# Patient Record
Sex: FEMALE | Race: WHITE | NOT HISPANIC OR LATINO | Employment: OTHER | URBAN - METROPOLITAN AREA
[De-identification: names, ages, dates, MRNs, and addresses within clinical notes are randomized per-mention and may not be internally consistent; named-entity substitution may affect disease eponyms.]

---

## 2017-01-30 ENCOUNTER — APPOINTMENT (OUTPATIENT)
Dept: LAB | Facility: CLINIC | Age: 82
End: 2017-01-30
Payer: COMMERCIAL

## 2017-01-30 ENCOUNTER — TRANSCRIBE ORDERS (OUTPATIENT)
Dept: LAB | Facility: CLINIC | Age: 82
End: 2017-01-30

## 2017-01-30 DIAGNOSIS — I10 ESSENTIAL HYPERTENSION, MALIGNANT: Primary | ICD-10-CM

## 2017-01-30 DIAGNOSIS — E78.00 PURE HYPERCHOLESTEROLEMIA: ICD-10-CM

## 2017-01-30 LAB
ALBUMIN SERPL BCP-MCNC: 3.4 G/DL (ref 3.5–5)
ALP SERPL-CCNC: 77 U/L (ref 46–116)
ALT SERPL W P-5'-P-CCNC: 33 U/L (ref 12–78)
ANION GAP SERPL CALCULATED.3IONS-SCNC: 7 MMOL/L (ref 4–13)
AST SERPL W P-5'-P-CCNC: 18 U/L (ref 5–45)
BILIRUB DIRECT SERPL-MCNC: 0.14 MG/DL (ref 0–0.2)
BILIRUB SERPL-MCNC: 0.53 MG/DL (ref 0.2–1)
BUN SERPL-MCNC: 7 MG/DL (ref 5–25)
CALCIUM SERPL-MCNC: 8.5 MG/DL (ref 8.3–10.1)
CHLORIDE SERPL-SCNC: 108 MMOL/L (ref 100–108)
CHOLEST SERPL-MCNC: 119 MG/DL (ref 50–200)
CK SERPL-CCNC: 51 U/L (ref 26–192)
CO2 SERPL-SCNC: 27 MMOL/L (ref 21–32)
CREAT SERPL-MCNC: 0.79 MG/DL (ref 0.6–1.3)
GFR SERPL CREATININE-BSD FRML MDRD: >60 ML/MIN/1.73SQ M
GLUCOSE SERPL-MCNC: 100 MG/DL (ref 65–140)
HDLC SERPL-MCNC: 50 MG/DL (ref 40–60)
LDLC SERPL CALC-MCNC: 48 MG/DL (ref 0–100)
POTASSIUM SERPL-SCNC: 4.2 MMOL/L (ref 3.5–5.3)
PROT SERPL-MCNC: 6.8 G/DL (ref 6.4–8.2)
SODIUM SERPL-SCNC: 142 MMOL/L (ref 136–145)
TRIGL SERPL-MCNC: 107 MG/DL

## 2017-01-30 PROCEDURE — 36415 COLL VENOUS BLD VENIPUNCTURE: CPT

## 2017-01-30 PROCEDURE — 80076 HEPATIC FUNCTION PANEL: CPT

## 2017-01-30 PROCEDURE — 82550 ASSAY OF CK (CPK): CPT

## 2017-01-30 PROCEDURE — 80061 LIPID PANEL: CPT

## 2017-01-30 PROCEDURE — 80048 BASIC METABOLIC PNL TOTAL CA: CPT

## 2017-03-20 ENCOUNTER — APPOINTMENT (EMERGENCY)
Dept: RADIOLOGY | Facility: HOSPITAL | Age: 82
End: 2017-03-20
Payer: COMMERCIAL

## 2017-03-20 ENCOUNTER — HOSPITAL ENCOUNTER (EMERGENCY)
Facility: HOSPITAL | Age: 82
Discharge: HOME | End: 2017-03-20
Attending: EMERGENCY MEDICINE | Admitting: EMERGENCY MEDICINE
Payer: COMMERCIAL

## 2017-03-20 VITALS
WEIGHT: 150 LBS | OXYGEN SATURATION: 95 % | SYSTOLIC BLOOD PRESSURE: 128 MMHG | RESPIRATION RATE: 16 BRPM | DIASTOLIC BLOOD PRESSURE: 61 MMHG | HEART RATE: 68 BPM | TEMPERATURE: 98.4 F | BODY MASS INDEX: 28.32 KG/M2 | HEIGHT: 61 IN

## 2017-03-20 DIAGNOSIS — R49.0 HOARSENESS OF VOICE: ICD-10-CM

## 2017-03-20 DIAGNOSIS — J20.9 ACUTE BRONCHITIS: Primary | ICD-10-CM

## 2017-03-20 LAB
ANION GAP SERPL CALCULATED.3IONS-SCNC: 9 MMOL/L (ref 4–13)
BASOPHILS # BLD AUTO: 0 THOUSANDS/ΜL (ref 0–0.1)
BASOPHILS NFR BLD AUTO: 1 % (ref 0–1)
BUN SERPL-MCNC: 10 MG/DL (ref 5–25)
CALCIUM SERPL-MCNC: 8.4 MG/DL (ref 8.3–10.1)
CHLORIDE SERPL-SCNC: 102 MMOL/L (ref 100–108)
CO2 SERPL-SCNC: 27 MMOL/L (ref 21–32)
CREAT SERPL-MCNC: 0.88 MG/DL (ref 0.6–1.3)
EOSINOPHIL # BLD AUTO: 0.2 THOUSAND/ΜL (ref 0–0.61)
EOSINOPHIL NFR BLD AUTO: 3 % (ref 0–6)
ERYTHROCYTE [DISTWIDTH] IN BLOOD BY AUTOMATED COUNT: 12.9 % (ref 11.6–15.1)
GFR SERPL CREATININE-BSD FRML MDRD: >60 ML/MIN/1.73SQ M
GLUCOSE SERPL-MCNC: 110 MG/DL (ref 65–140)
HCT VFR BLD AUTO: 37 % (ref 37–47)
HGB BLD-MCNC: 12.2 G/DL (ref 12–16)
LYMPHOCYTES # BLD AUTO: 1.3 THOUSANDS/ΜL (ref 0.6–4.47)
LYMPHOCYTES NFR BLD AUTO: 18 % (ref 14–44)
MCH RBC QN AUTO: 30.9 PG (ref 27–31)
MCHC RBC AUTO-ENTMCNC: 32.9 G/DL (ref 31.4–37.4)
MCV RBC AUTO: 94 FL (ref 82–98)
MONOCYTES # BLD AUTO: 0.7 THOUSAND/ΜL (ref 0.17–1.22)
MONOCYTES NFR BLD AUTO: 9 % (ref 4–12)
NEUTROPHILS # BLD AUTO: 5.3 THOUSANDS/ΜL (ref 1.85–7.62)
NEUTS SEG NFR BLD AUTO: 70 % (ref 43–75)
NRBC BLD AUTO-RTO: 0 /100 WBCS
PLATELET # BLD AUTO: 262 THOUSANDS/UL (ref 130–400)
PMV BLD AUTO: 8.1 FL (ref 8.9–12.7)
POTASSIUM SERPL-SCNC: 3.6 MMOL/L (ref 3.5–5.3)
RBC # BLD AUTO: 3.94 MILLION/UL (ref 4.2–5.4)
SODIUM SERPL-SCNC: 138 MMOL/L (ref 136–145)
WBC # BLD AUTO: 7.5 THOUSAND/UL (ref 4.8–10.8)

## 2017-03-20 PROCEDURE — 80048 BASIC METABOLIC PNL TOTAL CA: CPT | Performed by: EMERGENCY MEDICINE

## 2017-03-20 PROCEDURE — 85025 COMPLETE CBC W/AUTO DIFF WBC: CPT | Performed by: EMERGENCY MEDICINE

## 2017-03-20 PROCEDURE — 36415 COLL VENOUS BLD VENIPUNCTURE: CPT | Performed by: EMERGENCY MEDICINE

## 2017-03-20 PROCEDURE — 99285 EMERGENCY DEPT VISIT HI MDM: CPT

## 2017-03-20 PROCEDURE — 93005 ELECTROCARDIOGRAM TRACING: CPT

## 2017-03-20 PROCEDURE — 71010 HB CHEST X-RAY 1 VIEW FRONTAL (PORTABLE): CPT

## 2017-03-20 PROCEDURE — 94640 AIRWAY INHALATION TREATMENT: CPT

## 2017-03-20 RX ORDER — AZITHROMYCIN 250 MG/1
250 TABLET, FILM COATED ORAL DAILY
Qty: 4 TABLET | Refills: 0 | Status: SHIPPED | OUTPATIENT
Start: 2017-03-20 | End: 2017-03-24

## 2017-03-20 RX ORDER — ALBUTEROL SULFATE 90 UG/1
1 AEROSOL, METERED RESPIRATORY (INHALATION) 4 TIMES DAILY
Qty: 1 INHALER | Refills: 0 | Status: SHIPPED | OUTPATIENT
Start: 2017-03-20 | End: 2017-03-25

## 2017-03-20 RX ORDER — AZITHROMYCIN 250 MG/1
500 TABLET, FILM COATED ORAL ONCE
Status: COMPLETED | OUTPATIENT
Start: 2017-03-20 | End: 2017-03-20

## 2017-03-20 RX ORDER — ALBUTEROL SULFATE 2.5 MG/3ML
2.5 SOLUTION RESPIRATORY (INHALATION) ONCE
Status: COMPLETED | OUTPATIENT
Start: 2017-03-20 | End: 2017-03-20

## 2017-03-20 RX ADMIN — AZITHROMYCIN 500 MG: 250 TABLET, FILM COATED ORAL at 16:18

## 2017-03-20 RX ADMIN — ALBUTEROL SULFATE 2.5 MG: 2.5 SOLUTION RESPIRATORY (INHALATION) at 14:32

## 2017-03-21 LAB
ATRIAL RATE: 86 BPM
P AXIS: 55 DEGREES
PR INTERVAL: 156 MS
QRS AXIS: 52 DEGREES
QRSD INTERVAL: 84 MS
QT INTERVAL: 366 MS
QTC INTERVAL: 437 MS
T WAVE AXIS: 39 DEGREES
VENTRICULAR RATE: 86 BPM

## 2018-01-05 ENCOUNTER — APPOINTMENT (OUTPATIENT)
Dept: LAB | Facility: CLINIC | Age: 83
End: 2018-01-05
Payer: COMMERCIAL

## 2018-01-05 ENCOUNTER — TRANSCRIBE ORDERS (OUTPATIENT)
Dept: LAB | Facility: CLINIC | Age: 83
End: 2018-01-05

## 2018-01-05 DIAGNOSIS — R06.02 SHORTNESS OF BREATH: ICD-10-CM

## 2018-01-05 DIAGNOSIS — I10 ESSENTIAL HYPERTENSION, BENIGN: Primary | ICD-10-CM

## 2018-01-05 DIAGNOSIS — R07.9 CHEST PAIN, UNSPECIFIED TYPE: ICD-10-CM

## 2018-01-05 LAB
ALBUMIN SERPL BCP-MCNC: 3.5 G/DL (ref 3.5–5)
ALP SERPL-CCNC: 71 U/L (ref 46–116)
ALT SERPL W P-5'-P-CCNC: 26 U/L (ref 12–78)
ANION GAP SERPL CALCULATED.3IONS-SCNC: 6 MMOL/L (ref 4–13)
AST SERPL W P-5'-P-CCNC: 18 U/L (ref 5–45)
BILIRUB DIRECT SERPL-MCNC: 0.14 MG/DL (ref 0–0.2)
BILIRUB SERPL-MCNC: 0.58 MG/DL (ref 0.2–1)
BUN SERPL-MCNC: 9 MG/DL (ref 5–25)
CALCIUM SERPL-MCNC: 8.3 MG/DL (ref 8.3–10.1)
CHLORIDE SERPL-SCNC: 108 MMOL/L (ref 100–108)
CHOLEST SERPL-MCNC: 140 MG/DL (ref 50–200)
CK SERPL-CCNC: 83 U/L (ref 26–192)
CO2 SERPL-SCNC: 28 MMOL/L (ref 21–32)
CREAT SERPL-MCNC: 0.8 MG/DL (ref 0.6–1.3)
GFR SERPL CREATININE-BSD FRML MDRD: 70 ML/MIN/1.73SQ M
GLUCOSE P FAST SERPL-MCNC: 93 MG/DL (ref 65–99)
HDLC SERPL-MCNC: 54 MG/DL (ref 40–60)
LDLC SERPL CALC-MCNC: 62 MG/DL (ref 0–100)
POTASSIUM SERPL-SCNC: 4 MMOL/L (ref 3.5–5.3)
PROT SERPL-MCNC: 6.8 G/DL (ref 6.4–8.2)
SODIUM SERPL-SCNC: 142 MMOL/L (ref 136–145)
TRIGL SERPL-MCNC: 121 MG/DL

## 2018-01-05 PROCEDURE — 80076 HEPATIC FUNCTION PANEL: CPT

## 2018-01-05 PROCEDURE — 82550 ASSAY OF CK (CPK): CPT

## 2018-01-05 PROCEDURE — 80048 BASIC METABOLIC PNL TOTAL CA: CPT

## 2018-01-05 PROCEDURE — 80061 LIPID PANEL: CPT

## 2018-01-05 PROCEDURE — 36415 COLL VENOUS BLD VENIPUNCTURE: CPT

## 2018-01-11 NOTE — RESULT NOTES
Verified Results  (1) HEMOGLOBIN A1C [id number] 12:50PM Michelle Kade     Test Name Result Flag Reference   HEMOGLOBIN A1C 5 7 %  4 2-6 3   EST  AVG   GLUCOSE 117 mg/dl     PLEASE DO FROM EXISTING BLOOD SAMPLE    PLEASE DO FROM EXISTING BLOOD SAMPLE

## 2018-01-16 NOTE — PROCEDURES
Procedures by Eliot Jacobsen MD  at 2016  9:27 AM      Author:  Eliot Jacobsen MD Service:  Neurology Author Type:  Physician     Filed:  2016  9:35 AM Date of Service:  2016  9:27 AM Status:  Signed     :  Eliot Jacobsen MD (Physician)            ELECTROENCEPHALOGRAM (EEG)      Patient Name:  Thom Lozano  MRN: [id number]   :  1935 File #: Luis Santo   Age: 68 y o  Encounter #: [id number]   Date performed:16            Report date: 2016          Study type: Routine EEG    ICD 10 diagnosis: Spells/Fit NOS R56 9    Start time: 08:22 End time: 08:55     -------------------------------------------------------------------------------------------------------------------   Patient History:  68year old female presented to ER with acute onset speech difficulty and left-sided weakness  Had a  stroke in 2016 with  almost complete recovery except  for some persistent speech problems  On this admission, patient received TPA, after which MRI showed small amount of subarachnoid blood as well as subdural hematoma in left parietal area  -------------------------------------------------------------------------------------------------------------------   Description of Procedure:  ·  32 channel digital recording with electrodes placed according to the International 10-20 system with additional  T1/T2 electrodes, EOG, EKG, and simultaneous  video  The recording was technically satisfactory  -------------------------------------------------------------------------------------------------------------------   Results:  Background Activity:  The recording was performed with the patient awake  During wakefulness, there were long runs of well regulated/regular,  low amplitude, posteriorly dominant, symmetric 9-10 Hz activity that  did attenuate with eye opening  -------------------------------------------------------------------------------------------------------------------   Activation Procedures:  Hyperventilation was not performed  Stepped photic stimulation between 1-20 cps was performed and induced symmetric photic  driving     -------------------------------------------------------------------------------------------------------------------   Abnormal Findings:  Continuous low to mid amplitude irregular theta with some intermixed delta slowing was noted at F7 T3  No interictal epileptiform discharges were noted  No electrographic seizures were observed during this recording     -------------------------------------------------------------------------------------------------------------------  Other Findings: The single lead ECG demonstrated a regular rhythm     -------------------------------------------------------------------------------------------------------------------  Events:  No patient push button events  -------------------------------------------------------------------------------------------------------------------   EEG Interpretation:  Moderately abnormal 28 minute EEG, due to nearly continuous irregular theta with some intermixed delta slowing in the left anterior temporal     No interictal epileptiform discharges were noted  No electrographic seizures occurred during the recording  Scribe Attestation:  Documented by Haley Gaspar, acting as a scribe for Dr Funmilayo Bales on 9/13/2016  at 9:34 PM     Physician Attestation:  All documentation recorded by the scribe accurately reflects the service I personally performed and the decisions made by me  I was present during the time the encounter was recorded and have reviewed all the documentation and confirm that it is all accurate  and representative of the encounter        Dale Davis MD   Medical Director  [facility] [place]  Pager # [phone] [phone]    Sep 13 2016  9:35AM [facility] Standard Time

## 2018-06-14 ENCOUNTER — TRANSCRIBE ORDERS (OUTPATIENT)
Dept: ADMINISTRATIVE | Facility: HOSPITAL | Age: 83
End: 2018-06-14

## 2018-06-14 DIAGNOSIS — R13.10 PROBLEMS WITH SWALLOWING AND MASTICATION: ICD-10-CM

## 2018-06-14 DIAGNOSIS — R27.0 ATAXIA: Primary | ICD-10-CM

## 2018-06-20 ENCOUNTER — HOSPITAL ENCOUNTER (OUTPATIENT)
Dept: RADIOLOGY | Facility: HOSPITAL | Age: 83
Discharge: HOME | End: 2018-06-20
Payer: COMMERCIAL

## 2018-06-20 DIAGNOSIS — R13.10 PROBLEMS WITH SWALLOWING AND MASTICATION: ICD-10-CM

## 2018-06-20 PROCEDURE — 74220 X-RAY XM ESOPHAGUS 1CNTRST: CPT

## 2018-07-12 ENCOUNTER — HOSPITAL ENCOUNTER (OUTPATIENT)
Dept: RADIOLOGY | Facility: HOSPITAL | Age: 83
Discharge: HOME | End: 2018-07-12
Payer: COMMERCIAL

## 2018-07-12 DIAGNOSIS — R27.0 ATAXIA: ICD-10-CM

## 2018-07-12 PROCEDURE — 70551 MRI BRAIN STEM W/O DYE: CPT

## 2018-07-23 ENCOUNTER — APPOINTMENT (OUTPATIENT)
Dept: LAB | Facility: CLINIC | Age: 83
End: 2018-07-23
Payer: COMMERCIAL

## 2018-07-23 ENCOUNTER — TRANSCRIBE ORDERS (OUTPATIENT)
Dept: LAB | Facility: CLINIC | Age: 83
End: 2018-07-23

## 2018-07-23 DIAGNOSIS — L28.1 PRURIGO NODULARIS: Primary | ICD-10-CM

## 2018-07-23 DIAGNOSIS — L29.8 PRURITUS SENILIS: ICD-10-CM

## 2018-07-23 LAB
ALBUMIN SERPL BCP-MCNC: 3.4 G/DL (ref 3.5–5)
ALP SERPL-CCNC: 78 U/L (ref 46–116)
ALT SERPL W P-5'-P-CCNC: 25 U/L (ref 12–78)
ANION GAP SERPL CALCULATED.3IONS-SCNC: 5 MMOL/L (ref 4–13)
AST SERPL W P-5'-P-CCNC: 21 U/L (ref 5–45)
BASOPHILS # BLD AUTO: 0.06 THOUSANDS/ΜL (ref 0–0.1)
BASOPHILS NFR BLD AUTO: 1 % (ref 0–1)
BILIRUB SERPL-MCNC: 0.31 MG/DL (ref 0.2–1)
BILIRUB UR QL STRIP: NEGATIVE
BUN SERPL-MCNC: 9 MG/DL (ref 5–25)
CALCIUM SERPL-MCNC: 8.3 MG/DL (ref 8.3–10.1)
CHLORIDE SERPL-SCNC: 108 MMOL/L (ref 100–108)
CLARITY UR: CLEAR
CO2 SERPL-SCNC: 27 MMOL/L (ref 21–32)
COLOR UR: YELLOW
CREAT SERPL-MCNC: 0.94 MG/DL (ref 0.6–1.3)
EOSINOPHIL # BLD AUTO: 0.16 THOUSAND/ΜL (ref 0–0.61)
EOSINOPHIL NFR BLD AUTO: 3 % (ref 0–6)
ERYTHROCYTE [DISTWIDTH] IN BLOOD BY AUTOMATED COUNT: 12.8 % (ref 11.6–15.1)
ERYTHROCYTE [SEDIMENTATION RATE] IN BLOOD: 13 MM/HOUR (ref 0–20)
GFR SERPL CREATININE-BSD FRML MDRD: 57 ML/MIN/1.73SQ M
GLUCOSE P FAST SERPL-MCNC: 98 MG/DL (ref 65–99)
GLUCOSE UR STRIP-MCNC: NEGATIVE MG/DL
HCT VFR BLD AUTO: 42.3 % (ref 34.8–46.1)
HGB BLD-MCNC: 13.5 G/DL (ref 11.5–15.4)
HGB UR QL STRIP.AUTO: NEGATIVE
IMM GRANULOCYTES # BLD AUTO: 0.01 THOUSAND/UL (ref 0–0.2)
IMM GRANULOCYTES NFR BLD AUTO: 0 % (ref 0–2)
KETONES UR STRIP-MCNC: NEGATIVE MG/DL
LEUKOCYTE ESTERASE UR QL STRIP: NEGATIVE
LYMPHOCYTES # BLD AUTO: 1.63 THOUSANDS/ΜL (ref 0.6–4.47)
LYMPHOCYTES NFR BLD AUTO: 25 % (ref 14–44)
MCH RBC QN AUTO: 31.6 PG (ref 26.8–34.3)
MCHC RBC AUTO-ENTMCNC: 31.9 G/DL (ref 31.4–37.4)
MCV RBC AUTO: 99 FL (ref 82–98)
MONOCYTES # BLD AUTO: 0.45 THOUSAND/ΜL (ref 0.17–1.22)
MONOCYTES NFR BLD AUTO: 7 % (ref 4–12)
NEUTROPHILS # BLD AUTO: 4.12 THOUSANDS/ΜL (ref 1.85–7.62)
NEUTS SEG NFR BLD AUTO: 64 % (ref 43–75)
NITRITE UR QL STRIP: NEGATIVE
NRBC BLD AUTO-RTO: 0 /100 WBCS
PH UR STRIP.AUTO: 6 [PH] (ref 4.5–8)
PLATELET # BLD AUTO: 257 THOUSANDS/UL (ref 149–390)
PMV BLD AUTO: 11.2 FL (ref 8.9–12.7)
POTASSIUM SERPL-SCNC: 3.9 MMOL/L (ref 3.5–5.3)
PROT SERPL-MCNC: 7 G/DL (ref 6.4–8.2)
PROT UR STRIP-MCNC: NEGATIVE MG/DL
RBC # BLD AUTO: 4.27 MILLION/UL (ref 3.81–5.12)
SODIUM SERPL-SCNC: 140 MMOL/L (ref 136–145)
SP GR UR STRIP.AUTO: 1.01 (ref 1–1.03)
TSH SERPL DL<=0.05 MIU/L-ACNC: 1.07 UIU/ML (ref 0.36–3.74)
UROBILINOGEN UR QL STRIP.AUTO: 0.2 E.U./DL
WBC # BLD AUTO: 6.43 THOUSAND/UL (ref 4.31–10.16)

## 2018-07-23 PROCEDURE — 85025 COMPLETE CBC W/AUTO DIFF WBC: CPT

## 2018-07-23 PROCEDURE — 84166 PROTEIN E-PHORESIS/URINE/CSF: CPT

## 2018-07-23 PROCEDURE — 80053 COMPREHEN METABOLIC PANEL: CPT

## 2018-07-23 PROCEDURE — 84166 PROTEIN E-PHORESIS/URINE/CSF: CPT | Performed by: PATHOLOGY

## 2018-07-23 PROCEDURE — 85652 RBC SED RATE AUTOMATED: CPT

## 2018-07-23 PROCEDURE — 84443 ASSAY THYROID STIM HORMONE: CPT

## 2018-07-23 PROCEDURE — 36415 COLL VENOUS BLD VENIPUNCTURE: CPT

## 2018-07-23 PROCEDURE — 81003 URINALYSIS AUTO W/O SCOPE: CPT

## 2018-07-26 LAB
ALBUMIN UR ELPH-MCNC: 100 %
ALPHA1 GLOB MFR UR ELPH: 0 %
ALPHA2 GLOB MFR UR ELPH: 0 %
B-GLOBULIN MFR UR ELPH: 0 %
GAMMA GLOB MFR UR ELPH: 0 %
PROT PATTERN UR ELPH-IMP: NORMAL
PROT UR-MCNC: 6 MG/DL

## 2018-07-27 ENCOUNTER — APPOINTMENT (OUTPATIENT)
Dept: LAB | Facility: CLINIC | Age: 83
End: 2018-07-27
Payer: COMMERCIAL

## 2018-07-27 ENCOUNTER — TRANSCRIBE ORDERS (OUTPATIENT)
Dept: LAB | Facility: CLINIC | Age: 83
End: 2018-07-27

## 2018-07-27 DIAGNOSIS — L29.8 PRURITUS SENILIS: ICD-10-CM

## 2018-07-27 DIAGNOSIS — L28.1 PRURIGO NODULARIS: Primary | ICD-10-CM

## 2018-07-27 PROCEDURE — 36415 COLL VENOUS BLD VENIPUNCTURE: CPT

## 2018-07-27 PROCEDURE — 84165 PROTEIN E-PHORESIS SERUM: CPT | Performed by: PATHOLOGY

## 2018-07-27 PROCEDURE — 84165 PROTEIN E-PHORESIS SERUM: CPT

## 2018-07-31 LAB
ALBUMIN SERPL ELPH-MCNC: 4.04 G/DL (ref 3.5–5)
ALBUMIN SERPL ELPH-MCNC: 60.3 % (ref 52–65)
ALPHA1 GLOB SERPL ELPH-MCNC: 0.29 G/DL (ref 0.1–0.4)
ALPHA1 GLOB SERPL ELPH-MCNC: 4.4 % (ref 2.5–5)
ALPHA2 GLOB SERPL ELPH-MCNC: 0.82 G/DL (ref 0.4–1.2)
ALPHA2 GLOB SERPL ELPH-MCNC: 12.3 % (ref 7–13)
BETA GLOB ABNORMAL SERPL ELPH-MCNC: 0.44 G/DL (ref 0.4–0.8)
BETA1 GLOB SERPL ELPH-MCNC: 6.5 % (ref 5–13)
BETA2 GLOB SERPL ELPH-MCNC: 5.3 % (ref 2–8)
BETA2+GAMMA GLOB SERPL ELPH-MCNC: 0.36 G/DL (ref 0.2–0.5)
GAMMA GLOB ABNORMAL SERPL ELPH-MCNC: 0.75 G/DL (ref 0.5–1.6)
GAMMA GLOB SERPL ELPH-MCNC: 11.2 % (ref 12–22)
IGG/ALB SER: 1.52 {RATIO} (ref 1.1–1.8)
PROT PATTERN SERPL ELPH-IMP: ABNORMAL
PROT SERPL-MCNC: 6.7 G/DL (ref 6.4–8.2)

## 2018-08-17 ENCOUNTER — APPOINTMENT (OUTPATIENT)
Dept: LAB | Facility: CLINIC | Age: 83
End: 2018-08-17
Payer: COMMERCIAL

## 2018-08-17 ENCOUNTER — TRANSCRIBE ORDERS (OUTPATIENT)
Dept: LAB | Facility: CLINIC | Age: 83
End: 2018-08-17

## 2018-08-17 DIAGNOSIS — I10 ESSENTIAL HYPERTENSION, MALIGNANT: ICD-10-CM

## 2018-08-17 DIAGNOSIS — E03.4 IDIOPATHIC ATROPHIC HYPOTHYROIDISM: ICD-10-CM

## 2018-08-17 DIAGNOSIS — E55.9 VITAMIN D DEFICIENCY: ICD-10-CM

## 2018-08-17 DIAGNOSIS — E78.5 HYPERLIPIDEMIA, UNSPECIFIED HYPERLIPIDEMIA TYPE: Primary | ICD-10-CM

## 2018-08-17 DIAGNOSIS — Z79.899 ENCOUNTER FOR LONG-TERM (CURRENT) USE OF MEDICATIONS: ICD-10-CM

## 2018-08-17 LAB
25(OH)D3 SERPL-MCNC: 25.1 NG/ML (ref 30–100)
ALBUMIN SERPL BCP-MCNC: 3.6 G/DL (ref 3.5–5)
ALP SERPL-CCNC: 76 U/L (ref 46–116)
ALT SERPL W P-5'-P-CCNC: 27 U/L (ref 12–78)
ANION GAP SERPL CALCULATED.3IONS-SCNC: 8 MMOL/L (ref 4–13)
AST SERPL W P-5'-P-CCNC: 20 U/L (ref 5–45)
BACTERIA UR QL AUTO: ABNORMAL /HPF
BASOPHILS # BLD AUTO: 0.04 THOUSANDS/ΜL (ref 0–0.1)
BASOPHILS NFR BLD AUTO: 1 % (ref 0–1)
BILIRUB SERPL-MCNC: 0.56 MG/DL (ref 0.2–1)
BILIRUB UR QL STRIP: NEGATIVE
BUN SERPL-MCNC: 8 MG/DL (ref 5–25)
CALCIUM SERPL-MCNC: 8.6 MG/DL (ref 8.3–10.1)
CHLORIDE SERPL-SCNC: 107 MMOL/L (ref 100–108)
CHOLEST SERPL-MCNC: 143 MG/DL (ref 50–200)
CLARITY UR: CLEAR
CO2 SERPL-SCNC: 26 MMOL/L (ref 21–32)
COLOR UR: YELLOW
CREAT SERPL-MCNC: 0.9 MG/DL (ref 0.6–1.3)
EOSINOPHIL # BLD AUTO: 0.17 THOUSAND/ΜL (ref 0–0.61)
EOSINOPHIL NFR BLD AUTO: 3 % (ref 0–6)
ERYTHROCYTE [DISTWIDTH] IN BLOOD BY AUTOMATED COUNT: 12.8 % (ref 11.6–15.1)
GFR SERPL CREATININE-BSD FRML MDRD: 61 ML/MIN/1.73SQ M
GLUCOSE P FAST SERPL-MCNC: 97 MG/DL (ref 65–99)
GLUCOSE UR STRIP-MCNC: NEGATIVE MG/DL
HCT VFR BLD AUTO: 41.1 % (ref 34.8–46.1)
HDLC SERPL-MCNC: 52 MG/DL (ref 40–60)
HGB BLD-MCNC: 13.4 G/DL (ref 11.5–15.4)
HGB UR QL STRIP.AUTO: NEGATIVE
HYALINE CASTS #/AREA URNS LPF: ABNORMAL /LPF
IMM GRANULOCYTES # BLD AUTO: 0.01 THOUSAND/UL (ref 0–0.2)
IMM GRANULOCYTES NFR BLD AUTO: 0 % (ref 0–2)
KETONES UR STRIP-MCNC: NEGATIVE MG/DL
LDLC SERPL CALC-MCNC: 72 MG/DL (ref 0–100)
LDLC SERPL DIRECT ASSAY-MCNC: 86 MG/DL (ref 0–100)
LEUKOCYTE ESTERASE UR QL STRIP: ABNORMAL
LYMPHOCYTES # BLD AUTO: 1.12 THOUSANDS/ΜL (ref 0.6–4.47)
LYMPHOCYTES NFR BLD AUTO: 22 % (ref 14–44)
MCH RBC QN AUTO: 31.7 PG (ref 26.8–34.3)
MCHC RBC AUTO-ENTMCNC: 32.6 G/DL (ref 31.4–37.4)
MCV RBC AUTO: 97 FL (ref 82–98)
MONOCYTES # BLD AUTO: 0.3 THOUSAND/ΜL (ref 0.17–1.22)
MONOCYTES NFR BLD AUTO: 6 % (ref 4–12)
NEUTROPHILS # BLD AUTO: 3.42 THOUSANDS/ΜL (ref 1.85–7.62)
NEUTS SEG NFR BLD AUTO: 68 % (ref 43–75)
NITRITE UR QL STRIP: NEGATIVE
NON-SQ EPI CELLS URNS QL MICRO: ABNORMAL /HPF
NONHDLC SERPL-MCNC: 91 MG/DL
NRBC BLD AUTO-RTO: 0 /100 WBCS
PH UR STRIP.AUTO: 6.5 [PH] (ref 4.5–8)
PLATELET # BLD AUTO: 275 THOUSANDS/UL (ref 149–390)
PMV BLD AUTO: 10.8 FL (ref 8.9–12.7)
POTASSIUM SERPL-SCNC: 4 MMOL/L (ref 3.5–5.3)
PROT SERPL-MCNC: 7.2 G/DL (ref 6.4–8.2)
PROT UR STRIP-MCNC: NEGATIVE MG/DL
RBC # BLD AUTO: 4.23 MILLION/UL (ref 3.81–5.12)
RBC #/AREA URNS AUTO: ABNORMAL /HPF
SODIUM SERPL-SCNC: 141 MMOL/L (ref 136–145)
SP GR UR STRIP.AUTO: 1.01 (ref 1–1.03)
T4 FREE SERPL-MCNC: 1 NG/DL (ref 0.76–1.46)
TRIGL SERPL-MCNC: 95 MG/DL
TSH SERPL DL<=0.05 MIU/L-ACNC: 0.68 UIU/ML (ref 0.36–3.74)
UROBILINOGEN UR QL STRIP.AUTO: 0.2 E.U./DL
WBC # BLD AUTO: 5.06 THOUSAND/UL (ref 4.31–10.16)
WBC #/AREA URNS AUTO: ABNORMAL /HPF

## 2018-08-17 PROCEDURE — 36415 COLL VENOUS BLD VENIPUNCTURE: CPT

## 2018-08-17 PROCEDURE — 81001 URINALYSIS AUTO W/SCOPE: CPT

## 2018-08-17 PROCEDURE — 80053 COMPREHEN METABOLIC PANEL: CPT

## 2018-08-17 PROCEDURE — 80061 LIPID PANEL: CPT

## 2018-08-17 PROCEDURE — 83721 ASSAY OF BLOOD LIPOPROTEIN: CPT

## 2018-08-17 PROCEDURE — 84439 ASSAY OF FREE THYROXINE: CPT

## 2018-08-17 PROCEDURE — 85025 COMPLETE CBC W/AUTO DIFF WBC: CPT

## 2018-08-17 PROCEDURE — 84443 ASSAY THYROID STIM HORMONE: CPT

## 2018-08-17 PROCEDURE — 82306 VITAMIN D 25 HYDROXY: CPT

## 2019-06-10 ENCOUNTER — HOSPITAL ENCOUNTER (INPATIENT)
Facility: HOSPITAL | Age: 84
LOS: 2 days | Discharge: HOME | DRG: 103 | End: 2019-06-12
Attending: EMERGENCY MEDICINE | Admitting: FAMILY MEDICINE
Payer: COMMERCIAL

## 2019-06-10 ENCOUNTER — APPOINTMENT (EMERGENCY)
Dept: RADIOLOGY | Facility: HOSPITAL | Age: 84
DRG: 103 | End: 2019-06-10
Payer: COMMERCIAL

## 2019-06-10 DIAGNOSIS — R47.01 EXPRESSIVE APHASIA: ICD-10-CM

## 2019-06-10 DIAGNOSIS — G45.9 TIA (TRANSIENT ISCHEMIC ATTACK): Primary | ICD-10-CM

## 2019-06-10 DIAGNOSIS — G40.909 SEIZURE DISORDER (HCC): ICD-10-CM

## 2019-06-10 DIAGNOSIS — Z79.899 MEDICATION MANAGEMENT: ICD-10-CM

## 2019-06-10 DIAGNOSIS — I10 HIGH BLOOD PRESSURE: ICD-10-CM

## 2019-06-10 DIAGNOSIS — I10 ESSENTIAL HYPERTENSION: Chronic | ICD-10-CM

## 2019-06-10 DIAGNOSIS — R41.3 POOR MEMORY: ICD-10-CM

## 2019-06-10 DIAGNOSIS — I63.9 ACUTE CVA (CEREBROVASCULAR ACCIDENT) (HCC): ICD-10-CM

## 2019-06-10 PROBLEM — K21.9 GERD (GASTROESOPHAGEAL REFLUX DISEASE): Status: ACTIVE | Noted: 2019-06-10

## 2019-06-10 PROBLEM — E78.5 HYPERLIPEMIA: Status: ACTIVE | Noted: 2019-06-10

## 2019-06-10 PROBLEM — F41.9 ANXIETY: Status: ACTIVE | Noted: 2019-06-10

## 2019-06-10 LAB
ALBUMIN SERPL BCP-MCNC: 3.7 G/DL (ref 3.5–5)
ALP SERPL-CCNC: 79 U/L (ref 46–116)
ALT SERPL W P-5'-P-CCNC: 28 U/L (ref 12–78)
ANION GAP SERPL CALCULATED.3IONS-SCNC: 10 MMOL/L (ref 4–13)
APTT PPP: 25 SECONDS (ref 26–38)
AST SERPL W P-5'-P-CCNC: 19 U/L (ref 5–45)
BASOPHILS # BLD AUTO: 0.04 THOUSANDS/ΜL (ref 0–0.1)
BASOPHILS NFR BLD AUTO: 1 % (ref 0–1)
BILIRUB SERPL-MCNC: 0.4 MG/DL (ref 0.2–1)
BUN SERPL-MCNC: 10 MG/DL (ref 5–25)
CALCIUM SERPL-MCNC: 8.7 MG/DL (ref 8.3–10.1)
CHLORIDE SERPL-SCNC: 105 MMOL/L (ref 100–108)
CHOLEST SERPL-MCNC: 172 MG/DL (ref 50–200)
CO2 SERPL-SCNC: 26 MMOL/L (ref 21–32)
CREAT SERPL-MCNC: 0.94 MG/DL (ref 0.6–1.3)
EOSINOPHIL # BLD AUTO: 0.17 THOUSAND/ΜL (ref 0–0.61)
EOSINOPHIL NFR BLD AUTO: 3 % (ref 0–6)
ERYTHROCYTE [DISTWIDTH] IN BLOOD BY AUTOMATED COUNT: 12.6 % (ref 11.6–15.1)
GFR SERPL CREATININE-BSD FRML MDRD: 57 ML/MIN/1.73SQ M
GLUCOSE SERPL-MCNC: 91 MG/DL (ref 65–140)
GLUCOSE SERPL-MCNC: 95 MG/DL (ref 65–140)
HCT VFR BLD AUTO: 40.8 % (ref 34.8–46.1)
HDLC SERPL-MCNC: 46 MG/DL (ref 40–60)
HGB BLD-MCNC: 13.5 G/DL (ref 11.5–15.4)
IMM GRANULOCYTES # BLD AUTO: 0.01 THOUSAND/UL (ref 0–0.2)
IMM GRANULOCYTES NFR BLD AUTO: 0 % (ref 0–2)
INR PPP: 1.02 (ref 0.86–1.16)
LDLC SERPL CALC-MCNC: 92 MG/DL (ref 0–100)
LYMPHOCYTES # BLD AUTO: 1.68 THOUSANDS/ΜL (ref 0.6–4.47)
LYMPHOCYTES NFR BLD AUTO: 29 % (ref 14–44)
MCH RBC QN AUTO: 31.5 PG (ref 26.8–34.3)
MCHC RBC AUTO-ENTMCNC: 33.1 G/DL (ref 31.4–37.4)
MCV RBC AUTO: 95 FL (ref 82–98)
MONOCYTES # BLD AUTO: 0.49 THOUSAND/ΜL (ref 0.17–1.22)
MONOCYTES NFR BLD AUTO: 8 % (ref 4–12)
NEUTROPHILS # BLD AUTO: 3.43 THOUSANDS/ΜL (ref 1.85–7.62)
NEUTS SEG NFR BLD AUTO: 59 % (ref 43–75)
NRBC BLD AUTO-RTO: 0 /100 WBCS
PLATELET # BLD AUTO: 248 THOUSANDS/UL (ref 149–390)
PMV BLD AUTO: 10.5 FL (ref 8.9–12.7)
POTASSIUM SERPL-SCNC: 3.8 MMOL/L (ref 3.5–5.3)
PROT SERPL-MCNC: 7.3 G/DL (ref 6.4–8.2)
PROTHROMBIN TIME: 10.7 SECONDS (ref 9.4–11.7)
RBC # BLD AUTO: 4.29 MILLION/UL (ref 3.81–5.12)
SODIUM SERPL-SCNC: 141 MMOL/L (ref 136–145)
TRIGL SERPL-MCNC: 168 MG/DL
TSH SERPL DL<=0.05 MIU/L-ACNC: 1.27 UIU/ML (ref 0.36–3.74)
WBC # BLD AUTO: 5.82 THOUSAND/UL (ref 4.31–10.16)

## 2019-06-10 PROCEDURE — 85610 PROTHROMBIN TIME: CPT | Performed by: EMERGENCY MEDICINE

## 2019-06-10 PROCEDURE — 87147 CULTURE TYPE IMMUNOLOGIC: CPT | Performed by: GENERAL PRACTICE

## 2019-06-10 PROCEDURE — 99285 EMERGENCY DEPT VISIT HI MDM: CPT

## 2019-06-10 PROCEDURE — 36415 COLL VENOUS BLD VENIPUNCTURE: CPT | Performed by: EMERGENCY MEDICINE

## 2019-06-10 PROCEDURE — 70450 CT HEAD/BRAIN W/O DYE: CPT

## 2019-06-10 PROCEDURE — 85025 COMPLETE CBC W/AUTO DIFF WBC: CPT | Performed by: EMERGENCY MEDICINE

## 2019-06-10 PROCEDURE — 70496 CT ANGIOGRAPHY HEAD: CPT

## 2019-06-10 PROCEDURE — 80165 DIPROPYLACETIC ACID FREE: CPT | Performed by: GENERAL PRACTICE

## 2019-06-10 PROCEDURE — 87081 CULTURE SCREEN ONLY: CPT | Performed by: GENERAL PRACTICE

## 2019-06-10 PROCEDURE — 82948 REAGENT STRIP/BLOOD GLUCOSE: CPT

## 2019-06-10 PROCEDURE — 84443 ASSAY THYROID STIM HORMONE: CPT | Performed by: GENERAL PRACTICE

## 2019-06-10 PROCEDURE — 96360 HYDRATION IV INFUSION INIT: CPT

## 2019-06-10 PROCEDURE — 85730 THROMBOPLASTIN TIME PARTIAL: CPT | Performed by: EMERGENCY MEDICINE

## 2019-06-10 PROCEDURE — 93005 ELECTROCARDIOGRAM TRACING: CPT

## 2019-06-10 PROCEDURE — 80061 LIPID PANEL: CPT | Performed by: GENERAL PRACTICE

## 2019-06-10 PROCEDURE — 80177 DRUG SCRN QUAN LEVETIRACETAM: CPT | Performed by: GENERAL PRACTICE

## 2019-06-10 PROCEDURE — 70498 CT ANGIOGRAPHY NECK: CPT

## 2019-06-10 PROCEDURE — 80053 COMPREHEN METABOLIC PANEL: CPT | Performed by: EMERGENCY MEDICINE

## 2019-06-10 RX ORDER — CALCIUM CARBONATE 200(500)MG
1000 TABLET,CHEWABLE ORAL DAILY PRN
Status: DISCONTINUED | OUTPATIENT
Start: 2019-06-10 | End: 2019-06-12 | Stop reason: HOSPADM

## 2019-06-10 RX ORDER — FLUTICASONE PROPIONATE 50 MCG
1 SPRAY, SUSPENSION (ML) NASAL DAILY
COMMUNITY

## 2019-06-10 RX ORDER — LANOLIN ALCOHOL/MO/W.PET/CERES
3 CREAM (GRAM) TOPICAL
COMMUNITY

## 2019-06-10 RX ORDER — LOSARTAN POTASSIUM 50 MG/1
50 TABLET ORAL DAILY
COMMUNITY

## 2019-06-10 RX ORDER — GUAIFENESIN 100 MG/5ML
200 SOLUTION ORAL EVERY 4 HOURS PRN
Status: DISCONTINUED | OUTPATIENT
Start: 2019-06-10 | End: 2019-06-12 | Stop reason: HOSPADM

## 2019-06-10 RX ORDER — CLOPIDOGREL BISULFATE 75 MG/1
75 TABLET ORAL DAILY
Status: DISCONTINUED | OUTPATIENT
Start: 2019-06-11 | End: 2019-06-12 | Stop reason: HOSPADM

## 2019-06-10 RX ORDER — LEVOTHYROXINE SODIUM 0.07 MG/1
75 TABLET ORAL DAILY
COMMUNITY
End: 2019-06-12 | Stop reason: HOSPADM

## 2019-06-10 RX ORDER — LORATADINE 10 MG/1
10 TABLET ORAL AS NEEDED
COMMUNITY

## 2019-06-10 RX ORDER — DIVALPROEX SODIUM 500 MG/1
500 TABLET, DELAYED RELEASE ORAL 3 TIMES DAILY
Status: DISCONTINUED | OUTPATIENT
Start: 2019-06-10 | End: 2019-06-12 | Stop reason: HOSPADM

## 2019-06-10 RX ORDER — LORAZEPAM 0.5 MG/1
0.5 TABLET ORAL EVERY 6 HOURS PRN
Status: DISCONTINUED | OUTPATIENT
Start: 2019-06-10 | End: 2019-06-12 | Stop reason: HOSPADM

## 2019-06-10 RX ORDER — FAMOTIDINE 20 MG/1
20 TABLET, FILM COATED ORAL 2 TIMES DAILY
Status: DISCONTINUED | OUTPATIENT
Start: 2019-06-10 | End: 2019-06-12 | Stop reason: HOSPADM

## 2019-06-10 RX ORDER — OMEPRAZOLE 20 MG/1
20 CAPSULE, DELAYED RELEASE ORAL DAILY
COMMUNITY

## 2019-06-10 RX ORDER — ATORVASTATIN CALCIUM 80 MG/1
80 TABLET, FILM COATED ORAL
Status: DISCONTINUED | OUTPATIENT
Start: 2019-06-11 | End: 2019-06-12 | Stop reason: HOSPADM

## 2019-06-10 RX ORDER — LEVOTHYROXINE SODIUM 88 UG/1
88 TABLET ORAL
Status: DISCONTINUED | OUTPATIENT
Start: 2019-06-11 | End: 2019-06-12 | Stop reason: HOSPADM

## 2019-06-10 RX ORDER — CITALOPRAM 20 MG/1
20 TABLET ORAL DAILY
COMMUNITY
End: 2021-04-14 | Stop reason: SDUPTHER

## 2019-06-10 RX ORDER — BISACODYL 10 MG
10 SUPPOSITORY, RECTAL RECTAL DAILY PRN
Status: DISCONTINUED | OUTPATIENT
Start: 2019-06-10 | End: 2019-06-12 | Stop reason: HOSPADM

## 2019-06-10 RX ORDER — ONDANSETRON 2 MG/ML
4 INJECTION INTRAMUSCULAR; INTRAVENOUS EVERY 6 HOURS PRN
Status: DISCONTINUED | OUTPATIENT
Start: 2019-06-10 | End: 2019-06-12 | Stop reason: HOSPADM

## 2019-06-10 RX ORDER — ACETAMINOPHEN 325 MG/1
650 TABLET ORAL EVERY 6 HOURS PRN
Status: DISCONTINUED | OUTPATIENT
Start: 2019-06-10 | End: 2019-06-12 | Stop reason: HOSPADM

## 2019-06-10 RX ORDER — LEVETIRACETAM 500 MG/1
500 TABLET ORAL EVERY 12 HOURS SCHEDULED
Status: DISCONTINUED | OUTPATIENT
Start: 2019-06-10 | End: 2019-06-12 | Stop reason: HOSPADM

## 2019-06-10 RX ORDER — KETOROLAC TROMETHAMINE 30 MG/ML
15 INJECTION, SOLUTION INTRAMUSCULAR; INTRAVENOUS ONCE AS NEEDED
Status: COMPLETED | OUTPATIENT
Start: 2019-06-10 | End: 2019-06-10

## 2019-06-10 RX ADMIN — KETOROLAC TROMETHAMINE 15 MG: 30 INJECTION, SOLUTION INTRAMUSCULAR at 21:34

## 2019-06-10 RX ADMIN — FAMOTIDINE 20 MG: 20 TABLET ORAL at 21:23

## 2019-06-10 RX ADMIN — SODIUM CHLORIDE 1000 ML: 0.9 INJECTION, SOLUTION INTRAVENOUS at 17:58

## 2019-06-10 RX ADMIN — IOHEXOL 85 ML: 350 INJECTION, SOLUTION INTRAVENOUS at 17:44

## 2019-06-10 RX ADMIN — DIVALPROEX SODIUM 500 MG: 500 TABLET, DELAYED RELEASE ORAL at 21:23

## 2019-06-10 RX ADMIN — METOPROLOL TARTRATE 25 MG: 25 TABLET ORAL at 21:24

## 2019-06-10 RX ADMIN — LEVETIRACETAM 500 MG: 500 TABLET, FILM COATED ORAL at 21:23

## 2019-06-10 NOTE — H&P
H&P Exam - Elisabeth Shoulder 82 y o  female MRN: [id number]    Unit/Bed#: [address] [place] [address] Encounter: [id number]    Assessment:  81 y/o female w/ PMH of CVA x3 in 2016, expressive aphagia, seizure disorder, paroxysmal Afib, hypothyroidism, htn, subarachnoid hemorrhage and subdural hematoma admitted for expressive aphasia likely 2/2 TIA vs CVA  Patient to be admitted on the F under the care of Dr Shola Lala, expected length of stay >2 midnights  Plan:  * Expressive aphasia  Assessment & Plan  · Likely 2/2 CVA versus TIA  · Home Plavix 75 mg continued  · Neuro consulted  · PT/OT consulted  · Fall & aspiration precautions ordered  · NPO until pt passes bedside swallow eval  · Brain MRI without contrast ordered  · Home ASA held on admission, consider restarting  qd in the morning    Hypertension  Assessment & Plan  · Permissive htn per neuro in light of TIA vs CVA  · Tx if systolic BP > 040  · Home Zestril 10 mg qd held  · Home Lopressor 25 mg q12h order w/ holding parameters (systolic TM<094 & HR <73) ordered  Paroxysmal atrial fibrillation [facility])  Assessment & Plan  · Placed on telemetry  · POA EKG showed normal sinus rhythm  · Echo on 3/13/16 showed EF of 60% G1DD, consider repeat echo outpatient  · Home Lopressor 25 mg q12h order w/ holding parameters (systolic AN<713 & HR <49) ordered  · Patient reports taking Lopressor 50 mg q12h (unable to reach  to confirm dosage)  · Home Plavix 75 mg continued, and  mg qd held on admission    Hypothyroidism  Assessment & Plan  · Home Levothyroxine 88 mcg qd continued  · Cold intolerance reported  · TSH w/ reflex T4 ordered, adjust Levothyroxine as needed  Seizure disorder (HCC)  Assessment & Plan  · Home Depakote  mg TID & Keppra 500 mg q12h continued  · Depakote & Keppra level ordered  · Neuro consulted      Hyperlipemia  Assessment & Plan     8/17/2018 11:12   Cholesterol 143   Triglycerides 95   HDL 52   Non-HDL Cholesterol 91   LDL Direct 72     · Repeat lipid panel ordered  · Home Lipitor increased to Lipitor 80 mg qd  GERD (gastroesophageal reflux disease)  Assessment & Plan  · Home Famotidine 20mg PO BID continued  · TUMS qd PRN ordered  Anxiety  Assessment & Plan  · Home Ativan 0 5mg q6h PRN continued  History of Present Illness      Se Pagan is 81 y/o female w/ PMH of CVA (x3 in 2016), expressive aphagia, seizure disorder, paroxysmal Afib, hypothyroidism, htn, subarachnoid hemorrhage and subdural hematoma presents for evaluation of expressive aphagia & confusion  Onset of symptoms was sudden, related to feeling frustrated while attempting to fix a  her bedroom, reports increased fatigue over the past few days  Symptoms are currently persistent and have have lasted a few hours since 16:30 on the day of presentation  Stroke risk factors include atrial fibrillation, hyperlipidemia and hypertension  Prior stroke history: yes, exact type of CVA unknown  Patient also complains of left temporal HA  Patient denies chest pain, palpitations, seizures, shortness of breath and fall  Patient has not seen a PCP for years  Although warranted, TPA not given in light of previous intracranial hemorrhage following last administration per chart review  Patient is unsure when she last took her medications  Limited HPI in light of expressive aphasia,  not present at bedside  Attempts has been made to reach him, unsucesful  *ED course: NIHSS: 4 (2 for aphasia and 2 for LOC questions), BP max 203/110, stroke alert called, EKG, Head CT, CTA head & neck ordered  Review of Systems   Constitutional: Positive for appetite change (decrease apetite) and fatigue  HENT: Positive for postnasal drip (attributed to allergies)  Respiratory: Negative for shortness of breath  Cardiovascular: Negative for chest pain and palpitations  Gastrointestinal: Negative for abdominal pain, constipation and diarrhea          Endorses loose stool x3-4  Reflux   Endocrine: Positive for cold intolerance  Neurological: Positive for dizziness, speech difficulty and headaches  Negative for facial asymmetry  Psychiatric/Behavioral: Positive for confusion  Historical Information   Past Medical History:   Diagnosis Date    Arthritis     Disease of thyroid gland     Hypertension     Stroke ([address] ) 06/2016     Past Surgical History:   Procedure Laterality Date    BACK SURGERY  01/2016     Social History   Social History     Substance and Sexual Activity   Alcohol Use No     Social History     Substance and Sexual Activity   Drug Use No     Social History     Tobacco Use   Smoking Status Never Smoker   Smokeless Tobacco Never Used     Family History: History reviewed  No pertinent family history  Meds/Allergies   all medications and allergies reviewed  Allergies   Allergen Reactions    Other       unsure of other allergies, pt unable to answer    Penicillins        Objective   First Vitals:   Blood Pressure: (!) 191/73 (06/10/19 1715)  Pulse: 65 (06/10/19 1715)  Temperature: 98 8 °F (37 1 °C) (06/10/19 1715)  Temp Source: Tympanic (06/10/19 1715)  Respirations: 20 (06/10/19 1715)  SpO2: 99 % (06/10/19 1715)    Current Vitals:   Blood Pressure: (!) 177/77 (06/10/19 1756)  Pulse: 77 (06/10/19 1756)  Temperature: 98 8 °F (37 1 °C) (06/10/19 1715)  Temp Source: Tympanic (06/10/19 1715)  Respirations: 20 (06/10/19 1756)  SpO2: 98 % (06/10/19 1756)      Intake/Output Summary (Last 24 hours) at 6/10/2019 1838  Last data filed at 6/10/2019 1758  Gross per 24 hour   Intake 1000 ml   Output    Net 1000 ml       Invasive Devices     Peripheral Intravenous Line            Peripheral IV 06/10/19 Left Antecubital less than 1 day                Physical Exam   Constitutional: She appears well-developed and well-nourished  No distress  HENT:   Head: Normocephalic and atraumatic  Mouth/Throat: No oropharyngeal exudate     Eyes: Conjunctivae are normal    Neck: No tracheal deviation present  Cardiovascular: Normal rate, regular rhythm and normal heart sounds  No murmur heard  Pulmonary/Chest: Effort normal and breath sounds normal  No respiratory distress  She has no wheezes  Abdominal: Soft  Bowel sounds are normal  She exhibits no distension  There is no tenderness  There is no rebound and no guarding  Musculoskeletal: Normal range of motion  She exhibits no edema, tenderness or deformity  Neurological: She is alert  She has normal strength  She is disoriented (AAOx1, to self only)  No cranial nerve deficit  She exhibits normal muscle tone  She displays no seizure activity  Expressive aphasia    Skin: Skin is warm and dry         Lab Results:   Results for orders placed or performed during the hospital encounter of 06/10/19   CBC and differential   Result Value Ref Range    WBC 5 82 4 31 - 10 16 Thousand/uL    RBC 4 29 3 81 - 5 12 Million/uL    Hemoglobin 13 5 11 5 - 15 4 g/dL    Hematocrit 40 8 34 8 - 46 1 %    MCV 95 82 - 98 fL    MCH 31 5 26 8 - 34 3 pg    MCHC 33 1 31 4 - 37 4 g/dL    RDW 12 6 11 6 - 15 1 %    MPV 10 5 8 9 - 12 7 fL    Platelets 184 185 - 312 Thousands/uL    nRBC 0 /100 WBCs    Neutrophils Relative 59 43 - 75 %    Immat GRANS % 0 0 - 2 %    Lymphocytes Relative 29 14 - 44 %    Monocytes Relative 8 4 - 12 %    Eosinophils Relative 3 0 - 6 %    Basophils Relative 1 0 - 1 %    Neutrophils Absolute 3 43 1 85 - 7 62 Thousands/µL    Immature Grans Absolute 0 01 0 00 - 0 20 Thousand/uL    Lymphocytes Absolute 1 68 0 60 - 4 47 Thousands/µL    Monocytes Absolute 0 49 0 17 - 1 22 Thousand/µL    Eosinophils Absolute 0 17 0 00 - 0 61 Thousand/µL    Basophils Absolute 0 04 0 00 - 0 10 Thousands/µL   Protime-INR   Result Value Ref Range    Protime 10 7 9 4 - 11 7 seconds    INR 1 02 0 86 - 1 16   APTT   Result Value Ref Range    PTT 25 (L) 26 - 38 seconds   Comprehensive metabolic panel   Result Value Ref Range    Sodium 141 136 - 145 mmol/L    Potassium 3 8 3 5 - 5 3 mmol/L    Chloride 105 100 - 108 mmol/L    CO2 26 21 - 32 mmol/L    ANION GAP 10 4 - 13 mmol/L    BUN 10 5 - 25 mg/dL    Creatinine 0 94 0 60 - 1 30 mg/dL    Glucose 91 65 - 140 mg/dL    Calcium 8 7 8 3 - 10 1 mg/dL    AST 19 5 - 45 U/L    ALT 28 12 - 78 U/L    Alkaline Phosphatase 79 46 - 116 U/L    Total Protein 7 3 6 4 - 8 2 g/dL    Albumin 3 7 3 5 - 5 0 g/dL    Total Bilirubin 0 40 0 20 - 1 00 mg/dL    eGFR 57 ml/min/1 73sq m   Fingerstick Glucose (POCT)   Result Value Ref Range    POC Glucose 95 65 - 140 mg/dl       Imaging:   CTA head and neck with and without contrast   Final Result         1  No hemodynamically significant stenosis, dissection or occlusion of the carotid or vertebral arteries  2   Moderate (50-69%) narrowing of the distal left M1 segment secondary to atherosclerotic plaque  No evidence of acute thrombus or occlusion of the major vessels of the [place]  I personally discussed this study with DR Bj Betancourt on 6/10/2019 at 6:00 PM                            Workstation performed: [id number]         CT head without contrast   Final Result      No evidence of acute vascular territorial infarction, intracranial hemorrhage or mass effect               I personally discussed this study with DR FREY on 6/10/2019 at 5:36 PM                            Workstation performed: [id number]             EKG, Pathology, and Other Studies:   ECG 12 Lead Documentation  Date/Time: 6/10/2019 6:15 PM  Performed by: Arturo Reece MD  Authorized by: Arturo Reece MD      Indications / Diagnosis:  CVA  ECG reviewed by me, the ED Provider: yes    Patient location:  ED  Previous ECG:     Previous ECG:  Unavailable    Comparison to cardiac monitor: Yes    Interpretation:     Interpretation: normal    Rate:     ECG rate:  76    ECG rate assessment: normal    Rhythm:     Rhythm: sinus rhythm    Ectopy:     Ectopy: none    QRS:     QRS axis:  Normal  Conduction: Conduction: normal    ST segments:     ST segments:  Normal  T waves:     T waves: normal    CriticalCare Time  Performed by: Veena Allan MD  Authorized by: Veena Allan MD     Code Status: Full code

## 2019-06-10 NOTE — ED NOTES
Patient came in with expressive aphasia and weakness  Alert/oriented but could not get her words out  Lungs clear  Abdomen soft wit bowel sounds noted  Patient unable to  hands,signs of weakness noted  Patient also was not able to lift limbs upon arrival ER MD assessed patient prior to being transferred to CT SCAN  Prior to being transferred back to ER patient was noted to have clearer speech and now speech is better  Patient is still having expressive aphasia but not as severe as when she arrived  Vital signs are within normal at this time  ER MD was in to speak with patient and her   Patient to be admitted  Call bell in reach  Side rails are up       Porfirio Collins RN  06/10/19 [id number]

## 2019-06-10 NOTE — ED PROVIDER NOTES
History  Chief Complaint   Patient presents with    CVA/TIA-like Symptoms     sudden onset overlast 30 min with confusion and aphasia, not ableto get outany appropriatewords  two strokes in past     Patient is an 65-year-old female who presents with her  of starting with expressive aphasia like symptoms starting 0 5 hour ago  Patient has had similar symptoms in the past which was from a stroke and 3 months later had another stroke like symptoms was given tPA and had a small bleed from the tPA  After that happened in 2016 the patient has only had minimal deficits with some speech issues  Today the patient is unable to express herself via words  She is obvious that she can follow commands  Patient is able to communicate that she is not having any pain such as no headache or chest pain but she was able to get out that she felt like her head was empty  She stroke alert was called and the patient was sent to CT scan          Prior to Admission Medications   Prescriptions Last Dose Informant Patient Reported? Taking? LORazepam (ATIVAN) 0 5 mg tablet More than a month at Unknown time  Yes No   Sig: Take 0 5 mg by mouth every 6 (six) hours as needed for anxiety   acetaminophen 650 MG TABS Not Taking at Unknown time  No No   Si tab po every 6 hours prn headache, fevers, mild pain   Patient not taking: Reported on 6/10/2019   aspirin (ECOTRIN) 325 mg EC tablet Not Taking at Unknown time  Yes No   Sig: Take 325 mg by mouth daily   atorvastatin (LIPITOR) 40 mg tablet 2019 at Unknown time  Yes Yes   Sig: Take 40 mg by mouth daily  bisacodyl (DULCOLAX) 10 mg suppository Past Month at Unknown time  Yes Yes   Sig: Insert 10 mg into the rectum daily   citalopram (CeleXA) 20 mg tablet Unknown at Unknown time Self Yes No   Sig: Take 20 mg by mouth daily   clopidogrel (PLAVIX) 75 mg tablet 6/10/2019 at Unknown time  Yes Yes   Sig: Take 75 mg by mouth daily Indications: Stroke caused by a Blood Clot  divalproex sodium (DEPAKOTE) 500 mg EC tablet 6/10/2019 at Unknown time  Yes Yes   Sig: Take 500 mg by mouth 3 (three) times a day   famotidine (PEPCID) 20 mg tablet 6/10/2019 at Unknown time  Yes Yes   Sig: Take 20 mg by mouth 2 (two) times a day   fluticasone (FLONASE) 50 mcg/act nasal spray 6/10/2019 at Unknown time Self Yes Yes   Si spray into each nostril daily   levETIRAcetam (KEPPRA) 500 mg tablet 6/10/2019 at 1100  No Yes   Sig: Take 1 tablet by mouth every 12 (twelve) hours for 30 days  levothyroxine (SYNTHROID) 88 mcg tablet   No No   Sig: Take 1 tablet by mouth daily for 30 days  levothyroxine 75 mcg tablet 6/10/2019 at Unknown time Self Yes Yes   Sig: Take 75 mcg by mouth daily   lisinopril (ZESTRIL) 10 mg tablet   No No   Sig: Take 1 tablet by mouth daily for 30 days  loratadine (CLARITIN) 10 mg tablet 6/10/2019 at Unknown time Self Yes Yes   Sig: Take 10 mg by mouth daily   losartan (COZAAR) 50 mg tablet 2019 at Unknown time  Yes Yes   Sig: Take 25 mg by mouth daily   melatonin 3 mg 2019 at Unknown time  Yes Yes   Sig: Take 3 mg by mouth daily at bedtime   metoprolol tartrate (LOPRESSOR) 25 mg tablet 2019 at Unknown time  No Yes   Sig: Take 1 tablet by mouth every 12 (twelve) hours for 30 days  Patient taking differently: Take 50 mg by mouth every 12 (twelve) hours     omeprazole (PriLOSEC) 20 mg delayed release capsule 6/10/2019 at Unknown time Self Yes Yes   Sig: Take 20 mg by mouth daily      Facility-Administered Medications: None       Past Medical History:   Diagnosis Date    Arthritis     Disease of thyroid gland     Hypertension     Stroke ([facility] [address] ) 2016       Past Surgical History:   Procedure Laterality Date    BACK SURGERY  2016       History reviewed  No pertinent family history  I have reviewed and agree with the history as documented      Social History     Tobacco Use    Smoking status: Never Smoker    Smokeless tobacco: Never Used   Substance Use Topics    Alcohol use: No    Drug use: No        Review of Systems   Unable to perform ROS: Acuity of condition   Neurological: Positive for speech difficulty  Physical Exam  Physical Exam   Constitutional: She appears well-developed and well-nourished  She appears distressed  HENT:   Head: Normocephalic and atraumatic  Eyes: Pupils are equal, round, and reactive to light  EOM are normal    Neck: Normal range of motion  Cardiovascular: Normal rate and regular rhythm  Pulmonary/Chest: Effort normal and breath sounds normal  No stridor  No respiratory distress  Abdominal: Soft  She exhibits no distension  There is no tenderness  Musculoskeletal: Normal range of motion  She exhibits no edema  Neurological: She is alert  She has normal reflexes  No cranial nerve deficit or sensory deficit  She exhibits normal muscle tone  GCS eye subscore is 4  GCS verbal subscore is 3  GCS motor subscore is 6  Patient has an NIH score of 5  This is based on a higher score because of her expressive aphasia unable to answer verbally questions  Nursing note and vitals reviewed        Vital Signs  ED Triage Vitals [06/10/19 1715]   Temperature Pulse Respirations Blood Pressure SpO2   98 8 °F (37 1 °C) 65 20 (!) 191/73 99 %      Temp Source Heart Rate Source Patient Position - Orthostatic VS BP Location FiO2 (%)   Tympanic Monitor Sitting Left arm --      Pain Score       No Pain           Vitals:    06/10/19 1900 06/10/19 1950 06/10/19 1952 06/10/19 2124   BP: (!) 180/80 (!) 178/68  (!) 176/64   Pulse: 72  74    Patient Position - Orthostatic VS: Lying Lying Lying          Visual Acuity      ED Medications  Medications   acetaminophen (TYLENOL) tablet 650 mg (has no administration in time range)   atorvastatin (LIPITOR) tablet 80 mg (has no administration in time range)   clopidogrel (PLAVIX) tablet 75 mg (has no administration in time range)   levothyroxine tablet 88 mcg (has no administration in time range) LORazepam (ATIVAN) tablet 0 5 mg (has no administration in time range)   famotidine (PEPCID) tablet 20 mg (20 mg Oral Given 6/10/19 2123)   bisacodyl (DULCOLAX) rectal suppository 10 mg (has no administration in time range)   ondansetron (ZOFRAN) injection 4 mg (has no administration in time range)   calcium carbonate (TUMS) chewable tablet 1,000 mg (has no administration in time range)   metoprolol tartrate (LOPRESSOR) tablet 25 mg (25 mg Oral Given 6/10/19 2124)   divalproex sodium (DEPAKOTE) EC tablet 500 mg (500 mg Oral Given 6/10/19 2123)   levETIRAcetam (KEPPRA) tablet 500 mg (500 mg Oral Given 6/10/19 2123)   guaiFENesin (ROBITUSSIN) oral solution 200 mg (has no administration in time range)   sodium chloride 0 9 % bolus 1,000 mL (1,000 mL Intravenous New Bag 6/10/19 1758)   iohexol (OMNIPAQUE) 350 MG/ML injection (MULTI-DOSE) 85 mL (85 mL Intravenous Given 6/10/19 1744)   ketorolac (TORADOL) injection 15 mg (15 mg Intravenous Given 6/10/19 2134)       Diagnostic Studies  Results Reviewed     Procedure Component Value Units Date/Time    TSH, 3rd generation with Free T4 reflex [[id number]]  (Normal) Collected:  06/10/19 1738    Lab Status:  Final result Specimen:  Blood from Arm, Left Updated:  06/10/19 2053     TSH 3RD GENERATON 1 273 uIU/mL     Narrative:       Patients undergoing fluorescein dye angiography may retain small amounts of fluorescein in the body for 48-72 hours post procedure  Samples containing fluorescein can produce falsely depressed TSH values  If the patient had this procedure,a specimen should be resubmitted post fluorescein clearance        Lipid Panel with Direct LDL reflex [[id number]]  (Abnormal) Collected:  06/10/19 1738    Lab Status:  Final result Specimen:  Blood from Arm, Left Updated:  06/10/19 2053     Cholesterol 172 mg/dL      Triglycerides 168 mg/dL      HDL, Direct 46 mg/dL      LDL Calculated 92 mg/dL     Protime-INR [[id number]]  (Normal) Collected:  06/10/19 1738    Lab Status:  Final result Specimen:  Blood from Arm, Left Updated:  06/10/19 1804     Protime 10 7 seconds      INR 1 02    APTT [[id number]]  (Abnormal) Collected:  06/10/19 1738    Lab Status:  Final result Specimen:  Blood from Arm, Left Updated:  06/10/19 1804     PTT 25 seconds     Comprehensive metabolic panel [[id number]] Collected:  06/10/19 1738    Lab Status:  Final result Specimen:  Blood from Arm, Left Updated:  06/10/19 1803     Sodium 141 mmol/L      Potassium 3 8 mmol/L      Chloride 105 mmol/L      CO2 26 mmol/L      ANION GAP 10 mmol/L      BUN 10 mg/dL      Creatinine 0 94 mg/dL      Glucose 91 mg/dL      Calcium 8 7 mg/dL      AST 19 U/L      ALT 28 U/L      Alkaline Phosphatase 79 U/L      Total Protein 7 3 g/dL      Albumin 3 7 g/dL      Total Bilirubin 0 40 mg/dL      eGFR 57 ml/min/1 73sq m     Narrative:       [place] guidelines for Chronic Kidney Disease (CKD):     Stage 1 with normal or high GFR (GFR > 90 mL/min/1 73 square meters)    Stage 2 Mild CKD (GFR = 60-89 mL/min/1 73 square meters)    Stage 3A Moderate CKD (GFR = 45-59 mL/min/1 73 square meters)    Stage 3B Moderate CKD (GFR = 30-44 mL/min/1 73 square meters)    Stage 4 Severe CKD (GFR = 15-29 mL/min/1 73 square meters)    Stage 5 End Stage CKD (GFR <15 mL/min/1 73 square meters)  Note: GFR calculation is accurate only with a steady state creatinine    CBC and differential [[id number]] Collected:  06/10/19 1738    Lab Status:  Final result Specimen:  Blood from Arm, Left Updated:  06/10/19 1743     WBC 5 82 Thousand/uL      RBC 4 29 Million/uL      Hemoglobin 13 5 g/dL      Hematocrit 40 8 %      MCV 95 fL      MCH 31 5 pg      MCHC 33 1 g/dL      RDW 12 6 %      MPV 10 5 fL      Platelets 778 Thousands/uL      nRBC 0 /100 WBCs      Neutrophils Relative 59 %      Immat GRANS % 0 %      Lymphocytes Relative 29 %      Monocytes Relative 8 %      Eosinophils Relative 3 %      Basophils Relative 1 % Neutrophils Absolute 3 43 Thousands/µL      Immature Grans Absolute 0 01 Thousand/uL      Lymphocytes Absolute 1 68 Thousands/µL      Monocytes Absolute 0 49 Thousand/µL      Eosinophils Absolute 0 17 Thousand/µL      Basophils Absolute 0 04 Thousands/µL     Fingerstick Glucose (POCT) [[id number]]  (Normal) Collected:  06/10/19 1715    Lab Status:  Final result Updated:  06/10/19 1716     POC Glucose 95 mg/dl                  CTA head and neck with and without contrast   Final Result by Yadira Mckeon MD (06/10 1810)         1  No hemodynamically significant stenosis, dissection or occlusion of the carotid or vertebral arteries  2   Moderate (50-69%) narrowing of the distal left M1 segment secondary to atherosclerotic plaque  No evidence of acute thrombus or occlusion of the major vessels of the [place]  I personally discussed this study with DR Rebecca Ulloa on 6/10/2019 at 6:00 PM                            Workstation performed: [id number]         CT head without contrast   Final Result by Yadira Mckeon MD (06/10 1801)      No evidence of acute vascular territorial infarction, intracranial hemorrhage or mass effect               I personally discussed this study with DR FREY on 6/10/2019 at 5:36 PM                            Workstation performed: [id number]         MRI inpatient order    (Results Pending)              Procedures  ECG 12 Lead Documentation  Date/Time: 6/10/2019 6:15 PM  Performed by: Kayy Hartley MD  Authorized by: Kayy Hartley MD     Indications / Diagnosis:  CVA  ECG reviewed by me, the ED Provider: yes    Patient location:  ED  Previous ECG:     Previous ECG:  Unavailable    Comparison to cardiac monitor: Yes    Interpretation:     Interpretation: normal    Rate:     ECG rate:  76    ECG rate assessment: normal    Rhythm:     Rhythm: sinus rhythm    Ectopy:     Ectopy: none    QRS:     QRS axis:  Normal  Conduction:     Conduction: normal    ST segments:     ST segments:  Normal  T waves:     T waves: normal    CriticalCare Time  Performed by: Kayy Hartley MD  Authorized by: Kayy Hartley MD     Critical care provider statement:     Critical care time (minutes):  30    Critical care was necessary to treat or prevent imminent or life-threatening deterioration of the following conditions:  CNS failure or compromise    Critical care was time spent personally by me on the following activities:  Obtaining history from patient or surrogate, discussions with consultants, evaluation of patient's response to treatment, examination of patient, ordering and performing treatments and interventions, ordering and review of laboratory studies, ordering and review of radiographic studies, re-evaluation of patient's condition and review of old charts           Phone Contacts  ED Phone Contact    ED Course             Stroke Assessment     Row Name 06/10/19 1727 06/10/19 1843          NIH Stroke Scale    Interval         Level of Consciousness (1a )  0  0     LOC Questions (1b )  2  0     LOC Commands (1c )  0  0     Best Gaze (2 )  0  0     Visual (3 )  0  0     Facial Palsy (4 )  0  0     Motor Arm, Left (5a )  0  0     Motor Arm, Right (5b )  0  0     Motor Leg, Left (6a )  0  0     Motor Leg, Right (6b )  0  0     Limb Ataxia (7 )  0  0     Sensory (8 )  0  0     Best Language (9 )  1  1     Dysarthria (10 )  2  1     Extinction and Inattention (11 ) (Formerly Neglect)  0  0     Total  5  2                         MDM  Number of Diagnoses or Management Options  High blood pressure:   TIA (transient ischemic attack):   Diagnosis management comments: Patient's CT scan and CTA showed no acute abnormalities  Patient is improving symptomatic we even without any treatment  The patient is most likely now having a TIA versus CVA  Patient is going to be admitted to the hospital for further evaluation and observation    At this time no tPA was recommended because of the increased risk of bleeding with 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 and also that her symptoms are improving  I answered all questions the best my ability, patient and  state understanding and are in agreement with the assessment plan  Amount and/or Complexity of Data Reviewed  Clinical lab tests: reviewed  Tests in the radiology section of CPT®: ordered and reviewed        Disposition  Final diagnoses:   TIA (transient ischemic attack)   High blood pressure     Time reflects when diagnosis was documented in both MDM as applicable and the Disposition within this note     Time User Action Codes Description Comment    6/10/2019  6:47 PM Gris Melissa Add [G45 9] TIA (transient ischemic attack)     6/10/2019  6:47 PM Gris Melissa Add [I10] High blood pressure     6/10/2019  7:44 PM ASH Woo Add [R47 01] Expressive aphasia     6/10/2019  7:44 PM MILY ToddFYIPTBIJAN Add [I63 9] Acute CVA (cerebrovascular accident) [facility])       ED Disposition     ED Disposition Condition Date/Time Comment    Admit Stable Mon Johann 10, 2019  6:47 PM Case was discussed with Dr Collin Mccracken and the patient's admission status was agreed to be Admission Status: inpatient status to the service of Dr Collin Mccracken   Follow-up Information    None         Current Discharge Medication List      CONTINUE these medications which have NOT CHANGED    Details   atorvastatin (LIPITOR) 40 mg tablet Take 40 mg by mouth daily  bisacodyl (DULCOLAX) 10 mg suppository Insert 10 mg into the rectum daily      clopidogrel (PLAVIX) 75 mg tablet Take 75 mg by mouth daily Indications: Stroke caused by a Blood Clot        divalproex sodium (DEPAKOTE) 500 mg EC tablet Take 500 mg by mouth 3 (three) times a day      famotidine (PEPCID) 20 mg tablet Take 20 mg by mouth 2 (two) times a day      fluticasone (FLONASE) 50 mcg/act nasal spray 1 spray into each nostril daily      levETIRAcetam (KEPPRA) 500 mg tablet Take 1 tablet by mouth every 12 (twelve) hours for 30 days   Rodrigo Chi: 60 tablet, Refills: 0      levothyroxine 75 mcg tablet Take 75 mcg by mouth daily      loratadine (CLARITIN) 10 mg tablet Take 10 mg by mouth daily      losartan (COZAAR) 50 mg tablet Take 25 mg by mouth daily      melatonin 3 mg Take 3 mg by mouth daily at bedtime      metoprolol tartrate (LOPRESSOR) 25 mg tablet Take 1 tablet by mouth every 12 (twelve) hours for 30 days  Qty: 60 tablet, Refills: 0      omeprazole (PriLOSEC) 20 mg delayed release capsule Take 20 mg by mouth daily      acetaminophen 650 MG TABS 1 tab po every 6 hours prn headache, fevers, mild pain  Qty: 30 tablet, Refills: 1      aspirin (ECOTRIN) 325 mg EC tablet Take 325 mg by mouth daily      citalopram (CeleXA) 20 mg tablet Take 20 mg by mouth daily      lisinopril (ZESTRIL) 10 mg tablet Take 1 tablet by mouth daily for 30 days  Qty: 30 tablet, Refills: 0      LORazepam (ATIVAN) 0 5 mg tablet Take 0 5 mg by mouth every 6 (six) hours as needed for anxiety           No discharge procedures on file      ED Provider  Electronically Signed by           Yenny Mcwilliams MD  06/10/19 4506

## 2019-06-11 ENCOUNTER — APPOINTMENT (INPATIENT)
Dept: RADIOLOGY | Facility: HOSPITAL | Age: 84
DRG: 103 | End: 2019-06-11
Payer: COMMERCIAL

## 2019-06-11 ENCOUNTER — TRANSCRIBE ORDERS (OUTPATIENT)
Dept: ADMINISTRATIVE | Facility: HOSPITAL | Age: 84
End: 2019-06-11

## 2019-06-11 PROBLEM — K22.4 ESOPHAGEAL DYSMOTILITY: Status: ACTIVE | Noted: 2019-06-10

## 2019-06-11 LAB
ALBUMIN SERPL BCP-MCNC: 3 G/DL (ref 3.5–5)
ALP SERPL-CCNC: 64 U/L (ref 46–116)
ALT SERPL W P-5'-P-CCNC: 21 U/L (ref 12–78)
ANION GAP SERPL CALCULATED.3IONS-SCNC: 8 MMOL/L (ref 4–13)
AST SERPL W P-5'-P-CCNC: 16 U/L (ref 5–45)
BASOPHILS # BLD AUTO: 0.03 THOUSANDS/ΜL (ref 0–0.1)
BASOPHILS NFR BLD AUTO: 1 % (ref 0–1)
BILIRUB SERPL-MCNC: 0.6 MG/DL (ref 0.2–1)
BUN SERPL-MCNC: 10 MG/DL (ref 5–25)
CALCIUM SERPL-MCNC: 7.7 MG/DL (ref 8.3–10.1)
CHLORIDE SERPL-SCNC: 107 MMOL/L (ref 100–108)
CO2 SERPL-SCNC: 25 MMOL/L (ref 21–32)
CREAT SERPL-MCNC: 1.01 MG/DL (ref 0.6–1.3)
EOSINOPHIL # BLD AUTO: 0.09 THOUSAND/ΜL (ref 0–0.61)
EOSINOPHIL NFR BLD AUTO: 2 % (ref 0–6)
ERYTHROCYTE [DISTWIDTH] IN BLOOD BY AUTOMATED COUNT: 12.6 % (ref 11.6–15.1)
GFR SERPL CREATININE-BSD FRML MDRD: 52 ML/MIN/1.73SQ M
GLUCOSE SERPL-MCNC: 87 MG/DL (ref 65–140)
HCT VFR BLD AUTO: 35.5 % (ref 34.8–46.1)
HGB BLD-MCNC: 11.6 G/DL (ref 11.5–15.4)
IMM GRANULOCYTES # BLD AUTO: 0.02 THOUSAND/UL (ref 0–0.2)
IMM GRANULOCYTES NFR BLD AUTO: 0 % (ref 0–2)
LYMPHOCYTES # BLD AUTO: 1.62 THOUSANDS/ΜL (ref 0.6–4.47)
LYMPHOCYTES NFR BLD AUTO: 29 % (ref 14–44)
MAGNESIUM SERPL-MCNC: 1.9 MG/DL (ref 1.6–2.6)
MCH RBC QN AUTO: 31.3 PG (ref 26.8–34.3)
MCHC RBC AUTO-ENTMCNC: 32.7 G/DL (ref 31.4–37.4)
MCV RBC AUTO: 96 FL (ref 82–98)
MONOCYTES # BLD AUTO: 0.39 THOUSAND/ΜL (ref 0.17–1.22)
MONOCYTES NFR BLD AUTO: 7 % (ref 4–12)
NEUTROPHILS # BLD AUTO: 3.39 THOUSANDS/ΜL (ref 1.85–7.62)
NEUTS SEG NFR BLD AUTO: 61 % (ref 43–75)
NRBC BLD AUTO-RTO: 0 /100 WBCS
PHOSPHATE SERPL-MCNC: 3.4 MG/DL (ref 2.3–4.1)
PLATELET # BLD AUTO: 200 THOUSANDS/UL (ref 149–390)
PMV BLD AUTO: 10 FL (ref 8.9–12.7)
POTASSIUM SERPL-SCNC: 3.9 MMOL/L (ref 3.5–5.3)
PROT SERPL-MCNC: 6 G/DL (ref 6.4–8.2)
RBC # BLD AUTO: 3.71 MILLION/UL (ref 3.81–5.12)
SODIUM SERPL-SCNC: 140 MMOL/L (ref 136–145)
WBC # BLD AUTO: 5.54 THOUSAND/UL (ref 4.31–10.16)

## 2019-06-11 PROCEDURE — 99222 1ST HOSP IP/OBS MODERATE 55: CPT | Performed by: FAMILY MEDICINE

## 2019-06-11 PROCEDURE — G8978 MOBILITY CURRENT STATUS: HCPCS

## 2019-06-11 PROCEDURE — G8987 SELF CARE CURRENT STATUS: HCPCS

## 2019-06-11 PROCEDURE — 97167 OT EVAL HIGH COMPLEX 60 MIN: CPT

## 2019-06-11 PROCEDURE — 70551 MRI BRAIN STEM W/O DYE: CPT

## 2019-06-11 PROCEDURE — NC001 PR NO CHARGE: Performed by: FAMILY MEDICINE

## 2019-06-11 PROCEDURE — 97110 THERAPEUTIC EXERCISES: CPT

## 2019-06-11 PROCEDURE — 83735 ASSAY OF MAGNESIUM: CPT | Performed by: GENERAL PRACTICE

## 2019-06-11 PROCEDURE — 99222 1ST HOSP IP/OBS MODERATE 55: CPT | Performed by: PSYCHIATRY & NEUROLOGY

## 2019-06-11 PROCEDURE — 97163 PT EVAL HIGH COMPLEX 45 MIN: CPT

## 2019-06-11 PROCEDURE — 97535 SELF CARE MNGMENT TRAINING: CPT

## 2019-06-11 PROCEDURE — 84100 ASSAY OF PHOSPHORUS: CPT | Performed by: GENERAL PRACTICE

## 2019-06-11 PROCEDURE — G8979 MOBILITY GOAL STATUS: HCPCS

## 2019-06-11 PROCEDURE — 80053 COMPREHEN METABOLIC PANEL: CPT | Performed by: GENERAL PRACTICE

## 2019-06-11 PROCEDURE — G8988 SELF CARE GOAL STATUS: HCPCS

## 2019-06-11 PROCEDURE — 85025 COMPLETE CBC W/AUTO DIFF WBC: CPT | Performed by: GENERAL PRACTICE

## 2019-06-11 PROCEDURE — 92610 EVALUATE SWALLOWING FUNCTION: CPT

## 2019-06-11 RX ORDER — LANOLIN ALCOHOL/MO/W.PET/CERES
3 CREAM (GRAM) TOPICAL
Status: DISCONTINUED | OUTPATIENT
Start: 2019-06-11 | End: 2019-06-12 | Stop reason: HOSPADM

## 2019-06-11 RX ORDER — LORATADINE 10 MG/1
10 TABLET ORAL DAILY
Status: DISCONTINUED | OUTPATIENT
Start: 2019-06-11 | End: 2019-06-12 | Stop reason: HOSPADM

## 2019-06-11 RX ORDER — KETOTIFEN FUMARATE 0.35 MG/ML
1 SOLUTION/ DROPS OPHTHALMIC 2 TIMES DAILY
Status: DISCONTINUED | OUTPATIENT
Start: 2019-06-11 | End: 2019-06-12 | Stop reason: HOSPADM

## 2019-06-11 RX ORDER — PANTOPRAZOLE SODIUM 40 MG/1
40 TABLET, DELAYED RELEASE ORAL
Status: DISCONTINUED | OUTPATIENT
Start: 2019-06-11 | End: 2019-06-12 | Stop reason: HOSPADM

## 2019-06-11 RX ORDER — CITALOPRAM 20 MG/1
20 TABLET ORAL DAILY
Status: DISCONTINUED | OUTPATIENT
Start: 2019-06-11 | End: 2019-06-12 | Stop reason: HOSPADM

## 2019-06-11 RX ORDER — FLUTICASONE PROPIONATE 50 MCG
1 SPRAY, SUSPENSION (ML) NASAL DAILY
Status: DISCONTINUED | OUTPATIENT
Start: 2019-06-11 | End: 2019-06-12 | Stop reason: HOSPADM

## 2019-06-11 RX ADMIN — FAMOTIDINE 20 MG: 20 TABLET ORAL at 09:48

## 2019-06-11 RX ADMIN — DIVALPROEX SODIUM 500 MG: 500 TABLET, DELAYED RELEASE ORAL at 09:48

## 2019-06-11 RX ADMIN — FLUTICASONE PROPIONATE 1 SPRAY: 50 SPRAY, METERED NASAL at 09:49

## 2019-06-11 RX ADMIN — MELATONIN 3 MG: at 22:23

## 2019-06-11 RX ADMIN — LEVETIRACETAM 500 MG: 500 TABLET, FILM COATED ORAL at 09:48

## 2019-06-11 RX ADMIN — LEVETIRACETAM 500 MG: 500 TABLET, FILM COATED ORAL at 22:22

## 2019-06-11 RX ADMIN — LORATADINE 10 MG: 10 TABLET ORAL at 09:48

## 2019-06-11 RX ADMIN — ATORVASTATIN CALCIUM 80 MG: 80 TABLET ORAL at 16:12

## 2019-06-11 RX ADMIN — FAMOTIDINE 20 MG: 20 TABLET ORAL at 18:02

## 2019-06-11 RX ADMIN — CITALOPRAM HYDROBROMIDE 20 MG: 20 TABLET ORAL at 09:48

## 2019-06-11 RX ADMIN — CLOPIDOGREL BISULFATE 75 MG: 75 TABLET ORAL at 09:48

## 2019-06-11 RX ADMIN — PANTOPRAZOLE SODIUM 40 MG: 40 TABLET, DELAYED RELEASE ORAL at 05:24

## 2019-06-11 RX ADMIN — LEVOTHYROXINE SODIUM 88 MCG: 88 TABLET ORAL at 05:24

## 2019-06-11 RX ADMIN — DIVALPROEX SODIUM 500 MG: 500 TABLET, DELAYED RELEASE ORAL at 22:22

## 2019-06-11 RX ADMIN — SODIUM CHLORIDE 500 ML: 0.9 INJECTION, SOLUTION INTRAVENOUS at 05:07

## 2019-06-11 RX ADMIN — DIVALPROEX SODIUM 500 MG: 500 TABLET, DELAYED RELEASE ORAL at 16:10

## 2019-06-11 NOTE — OCCUPATIONAL THERAPY NOTE
Occupational Therapy Evaluation/Treatment       06/11/19 1500   Note Type   Note type Eval/Treat   Restrictions/Precautions   Other Precautions Fall Risk   Pain Assessment   Pain Assessment No/denies pain   Home Living   Type of [address] One level  (3 GAVIN )   Bathroom Shower/Tub Tub/shower unit   Bathroom Equipment Shower chair;Grab bars in [facility] Walker;Cane   Prior Function   Level of [place] Independent with ADLs and functional mobility   Lives With [place] Help From Family   ADL Assistance Independent   IADLs Independent   Comments pt drives    Lifestyle   Intrinsic Gratification sewing   ADL   Eating Assistance 7  Independent   Grooming Assistance 5  Supervision/Setup   Grooming Deficit Standing with assistive device   UB Bathing Assistance 5  Supervision/Setup   LB Bathing Assistance 5  Supervision/Setup   UB Dressing Assistance 5  Supervision/Setup   LB Dressing Assistance 5  Supervision/Setup   Toileting Assistance  5  Supervision/Setup   Transfers   Sit to Stand 5  Supervision   Stand to Sit 5  Supervision   Stand pivot 5  Supervision   Toilet transfer 5  Supervision   Functional Mobility   Functional Mobility 5  Supervision   Additional Comments 15 feet x 2 to and from bathroom    Additional items Rolling walker   Balance   Static Sitting Fair +   Dynamic Sitting [address] -   Activity Tolerance   Activity Tolerance Patient tolerated treatment well   RUE Assessment   RUE Assessment WFL   LUE Assessment   LUE Assessment WFL   Hand Function   Gross Motor Coordination Functional   Fine Motor Coordination Functional   Cognition   Overall Cognitive Status Impaired   Arousal/Participation Cooperative   Attention Attends with cues to redirect   Orientation Level Oriented X4   Following Commands Follows multistep commands with increased time or repetition   Comments scored 4 8 on ACLS, per staff pt unable to manage pills Assessment   Limitation Decreased ADL status; Decreased Safe judgement during ADL;Decreased endurance;Decreased self-care trans;Decreased high-level ADLs  (decreased balance and mobility )   Prognosis Good   Assessment Patient evaluated by Occupational Therapy  Patient admitted with Expressive aphasia  The patients occupational profile, medical and therapy history includes a extensive additional review of physical, cognitive, or psychosocial history related to current functional performance  Comorbidities affecting functional mobility and ADLS include: arthritis, CVA and hypertension  Prior to admission, patient was independent with functional mobility without assistive device, independent with ADLS and independent with IADLS  The evaluation identifies the following performance deficits: weakness, impaired balance, decreased endurance, increased fall risk, new onset of impairment of functional mobility, decreased ADLS, decreased IADLS, decreased activity tolerance, decreased safety awareness, impaired judgement, decreased cognition and decreased strength, that result in activity limitations and/or participation restrictions  This evaluation requires clinical decision making of high complexity, because the patient presents with comorbidites that affect occupational performance and required significant modification of tasks or assistance with consideration of multiple treatment options  The Barthel Index was used as a functional outcome tool presenting with a score of 50, indicating marked limitations of functional mobility and ADLS  Patient will benefit from skilled Occupational Therapy services to address above deficits and facilitate a safe return to prior level of function  Goals   Patient Goals go home!    STG Time Frame   (1-7 days)   Short Term Goal  Goals established to promote patient goal of going home:  Patient will increase standing tolerance to 5 minutes during ADL task to decrease assistance level and decrease fall risk;  Patient will increase functional mobility to and from bathroom with rolling walker independently to increase performance with ADLS and to use a toilet; Patient will tolerate 10 minutes of UE ROM/strengthening to increase general activity tolerance and performance in ADLS/IADLS; Patient will improve functional activity tolerance to 10 minutes of sustained functional tasks to increase participation in basic self-care and decrease assistance level;  Patient will increase dynamic standing balance to fair to improve postural stability and decrease fall risk during standing ADLS and transfers  LTG Time Frame   (8-14 days)   Long Term Goal Goals established to promote patient goal of going home:  Patient will increase standing tolerance to 10 minutes during ADL task to decrease assistance level and decrease fall risk;  Patient will tolerate 20 minutes of UE ROM/strengthening to increase general activity tolerance and performance in ADLS/IADLS; Patient will improve functional activity tolerance to 20 minutes of sustained functional tasks to increase participation in basic self-care and decrease assistance level;  Patient will increase dynamic standing balance to fair+ to improve postural stability and decrease fall risk during standing ADLS and transfers  Patient will complete medication management activity with minimal verbal cues  Functional Transfer Goals   Pt Will Perform All Functional Transfers   (STG independent )   ADL Goals   Pt Will Perform Grooming Standing at sink  (STG independent )   Pt Will Perform Bathing   (LTG independent )   Pt Will Perform LE Dressing   (STG independent )   Pt Will Perform Toileting   (STG independent )   Plan   Treatment Interventions ADL retraining;Functional transfer training; Endurance training;Patient/family training;Equipment evaluation/education; Activityengagement; Compensatory technique education   Goal Expiration Date 06/25/19   OT Frequency 5x/wk   Additional Treatment Session   Start Time 1435   End Time 1500   Treatment Assessment Completed the [facility] Cognitive Level Screen  Patient scored a 4 8/6 0  Patient is requires reptition and deomstration to complete the single cordovan stitch  Patient was determined to complete stitch but unable to complete independently after 2 demonstrations  Patient was able to concentration on task but unable to correct errors  See full results at end of evaluation  Patient was distracted at times but cooperative and pleasant  Recommendation   OT Discharge Recommendation Home with family support  ([facility] center )   Barthel Index   Feeding 10   Bathing 0   Grooming Score 0   Dressing Score 5   Bladder Score 10   Bowels Score 10   Toilet Use Score 5   Transfers (Bed/Chair) Score 10   Mobility (Level Surface) Score 0   Stairs Score 0   Barthel Index Score 50   Modified [place] Scale   Modified [place] Scale 1   Licensure   [place] License Number  Polo Oz MS OTR/L [id number]       4 8    Administered [facility] Cognitive Level Screen (ACLS)  The patient scored 4 8/6 0 indicating that they may live alone with daily assistance to monitor personal safety and check problem solving effectiveness  Behavior:  Asks for verification  Knows how 2 concurrent schedules fit together  Fits daily routine into schedule of others  Can avoid obvious hazards  More flexible and willing to adapt to new ideas  Insight into disability is fair  Processing is delayed  May be able to get to a regularly scheduled appointment without need for assistance  May frequently stop a task to examine objects  Seeks verification from others  Grooming:  Checks results of grooming, hair styling and make up application  Learns new grooming procedures slowly  May rigidly follow prescribed routines  Dressing: Considers all striking features of garments including color, pattern, condition and fit    May not attend to cleanliness or consider social standards  May don clothing slowly and check results in mirror  Bathing:  May coordinate bathing schedule with others  Attends to all features of bathing environment  May check results and vary rate  Reports low supplies to caregivers  Walking/Exercising:  Learns new routes over several days  Scans environment, reads street signs and attempts to use this information but may still get lost   Understands explanations of safety hazards  Learns an exercise program and does it without variation  Eating:  Eats and attends to social conversation though may not be able to talk and eat at the same time  Manages all utensils  Follows a meal time schedule  May be reminded to check food temperatures  Toileting:  Independently performs usual toileting routine  May not anticipate use of bathroom before trips  May learn bathroom etiquette like conserving paper  Medications: Follows new prescriptions slowly  Considers variables like time of day and amount but with difficulty  Follows verbal explanations  May follow a set schedule set up by others for renewing prescriptions  Utilize a daily pill box (set up by someone else)  Use of adaptive equipment:  Recognizes loss of strength, sensation and balance  May learn a series of new actions slowly  Housekeeping:  Initiates and completes a routine of housekeeping activities  Learns new procedures slowly  Notes all visible effects of cleaning and checking results  Corrects visible errors one at a time  May not anticipate supplies but reports low supplies to caregiver  May not identify potential hazards related to electric, gas, toxins, poisons and improper storage/disposal of items  Food preparation:  Does not plan for long term food needs  May learn to follow a plan for purchasing food items  May follow a weekly schedule for shopping  Memorizes a new sequence of actions to prepare dishes  Follows a written recipe in a rigid manner  Check stove after use  Spending Money:  Slowly follows budget or learns money management procedures  May need assistance to adjust to change in income or usual banking procedures  Shopping: Learns directions to new shopping areas  May learn to use a shopping list or use money saving procedures like coupons  May not read labels or consider whether it is practical or affordable  Laundry:  Completes laundry routine in a fixed manner  Memorizes procedures like using a new [employer]  May try to read new labels but needs assistance in interpreting them  May overload machine  Traveling:  Learns new routes or travel procedures slowly  May learn to use a wheelchair but has difficulty maneuvering or estimating space requirements  Does not anticipate hazards  May follow safety precautions pointed out by others  Telephone:  Learns to use a new telephone answering machine or pay phone slowly  Does not vary actions  May memorize correct times to call others     Driving:  Should NOT operate a motor vehicle

## 2019-06-11 NOTE — ASSESSMENT & PLAN NOTE
· Placed on telemetry  · POA EKG showed normal sinus rhythm  · Echo on 3/13/16 showed EF of 60% G1DD, consider repeat echo outpatient  · Home Lopressor 25 mg q12h order w/ holding parameters (systolic OA<041 & HR <20) ordered    · Patient reports taking Lopressor 50 mg q12h (unable to reach  to confirm dosage)  · Home Plavix 75 mg continued, and  mg qd held on admission

## 2019-06-11 NOTE — PROGRESS NOTES
[facility] Practice Progress Note - Thom Lozano 82 y o  female MRN: [id number]    Unit/Bed#: [address] [place] [address] Encounter: [id number]      Assessment/Plan:  * Expressive aphasia  Assessment & Plan  · Likely 2/2 CVA versus TIA  · Home Plavix 75 mg continued  · Neuro consulted  · PT/OT consulted  · Fall & aspiration precautions ordered  · Brain MRI without contrast ordered  · Home ASA held on admission, consider restarting  qd in the morning    Seizure disorder (HCC)  Assessment & Plan  · Home Depakote  mg TID & Keppra 500 mg q12h continued  · Depakote & Keppra level ordered  · Neuro consulted  Esophageal dysmotility  Assessment & Plan  · Home Famotidine 20mg PO BID continued  · TUMS qd PRN ordered  · Home Omeprazole 20 mg delayed release ordered as Protonix 40 mg qd  · Formal modified videofluoroscopic swallowing study, performed in conjunction with speech therapy recommended  Anxiety  Assessment & Plan  · Home Celexa 20 mg qd & Ativan 0 5mg q6h PRN continued  Hyperlipemia  Assessment & Plan     8/17/2018 11:12   Cholesterol 143   Triglycerides 95   HDL 52   Non-HDL Cholesterol 91   LDL Direct 72     · Repeat lipid panel ordered  · Home Lipitor increased to Lipitor 80 mg qd  Paroxysmal atrial fibrillation [facility])  Assessment & Plan  · Placed on telemetry  · POA EKG showed normal sinus rhythm  · Echo on 3/13/16 showed EF of 60% G1DD, consider repeat echo outpatient  · Home Lopressor 25 mg q12h order w/ holding parameters (systolic LS<804 & HR <84) ordered  · Patient reports taking Lopressor 50 mg q12h (unable to reach  to confirm dosage)  · Home Plavix 75 mg continued, and  mg qd held on admission    Hypothyroidism  Assessment & Plan  · Home Levothyroxine 88 mcg qd continued  · Cold intolerance reported  · TSH w/ reflex T4 ordered  · TSH 6/11: 1 237    Hypertension  Assessment & Plan  · Permissive htn per neuro in light of TIA vs CVA    · Tx if systolic BP > 210   · Home Zestril 10 mg qd held  · Home Lopressor 25 mg q12h order w/ holding parameters (systolic PZ<481 & HR <77) ordered  Subjective:   Patient seen and examined at bedside  Reports feeling much better compared to yesterday  No complaints  Objective:     Vitals: Blood pressure 129/59, pulse 69, temperature 99 3 °F (37 4 °C), temperature source Oral, resp  rate 19, weight 65 1 kg (143 lb 8 3 oz), SpO2 94 %, not currently breastfeeding  ,Body mass index is 27 12 kg/m²  Wt Readings from Last 3 Encounters:   06/10/19 65 1 kg (143 lb 8 3 oz)   03/20/17 68 kg (150 lb)   09/20/16 63 2 kg (139 lb 5 3 oz)       Intake/Output Summary (Last 24 hours) at 6/11/2019 0811  Last data filed at 6/11/2019 0527  Gross per 24 hour   Intake 1000 ml   Output 800 ml   Net 200 ml       Physical Exam: /59 (BP Location: Right arm)   Pulse 69   Temp 99 3 °F (37 4 °C) (Oral)   Resp 19   Wt 65 1 kg (143 lb 8 3 oz)   SpO2 94%   BMI 27 12 kg/m²   General appearance: alert and oriented, in no acute distress  Lungs: clear to auscultation bilaterally  Heart: regular rate and rhythm, S1, S2 normal, no murmur, click, rub or gallop  Abdomen: soft, non-tender; bowel sounds normal; no masses,  no organomegaly  Extremities: extremities normal, warm and well-perfused; no cyanosis, clubbing, or edema  Neurologic: Alert and oriented X 3, normal strength and tone  Normal symmetric reflexes   Normal coordination and gait     Recent Results (from the past 24 hour(s))   Fingerstick Glucose (POCT)    Collection Time: 06/10/19  5:15 PM   Result Value Ref Range    POC Glucose 95 65 - 140 mg/dl   CBC and differential    Collection Time: 06/10/19  5:38 PM   Result Value Ref Range    WBC 5 82 4 31 - 10 16 Thousand/uL    RBC 4 29 3 81 - 5 12 Million/uL    Hemoglobin 13 5 11 5 - 15 4 g/dL    Hematocrit 40 8 34 8 - 46 1 %    MCV 95 82 - 98 fL    MCH 31 5 26 8 - 34 3 pg    MCHC 33 1 31 4 - 37 4 g/dL    RDW 12 6 11 6 - 15 1 %    MPV 10 5 8 9 - 12 7 fL    Platelets 200 165 - 696 Thousands/uL    nRBC 0 /100 WBCs    Neutrophils Relative 59 43 - 75 %    Immat GRANS % 0 0 - 2 %    Lymphocytes Relative 29 14 - 44 %    Monocytes Relative 8 4 - 12 %    Eosinophils Relative 3 0 - 6 %    Basophils Relative 1 0 - 1 %    Neutrophils Absolute 3 43 1 85 - 7 62 Thousands/µL    Immature Grans Absolute 0 01 0 00 - 0 20 Thousand/uL    Lymphocytes Absolute 1 68 0 60 - 4 47 Thousands/µL    Monocytes Absolute 0 49 0 17 - 1 22 Thousand/µL    Eosinophils Absolute 0 17 0 00 - 0 61 Thousand/µL    Basophils Absolute 0 04 0 00 - 0 10 Thousands/µL   Protime-INR    Collection Time: 06/10/19  5:38 PM   Result Value Ref Range    Protime 10 7 9 4 - 11 7 seconds    INR 1 02 0 86 - 1 16   APTT    Collection Time: 06/10/19  5:38 PM   Result Value Ref Range    PTT 25 (L) 26 - 38 seconds   Comprehensive metabolic panel    Collection Time: 06/10/19  5:38 PM   Result Value Ref Range    Sodium 141 136 - 145 mmol/L    Potassium 3 8 3 5 - 5 3 mmol/L    Chloride 105 100 - 108 mmol/L    CO2 26 21 - 32 mmol/L    ANION GAP 10 4 - 13 mmol/L    BUN 10 5 - 25 mg/dL    Creatinine 0 94 0 60 - 1 30 mg/dL    Glucose 91 65 - 140 mg/dL    Calcium 8 7 8 3 - 10 1 mg/dL    AST 19 5 - 45 U/L    ALT 28 12 - 78 U/L    Alkaline Phosphatase 79 46 - 116 U/L    Total Protein 7 3 6 4 - 8 2 g/dL    Albumin 3 7 3 5 - 5 0 g/dL    Total Bilirubin 0 40 0 20 - 1 00 mg/dL    eGFR 57 ml/min/1 73sq m   TSH, 3rd generation with Free T4 reflex    Collection Time: 06/10/19  5:38 PM   Result Value Ref Range    TSH 3RD GENERATON 1 273 0 358 - 3 740 uIU/mL   Lipid Panel with Direct LDL reflex    Collection Time: 06/10/19  5:38 PM   Result Value Ref Range    Cholesterol 172 50 - 200 mg/dL    Triglycerides 168 (H) <=150 mg/dL    HDL, Direct 46 40 - 60 mg/dL    LDL Calculated 92 0 - 100 mg/dL   Comprehensive metabolic panel    Collection Time: 06/11/19  5:20 AM   Result Value Ref Range    Sodium 140 136 - 145 mmol/L    Potassium 3 9 3 5 - 5 3 mmol/L    Chloride 107 100 - 108 mmol/L    CO2 25 21 - 32 mmol/L    ANION GAP 8 4 - 13 mmol/L    BUN 10 5 - 25 mg/dL    Creatinine 1 01 0 60 - 1 30 mg/dL    Glucose 87 65 - 140 mg/dL    Calcium 7 7 (L) 8 3 - 10 1 mg/dL    AST 16 5 - 45 U/L    ALT 21 12 - 78 U/L    Alkaline Phosphatase 64 46 - 116 U/L    Total Protein 6 0 (L) 6 4 - 8 2 g/dL    Albumin 3 0 (L) 3 5 - 5 0 g/dL    Total Bilirubin 0 60 0 20 - 1 00 mg/dL    eGFR 52 ml/min/1 73sq m   Magnesium    Collection Time: 06/11/19  5:20 AM   Result Value Ref Range    Magnesium 1 9 1 6 - 2 6 mg/dL   Phosphorus    Collection Time: 06/11/19  5:20 AM   Result Value Ref Range    Phosphorus 3 4 2 3 - 4 1 mg/dL   CBC and differential    Collection Time: 06/11/19  5:20 AM   Result Value Ref Range    WBC 5 54 4 31 - 10 16 Thousand/uL    RBC 3 71 (L) 3 81 - 5 12 Million/uL    Hemoglobin 11 6 11 5 - 15 4 g/dL    Hematocrit 35 5 34 8 - 46 1 %    MCV 96 82 - 98 fL    MCH 31 3 26 8 - 34 3 pg    MCHC 32 7 31 4 - 37 4 g/dL    RDW 12 6 11 6 - 15 1 %    MPV 10 0 8 9 - 12 7 fL    Platelets 398 566 - 701 Thousands/uL    nRBC 0 /100 WBCs    Neutrophils Relative 61 43 - 75 %    Immat GRANS % 0 0 - 2 %    Lymphocytes Relative 29 14 - 44 %    Monocytes Relative 7 4 - 12 %    Eosinophils Relative 2 0 - 6 %    Basophils Relative 1 0 - 1 %    Neutrophils Absolute 3 39 1 85 - 7 62 Thousands/µL    Immature Grans Absolute 0 02 0 00 - 0 20 Thousand/uL    Lymphocytes Absolute 1 62 0 60 - 4 47 Thousands/µL    Monocytes Absolute 0 39 0 17 - 1 22 Thousand/µL    Eosinophils Absolute 0 09 0 00 - 0 61 Thousand/µL    Basophils Absolute 0 03 0 00 - 0 10 Thousands/µL       Current Facility-Administered Medications   Medication Dose Route Frequency Provider Last Rate Last Dose    acetaminophen (TYLENOL) tablet 650 mg  650 mg Oral Q6H PRN Lanny Woo DO        atorvastatin (LIPITOR) tablet 80 mg  80 mg Oral Daily With KEMI Farooq DO        bisacodyl (DULCOLAX) rectal suppository 10 mg  10 mg Rectal Daily PRN FKQRAOLS Chilo, DO        calcium carbonate (TUMS) chewable tablet 1,000 mg  1,000 mg Oral Daily PRN Dieynaba Chilo, DO        citalopram (CeleXA) tablet 20 mg  20 mg Oral Daily Dieynaba Chilo, DO        clopidogrel (PLAVIX) tablet 75 mg  75 mg Oral Daily Dieynaba Chilo, DO        divalproex sodium (DEPAKOTE) EC tablet 500 mg  500 mg Oral TID Dieynaba Chilo, DO   500 mg at 06/10/19 2123    famotidine (PEPCID) tablet 20 mg  20 mg Oral BID Dieynaba Chilo, DO   20 mg at 06/10/19 2123    fluticasone (FLONASE) 50 mcg/act nasal spray 1 spray  1 spray Nasal Daily Dieynaba Chilo, DO        guaiFENesin (ROBITUSSIN) oral solution 200 mg  200 mg Oral Q4H PRN Diaz Chavira, DO        levETIRAcetam (KEPPRA) tablet 500 mg  500 mg Oral Q12H [address] Dieynaba Chilo, DO   500 mg at 06/10/19 2123    levothyroxine tablet 88 mcg  88 mcg Oral Early Morning Dieynaba Chilo, DO   88 mcg at 06/11/19 0524    loratadine (CLARITIN) tablet 10 mg  10 mg Oral Daily Dieynaba Chilo, DO        LORazepam (ATIVAN) tablet 0 5 mg  0 5 mg Oral Q6H PRN Dieynaba Chilo, DO        melatonin tablet 3 mg  3 mg Oral HS Dieynaba Chilo, DO        ondansetron (ZOFRAN) injection 4 mg  4 mg Intravenous Q6H PRN Dieynaba Chilo, DO        pantoprazole (PROTONIX) EC tablet 40 mg  40 mg Oral Early Morning Dieynaba Chilo, DO   40 mg at 06/11/19 0524       Invasive Devices     Peripheral Intravenous Line            Peripheral IV 06/10/19 Left Antecubital less than 1 day                Lab, Imaging and other studies: I have personally reviewed pertinent reports      VTE Pharmacologic Prophylaxis: Reason for no pharmacologic prophylaxis Hx of intracranial bleeding  VTE Mechanical Prophylaxis: sequential compression device    Rosa Duke MD

## 2019-06-11 NOTE — UTILIZATION REVIEW
Initial Clinical Review    Admission: Date/Time/Statement: 6/10/19 @ 1848       Orders Placed This Encounter   Procedures    Inpatient Admission     Standing Status:   Standing     Number of Occurrences:   1     Order Specific Question:   Admitting Physician     Answer:   Yanira Matthew     Order Specific Question:   Level of Care     Answer:   Med Surg [16]     Order Specific Question:   Estimated length of stay     Answer:   More than 2 Midnights     Order Specific Question:   Certification     Answer:   I certify that inpatient services are medically necessary for this patient for a duration of greater than two midnights  See H&P and MD Progress Notes for additional information about the patient's course of treatment  ED Arrival Information     Expected Arrival Acuity Means of Arrival Escorted By Service Admission Type    - 6/10/2019 17:09 Immediate Walk-In Spouse General Medicine Emergency    Arrival Complaint    -        Chief Complaint   Patient presents with    CVA/TIA-like Symptoms     sudden onset overlast 30 min with confusion and aphasia, not ableto get outany appropriatewords  two strokes in past     Assessment/Plan: 83 YO FEMALE PMH CVA X3 IN 2016 ,EXPRESSIVE APHAGIA,SEIZURE DISORDER, AFIB, HTN SUBARACHNOID HEMORRHAGE AND SUBDURAL HEMATOMA PRESENTS TO ED WITH SUDDEN ONSET OF EXPRESSIVE APHAGIA AND CONFUSION  SYMPTOMS SUDDEN ONSET WHEN SHE BECAME FRUSTRATED WHILE ATTEMPTPING TO FIX A DRAWER  REPORTS INCREASED FATIGUE OVER THE PAST FEW DAYS  SYMPTOMS ARE CURRENTLY PERSISTENT AND HAVE LASTED A FEW HOURS SINCE 16:30   PT ALSO C/O LEFT  TEMPORAL HA   TPA NOT GIVEN IN LIGHT OF PREVIOUS INTRACRANIAL HEMORRHAGE AFTER LAST TPA  ATTEMPTS HAS BEEN MADE TO REACH  WHICH WERE UNSUCCESSFUL  She is disoriented (AAOx1, to self only)  Patient has an NIH score of 5  This is based on a higher score because of her expressive aphasia unable to answer verbally questions       NEUROLOGY ASSESSMENT ED  EXPRESSIVE APHASIA, UNKNOWN ETIOLOGY,FIXED STENOSIS  SUGGESTED REDUCED PERFUSION   NO TPA CONTRAINDICATED PER NEURO  ADMIT TO STROKE PATHWAY MRI BRAIN ORDERED  PERMISSIVE HTN ONLY TREAT IF SYSTOLIC >973 MMHG  ED Triage Vitals [06/10/19 1715]   Temperature Pulse Respirations Blood Pressure SpO2   98 8 °F (37 1 °C) 65 20 (!) 191/73 99 %      Temp Source Heart Rate Source Patient Position - Orthostatic VS BP Location FiO2 (%)   Tympanic Monitor Sitting Left arm --      Pain Score       No Pain        Wt Readings from Last 1 Encounters:   06/10/19 65 1 kg (143 lb 8 3 oz)     Additional Vital Signs:   Pertinent Labs/Diagnostic Test Results:   CT HEAD No evidence of acute vascular territorial infarction, intracranial hemorrhage or mass effect  CTA HEAD NECK    1  No hemodynamically significant stenosis, dissection or occlusion of the carotid or vertebral arteries  2   Moderate (50-69%) narrowing of the distal left M1 segment secondary to atherosclerotic plaque  No evidence of acute thrombus or occlusion of the major vessels of the [place]    6/11/19 MRI BRAIN  Mild chronic microvascular ischemic disease  No acute ischemic disease  Age-related atrophy  Similar to previous study  Results from last 7 days   Lab Units 06/11/19  0520 06/10/19  1738   WBC Thousand/uL 5 54 5 82   HEMOGLOBIN g/dL 11 6 13 5   HEMATOCRIT % 35 5 40 8   PLATELETS Thousands/uL 200 248   NEUTROS ABS Thousands/µL 3 39 3 43     Results from last 7 days   Lab Units 06/11/19  0520 06/10/19  1738   SODIUM mmol/L 140 141   POTASSIUM mmol/L 3 9 3 8   CHLORIDE mmol/L 107 105   CO2 mmol/L 25 26   ANION GAP mmol/L 8 10   BUN mg/dL 10 10   CREATININE mg/dL 1 01 0 94   EGFR ml/min/1 73sq m 52 57   CALCIUM mg/dL 7 7* 8 7   MAGNESIUM mg/dL 1 9  --    PHOSPHORUS mg/dL 3 4  --      Results from last 7 days   Lab Units 06/11/19  0520 06/10/19  1738   AST U/L 16 19   ALT U/L 21 28   ALK PHOS U/L 64 79   TOTAL PROTEIN g/dL 6 0* 7 3   ALBUMIN g/dL 3 0* 3 7 TOTAL BILIRUBIN mg/dL 0 60 0 40     Results from last 7 days   Lab Units 06/10/19  1715   POC GLUCOSE mg/dl 95     Results from last 7 days   Lab Units 06/11/19  0520 06/10/19  1738   GLUCOSE RANDOM mg/dL 87 91     Results from last 7 days   Lab Units 06/10/19  1738   PROTIME seconds 10 7   INR  1 02   PTT seconds 25*     ED Treatment:   Medication Administration from 06/10/2019 1708 to 06/10/2019 1938       Date/Time Order Dose Route Action Action by Comments     06/10/2019 1758 sodium chloride 0 9 % bolus 1,000 mL 1,000 mL Intravenous New 350 KAILYN Roberts      06/10/2019 1744 iohexol (OMNIPAQUE) 350 MG/ML injection (MULTI-DOSE) 85 mL 85 mL Intravenous Given Debbrah Labrum         Past Medical History:   Diagnosis Date    Arthritis     Disease of thyroid gland     Hypertension     Stroke [facility] [address] ) 06/2016     Present on Admission:   Expressive aphasia   Hypertension   Hypothyroidism   Paroxysmal atrial fibrillation (HCC)   Seizure disorder (HCC)      Admitting Diagnosis: TIA (transient ischemic attack) [G45 9]  High blood pressure [I10]  Symptoms of cerebrovascular accident (CVA) [R29 90]  Age/Sex: 82 y o  female  Admission Orders:  TELE MON  NEUROLOGY CONSULT  NEURO CHECKS Q4H  ASPIRATION PRECAUTIONS  FALL PRECAUTIONS  PT OT SPEECH SWALLOW EVAL/COGNITIVE EVAL  MRI    Current Facility-Administered Medications:  acetaminophen 650 mg Oral Q6H PRN Dieynaba Chilo, DO   atorvastatin 80 mg Oral Daily With KEMI Farooq, DO   bisacodyl 10 mg Rectal Daily PRN Dieynaba Chilo, DO   calcium carbonate 1,000 mg Oral Daily PRN Dieynaba Chilo, DO   citalopram 20 mg Oral Daily Dieynaba Chilo, DO   clopidogrel 75 mg Oral Daily Dieynaba Chilo, DO   divalproex sodium 500 mg Oral TID Dieynaba Chilo, DO   famotidine 20 mg Oral BID Dieynaba Chilo, DO   fluticasone 1 spray Nasal Daily Dieynaba Chilo, DO   guaiFENesin 200 mg Oral Q4H PRN Yi Cueva, DO   levETIRAcetam 500 mg Oral Q12H [address] Dieynaba Chilo, DO levothyroxine 88 mcg Oral Early Morning Dieynaba Chilo, DO   loratadine 10 mg Oral Daily Dieynaba Chilo, DO   LORazepam 0 5 mg Oral Q6H PRN Dieynaba Chilo, DO   melatonin 3 mg Oral HS Dieynaba Chilo, DO   ondansetron 4 mg Intravenous Q6H PRN Dieynaba Chilo, DO   pantoprazole 40 mg Oral Early Morning Dieynaba Chilo, DO       IP CONSULT TO NEUROLOGY    Network Utilization Review Department  Phone: [phone]; Fax [phone]  [email or web address]  ATTENTION: Please call with any questions or concerns to [phone]  and carefully listen to the prompts so that you are directed to the right person  Send all requests for admission clinical reviews, approved or denied determinations and any other requests to fax [phone]   All voicemails are confidential

## 2019-06-11 NOTE — PLAN OF CARE
Problem: Potential for Falls  Goal: Patient will remain free of falls  Description  INTERVENTIONS:  - Assess patient frequently for physical needs  -  Identify cognitive and physical deficits and behaviors that affect risk of falls  -  [place] fall precautions as indicated by assessment   - Educate patient/family on patient safety including physical limitations  - Instruct patient to call for assistance with activity based on assessment  - Modify environment to reduce risk of injury  - Consider OT/PT consult to assist with strengthening/mobility  Outcome: Progressing     Problem: Nutrition/Hydration-ADULT  Goal: Nutrient/Hydration intake appropriate for improving, restoring or maintaining nutritional needs  Description  Monitor and assess patient's nutrition/hydration status for malnutrition (ex- brittle hair, bruises, dry skin, pale skin and conjunctiva, muscle wasting, smooth red tongue, and disorientation)  Collaborate with interdisciplinary team and initiate plan and interventions as ordered  Monitor patient's weight and dietary intake as ordered or per policy  Utilize nutrition screening tool and intervene per policy  Determine patient's food preferences and provide high-protein, high-caloric foods as appropriate       INTERVENTIONS:  - Monitor oral intake, urinary output, labs, and treatment plans  - Assess nutrition and hydration status and recommend course of action  - Evaluate amount of meals eaten  - Assist patient with eating if necessary   - Allow adequate time for meals  - Recommend/ encourage appropriate diets, oral nutritional supplements, and vitamin/mineral supplements  - Order, calculate, and assess calorie counts as needed  - Recommend, monitor, and adjust tube feedings and TPN/PPN based on assessed needs  - Assess need for intravenous fluids  - Provide specific nutrition/hydration education as appropriate  - Include patient/family/caregiver in decisions related to nutrition  Outcome: Progressing     Problem: Prexisting or High Potential for Compromised Skin Integrity  Goal: Skin integrity is maintained or improved  Description  INTERVENTIONS:  - Identify patients at risk for skin breakdown  - Assess and monitor skin integrity  - Assess and monitor nutrition and hydration status  - Monitor labs (i e  albumin)  - Assess for incontinence   - Turn and reposition patient  - Assist with mobility/ambulation  - Relieve pressure over bony prominences  - Avoid friction and shearing  - Provide appropriate hygiene as needed including keeping skin clean and dry  - Evaluate need for skin moisturizer/barrier cream  - Collaborate with interdisciplinary team (i e  Nutrition, Rehabilitation, etc )   - Patient/family teaching  Outcome: Progressing     Problem: PAIN - ADULT  Goal: Verbalizes/displays adequate comfort level or baseline comfort level  Description  Interventions:  - Encourage patient to monitor pain and request assistance  - Assess pain using appropriate pain scale  - Administer analgesics based on type and severity of pain and evaluate response  - Implement non-pharmacological measures as appropriate and evaluate response  - Consider cultural and social influences on pain and pain management  - Notify physician/advanced practitioner if interventions unsuccessful or patient reports new pain  Outcome: Progressing     Problem: INFECTION - ADULT  Goal: Absence or prevention of progression during hospitalization  Description  INTERVENTIONS:  - Assess and monitor for signs and symptoms of infection  - Monitor lab/diagnostic results  - Monitor all insertion sites, i e  indwelling lines, tubes, and drains  - [place] appropriate cooling/warming therapies per order  - Administer medications as ordered  - Instruct and encourage patient and family to use good hand hygiene technique  - Identify and instruct in appropriate isolation precautions for identified infection/condition   Outcome: Progressing Problem: SAFETY ADULT  Goal: Maintain or return to baseline ADL function  Description  INTERVENTIONS:  -  Assess patient's ability to carry out ADLs; assess patient's baseline for ADL function and identify physical deficits which impact ability to perform ADLs (bathing, care of mouth/teeth, toileting, grooming, dressing, etc )  - Assess/evaluate cause of self-care deficits   - Assess range of motion  - Assess patient's mobility; develop plan if impaired  - Assess patient's need for assistive devices and provide as appropriate  - Encourage maximum independence but intervene and supervise when necessary  ¯ Involve family in performance of ADLs  ¯ Assess for home care needs following discharge   ¯ Request OT consult to assist with ADL evaluation and planning for discharge  ¯ Provide patient education as appropriate  Outcome: Progressing  Goal: Maintain or return mobility status to optimal level  Description  INTERVENTIONS:  - Assess patient's baseline mobility status (ambulation, transfers, stairs, etc )    - Identify cognitive and physical deficits and behaviors that affect mobility  - Identify mobility aids required to assist with transfers and/or ambulation (gait belt, sit-to-stand, lift, walker, cane, etc )  - [place] fall precautions as indicated by assessment  - Record patient progress and toleration of activity level on Mobility SBAR; progress patient to next Phase/Stage  - Instruct patient to call for assistance with activity based on assessment  - Request Rehabilitation consult to assist with strengthening/weightbearing, etc   Outcome: Progressing     Problem: DISCHARGE PLANNING  Goal: Discharge to home or other facility with appropriate resources  Description  INTERVENTIONS:  - Identify barriers to discharge w/patient and caregiver  - Arrange for needed discharge resources and transportation as appropriate  - Identify discharge learning needs (meds, wound care, etc )  - Arrange for interpretive services to assist at discharge as needed  - Refer to Case Management Department for coordinating discharge planning if the patient needs post-hospital services based on physician/advanced practitioner order or complex needs related to functional status, cognitive ability, or social support system  Outcome: Progressing     Problem: Knowledge Deficit  Goal: Patient/family/caregiver demonstrates understanding of disease process, treatment plan, medications, and discharge instructions  Description  Complete learning assessment and assess knowledge base  Interventions:  - Provide teaching at level of understanding  - Provide teaching via preferred learning methods  Outcome: Progressing     Problem: Neurological Deficit  Goal: Neurological status is stable or improving  Description  Interventions:  - Monitor and assess patient's level of consciousness, motor function, sensory function, and level of assistance needed for ADLs  - Monitor and report changes from baseline  Collaborate with interdisciplinary team to initiate plan and implement interventions as ordered  - Provide and maintain a safe environment  - Utilize seizure precautions  - Utilize fall precautions  - Utilize aspiration precautions  - Utilize bleeding precautions  Outcome: Progressing     Problem: Activity Intolerance/Impaired Mobility  Goal: Mobility/activity is maintained at optimum level for patient  Description  Interventions:  - Assess and monitor patient  barriers to mobility and need for assistive/adaptive devices  - Assess patient's emotional response to limitations  - Collaborate with interdisciplinary team and initiate plans and interventions as ordered  - Encourage independent activity per ability   - Maintain proper body alignment  - Perform active/passive rom as tolerated/ordered    - Plan activities to conserve energy   - Turn patient  Outcome: Progressing     Problem: Communication Impairment  Goal: Ability to express needs and understand communication  Description  Assess patient's communication skills and ability to understand information  Patient will demonstrate use of effective communication techniques, alternative methods of communication and understanding even if not able to speak  - Encourage communication and provide alternate methods of communication as needed  - Collaborate with case management/ for discharge needs  - Include patient/family/caregiver in decisions related to communication  Outcome: Progressing     Problem: Potential for Aspiration  Goal: Non-ventilated patient's risk of aspiration is minimized  Description  Assess and monitor vital signs, respiratory status, and labs (WBC)  Monitor for signs of aspiration (tachypnea, cough, rales, wheezing, cyanosis, fever)  - Assess and monitor patient's ability to swallow  - Place patient up in chair to eat if possible  - HOB up at 90 degrees to eat if unable to get patient up into chair   - Supervise patient during oral intake  - Instruct patient to take small bites  - Instruct patient to take small single sips when taking liquids  - Follow patient-specific strategies generated by speech pathologist   Outcome: Progressing     Problem: Nutrition  Goal: Nutrition/Hydration status is improving  Description  Monitor and assess patient's nutrition/hydration status for malnutrition (ex- brittle hair, bruises, dry skin, pale skin and conjunctiva, muscle wasting, smooth red tongue, and disorientation)  Collaborate with interdisciplinary team and initiate plan and interventions as ordered  Monitor patient's weight and dietary intake as ordered or per policy  Utilize nutrition screening tool and intervene per policy  Determine patient's food preferences and provide high-protein, high-caloric foods as appropriate  - Assist patient with eating   - Allow adequate time for meals   - Encourage patient to take dietary supplement as ordered    - Collaborate with clinical nutritionist   - Include patient/family/caregiver in decisions related to nutrition    Outcome: Progressing

## 2019-06-11 NOTE — PHYSICAL THERAPY NOTE
PT EVALUATION     06/11/19 1135   Pain Assessment   Pain Assessment No/denies pain   Home Living   Type of [address] [facility] [address] One level;Stairs to enter with rails  (3 stairs to enter)   [address] [place] [address] chair   Home Equipment Walker;Cane   Additional Comments patient not using assistive device prior to admission   Prior Function   Level of [place] Independent with ADLs and functional mobility   Lives With [place] Help From Family   ADL Assistance Independent   IADLs Independent   Comments patient driving and caring for  as needed   Restrictions/Precautions   Other Precautions Fall Risk   General   Additional Pertinent History chart reviewed,patient admitted with expressive aphasia, now resolved  Patient now presents with unsteady gait with distance walking and benefitted from use of roller walker   Family/Caregiver Present Yes   Cognition   Arousal/Participation Cooperative   Attention Attends with cues to redirect   Orientation Level Oriented X4   Following Commands Follows multistep commands with increased time or repetition   Comments patient with decreased safety awareness and education completed for use of roller walker for safety   RLE Assessment   RLE Assessment   (ROM WFL, strength 3+/4-)   LLE Assessment   LLE Assessment   (ROM WFL, strength 3+/4-)   Coordination   Movements are Fluid and Coordinated 0   Transfers   Sit to Stand 7  Independent   Stand to Sit 7  Independent   Ambulation/Elevation   Gait Assistance 4  Minimal assist   Additional items Assist x 1;Verbal cues; Tactile cues   Assistive Device   (none)   Distance 20 feet with change in direction and loss of balance laterally at times with narrow base of support   Balance   Static Sitting Fair +   Dynamic Sitting Fair   Static Standing Fair   Dynamic Standing Fair -   Ambulatory Poor +   Activity Tolerance   Activity Tolerance Patient tolerated treatment well   Nurse Made Aware yes   Assessment Prognosis Good   Problem List Decreased strength;Decreased endurance;Decreased range of motion; Impaired balance;Decreased mobility; Decreased coordination;Decreased safety awareness   Assessment Patient seen for Physical Therapy evaluation  Patient admitted with Expressive aphasia  Comorbidities affecting patient's physical performance include:CVA x3 in 2016, expressive aphagia, seizure disorder, paroxysmal Afib, hypothyroidism, htn, subarachnoid hemorrhage and subdural hematoma admitted for expressive aphasia   Personal factors affecting patient at time of initial evaluation include: inability to ambulate household distances, inability to navigate community distances, inability to navigate level surfaces without external assistance, inability to perform dynamic tasks in community, limited home support, inability to perform caregiver support/tasks, inability to perform physical activity, limited insight into impairments, inability to perform ADLS and inability to perform IADLS   Prior to admission, patient was independent with functional mobility without assistive device, independent with ADLS, independent with IADLS, ambulating household distance and ambulating community distances  Please find objective findings from Physical Therapy assessment regarding body systems outlined above with impairments and limitations including weakness, decreased ROM, impaired balance, decreased endurance, impaired coordination, gait deviations, decreased activity tolerance, decreased functional mobility tolerance, decreased safety awareness and fall risk  The Barthel Index was used as a functional outcome tool presenting with a score of 50 today indicating marked limitations of functional mobility and ADLS    Patient's clinical presentation is currently unstable/unpredictable as seen in patient's presentation of vital sign response, changing level of pain, varying levels of cognitive performance, increased fall risk, new onset of impairment of functional mobility, decreased endurance and new onset of weakness  Pt would benefit from continued Physical Therapy treatment to address deficits as defined above and maximize level of functional mobility  As demonstrated by objective findings, the assigned level of complexity for this evaluation is high  Goals   Patient Goals go home and be independent   STG Expiration Date 06/18/19   Short Term Goal #1 transfers and gait with roller walker independently   Short Term Goal #2 gait endurance to functional household distances, strength BLEs 4-/5 all to meet patient goal or returning home   LTG Expiration Date 06/25/19   Long Term Goal #1 transfers and gait without assistive device independently   Long Term Goal #2  gait endruance to functional community distances   Plan   Treatment/Interventions ADL retraining;Functional transfer training;LE strengthening/ROM; Elevations; Therapeutic exercise; Endurance training;Patient/family training;Equipment eval/education;Gait training; Compensatory technique education   PT Frequency 5x/wk   Recommendation   Recommendation Outpatient PT  ([facility])   Barthel Index   Feeding 10   Bathing 0   Grooming Score 0   Dressing Score 5   Bladder Score 10   Bowels Score 10   Toilet Use Score 5   Transfers (Bed/Chair) Score 10   Mobility (Level Surface) Score 0   Stairs Score 0   Barthel Index Score 50   Licensure   [place] License Number  Liza Rodríguez [id number]   PT TREATMENT  Time In:1125 1135  Time Out:  Total Time: 10min      S:  Patient cooperative without pain but with word finding difficulty at times  O:  -gait training with roller walker with supervision of 1  -endurance 60 feet with improved gait balance and endurance and education completed for improved safety awareness to prevent falls by using roller walker at all times  A:  Patient will benefit from continued PT with progression as tolerated  P:  Out patient [facility]    Jaylene Eid, PT

## 2019-06-11 NOTE — SPEECH THERAPY NOTE
Speech-Language Pathology Bedside Swallow Evaluation        Patient Name: Yadi Richardson    PSEFU'S Date: 6/11/2019     Problem List  Patient Active Problem List   Diagnosis    Expressive aphasia    Acute CVA (cerebrovascular accident) ([facility] [address] )    Hypertension    Hypothyroidism    Paroxysmal atrial fibrillation (HCC)    Hypokalemia    Subarachnoid hemorrhage (HCC)    Subdural hematoma (HCC)    Hyperlipemia    Anxiety    Esophageal dysmotility    Seizure disorder [facility])       Past Medical History  Past Medical History:   Diagnosis Date    Arthritis     Disease of thyroid gland     Hypertension     Stroke ([facility] ) 06/2016       Past Surgical History  Past Surgical History:   Procedure Laterality Date    BACK SURGERY  01/2016    CARPAL TUNNEL RELEASE Right        Summary    Oral and pharyngeal stages of swallowing appeared WNL  No overt s/s aspiration noted  Recommendations:   Diet: regular diet and thin liquids   Meds: whole with liquid   Frequent Oral care: 2x/day  Other Recommendations/ considerations: no further tx needed  Current Medical Status  Pt is a 82 y o  female who presented to [facility] with expressive aphasia  Pt presents for evaluation of expressive aphasia & confusion  Onset of symptoms was sudden, related to feeling frustrated while attempting to fix a  her bedroom, reports increased fatigue over the past few days  Symptoms are currently persistent and have have lasted a few hours since 16:30 on the day of presentation  Stroke risk factors include atrial fibrillation, hyperlipidemia and hypertension  Prior stroke history: yes, exact type of CVA unknown  Patient also complains of left temporal HA  Patient denies chest pain, palpitations, seizures, shortness of breath and fall  Patient has not seen a PCP for years  Although warranted, TPA not given in light of previous intracranial hemorrhage following last administration per chart review   Patient is unsure when she last took her medications  Limited HPI in light of expressive aphasia,  not present at bedside  Attempts has been made to reach him, unsucesful  Past medical history:   Please see H&P for details    Special Studies:  MRI: 6/11/19 Mild chronic microvascular ischemic disease  No acute ischemic disease  Barium Swallow esophagram: 6/20/18   Esophageal dysmotility with tertiary contractions in the mid and distal esophagus, as can be seen with chronic reflux esophagitis and presbyesophagus  Social/Education/Vocational Hx:  Pt lives with family    Swallow Information   Current Risks for Dysphagia & Aspiration: CVA  Current Symptoms/Concerns: CVA symptoms  Current Diet: puree/level 1 diet and honey thick liquids   Baseline Diet: regular diet and thin liquids    Baseline Assessment   Behavior/Cognition: alert  Speech/Language Status: able to participate in conversation and able to follow commands  Patient Positioning: upright in chair     Swallow Mechanism Exam   Facial: symmetrical  Labial: WFL  Lingual: WFL  Velum: unable to visualize  Mandible: adequate ROM  Dentition: adequate  Vocal quality:clear/adequate   Volitional Cough: strong/productive   Respiratory: RA    Consistencies Assessed and Performance   Consistencies Administered: thin liquids, nectar thick, puree and hard solids    Oral Stage: pt was able to self feed;  mastication, manipulation, oral control of liquids and transfer of all consistencies  No oral residue or pocketing noted  Pharyngeal Stage: Swallow initiation was timely and complete w/ no overt s/s aspiration  Voice remained cleared         Esophageal Concerns: c/o feeling food and NTL go down slowly      Results Reviewed with: patient, MD and dietician   Dysphagia Goals: none at this time    April Jannie Colvin MA CCC-SLP  Speech Patholgist  [place] license # [employer] [facility] ([facility]) [id number]  [place] license # [id number]  Available via [employer] [facility]

## 2019-06-11 NOTE — ASSESSMENT & PLAN NOTE
· Neuro: Most likely complex migraine  No longer needs permissive HTN  · Patient to continue home BP meds

## 2019-06-11 NOTE — ASSESSMENT & PLAN NOTE
· Home Depakote  mg TID & Keppra 500 mg q12h continued  · Depakote & Keppra level ordered  · Neuro consulted    · Patient to continue keppra 500 mg TID and depakote 500 mg BID

## 2019-06-11 NOTE — CONSULTS
[facility]   Neurology Initial Consult    Danisha Conner is a 82 y o  female  [address] [place]          Information obtained from:   Chief Complaint   Patient presents with    CVA/TIA-like Symptoms     sudden onset overlast 30 min with confusion and aphasia, not ableto get outany appropriatewords  two strokes in past         Assessment/Plan:    1  HTN urgency  2  Migraine aura  3  Stress reaction   4  Left carotid moderate stenosis   5  H/o seizure disorder   6  Anxiety disorder     Patient has history of similar episodes at least 3 x by now  We have not found any etiology  True ischemic process seems less likely  It could be HTN related as each time it has been elevated  It has now spontaneously returned to wnl  There is small possibility of migraine aura as she has history of them  Stress reaction and non neurological is also in differential    Medication compliance is in question  Recommend arranging for home health aide that can check her meds, compliance and also periodic BP at home  MRI brain is negative for acute process  CTA shows left carotid 50-69% stenosis  Resume lipitor and plavix  F/u doppler in 6 months  Cont prior AEDs depakote and keppra  PT/OT          HPI:    Danisha Conner is an 81 yo F with PMH of HTN presents with cc of aphasia  Patient says she was in a lot of stress at home  She developed inability to speak 30 min prior to arrival  She reported associated left sided headache and some vision disturbance  She could follow commands but wasn't able to communicate  Patient had similar episode in June 2016  She had similar sxs with addition of left lower extremity weakness in Sept of 2016 at which time she had received IV tPA  After she tPA she developed small area of SAH and SDH which was either after effect or because of uncontrolled blood pressure post tPA  With this history, high risk of bleed, she was not offered tPA yesterday   Her blood pressure is also an issue and tends to be high each time she presents with stroke like sxs  Patient is also on depakote and keppra for h/o seizure but our sources suggest she hasn't been taking them  Her  who is 82 is dispensing her meds     She's asymptomatic upon encounter           Past Medical History:   Diagnosis Date    Arthritis     Disease of thyroid gland     Hypertension     Stroke ([facility] [address] ) 06/2016       Past Surgical History:   Procedure Laterality Date    BACK SURGERY  01/2016    CARPAL TUNNEL RELEASE Right        Allergies   Allergen Reactions    Other       unsure of other allergies, pt unable to answer    Penicillins          Current Facility-Administered Medications:     acetaminophen (TYLENOL) tablet 650 mg, 650 mg, Oral, Q6H PRN, UFIEXATR Chilo, DO    atorvastatin (LIPITOR) tablet 80 mg, 80 mg, Oral, Daily With Dinner, Dieynaba Chilo, DO    bisacodyl (DULCOLAX) rectal suppository 10 mg, 10 mg, Rectal, Daily PRN, RIZAEIGC Chilo, DO    calcium carbonate (TUMS) chewable tablet 1,000 mg, 1,000 mg, Oral, Daily PRN, JNPPAHWN Chilo, DO    citalopram (CeleXA) tablet 20 mg, 20 mg, Oral, Daily, Dieynaba Chilo, DO, 20 mg at 06/11/19 0948    clopidogrel (PLAVIX) tablet 75 mg, 75 mg, Oral, Daily, Dieynaba Chilo, DO, 75 mg at 06/11/19 0948    divalproex sodium (DEPAKOTE) EC tablet 500 mg, 500 mg, Oral, TID, Dieynaba Chilo, DO, 500 mg at 06/11/19 0948    famotidine (PEPCID) tablet 20 mg, 20 mg, Oral, BID, Dieynaba Chilo, DO, 20 mg at 06/11/19 0948    fluticasone (FLONASE) 50 mcg/act nasal spray 1 spray, 1 spray, Nasal, Daily, Dieynaba Chilo, DO, 1 spray at 06/11/19 0949    guaiFENesin (ROBITUSSIN) oral solution 200 mg, 200 mg, Oral, Q4H PRN, Stacey Simmer, DO    levETIRAcetam (KEPPRA) tablet 500 mg, 500 mg, Oral, Q12H [address], Dieynaba Chilo, DO, 500 mg at 06/11/19 0948    levothyroxine tablet 88 mcg, 88 mcg, Oral, Early Morning, Lanny Woo DO, 88 mcg at 06/11/19 0524    loratadine (CLARITIN) tablet 10 mg, 10 mg, Oral, Daily, Dieynaba Chilo, DO, 10 mg at 06/11/19 0948    LORazepam (ATIVAN) tablet 0 5 mg, 0 5 mg, Oral, Q6H PRN, GXINSSMB Chilo, DO    melatonin tablet 3 mg, 3 mg, Oral, HS, Dieynaba Chilo, DO    ondansetron (ZOFRAN) injection 4 mg, 4 mg, Intravenous, Q6H PRN, HWVOVXIK Chilo, DO    pantoprazole (PROTONIX) EC tablet 40 mg, 40 mg, Oral, Early Morning, Dieynaba Chilo, DO, 40 mg at 06/11/19 0524    Social History     Socioeconomic History    Marital status: /Civil Union     Spouse name: Not on file    Number of children: Not on file    Years of education: Not on file    Highest education level: Not on file   Occupational History    Not on file   Social Needs    Financial resource strain: Not on file    Food insecurity:     Worry: Not on file     Inability: Not on file    Transportation needs:     Medical: Not on file     Non-medical: Not on file   Tobacco Use    Smoking status: Never Smoker    Smokeless tobacco: Never Used   Substance and Sexual Activity    Alcohol use: Never     Alcohol/week: 0 0 standard drinks     Frequency: Never     Drinks per session: Patient refused     Binge frequency: Never     Comment: 0    Drug use: No    Sexual activity: Yes     Partners: Male     Birth control/protection: Post-menopausal   Lifestyle    Physical activity:     Days per week: Not on file     Minutes per session: Not on file    Stress: Not on file   Relationships    Social connections:     Talks on phone: Not on file     Gets together: Not on file     Attends Taoist service: Not on file     Active member of club or organization: Not on file     Attends meetings of clubs or organizations: Not on file     Relationship status: Not on file    Intimate partner violence:     Fear of current or ex partner: Not on file     Emotionally abused: Not on file     Physically abused: Not on file     Forced sexual activity: Not on file   Other Topics Concern    Not on file   Social History Narrative    Not on file       History reviewed  No pertinent family history  Review of systems:  Please see HPI for positive symptoms  No fever, no chills, no weight change  Ocular: No drainage, no blurred vision  HEENT:  No sore throat, earache, or congestion  + mild headache No neck pain  COR:  No chest pain  No palpitations  Lungs:  no sob, wheezing,  GI:  no  nausea, no vomiting, no diarrhea, no constipation, no anorexia  :  No dysuria, frequency, or urgency  No hematuria  Musculoskeletal:  No joint pain or swelling or edema  Skin:  No rash or itching  Psychiatric:  no anxiety, no depression  Endocrine:  No polyuria or polydipsia  Physical examination:  Vitals:    06/11/19 0800   BP: 130/74   Pulse: 70   Resp: 18   Temp: 98 °F (36 7 °C)   SpO2: 96%       GENERAL APPEARANCE:  The patient is alert, oriented  He is in no acute distress  HEENT:  Head is normocephalic  The sinuses are otherwise nontender  Pupils are equal and reactive  NECK:  Supple without lymphadenopathy  HEART:  Regular rate and rhythm  LUNGS:  clear to auscultation  No crackles or wheezes are heard  ABDOMEN:  Soft, nontender, nondistended with good bowel sounds heard  EXTREMITIES:  Without cyanosis, clubbing or edema  Mental status: The patient is alert, attentive, and oriented  Speech is clear and fluent, good repetition, comprehension, and naming  She recalls 2/3 objects at 5 minutes  Cranial nerves:  CN II: Visual fields are full to confrontation  Fundoscopic exam is normal with sharp discs and no vascular changes    Pupils are 3 mm and reactive to light  CN III, IV, VI: At primary gaze, there is no eye deviation  CN V: Facial sensation is intact to pinprick in all 3 divisions bilaterally  Corneal responses are intact  CN VII: Face is symmetric with normal eye closure and smile  CN VIII: Hearing is normal to rubbing fingers  CN IX, X: Palate elevates symmetrically   Phonation is normal   CN XI: Head turning and shoulder shrug are intact  CN XII: Tongue is midline with normal movements and no atrophy  Motor: There is no pronator drift of out-stretched arms  Muscle bulk and tone are normal      Muscle exam  Arm Right Left Leg Right Left   Deltoid 5/5 5/5 Iliopsoas 5/5 5/5   Biceps 5/5 5/5 Quads 5/5 5/5   Triceps 5/5 5/5 Hamstrings 5/5 5/5   Wrist Extension 5/5 5/5 Ankle Dorsi Flexion 5/5 5/5   Wrist Flexion 5/5 5/5 Ankle Plantar Flexion 5/5 5/5   Interossei 5/5 5/5 Ankle Eversion 5/5 5/5   APB 5/5 5/5 Ankle Inversion 5/5 5/5       Reflexes   RJ BJ TJ KJ AJ Plantars Copeland's   Right 2+ 2+ 2+ 2+ 2+ Downgoing Not present   Left 2+ 2+ 2+ 2+ 2+ Downgoing Not present     Sensory:  Light touch, pinprick, position sense, and vibration sense are intact in fingers and toes  Coordination:  Rapid alternating movements and fine finger movements are intact  There is no dysmetria on finger-to-nose and heel-knee-shin  There are no abnormal or extraneous movements  Romberg negative  Gait/Stance:  Unsteady  Balance slightly off at baseline     Lab Results   Component Value Date    WBC 5 54 06/11/2019    HGB 11 6 06/11/2019    HCT 35 5 06/11/2019    MCV 96 06/11/2019     06/11/2019     Lab Results   Component Value Date    HGBA1C 5 7 09/12/2016     Lab Results   Component Value Date    ALT 21 06/11/2019    AST 16 06/11/2019    ALKPHOS 64 06/11/2019     Lab Results   Component Value Date    CALCIUM 7 7 (L) 06/11/2019    K 3 9 06/11/2019    CO2 25 06/11/2019     06/11/2019    BUN 10 06/11/2019    CREATININE 1 01 06/11/2019         Radiology          Review of reports and Independent Interpretation of images or specimens:  Cta Head And Neck With And Without Contrast    Result Date: 6/10/2019  1  No hemodynamically significant stenosis, dissection or occlusion of the carotid or vertebral arteries  2   Moderate (50-69%) narrowing of the distal left M1 segment secondary to atherosclerotic plaque    No evidence of acute thrombus or occlusion of the major vessels of the [place]  I personally discussed this study with DR Peri Morrison on 6/10/2019 at 6:00 PM  Workstation performed: [id number]     Ct Head Without Contrast    Result Date: 6/10/2019  No evidence of acute vascular territorial infarction, intracranial hemorrhage or mass effect  I personally discussed this study with DR FREY on 6/10/2019 at 5:36 PM  Workstation performed: [id number]     Mri Brain Wo Contrast    Result Date: 6/11/2019  Mild chronic microvascular ischemic disease No acute ischemic disease Age-related atrophy Similar to previous study Workstation performed: [id number]             Thank you for this consult  Total time of encounter: 70 min  More than 50% of time was spent in counseling and coordination of care of patient  PASCUAL Cassidy [facility]  [phone]  [address]

## 2019-06-11 NOTE — ASSESSMENT & PLAN NOTE
· Home Levothyroxine 88 mcg qd continued  · Cold intolerance reported    · TSH w/ reflex T4 ordered  · TSH 6/11: 1 237

## 2019-06-11 NOTE — DISCHARGE SUMMARY
[facility] Discharge Summary - Medical Yeseniavickie Petersen 82 y o  female MRN: [id number]    [place] [address] [facility] / Bed: [address] Encounter: [id number]    BRIEF OVERVIEW  Admitting Provider: Marycruz Currie, DO  Discharge Provider: Marycruz Currie DO    Discharge To: Home, self care    Outpatient Follow-Up:   PCP, Neurology  Things to address at first follow up visit:   Expressive aphasia   HTN vs migraine aura  Has patient had any more episodes like this? Is her BP well controlled? · one of suspected causes for episodes is HTN  · patient also mentioned recent stressors at home and has been unable to keep track of BP at home  Is patient taking all her meds? · Suspected to be noncompliant  · Patient d/c with keppra 500 mg BID and depakote 500 mg BID  · Ordered script for lab work for valproic acid level  Has patient f/u with neurology? F/u doppler in 6 months per neuro  Labs and results pending at discharge:  Valproic acid and levetiracetam levels    Admission Date: 6/10/2019     Discharge Date: 6/12/2019    Primary Discharge Diagnosis  Principal Problem:    Expressive aphasia  Active Problems:    Hypertension    Hypothyroidism    Paroxysmal atrial fibrillation (HCC)    Hyperlipemia    Anxiety    Esophageal dysmotility    Seizure disorder ([address] )  Resolved Problems:    Expressive aphasia  Consulting Providers   Taqueria Chen MD (Neurology)        [address] [place] [address]    Procedures Performed/Pertinent Test results  MRI brain wo contrast   Final Result   Mild chronic microvascular ischemic disease      No acute ischemic disease      Age-related atrophy      Similar to previous study         Workstation performed: [id number]         CTA head and neck with and without contrast   Final Result         1  No hemodynamically significant stenosis, dissection or occlusion of the carotid or vertebral arteries     2   Moderate (50-69%) narrowing of the distal left M1 segment secondary to atherosclerotic plaque  No evidence of acute thrombus or occlusion of the major vessels of the [place]  I personally discussed this study with DR Paras Gaitan on 6/10/2019 at 6:00 PM                            Workstation performed: [id number]         CT head without contrast   Final Result      No evidence of acute vascular territorial infarction, intracranial hemorrhage or mass effect  I personally discussed this study with DR FREY on 6/10/2019 at 5:36 PM                            Workstation performed: [id number]           6/12: BMP WNLs, CBC WNLs, Ca 8 1, A1C 6 1, sed rate 13  6/11: CBC WNL, CMP WNLS,  Corrected Ca 8 5, protein 6 0, albumin 3 0 othyerwise WNL  6/10: CMP & CBC unremarkable; CT head: No evidence of acute vascular territorial infarction, intracranial hemorrhage or mass effect  CTA head & neck: No significant stenosis or occlusion of the carotid or vertebral a  Mod narrowing of the distal M1 segment (arthrosclerotic plaque)  EKG: NSR    HPI (copied from H&P)  Karlos Berg is 81 y/o female w/ PMH of CVA (x3 in 2016), expressive aphagia, seizure disorder, paroxysmal Afib, hypothyroidism, htn, subarachnoid hemorrhage and subdural hematoma presents for evaluation of expressive aphagia & confusion  Onset of symptoms was sudden, related to feeling frustrated while attempting to fix a  her bedroom, reports increased fatigue over the past few days  Symptoms are currently persistent and have have lasted a few hours since 16:30 on the day of presentation  Stroke risk factors include atrial fibrillation, hyperlipidemia and hypertension  Prior stroke history: yes, exact type of CVA unknown  Patient also complains of left temporal HA  Patient denies chest pain, palpitations, seizures, shortness of breath and fall  Patient has not seen a PCP for years   Although warranted, TPA not given in light of previous intracranial hemorrhage following last administration per chart review  Patient is unsure when she last took her medications  Limited HPI in light of expressive aphasia,  not present at bedside  Attempts has been made to reach him, unsucesful  *ED course: NIHSS: 4 (2 for aphasia and 2 for LOC questions), BP max 203/110, stroke alert called, EKG, Head CT, CTA head & neck ordered  Hospital Course  Patient admitted due to expressive aphasia suspected to be secondary to stroke vs TIA  CT head w/o evidence of infarction, intracranial hemorrhage or mass  CTA head and neck showed no hemodynamically significant stenosis, dissection or occlusion of carotid or vertebral arteries  Moderate (50-69%) narrowing of the distal left M1 segment secondary to atherosclerotic plaque   No evidence of acute thrombus or occlusion of the major vessels of the [place]  Stroke was called, case was discussed with neurologist on call who recommend to avoid tPA due to history of intracranial bleeding, MRI head w/o contrast and permissive HTN to systolic <960 mmHg  Patient's condition improved through the night  On examination today, patient able to have a complete conversation with team w/o any residual symptoms from expressive aphasia  Also, no neurological deficits noted on examination  Patient evaluated by speech-language pathology, who reported that patient is stable, no residual deficits and patient currently does not need dysphagia diet  MRI head w/o acute process Patient later seen by Dr Alexandre Preston, who suspects episodes likely HTN related vs migraine aura (due to association of headaches with episodes)  Recommend to arrange health aide to check meds and compliance, due to questionable compliance, also to resume lipitor and plavix and continue prior AEDs depakote and keppra  Patient discharged with script to f/u valproic acid levels and was instructed to f/u with PCP and neurology       Physical Exam at Discharge  /59 (BP Location: Right arm)   Pulse 69 Temp 99 3 °F (37 4 °C) (Oral)   Resp 19   Wt 65 1 kg (143 lb 8 3 oz)   SpO2 94%   BMI 27 12 kg/m²   General appearance: alert and oriented, in no acute distress  Lungs: clear to auscultation bilaterally  Heart: regular rate and rhythm, S1, S2 normal, no murmur, click, rub or gallop  Abdomen: soft, non-tender; bowel sounds normal; no masses,  no organomegaly  Extremities: extremities normal, warm and well-perfused; no cyanosis, clubbing, or edema  Neurologic: Alert and oriented X 3, normal strength and tone  Normal symmetric reflexes  Normal coordination and gait     Medications   TAKE these medications     TAKE these medications     Morning Afternoon Evening Bedtime As Needed    atorvastatin 40 mg tablet   Commonly known as: LIPITOR   Take 40 mg by mouth daily  Refills: 0          bisacodyl 10 mg suppository   Commonly known as: DULCOLAX   Insert 10 mg into the rectum daily   Refills: 0          citalopram 20 mg tablet   Commonly known as: CeleXA   Take 20 mg by mouth daily   Refills: 0  Given at 0933         clopidogrel 75 mg tablet   Commonly known as: PLAVIX   For: Stroke caused by a Blood Clot   Take 75 mg by mouth daily Indications: Stroke caused by a Blood Clot  Refills: 0          divalproex sodium 500 mg EC tablet   Commonly known as: DEPAKOTE   Take 1 tablet (500 mg total) by mouth every 12 (twelve) hours   Refills: 0   What changed: when to take this  Given at 0933         famotidine 20 mg tablet   Commonly known as: PEPCID   Take 20 mg by mouth 2 (two) times a day   Refills: 0  Given at 0933         fluticasone 50 mcg/act nasal spray   Commonly known as: FLONASE   1 spray into each nostril daily   Refills: 0  Given at 0933         levETIRAcetam 500 mg tablet   Commonly known as: KEPPRA   Take 1 tablet (500 mg total) by mouth every 12 (twelve) hours   Refills: 0          lisinopril 10 mg tablet   Commonly known as: ZESTRIL   Take 1 tablet by mouth daily for 30 days     Refills: 0          loratadine 10 mg tablet   Commonly known as: CLARITIN   Take 10 mg by mouth daily   Refills: 0  Given at 0933          LORazepam 0 5 mg tablet   Commonly known as: ATIVAN   Take 0 5 mg by mouth every 6 (six) hours as needed for anxiety   Refills: 0          losartan 50 mg tablet   Commonly known as: COZAAR   Take 25 mg by mouth daily   Refills: 0          melatonin 3 mg   Take 3 mg by mouth daily at bedtime   Refills: 0          metoprolol tartrate 25 mg tablet   Commonly known as: LOPRESSOR   Take 2 tablets (50 mg total) by mouth every 12 (twelve) hours   Refills: 0          omeprazole 20 mg delayed release capsule   Commonly known as: PriLOSEC   Take 20 mg by mouth daily   Refills: 0         Ask your Primary Care Physician about these medications:     Ask your Primary Care Physician about these medications:     Morning Afternoon Evening Bedtime As Needed    levothyroxine 88 mcg tablet   Take 1 tablet by mouth daily for 30 days  Refills: 0   Ask about: Should I take this medication? Allergies  Allergies   Allergen Reactions    Other       unsure of other allergies, pt unable to answer    Penicillins        Diet restrictions: low sodium diet  Activity restrictions: as tolerated per patient  Code Status: Level 1 - Full Code    Discharge Condition: stable      Discharge  Statement   I spent 30 minutes discharging the patient  This time was spent on the day of discharge  I had direct contact with the patient on the day of discharge  Additional documentation is required if more than 30 minutes were spent on discharge

## 2019-06-11 NOTE — ASSESSMENT & PLAN NOTE
8/17/2018 11:12   Cholesterol 143   Triglycerides 95   HDL 52   Non-HDL Cholesterol 91   LDL Direct 72     · Repeat lipid panel ordered  · Home Lipitor increased to Lipitor 80 mg qd

## 2019-06-11 NOTE — ASSESSMENT & PLAN NOTE
· Likely 2/2 CVA versus TIA  · Home Plavix 75 mg continued  · Neuro consulted  · PT/OT consulted  · Fall & aspiration precautions ordered  · Brain MRI without contrast ordered  · Home ASA held on admission, consider restarting  qd in the morning

## 2019-06-11 NOTE — ASSESSMENT & PLAN NOTE
· Home Famotidine 20mg PO BID continued  · TUMS qd PRN ordered  · Home Omeprazole 20 mg delayed release ordered as Protonix 40 mg qd  · Formal modified videofluoroscopic swallowing study, performed in conjunction with speech therapy recommended

## 2019-06-12 VITALS
WEIGHT: 144 LBS | RESPIRATION RATE: 20 BRPM | TEMPERATURE: 98.2 F | HEART RATE: 72 BPM | SYSTOLIC BLOOD PRESSURE: 180 MMHG | BODY MASS INDEX: 24.59 KG/M2 | DIASTOLIC BLOOD PRESSURE: 68 MMHG | HEIGHT: 64 IN | OXYGEN SATURATION: 96 %

## 2019-06-12 LAB
ANION GAP SERPL CALCULATED.3IONS-SCNC: 9 MMOL/L (ref 4–13)
BUN SERPL-MCNC: 12 MG/DL (ref 5–25)
CALCIUM SERPL-MCNC: 8.1 MG/DL (ref 8.3–10.1)
CHLORIDE SERPL-SCNC: 107 MMOL/L (ref 100–108)
CO2 SERPL-SCNC: 26 MMOL/L (ref 21–32)
CREAT SERPL-MCNC: 0.92 MG/DL (ref 0.6–1.3)
ERYTHROCYTE [DISTWIDTH] IN BLOOD BY AUTOMATED COUNT: 12.6 % (ref 11.6–15.1)
ERYTHROCYTE [SEDIMENTATION RATE] IN BLOOD: 13 MM/HOUR (ref 2–25)
EST. AVERAGE GLUCOSE BLD GHB EST-MCNC: 128 MG/DL
GFR SERPL CREATININE-BSD FRML MDRD: 59 ML/MIN/1.73SQ M
GLUCOSE SERPL-MCNC: 81 MG/DL (ref 65–140)
HBA1C MFR BLD: 6.1 % (ref 4.2–6.3)
HCT VFR BLD AUTO: 36.6 % (ref 34.8–46.1)
HGB BLD-MCNC: 11.9 G/DL (ref 11.5–15.4)
MAGNESIUM SERPL-MCNC: 2.1 MG/DL (ref 1.6–2.6)
MCH RBC QN AUTO: 31.3 PG (ref 26.8–34.3)
MCHC RBC AUTO-ENTMCNC: 32.5 G/DL (ref 31.4–37.4)
MCV RBC AUTO: 96 FL (ref 82–98)
MRSA NOSE QL CULT: ABNORMAL
PHOSPHATE SERPL-MCNC: 2.9 MG/DL (ref 2.3–4.1)
PLATELET # BLD AUTO: 203 THOUSANDS/UL (ref 149–390)
PMV BLD AUTO: 10.7 FL (ref 8.9–12.7)
POTASSIUM SERPL-SCNC: 4 MMOL/L (ref 3.5–5.3)
RBC # BLD AUTO: 3.8 MILLION/UL (ref 3.81–5.12)
SODIUM SERPL-SCNC: 142 MMOL/L (ref 136–145)
WBC # BLD AUTO: 4.67 THOUSAND/UL (ref 4.31–10.16)

## 2019-06-12 PROCEDURE — 84100 ASSAY OF PHOSPHORUS: CPT | Performed by: STUDENT IN AN ORGANIZED HEALTH CARE EDUCATION/TRAINING PROGRAM

## 2019-06-12 PROCEDURE — 85027 COMPLETE CBC AUTOMATED: CPT | Performed by: STUDENT IN AN ORGANIZED HEALTH CARE EDUCATION/TRAINING PROGRAM

## 2019-06-12 PROCEDURE — 80048 BASIC METABOLIC PNL TOTAL CA: CPT | Performed by: STUDENT IN AN ORGANIZED HEALTH CARE EDUCATION/TRAINING PROGRAM

## 2019-06-12 PROCEDURE — 83735 ASSAY OF MAGNESIUM: CPT | Performed by: STUDENT IN AN ORGANIZED HEALTH CARE EDUCATION/TRAINING PROGRAM

## 2019-06-12 PROCEDURE — 83036 HEMOGLOBIN GLYCOSYLATED A1C: CPT | Performed by: PSYCHIATRY & NEUROLOGY

## 2019-06-12 PROCEDURE — NC001 PR NO CHARGE: Performed by: FAMILY MEDICINE

## 2019-06-12 PROCEDURE — 85652 RBC SED RATE AUTOMATED: CPT | Performed by: STUDENT IN AN ORGANIZED HEALTH CARE EDUCATION/TRAINING PROGRAM

## 2019-06-12 RX ORDER — LEVETIRACETAM 500 MG/1
500 TABLET ORAL EVERY 12 HOURS SCHEDULED
Qty: 60 TABLET | Refills: 0 | Status: SHIPPED | OUTPATIENT
Start: 2019-06-12 | End: 2020-09-08

## 2019-06-12 RX ORDER — LEVETIRACETAM 500 MG/1
500 TABLET ORAL EVERY 12 HOURS SCHEDULED
Qty: 60 TABLET | Refills: 0 | Status: SHIPPED | OUTPATIENT
Start: 2019-06-12 | End: 2019-06-12

## 2019-06-12 RX ORDER — DIVALPROEX SODIUM 500 MG/1
500 TABLET, DELAYED RELEASE ORAL EVERY 12 HOURS SCHEDULED
Qty: 60 TABLET | Refills: 0 | Status: ON HOLD | OUTPATIENT
Start: 2019-06-12 | End: 2020-02-09 | Stop reason: CLARIF

## 2019-06-12 RX ADMIN — LEVETIRACETAM 500 MG: 500 TABLET, FILM COATED ORAL at 09:33

## 2019-06-12 RX ADMIN — FLUTICASONE PROPIONATE 1 SPRAY: 50 SPRAY, METERED NASAL at 09:33

## 2019-06-12 RX ADMIN — LORATADINE 10 MG: 10 TABLET ORAL at 09:33

## 2019-06-12 RX ADMIN — CLOPIDOGREL BISULFATE 75 MG: 75 TABLET ORAL at 09:33

## 2019-06-12 RX ADMIN — FAMOTIDINE 20 MG: 20 TABLET ORAL at 09:33

## 2019-06-12 RX ADMIN — DIVALPROEX SODIUM 500 MG: 500 TABLET, DELAYED RELEASE ORAL at 09:33

## 2019-06-12 RX ADMIN — PANTOPRAZOLE SODIUM 40 MG: 40 TABLET, DELAYED RELEASE ORAL at 06:39

## 2019-06-12 RX ADMIN — LEVOTHYROXINE SODIUM 88 MCG: 88 TABLET ORAL at 06:39

## 2019-06-12 RX ADMIN — CITALOPRAM HYDROBROMIDE 20 MG: 20 TABLET ORAL at 09:33

## 2019-06-12 NOTE — PLAN OF CARE
Problem: Potential for Falls  Goal: Patient will remain free of falls  Description  INTERVENTIONS:  - Assess patient frequently for physical needs  -  Identify cognitive and physical deficits and behaviors that affect risk of falls  -  [place] fall precautions as indicated by assessment   - Educate patient/family on patient safety including physical limitations  - Instruct patient to call for assistance with activity based on assessment  - Modify environment to reduce risk of injury  - Consider OT/PT consult to assist with strengthening/mobility  Outcome: Progressing     Problem: Nutrition/Hydration-ADULT  Goal: Nutrient/Hydration intake appropriate for improving, restoring or maintaining nutritional needs  Description  Monitor and assess patient's nutrition/hydration status for malnutrition (ex- brittle hair, bruises, dry skin, pale skin and conjunctiva, muscle wasting, smooth red tongue, and disorientation)  Collaborate with interdisciplinary team and initiate plan and interventions as ordered  Monitor patient's weight and dietary intake as ordered or per policy  Utilize nutrition screening tool and intervene per policy  Determine patient's food preferences and provide high-protein, high-caloric foods as appropriate       INTERVENTIONS:  - Monitor oral intake, urinary output, labs, and treatment plans  - Assess nutrition and hydration status and recommend course of action  - Evaluate amount of meals eaten  - Assist patient with eating if necessary   - Allow adequate time for meals  - Recommend/ encourage appropriate diets, oral nutritional supplements, and vitamin/mineral supplements  - Order, calculate, and assess calorie counts as needed  - Recommend, monitor, and adjust tube feedings and TPN/PPN based on assessed needs  - Assess need for intravenous fluids  - Provide specific nutrition/hydration education as appropriate  - Include patient/family/caregiver in decisions related to nutrition  Outcome: Progressing     Problem: Prexisting or High Potential for Compromised Skin Integrity  Goal: Skin integrity is maintained or improved  Description  INTERVENTIONS:  - Identify patients at risk for skin breakdown  - Assess and monitor skin integrity  - Assess and monitor nutrition and hydration status  - Monitor labs (i e  albumin)  - Assess for incontinence   - Turn and reposition patient  - Assist with mobility/ambulation  - Relieve pressure over bony prominences  - Avoid friction and shearing  - Provide appropriate hygiene as needed including keeping skin clean and dry  - Evaluate need for skin moisturizer/barrier cream  - Collaborate with interdisciplinary team (i e  Nutrition, Rehabilitation, etc )   - Patient/family teaching  Outcome: Progressing     Problem: PAIN - ADULT  Goal: Verbalizes/displays adequate comfort level or baseline comfort level  Description  Interventions:  - Encourage patient to monitor pain and request assistance  - Assess pain using appropriate pain scale  - Administer analgesics based on type and severity of pain and evaluate response  - Implement non-pharmacological measures as appropriate and evaluate response  - Consider cultural and social influences on pain and pain management  - Notify physician/advanced practitioner if interventions unsuccessful or patient reports new pain  Outcome: Progressing     Problem: INFECTION - ADULT  Goal: Absence or prevention of progression during hospitalization  Description  INTERVENTIONS:  - Assess and monitor for signs and symptoms of infection  - Monitor lab/diagnostic results  - Monitor all insertion sites, i e  indwelling lines, tubes, and drains  - [place] appropriate cooling/warming therapies per order  - Administer medications as ordered  - Instruct and encourage patient and family to use good hand hygiene technique  - Identify and instruct in appropriate isolation precautions for identified infection/condition   Outcome: Progressing Problem: SAFETY ADULT  Goal: Maintain or return to baseline ADL function  Description  INTERVENTIONS:  -  Assess patient's ability to carry out ADLs; assess patient's baseline for ADL function and identify physical deficits which impact ability to perform ADLs (bathing, care of mouth/teeth, toileting, grooming, dressing, etc )  - Assess/evaluate cause of self-care deficits   - Assess range of motion  - Assess patient's mobility; develop plan if impaired  - Assess patient's need for assistive devices and provide as appropriate  - Encourage maximum independence but intervene and supervise when necessary  ¯ Involve family in performance of ADLs  ¯ Assess for home care needs following discharge   ¯ Request OT consult to assist with ADL evaluation and planning for discharge  ¯ Provide patient education as appropriate  Outcome: Progressing  Goal: Maintain or return mobility status to optimal level  Description  INTERVENTIONS:  - Assess patient's baseline mobility status (ambulation, transfers, stairs, etc )    - Identify cognitive and physical deficits and behaviors that affect mobility  - Identify mobility aids required to assist with transfers and/or ambulation (gait belt, sit-to-stand, lift, walker, cane, etc )  - [place] fall precautions as indicated by assessment  - Record patient progress and toleration of activity level on Mobility SBAR; progress patient to next Phase/Stage  - Instruct patient to call for assistance with activity based on assessment  - Request Rehabilitation consult to assist with strengthening/weightbearing, etc   Outcome: Progressing     Problem: DISCHARGE PLANNING  Goal: Discharge to home or other facility with appropriate resources  Description  INTERVENTIONS:  - Identify barriers to discharge w/patient and caregiver  - Arrange for needed discharge resources and transportation as appropriate  - Identify discharge learning needs (meds, wound care, etc )  - Arrange for interpretive services to assist at discharge as needed  - Refer to Case Management Department for coordinating discharge planning if the patient needs post-hospital services based on physician/advanced practitioner order or complex needs related to functional status, cognitive ability, or social support system  Outcome: Progressing     Problem: Knowledge Deficit  Goal: Patient/family/caregiver demonstrates understanding of disease process, treatment plan, medications, and discharge instructions  Description  Complete learning assessment and assess knowledge base  Interventions:  - Provide teaching at level of understanding  - Provide teaching via preferred learning methods  Outcome: Progressing     Problem: Neurological Deficit  Goal: Neurological status is stable or improving  Description  Interventions:  - Monitor and assess patient's level of consciousness, motor function, sensory function, and level of assistance needed for ADLs  - Monitor and report changes from baseline  Collaborate with interdisciplinary team to initiate plan and implement interventions as ordered  - Provide and maintain a safe environment  - Utilize seizure precautions  - Utilize fall precautions  - Utilize aspiration precautions  - Utilize bleeding precautions  Outcome: Progressing     Problem: Activity Intolerance/Impaired Mobility  Goal: Mobility/activity is maintained at optimum level for patient  Description  Interventions:  - Assess and monitor patient  barriers to mobility and need for assistive/adaptive devices  - Assess patient's emotional response to limitations  - Collaborate with interdisciplinary team and initiate plans and interventions as ordered  - Encourage independent activity per ability   - Maintain proper body alignment  - Perform active/passive rom as tolerated/ordered    - Plan activities to conserve energy   - Turn patient  Outcome: Progressing     Problem: Communication Impairment  Goal: Ability to express needs and understand communication  Description  Assess patient's communication skills and ability to understand information  Patient will demonstrate use of effective communication techniques, alternative methods of communication and understanding even if not able to speak  - Encourage communication and provide alternate methods of communication as needed  - Collaborate with case management/ for discharge needs  - Include patient/family/caregiver in decisions related to communication  Outcome: Progressing     Problem: Potential for Aspiration  Goal: Non-ventilated patient's risk of aspiration is minimized  Description  Assess and monitor vital signs, respiratory status, and labs (WBC)  Monitor for signs of aspiration (tachypnea, cough, rales, wheezing, cyanosis, fever)  - Assess and monitor patient's ability to swallow  - Place patient up in chair to eat if possible  - HOB up at 90 degrees to eat if unable to get patient up into chair   - Supervise patient during oral intake  - Instruct patient to take small bites  - Instruct patient to take small single sips when taking liquids  - Follow patient-specific strategies generated by speech pathologist   Outcome: Progressing     Problem: Nutrition  Goal: Nutrition/Hydration status is improving  Description  Monitor and assess patient's nutrition/hydration status for malnutrition (ex- brittle hair, bruises, dry skin, pale skin and conjunctiva, muscle wasting, smooth red tongue, and disorientation)  Collaborate with interdisciplinary team and initiate plan and interventions as ordered  Monitor patient's weight and dietary intake as ordered or per policy  Utilize nutrition screening tool and intervene per policy  Determine patient's food preferences and provide high-protein, high-caloric foods as appropriate  - Assist patient with eating   - Allow adequate time for meals   - Encourage patient to take dietary supplement as ordered    - Collaborate with clinical nutritionist   - Include patient/family/caregiver in decisions related to nutrition    Outcome: Progressing

## 2019-06-12 NOTE — PROGRESS NOTES
[facility] Practice Progress Note - Thom Lozano 82 y o  female MRN: [id number]    Unit/Bed#: [address] [place] [address] Encounter: [id number]      Assessment/Plan:  * Expressive aphasiaresolved as of 6/12/2019  Assessment & Plan  · Likely 2/2 CVA versus TIA  · Home Plavix 75 mg continued  · Neuro consulted  · PT/OT consulted  · Fall & aspiration precautions ordered  · Brain MRI without contrast ordered  · Home ASA held on admission, consider restarting  qd in the morning    Seizure disorder (HCC)  Assessment & Plan  · Home Depakote  mg TID & Keppra 500 mg q12h continued  · Depakote & Keppra level ordered  · Neuro consulted  · Patient to continue keppra 500 mg TID and depakote 500 mg BID    Esophageal dysmotility  Assessment & Plan  · Home Famotidine 20mg PO BID continued  · TUMS qd PRN ordered  · Home Omeprazole 20 mg delayed release ordered as Protonix 40 mg qd  · Formal modified videofluoroscopic swallowing study, performed in conjunction with speech therapy recommended  Anxiety  Assessment & Plan  · Home Celexa 20 mg qd & Ativan 0 5mg q6h PRN continued  Hyperlipemia  Assessment & Plan     8/17/2018 11:12   Cholesterol 143   Triglycerides 95   HDL 52   Non-HDL Cholesterol 91   LDL Direct 72     · Repeat lipid panel ordered  · Home Lipitor increased to Lipitor 80 mg qd  Paroxysmal atrial fibrillation [facility])  Assessment & Plan  · Placed on telemetry  · POA EKG showed normal sinus rhythm  · Echo on 3/13/16 showed EF of 60% G1DD, consider repeat echo outpatient  · Home Lopressor 25 mg q12h order w/ holding parameters (systolic LL<804 & HR <46) ordered  · Patient reports taking Lopressor 50 mg q12h (unable to reach  to confirm dosage)  · Home Plavix 75 mg continued, and  mg qd held on admission    Hypothyroidism  Assessment & Plan  · Home Levothyroxine 88 mcg qd continued  · Cold intolerance reported    · TSH w/ reflex T4 ordered  · TSH 6/11: 1 237    Hypertension  Assessment & Plan  · Neuro: Most likely complex migraine  No longer needs permissive HTN  · Patient to continue home BP meds  Subjective:   Patient seen and examined at bedside  Patient reports she feels well, ready to go home  ROS negative  Objective:     Vitals: Blood pressure (!) 180/68, pulse 72, temperature 98 2 °F (36 8 °C), temperature source Oral, resp  rate 20, height 5' 4" (1 626 m), weight 65 3 kg (144 lb), SpO2 96 %, not currently breastfeeding  ,Body mass index is 24 72 kg/m²  Wt Readings from Last 3 Encounters:   06/11/19 65 3 kg (144 lb)   03/20/17 68 kg (150 lb)   09/20/16 63 2 kg (139 lb 5 3 oz)       Intake/Output Summary (Last 24 hours) at 6/12/2019 1145  Last data filed at 6/12/2019 0800  Gross per 24 hour   Intake 960 ml   Output 200 ml   Net 760 ml       Physical Exam: BP (!) 180/68 (BP Location: Right arm)   Pulse 72   Temp 98 2 °F (36 8 °C) (Oral)   Resp 20   Ht 5' 4" (1 626 m)   Wt 65 3 kg (144 lb)   SpO2 96%   BMI 24 72 kg/m²   General appearance: alert and oriented, in no acute distress  Lungs: clear to auscultation bilaterally  Heart: regular rate and rhythm, S1, S2 normal, no murmur, click, rub or gallop  Abdomen: soft, non-tender; bowel sounds normal; no masses,  no organomegaly  Extremities: extremities normal, warm and well-perfused; no cyanosis, clubbing, or edema  Neurologic: Alert and oriented X 3, normal strength and tone  Normal symmetric reflexes   Normal coordination and gait     Recent Results (from the past 24 hour(s))   Basic metabolic panel    Collection Time: 06/12/19  5:16 AM   Result Value Ref Range    Sodium 142 136 - 145 mmol/L    Potassium 4 0 3 5 - 5 3 mmol/L    Chloride 107 100 - 108 mmol/L    CO2 26 21 - 32 mmol/L    ANION GAP 9 4 - 13 mmol/L    BUN 12 5 - 25 mg/dL    Creatinine 0 92 0 60 - 1 30 mg/dL    Glucose 81 65 - 140 mg/dL    Calcium 8 1 (L) 8 3 - 10 1 mg/dL    eGFR 59 ml/min/1 73sq m   CBC    Collection Time: 06/12/19 5:16 AM   Result Value Ref Range    WBC 4 67 4 31 - 10 16 Thousand/uL    RBC 3 80 (L) 3 81 - 5 12 Million/uL    Hemoglobin 11 9 11 5 - 15 4 g/dL    Hematocrit 36 6 34 8 - 46 1 %    MCV 96 82 - 98 fL    MCH 31 3 26 8 - 34 3 pg    MCHC 32 5 31 4 - 37 4 g/dL    RDW 12 6 11 6 - 15 1 %    Platelets 742 867 - 469 Thousands/uL    MPV 10 7 8 9 - 12 7 fL   Magnesium    Collection Time: 06/12/19  5:16 AM   Result Value Ref Range    Magnesium 2 1 1 6 - 2 6 mg/dL   Phosphorus    Collection Time: 06/12/19  5:16 AM   Result Value Ref Range    Phosphorus 2 9 2 3 - 4 1 mg/dL   Hemoglobin A1C    Collection Time: 06/12/19  5:16 AM   Result Value Ref Range    Hemoglobin A1C 6 1 4 2 - 6 3 %     mg/dl   Sedimentation rate, automated    Collection Time: 06/12/19  9:05 AM   Result Value Ref Range    Sed Rate 13 2 - 25 mm/hour       Current Facility-Administered Medications   Medication Dose Route Frequency Provider Last Rate Last Dose    acetaminophen (TYLENOL) tablet 650 mg  650 mg Oral Q6H PRN Dieynaba Chilo, DO        atorvastatin (LIPITOR) tablet 80 mg  80 mg Oral Daily With KEMI Farooq, DO   80 mg at 06/11/19 1612    bisacodyl (DULCOLAX) rectal suppository 10 mg  10 mg Rectal Daily PRN Dieynaba Chilo, DO        calcium carbonate (TUMS) chewable tablet 1,000 mg  1,000 mg Oral Daily PRN Dieynaba Chilo, DO        citalopram (CeleXA) tablet 20 mg  20 mg Oral Daily Dieynaba Chilo, DO   20 mg at 06/12/19 0933    clopidogrel (PLAVIX) tablet 75 mg  75 mg Oral Daily Dieynaba Chilo, DO   75 mg at 06/12/19 0933    divalproex sodium (DEPAKOTE) EC tablet 500 mg  500 mg Oral TID Dieynaba Chilo, DO   500 mg at 06/12/19 0933    famotidine (PEPCID) tablet 20 mg  20 mg Oral BID Dieynaba Chilo, DO   20 mg at 06/12/19 0933    fluticasone (FLONASE) 50 mcg/act nasal spray 1 spray  1 spray Nasal Daily Dieynaba Chilo, DO   1 spray at 06/12/19 0933    guaiFENesin (ROBITUSSIN) oral solution 200 mg  200 mg Oral Q4H PRN Vera Shelley Keke Dickerson, DO        ketotifen (ZADITOR) 0 025 % ophthalmic solution 1 drop  1 drop Both Eyes BID Jamie Cerda MD        levETIRAcetam (KEPPRA) tablet 500 mg  500 mg Oral Q12H [address] Dieynaba Chilo, DO   500 mg at 06/12/19 0933    levothyroxine tablet 88 mcg  88 mcg Oral Early Morning Dieynaba Chilo, DO   88 mcg at 06/12/19 0639    loratadine (CLARITIN) tablet 10 mg  10 mg Oral Daily Dieynaba Chilo, DO   10 mg at 06/12/19 0933    LORazepam (ATIVAN) tablet 0 5 mg  0 5 mg Oral Q6H PRN Dieynaba Chilo, DO        melatonin tablet 3 mg  3 mg Oral HS Dieynaba Chilo, DO   3 mg at 06/11/19 2223    ondansetron (ZOFRAN) injection 4 mg  4 mg Intravenous Q6H PRN Dieynaba Chilo, DO        pantoprazole (PROTONIX) EC tablet 40 mg  40 mg Oral Early Morning Dieynaba Cihlo, DO   40 mg at 06/12/19 2919       Invasive Devices     Peripheral Intravenous Line            Peripheral IV 06/10/19 Left Antecubital 1 day                Lab, Imaging and other studies: I have personally reviewed pertinent reports      VTE Pharmacologic Prophylaxis: Plavix   VTE Mechanical Prophylaxis: sequential compression device    Jamie Cerda MD

## 2019-06-12 NOTE — DISCHARGE INSTRUCTIONS
Migraine Headache   WHAT YOU NEED TO KNOW:   A migraine is a severe headache  The pain can be so severe that it interferes with your daily activities  A migraine can last a few hours up to several days  The exact cause of migraines is not known  DISCHARGE INSTRUCTIONS:   Return to the emergency department if:   · You have a headache that seems different or much worse than your usual migraine headache  · You have a severe headache with a fever or a stiff neck  · You have new problems with speech, vision, balance, or movement  · You feel like you are going to faint, you become confused, or you have a seizure  Contact your healthcare provider or neurologist if:   · Your migraines interfere with your daily activities  · Your medicines or treatments stop working  · You have questions or concerns about your condition or care  Medicines: You may need any of the following  Take medicine as soon as you feel a migraine begin  · Prescription pain medicine  may be given  Do not wait until the pain is severe before you take your medicine  · Migraine medicines  are used to help prevent a migraine or stop it once it starts  · Antinausea medicine  may be given to calm your stomach and to help prevent vomiting  This medicine can also help relieve pain  · Take your medicine as directed  Contact your healthcare provider if you think your medicine is not helping or if you have side effects  Tell him or her if you are allergic to any medicine  Keep a list of the medicines, vitamins, and herbs you take  Include the amounts, and when and why you take them  Bring the list or the pill bottles to follow-up visits  Carry your medicine list with you in case of an emergency  Manage your symptoms:   · Rest in a dark, quiet room  This will help decrease your pain  Sleep may also help relieve the pain  · Apply ice to decrease pain  Use an ice pack, or put crushed ice in a plastic bag   Cover the ice pack with a towel and place it on your head  Apply ice for 15 to 20 minutes every hour  · Apply heat to decrease pain and muscle spasms  Use a small towel dampened with warm water or a heating pad, or sit in a warm bath  Apply heat on the area for 20 to 30 minutes every 2 hours  You may alternate heat and ice  · Keep a migraine record  Write down when your migraines start and stop  Include your symptoms and what you were doing when a migraine began  Record what you ate or drank for 24 hours before the migraine started  Keep track of what you did to treat your migraine and if it worked  Bring the migraine record with you to visits with your healthcare provider  Follow up with your healthcare provider or neurologist as directed:  Bring your migraine record with you  Write down your questions so you remember to ask them during your visits  Prevent another migraine:   · Do not smoke  Nicotine and other chemicals in cigarettes and cigars can trigger a migraine or make it worse  Ask your healthcare provider for information if you currently smoke and need help to quit  E-cigarettes or smokeless tobacco still contain nicotine  Talk to your healthcare provider before you use these products  · Do not drink alcohol  Alcohol can trigger a migraine  It can also keep medicines used to treat your migraines from working  · Get regular exercise  Exercise may help prevent migraines  Talk to your healthcare provider about the best exercise plan for you  Try to get at least 30 minutes of exercise on most days  · Manage stress  Stress may trigger a migraine  Learn new ways to relax, such as deep breathing  · Create a sleep schedule  Go to bed and get up at the same times each day  Do not watch television before bed  · Eat regular meals  Include healthy foods such as include fruit, vegetables, whole-grain breads, low-fat dairy products, beans, lean meat, and fish   Do not have food or drinks that trigger your migraines  © 2017 [employer]  Information is for End User's use only and may not be sold, redistributed or otherwise used for commercial purposes  All illustrations and images included in CareNotes® are the copyrighted property of [employer]  or [employer]  The above information is an  only  It is not intended as medical advice for individual conditions or treatments  Talk to your doctor, nurse or pharmacist before following any medical regimen to see if it is safe and effective for you  Stress   WHAT YOU NEED TO KNOW:   Stress is a feeling of tension or strain related to the events and pressures of everyday life  Learn to cope and control your stress to help you function in a healthy way  DISCHARGE INSTRUCTIONS:   Call 911 for any of the following:   · You feel like hurting yourself or someone else  · You feel you are overwhelmed and can no longer handle things by yourself  Contact your healthcare provider if:   · You have trouble coping with your stress  · Your symptoms cause problems in your relationships  · You feel depressed  · You have trouble controlling your anger  · You have started to use alcohol, illegal drugs, or prescription medicines, or you increase your current use  · You have questions or concerns about your condition or care  Ways to manage your stress:  Learn what causes you stress  Not all stress can be avoided  Instead, change how you cope with stress by doing any of the following:  · Learn relaxation techniques, such as yoga, meditation, or listening to music  Take at least 30 minutes a day to do something you enjoy  This may include taking a bath or reading a book  · Do deep breathing exercises during times of increased stress  Sit up straight and take a slow, deep breath in through your nose  Then breathe out slowly through your mouth  Take twice as long to breathe out as you do when you breathe in   Repeat this a few times until you feel calmer or more focused  · Set realistic goals for yourself  Make a list of tasks and prioritize them  Focus on one task at a time  · Talk to someone about things that upset you  Talk to a trusted friend, family member, or support group  Try to stop yourself when you think negative, angry, or discouraging thoughts  · Take time to exercise  Start slowly, such as walking 1 to 2 blocks each day  Stretch and relax your muscles often  Ask about the best exercise plan for you  · Eat a variety of healthy foods  Healthy foods include fruits, vegetables, whole-grain breads, low-fat dairy products, beans, lean meats, and fish  Follow up with your healthcare provider as directed:  Write down your questions so you remember to ask them during your visits  © 2017 [employer] Information is for End User's use only and may not be sold, redistributed or otherwise used for commercial purposes  All illustrations and images included in CareNotes® are the copyrighted property of [employer]  or [employer]  The above information is an  only  It is not intended as medical advice for individual conditions or treatments  Talk to your doctor, nurse or pharmacist before following any medical regimen to see if it is safe and effective for you

## 2019-06-12 NOTE — NURSING NOTE
AVS reviewed with pt  Scripts for blood work given  IV and tele removed  Tele returned   Pt left to go home with PCA

## 2019-06-13 LAB
LEVETIRACETAM SERPL-MCNC: 11.1 UG/ML (ref 10–40)
VALPROATE FREE SERPL-MCNC: NORMAL UG/ML (ref 6–22)

## 2019-06-13 NOTE — UTILIZATION REVIEW
Auth:   [id number]    Notification of Discharge  This is a Notification of Discharge from our facility [address]  Please be advised that this patient has been discharge from our facility  Below you will find the admission and discharge date and time including the patients disposition  PRESENTATION DATE: 6/10/2019  5:10 PM  IP ADMISSION DATE: 6/10/19 1848  DISCHARGE DATE: 6/12/2019  1:26 PM  DISPOSITION: Home/Self Care    Please contact the UR Department if additional information is required to close this patient's authorization/case  [address]  Utilization Review Department  Phone: [phone]; Fax [phone]  [email or web address]  ATTENTION: Please call with any questions or concerns to [phone]  and carefully listen to the prompts so that you are directed to the right person  Send all requests for admission clinical reviews, approved or denied determinations and any other requests to fax [phone]   All voicemails are confidential

## 2019-06-14 ENCOUNTER — TELEPHONE (OUTPATIENT)
Dept: NEUROLOGY | Facility: CLINIC | Age: 84
End: 2019-06-14

## 2019-06-14 LAB
ATRIAL RATE: 76 BPM
P AXIS: 71 DEGREES
PR INTERVAL: 182 MS
QRS AXIS: 62 DEGREES
QRSD INTERVAL: 86 MS
QT INTERVAL: 400 MS
QTC INTERVAL: 450 MS
T WAVE AXIS: 53 DEGREES
VENTRICULAR RATE: 76 BPM

## 2019-06-14 PROCEDURE — 93010 ELECTROCARDIOGRAM REPORT: CPT | Performed by: INTERNAL MEDICINE

## 2019-08-01 ENCOUNTER — OFFICE VISIT (OUTPATIENT)
Dept: NEUROLOGY | Facility: CLINIC | Age: 84
End: 2019-08-01
Payer: COMMERCIAL

## 2019-08-01 VITALS
BODY MASS INDEX: 24.89 KG/M2 | SYSTOLIC BLOOD PRESSURE: 143 MMHG | DIASTOLIC BLOOD PRESSURE: 73 MMHG | WEIGHT: 145 LBS | HEART RATE: 69 BPM

## 2019-08-01 DIAGNOSIS — I10 ESSENTIAL HYPERTENSION: Chronic | ICD-10-CM

## 2019-08-01 DIAGNOSIS — I66.02 STENOSIS OF LEFT MIDDLE CEREBRAL ARTERY: ICD-10-CM

## 2019-08-01 DIAGNOSIS — I65.22 STENOSIS OF LEFT CAROTID ARTERY: ICD-10-CM

## 2019-08-01 DIAGNOSIS — I48.0 PAROXYSMAL ATRIAL FIBRILLATION (HCC): Chronic | ICD-10-CM

## 2019-08-01 DIAGNOSIS — G40.909 SEIZURE DISORDER (HCC): Primary | ICD-10-CM

## 2019-08-01 PROCEDURE — 99215 OFFICE O/P EST HI 40 MIN: CPT | Performed by: PSYCHIATRY & NEUROLOGY

## 2019-08-01 RX ORDER — LEVOTHYROXINE SODIUM 88 UG/1
TABLET ORAL
COMMUNITY

## 2019-08-01 RX ORDER — VALSARTAN 80 MG/1
80 TABLET ORAL
COMMUNITY
End: 2020-05-07 | Stop reason: ALTCHOICE

## 2019-08-01 NOTE — PROGRESS NOTES
Patient ID: Leeanna Muro is a 82 y o  female  Assessment/Plan:       Problem List Items Addressed This Visit        Cardiovascular and Mediastinum    Hypertension (Chronic)    Relevant Medications    valsartan (DIOVAN) 80 mg tablet    Paroxysmal atrial fibrillation (HCC) (Chronic)    Stenosis of left middle cerebral artery    RESOLVED: Stenosis of left carotid artery       Nervous and Auditory    Seizure disorder (HCC) - Primary    Relevant Orders    EEG awake or drowsy routine           82-year-old female with transient expressive aphasia associated with left temporal headache, with history of TIA like events in the recent past as noted below, on Plavix, in the setting of PAF, hypertension, stress reaction  NIHSS4, tpa not given due to bleed in the past with tpa/ HTN combination  Cannot r/o TIA, however sounds migrainous as well  She feels her sxs have improved and they have not recurred  We could have her see vascular however left M1 (MCA) stenosis not correlating with past episodes of left-sided weakness, so will have Dr Alexandre Preston decide whether seeing vascular surgery for this is appropriate, or we can follow this ourselves with CTA head  Recommended continuing Plavix plus 40 mg atorvastatin daily for secondary stroke prevention  Counseled to maintain euglycemia and blood pressure of less than 130/80  We could order P2Y12 to see if plavix is effective  She is non-compliant with depakote, however no apparent seizures since weaning it down on her own  She also only takes keppra 500 mg once daily  Will repeat an EEG to see what her baseline activity is, and if EEG abnormal we may need to increase the dose of keppra to BID, or restart depakote  Seizure precautions discussed today  She did not have a known seizure recently so driving short distances is not restricted  Recommended counseling for grief reaction      The patient should not hesitate to call me prior to her follow up with any questions or concerns  The patient was instructed to urgently call 911 or present to the nearest emergency room with any new or worsening neurological deficits  Subjective:    HPI    Ms Vania Martinez  Is a pleasant 82-year-old female here for neurological follow-up after hospital admission  She has a past history of hypertension, hypothyroidism, PAF, seizures (no isolated seizures on EEGs in past), anxiety, left M1 (MCA) stenosis, migraines/ migraine aura  She presented to the ED on 6/10/2019, NIHSS 4 (see tele-neurology note); she had expressive aphasia but she could follow commands thankfully  Per tele neurology note: "This is the 4th stereotypical presentation:    1st) Early 2016, immediately following an elective surgery (not in our system)  Aphasia remained for some unknown periods, and recovered fully  2nd) June 2016  She developed aphasia again that stayed for near a weak  Dr Aiken Bottom note mentioned that only 5% of her speech was understandable at that time  MRI (not available to me) showed only small infarct in left parietal area  CTA showed fixed stenosis of distal M1 on left  She was discharged on Plavix  She underwent a loop recorder implantation  3rd) September 2016: She presented with aphasia and ?left sided weakness  She received tPA IV  Imaging showed small occipital hemorrhage with adjacent subarachnoid hemorrhage, although there did not seem any acute infarcts  She recovered fully  She underwent EEG which was normal  MRI was not suggestive of amyloid angiopathy "    She was evaluated by Neurology the following day in the hospital and it was noted that she has a lot of stress at home  "She developed inability to speak 30 min prior to arrival  She reported associated left sided headache and some vision disturbance  She could follow commands but wasn't able to communicate  Patient had similar episode in June 2016   She had similar sxs with addition of left lower extremity weakness in Sept of 2016 at which time she had received IV tPA  After she tPA she developed small area of SAH and SDH which was either after effect or because of uncontrolled blood pressure post tPA  With this history, high risk of bleed, she was not offered tPA" at the time of presentation on 6/10/19  She came to the hospital already on depakote and keppra for h/o seizure, however she admits to me today that she has not been taking 500 mg Keppra twice a day, rather she only takes it q h s , and she does not take Depakote at all (but it is supposed to be 500 mg BID)  She states she stopped it a long time ago by weaning down slowly, and then never had any problems or seizure-like activities, so she thought she was okay to continue without it  She tells me that her son committed suicide about 1 year ago  She was having difficulty getting along with his wife, her daughter in law, but recently they have been getting along better  She is very depressed and teary about this today  She states she does not see a counselor/ therapist but she was thinking about it  During this past admission in June 2019:  CTA shows the fixed stenosis of left distal M1, with no acute occlusion  CT head shows no hemorrhage , and brain MRI 6/11/2019 shows age-related atrophy, mild chronic microvascular ischemia but no acute disease and is similar to the previous study  Hemoglobin A1c 6 1%   Keppra level 11 1, valproic acid level undetected  LDL 92 on lipitor 40 mg qd  Echo 2016 w/o ASD, PFO, atrial dilation  BP in the ED was noted 191/73, HR 65  AF  The following portions of the patient's history were reviewed and updated as appropriate: She  has a past medical history of Arthritis, Disease of thyroid gland, Hypertension, and Stroke ([facility] [address] ) (06/2016)    She   Patient Active Problem List    Diagnosis Date Noted    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    Hyperlipemia 06/10/2019    Anxiety 06/10/2019    Esophageal dysmotility 06/10/2019    Seizure disorder ([place] [id number] ) 08/24/2018    Hypokalemia 09/13/2016    Subarachnoid hemorrhage ([place] [id number] ) 09/13/2016    Subdural hematoma ([place] [id number] ) 09/13/2016    Hypertension 09/12/2016    Hypothyroidism 09/12/2016    Paroxysmal atrial fibrillation ([place] [id number] ) 09/12/2016    Acute CVA (cerebrovascular accident) ([place] [id number] ) 06/18/2016     She  has a past surgical history that includes Back surgery (01/2016) and Carpal tunnel release (Right)  Her family history is not on file  She  reports that she has never smoked  She has never used smokeless tobacco  She reports that she does not drink alcohol or use drugs  Current Outpatient Medications   Medication Sig Dispense Refill    atorvastatin (LIPITOR) 40 mg tablet Take 40 mg by mouth daily   citalopram (CeleXA) 20 mg tablet Take 20 mg by mouth daily      clopidogrel (PLAVIX) 75 mg tablet Take 75 mg by mouth daily Indications: Stroke caused by a Blood Clot        famotidine (PEPCID) 20 mg tablet Take 20 mg by mouth 2 (two) times a day      levETIRAcetam (KEPPRA) 500 mg tablet Take 1 tablet (500 mg total) by mouth every 12 (twelve) hours 60 tablet 0    levothyroxine 88 mcg tablet levothyroxine 88 mcg tablet      LORazepam (ATIVAN) 0 5 mg tablet Take 0 5 mg by mouth every 6 (six) hours as needed for anxiety      losartan (COZAAR) 50 mg tablet Take 25 mg by mouth daily      melatonin 3 mg Take 3 mg by mouth daily at bedtime      metoprolol tartrate (LOPRESSOR) 25 mg tablet Take 2 tablets (50 mg total) by mouth every 12 (twelve) hours 60 tablet 0    omeprazole (PriLOSEC) 20 mg delayed release capsule Take 20 mg by mouth daily      valsartan (DIOVAN) 80 mg tablet Take 80 mg by mouth daily at bedtime      bisacodyl (DULCOLAX) 10 mg suppository Insert 10 mg into the rectum daily      divalproex sodium (DEPAKOTE) 500 mg EC tablet Take 1 tablet (500 mg total) by mouth every 12 (twelve) hours (Patient not taking: Reported on 8/1/2019) 60 tablet 0    fluticasone (FLONASE) 50 mcg/act nasal spray 1 spray into each nostril daily      lisinopril (ZESTRIL) 10 mg tablet Take 1 tablet by mouth daily for 30 days  30 tablet 0    loratadine (CLARITIN) 10 mg tablet Take 10 mg by mouth daily       No current facility-administered medications for this visit  She is allergic to other and penicillins            Objective:    Blood pressure 143/73, pulse 69, weight 65 8 kg (145 lb), not currently breastfeeding  Physical Exam    Neurological Exam  Vital signs reviewed  Well developed, well nourished  Speech is fluent and articulate  Head: Normocephalic, atraumatic  Neck: Neck flexors 5/5, No TTP  CN 2-12: intact and symmetric, including EOMs which are normal b/l and PERRL  Fundi b/l are normal to crude ophthalmological examination  MSK: 5/5 t/o  ROM normal x all 4 extr  No pronator drift  Sensation: Inact to LT and temp x4 extr  Romberg borderline  Reflexes: 2+ and symmetric in all 4 extr  Coordination: Nml x4 extr  Gait: Steady normal gait  MMSE 30/30 today  ROS:    Review of Systems   Constitutional: Positive for appetite change  Negative for fever  HENT: Positive for tinnitus  Negative for hearing loss, trouble swallowing and voice change  Eyes: Positive for pain  Negative for photophobia  Respiratory: Positive for cough and shortness of breath  Cardiovascular: Negative  Negative for palpitations  Gastrointestinal: Negative  Negative for nausea and vomiting  Endocrine: Negative  Negative for cold intolerance and heat intolerance  Genitourinary: Positive for urgency  Negative for dysuria and frequency  Musculoskeletal: Positive for gait problem (balance)  Negative for myalgias and neck pain  Skin: Negative  Negative for rash  Neurological: Positive for dizziness, speech difficulty (word finding difficulty, slurred speech) and light-headedness  Negative for tremors, seizures, syncope, facial asymmetry, weakness, numbness and headaches  Hematological: Negative  Does not bruise/bleed easily  Psychiatric/Behavioral: Positive for decreased concentration (memory) and sleep disturbance  Negative for confusion and hallucinations  The patient is nervous/anxious  The following portions of the patient's history were reviewed and updated as appropriate: allergies, current medications/ medication history, past family history, past medical history, past social history, past surgical history and problem list     Review of systems was reviewed and otherwise unremarkable from a neurological perspective

## 2019-08-13 PROBLEM — I65.22 STENOSIS OF LEFT CAROTID ARTERY: Status: RESOLVED | Noted: 2019-08-13 | Resolved: 2019-08-13

## 2019-08-13 PROBLEM — I66.02 STENOSIS OF LEFT MIDDLE CEREBRAL ARTERY: Status: ACTIVE | Noted: 2019-08-13

## 2019-08-13 PROBLEM — I65.22 STENOSIS OF LEFT CAROTID ARTERY: Status: ACTIVE | Noted: 2019-08-13

## 2019-09-11 ENCOUNTER — HOSPITAL ENCOUNTER (OUTPATIENT)
Dept: NEUROLOGY | Facility: HOSPITAL | Age: 84
Discharge: HOME | End: 2019-09-11
Payer: COMMERCIAL

## 2019-09-11 DIAGNOSIS — G40.909 SEIZURE DISORDER (HCC): ICD-10-CM

## 2019-09-11 PROCEDURE — 95816 EEG AWAKE AND DROWSY: CPT

## 2019-09-11 PROCEDURE — 95816 EEG AWAKE AND DROWSY: CPT | Performed by: PSYCHIATRY & NEUROLOGY

## 2019-09-20 ENCOUNTER — APPOINTMENT (OUTPATIENT)
Dept: LAB | Facility: CLINIC | Age: 84
End: 2019-09-20
Payer: COMMERCIAL

## 2019-09-20 ENCOUNTER — TRANSCRIBE ORDERS (OUTPATIENT)
Dept: LAB | Facility: CLINIC | Age: 84
End: 2019-09-20

## 2019-09-20 DIAGNOSIS — E78.5 HYPERLIPIDEMIA, UNSPECIFIED HYPERLIPIDEMIA TYPE: Primary | ICD-10-CM

## 2019-09-20 DIAGNOSIS — E03.4 IDIOPATHIC ATROPHIC HYPOTHYROIDISM: ICD-10-CM

## 2019-09-20 DIAGNOSIS — E55.9 VITAMIN D DEFICIENCY, UNSPECIFIED: ICD-10-CM

## 2019-09-20 DIAGNOSIS — Z79.899 ENCOUNTER FOR LONG-TERM (CURRENT) USE OF OTHER MEDICATIONS: ICD-10-CM

## 2019-09-20 DIAGNOSIS — E78.5 HYPERLIPIDEMIA, UNSPECIFIED HYPERLIPIDEMIA TYPE: ICD-10-CM

## 2019-09-20 DIAGNOSIS — I10 ESSENTIAL HYPERTENSION, MALIGNANT: ICD-10-CM

## 2019-09-20 LAB
25(OH)D3 SERPL-MCNC: 20.4 NG/ML (ref 30–100)
ALBUMIN SERPL BCP-MCNC: 4 G/DL (ref 3.5–5)
ALP SERPL-CCNC: 73 U/L (ref 46–116)
ALT SERPL W P-5'-P-CCNC: 27 U/L (ref 12–78)
ANION GAP SERPL CALCULATED.3IONS-SCNC: 8 MMOL/L (ref 4–13)
AST SERPL W P-5'-P-CCNC: 16 U/L (ref 5–45)
BACTERIA UR QL AUTO: ABNORMAL /HPF
BASOPHILS # BLD AUTO: 0.05 THOUSANDS/ΜL (ref 0–0.1)
BASOPHILS NFR BLD AUTO: 1 % (ref 0–1)
BILIRUB SERPL-MCNC: 0.45 MG/DL (ref 0.2–1)
BILIRUB UR QL STRIP: NEGATIVE
BUN SERPL-MCNC: 14 MG/DL (ref 5–25)
CALCIUM SERPL-MCNC: 8.6 MG/DL (ref 8.3–10.1)
CHLORIDE SERPL-SCNC: 109 MMOL/L (ref 100–108)
CHOLEST SERPL-MCNC: 150 MG/DL (ref 50–200)
CLARITY UR: ABNORMAL
CO2 SERPL-SCNC: 25 MMOL/L (ref 21–32)
COLOR UR: YELLOW
CREAT SERPL-MCNC: 1.03 MG/DL (ref 0.6–1.3)
EOSINOPHIL # BLD AUTO: 0.17 THOUSAND/ΜL (ref 0–0.61)
EOSINOPHIL NFR BLD AUTO: 3 % (ref 0–6)
ERYTHROCYTE [DISTWIDTH] IN BLOOD BY AUTOMATED COUNT: 13.1 % (ref 11.6–15.1)
GFR SERPL CREATININE-BSD FRML MDRD: 51 ML/MIN/1.73SQ M
GLUCOSE P FAST SERPL-MCNC: 114 MG/DL (ref 65–99)
GLUCOSE UR STRIP-MCNC: NEGATIVE MG/DL
HCT VFR BLD AUTO: 40.2 % (ref 34.8–46.1)
HDLC SERPL-MCNC: 50 MG/DL (ref 40–60)
HGB BLD-MCNC: 13.2 G/DL (ref 11.5–15.4)
HGB UR QL STRIP.AUTO: ABNORMAL
HYALINE CASTS #/AREA URNS LPF: ABNORMAL /LPF
IMM GRANULOCYTES # BLD AUTO: 0.01 THOUSAND/UL (ref 0–0.2)
IMM GRANULOCYTES NFR BLD AUTO: 0 % (ref 0–2)
KETONES UR STRIP-MCNC: NEGATIVE MG/DL
LDLC SERPL CALC-MCNC: 78 MG/DL (ref 0–100)
LDLC SERPL DIRECT ASSAY-MCNC: 84 MG/DL (ref 0–100)
LEUKOCYTE ESTERASE UR QL STRIP: ABNORMAL
LYMPHOCYTES # BLD AUTO: 1.58 THOUSANDS/ΜL (ref 0.6–4.47)
LYMPHOCYTES NFR BLD AUTO: 28 % (ref 14–44)
MAGNESIUM SERPL-MCNC: 2.3 MG/DL (ref 1.6–2.6)
MCH RBC QN AUTO: 31.5 PG (ref 26.8–34.3)
MCHC RBC AUTO-ENTMCNC: 32.8 G/DL (ref 31.4–37.4)
MCV RBC AUTO: 96 FL (ref 82–98)
MONOCYTES # BLD AUTO: 0.4 THOUSAND/ΜL (ref 0.17–1.22)
MONOCYTES NFR BLD AUTO: 7 % (ref 4–12)
NEUTROPHILS # BLD AUTO: 3.36 THOUSANDS/ΜL (ref 1.85–7.62)
NEUTS SEG NFR BLD AUTO: 61 % (ref 43–75)
NITRITE UR QL STRIP: NEGATIVE
NON-SQ EPI CELLS URNS QL MICRO: ABNORMAL /HPF
NONHDLC SERPL-MCNC: 100 MG/DL
NRBC BLD AUTO-RTO: 0 /100 WBCS
PH UR STRIP.AUTO: 5.5 [PH]
PLATELET # BLD AUTO: 283 THOUSANDS/UL (ref 149–390)
PMV BLD AUTO: 10.7 FL (ref 8.9–12.7)
POTASSIUM SERPL-SCNC: 3.9 MMOL/L (ref 3.5–5.3)
PROT SERPL-MCNC: 7 G/DL (ref 6.4–8.2)
PROT UR STRIP-MCNC: NEGATIVE MG/DL
RBC # BLD AUTO: 4.19 MILLION/UL (ref 3.81–5.12)
RBC #/AREA URNS AUTO: ABNORMAL /HPF
SODIUM SERPL-SCNC: 142 MMOL/L (ref 136–145)
SP GR UR STRIP.AUTO: 1.02 (ref 1–1.03)
T4 FREE SERPL-MCNC: 1.12 NG/DL (ref 0.76–1.46)
TRIGL SERPL-MCNC: 109 MG/DL
TSH SERPL DL<=0.05 MIU/L-ACNC: 0.62 UIU/ML (ref 0.36–3.74)
UROBILINOGEN UR QL STRIP.AUTO: 0.2 E.U./DL
WBC # BLD AUTO: 5.57 THOUSAND/UL (ref 4.31–10.16)
WBC #/AREA URNS AUTO: ABNORMAL /HPF

## 2019-09-20 PROCEDURE — 80061 LIPID PANEL: CPT

## 2019-09-20 PROCEDURE — 82306 VITAMIN D 25 HYDROXY: CPT

## 2019-09-20 PROCEDURE — 81001 URINALYSIS AUTO W/SCOPE: CPT

## 2019-09-20 PROCEDURE — 83735 ASSAY OF MAGNESIUM: CPT

## 2019-09-20 PROCEDURE — 36415 COLL VENOUS BLD VENIPUNCTURE: CPT

## 2019-09-20 PROCEDURE — 83721 ASSAY OF BLOOD LIPOPROTEIN: CPT

## 2019-09-20 PROCEDURE — 85025 COMPLETE CBC W/AUTO DIFF WBC: CPT

## 2019-09-20 PROCEDURE — 80053 COMPREHEN METABOLIC PANEL: CPT

## 2019-09-20 PROCEDURE — 84439 ASSAY OF FREE THYROXINE: CPT

## 2019-09-20 PROCEDURE — 84443 ASSAY THYROID STIM HORMONE: CPT

## 2019-11-07 ENCOUNTER — OFFICE VISIT (OUTPATIENT)
Dept: NEUROLOGY | Facility: CLINIC | Age: 84
End: 2019-11-07
Payer: COMMERCIAL

## 2019-11-07 VITALS
SYSTOLIC BLOOD PRESSURE: 132 MMHG | HEIGHT: 64 IN | DIASTOLIC BLOOD PRESSURE: 70 MMHG | BODY MASS INDEX: 26.29 KG/M2 | HEART RATE: 67 BPM | WEIGHT: 154 LBS

## 2019-11-07 DIAGNOSIS — Z63.4 GRIEF AT LOSS OF CHILD: ICD-10-CM

## 2019-11-07 DIAGNOSIS — Z87.898 HISTORY OF SEIZURE: ICD-10-CM

## 2019-11-07 DIAGNOSIS — I65.22 LEFT CAROTID STENOSIS: Primary | ICD-10-CM

## 2019-11-07 DIAGNOSIS — Z86.73 HISTORY OF STROKE: ICD-10-CM

## 2019-11-07 DIAGNOSIS — F43.21 GRIEF AT LOSS OF CHILD: ICD-10-CM

## 2019-11-07 PROCEDURE — 99214 OFFICE O/P EST MOD 30 MIN: CPT | Performed by: PSYCHIATRY & NEUROLOGY

## 2019-11-07 RX ORDER — METOPROLOL SUCCINATE 50 MG/1
50 TABLET, EXTENDED RELEASE ORAL DAILY
Refills: 0 | COMMUNITY
Start: 2019-09-03

## 2019-11-07 SDOH — SOCIAL STABILITY - SOCIAL INSECURITY: DISSAPEARANCE AND DEATH OF FAMILY MEMBER: Z63.4

## 2019-11-07 NOTE — PROGRESS NOTES
Return NeuroOutpatient Note        Daysi Mcrae  [id number]  82 y o   1935       Seizures        History obtained from:  Patient     HPI/Subjective:    Daysi Mcrae is an 81 yo F with PMH of HTN presents as follow up  Patient was evaluated in June at [facility] for aphasia  Per my previous history, she had developed inability to speak 30 min prior to arrival  She reported associated left sided headache and some vision disturbance  She could follow commands but wasn't able to communicate  Patient had similar episode in June 2016  She had similar sxs with addition of left lower extremity weakness in Sept of 2016 at which time she had received IV tPA  After she tPA she developed small area of SAH and SDH which was either after effect or because of uncontrolled blood pressure post tPA  Patient was not offered tPA during June admission due to high risk of bleed  Her MRI brain was negative for stroke  She was thought to have had possible migraine with aura vs complex migraine  Patient was on depakote and keppra for h/o seizure but our sources suggest she hasn't been taking them  She now only takes Keppra 500mg daily  Her last EEG was normal in august    She has known left M1 atherosclerotic plaque (50-69%)  She's on plavix 75mg daily and lipitor 40mg daily for stroke prevention  She has evidence of left parietal lacunar stroke in June 2016  After this there has been no evidence of stroke  She has loop recorder in place  Patient is still upset about losing her son from suicide attempt       Past Medical History:   Diagnosis Date    Arthritis     Disease of thyroid gland     Hypertension     Seizures ([facility] [address] )     Stroke ([facility] [address] ) 06/2016     Social History     Socioeconomic History    Marital status: /Civil Union     Spouse name: Not on file    Number of children: Not on file    Years of education: Not on file    Highest education level: Not on file   Occupational History    Not on file Social Needs    Financial resource strain: Not on file    Food insecurity:     Worry: Not on file     Inability: Not on file    Transportation needs:     Medical: Not on file     Non-medical: Not on file   Tobacco Use    Smoking status: Never Smoker    Smokeless tobacco: Never Used   Substance and Sexual Activity    Alcohol use: Never     Alcohol/week: 0 0 standard drinks     Frequency: Never     Drinks per session: Patient refused     Binge frequency: Never     Comment: 0    Drug use: No    Sexual activity: Yes     Partners: Male     Birth control/protection: Post-menopausal   Lifestyle    Physical activity:     Days per week: Not on file     Minutes per session: Not on file    Stress: Not on file   Relationships    Social connections:     Talks on phone: Not on file     Gets together: Not on file     Attends Buddhist service: Not on file     Active member of club or organization: Not on file     Attends meetings of clubs or organizations: Not on file     Relationship status: Not on file    Intimate partner violence:     Fear of current or ex partner: Not on file     Emotionally abused: Not on file     Physically abused: Not on file     Forced sexual activity: Not on file   Other Topics Concern    Not on file   Social History Narrative    Not on file     Family History   Problem Relation Age of Onset    Kidney disease Mother     Heart disease Mother     Heart disease Father     Colon cancer Father     Heart disease Sister     Multiple sclerosis Son     No Known Problems Son      Allergies   Allergen Reactions    Other      Hydralazine, Amlodipine    Penicillins      Current Outpatient Medications on File Prior to Visit   Medication Sig Dispense Refill    atorvastatin (LIPITOR) 40 mg tablet Take 40 mg by mouth daily   citalopram (CeleXA) 20 mg tablet Take 20 mg by mouth daily      clopidogrel (PLAVIX) 75 mg tablet Take 75 mg by mouth daily Indications: Stroke caused by a Blood Clot   famotidine (PEPCID) 20 mg tablet Take 20 mg by mouth daily       fluticasone (FLONASE) 50 mcg/act nasal spray 1 spray into each nostril daily      levETIRAcetam (KEPPRA) 500 mg tablet Take 1 tablet (500 mg total) by mouth every 12 (twelve) hours 60 tablet 0    levothyroxine 88 mcg tablet levothyroxine 88 mcg tablet      loratadine (CLARITIN) 10 mg tablet Take 10 mg by mouth daily      LORazepam (ATIVAN) 0 5 mg tablet Take 0 5 mg by mouth every 6 (six) hours as needed for anxiety      losartan (COZAAR) 50 mg tablet Take 50 mg by mouth daily       melatonin 3 mg Take 3 mg by mouth daily at bedtime      metoprolol succinate (TOPROL-XL) 50 mg 24 hr tablet Take 50 mg by mouth daily  0    omeprazole (PriLOSEC) 20 mg delayed release capsule Take 20 mg by mouth daily      divalproex sodium (DEPAKOTE) 500 mg EC tablet Take 1 tablet (500 mg total) by mouth every 12 (twelve) hours (Patient not taking: Reported on 8/1/2019) 60 tablet 0    valsartan (DIOVAN) 80 mg tablet Take 80 mg by mouth daily at bedtime      [DISCONTINUED] bisacodyl (DULCOLAX) 10 mg suppository Insert 10 mg into the rectum daily      [DISCONTINUED] lisinopril (ZESTRIL) 10 mg tablet Take 1 tablet by mouth daily for 30 days  30 tablet 0    [DISCONTINUED] metoprolol tartrate (LOPRESSOR) 25 mg tablet Take 2 tablets (50 mg total) by mouth every 12 (twelve) hours 60 tablet 0     No current facility-administered medications on file prior to visit  Review of Systems   Refer to positive review of systems in HPI     Review of Systems    Constitutional- No fever  Eyes- No visual change  ENT- Hearing normal  CV- No chest pain  Resp- No Shortness of breath  GI- No diarrhea  - Bladder normal  MS- No Arthritis   Skin- No rash  Psych- No depression  Endo- No DM  Heme- No nodes    Vitals:    11/07/19 1110   BP: 132/70   BP Location: Left arm   Patient Position: Sitting   Cuff Size: Adult   Pulse: 67   Weight: 69 9 kg (154 lb)   Height: 5' 4" (1 626 m)       PHYSICAL EXAM:  Appearance: No Acute Distress  Ophthalmoscopic: Disc Flat, Normal fundus  Mental status:  Orientation: Awake, Alert, and Orientedx3  Memory: Registation 3/3 Recall 33  Attention: normal  Knowledge: good  Language: No aphasia  Speech: No dysarthria  Cranial Nerves:  2 No Visual Defect on Confrontation, Pupils round, equal, reactive to light  3,4,6 Extraocular Movements Intact, no nystagmus  5 Facial Sensation Intact  7 No facial asymmetry  8 Intact hearing  9,10 Palate symmetric, normal gag  11 Good shoulder shrug  12 Tongue Midline  Gait: Stable  Coordination: No ataxia with finger to nose testing, and heel to shin  Sensory: Intact, Symmetric to pinprick, light touch, vibration, and joint position  Muscle Tone: Normal              Muscle exam:  Arm Right Left Leg Right Left   Deltoid 5/5 5/5 Iliopsoas 5/5 5/5   Biceps 5/5 5/5 Quads 5/5 5/5   Triceps 5/5 5/5 Hamstrings 5/5 5/5   Wrist Extension 5/5 5/5 Ankle Dorsi Flexion 5/5 5/5   Wrist Flexion 5/5 5/5 Ankle Plantar Flexion 5/5 5/5   Interossei 5/5 5/5 Ankle Eversion 5/5 5/5   APB 5/5 5/5 Ankle Inversion 5/5 5/5       Reflexes   RJ BJ TJ KJ AJ Plantars Copeland's   Right 2+ 2+ 2+ 2+ 2+ Downgoing Not present   Left 2+ 2+ 2+ 2+ 2+ Downgoing Not present     Personal review of  Labs:                    Diagnoses and all orders for this visit:        1  Left carotid stenosis     2  History of stroke     3  History of seizure     4  Grief at loss of child         Patient has remained stable in terms of tia and stroke  Patient is to resume plavix 75 mg and lipitor 40mg for stroke prevention  Patient is to resume Keppra for seizure prophylaxis  F/u CTA in summer of 2020  Addressed all of her concerns  She is still upset about how his son  due to unavailable information  Discussed how to cope with it               Total time of encounter:  30 min  More than 50% of the time was used in counseling and/or coordination of care  Extent of counseling and/or coordination of care        MD [facility]  [address]  [address]  [phone]

## 2020-02-09 ENCOUNTER — APPOINTMENT (EMERGENCY)
Dept: RADIOLOGY | Facility: HOSPITAL | Age: 85
DRG: 093 | End: 2020-02-09
Payer: COMMERCIAL

## 2020-02-09 ENCOUNTER — HOSPITAL ENCOUNTER (INPATIENT)
Facility: HOSPITAL | Age: 85
LOS: 1 days | Discharge: HOME | DRG: 093 | End: 2020-02-10
Attending: EMERGENCY MEDICINE | Admitting: INTERNAL MEDICINE
Payer: COMMERCIAL

## 2020-02-09 DIAGNOSIS — I63.9 ACUTE CVA (CEREBROVASCULAR ACCIDENT) (HCC): ICD-10-CM

## 2020-02-09 DIAGNOSIS — I63.9 CVA (CEREBRAL VASCULAR ACCIDENT) (HCC): Primary | ICD-10-CM

## 2020-02-09 DIAGNOSIS — F41.9 ANXIETY: ICD-10-CM

## 2020-02-09 DIAGNOSIS — R29.90 STROKE-LIKE SYMPTOMS: ICD-10-CM

## 2020-02-09 DIAGNOSIS — K22.4 ESOPHAGEAL DYSMOTILITY: ICD-10-CM

## 2020-02-09 PROBLEM — Z86.73 H/O: CVA (CEREBROVASCULAR ACCIDENT): Status: ACTIVE | Noted: 2020-02-09

## 2020-02-09 LAB
ANION GAP SERPL CALCULATED.3IONS-SCNC: 7 MMOL/L (ref 4–13)
APTT PPP: 26 SECONDS (ref 25–32)
BUN SERPL-MCNC: 9 MG/DL (ref 5–25)
CALCIUM SERPL-MCNC: 8.8 MG/DL (ref 8.3–10.1)
CHLORIDE SERPL-SCNC: 104 MMOL/L (ref 100–108)
CO2 SERPL-SCNC: 29 MMOL/L (ref 21–32)
CREAT SERPL-MCNC: 0.99 MG/DL (ref 0.6–1.3)
ERYTHROCYTE [DISTWIDTH] IN BLOOD BY AUTOMATED COUNT: 12.7 % (ref 11.6–15.1)
GFR SERPL CREATININE-BSD FRML MDRD: 53 ML/MIN/1.73SQ M
GLUCOSE SERPL-MCNC: 101 MG/DL (ref 65–140)
GLUCOSE SERPL-MCNC: 93 MG/DL (ref 65–140)
HCT VFR BLD AUTO: 41.9 % (ref 34.8–46.1)
HGB BLD-MCNC: 13.5 G/DL (ref 11.5–15.4)
INR PPP: 1.03 (ref 0.91–1.09)
MCH RBC QN AUTO: 31 PG (ref 26.8–34.3)
MCHC RBC AUTO-ENTMCNC: 32.2 G/DL (ref 31.4–37.4)
MCV RBC AUTO: 96 FL (ref 82–98)
PLATELET # BLD AUTO: 288 THOUSANDS/UL (ref 149–390)
PMV BLD AUTO: 9.6 FL (ref 8.9–12.7)
POTASSIUM SERPL-SCNC: 4.1 MMOL/L (ref 3.5–5.3)
PROTHROMBIN TIME: 11 SECONDS (ref 9.8–12)
RBC # BLD AUTO: 4.36 MILLION/UL (ref 3.81–5.12)
SODIUM SERPL-SCNC: 140 MMOL/L (ref 136–145)
TROPONIN I SERPL-MCNC: <0.02 NG/ML
TSH SERPL DL<=0.05 MIU/L-ACNC: 0.88 UIU/ML (ref 0.36–3.74)
WBC # BLD AUTO: 6.62 THOUSAND/UL (ref 4.31–10.16)

## 2020-02-09 PROCEDURE — 80048 BASIC METABOLIC PNL TOTAL CA: CPT | Performed by: EMERGENCY MEDICINE

## 2020-02-09 PROCEDURE — 99291 CRITICAL CARE FIRST HOUR: CPT | Performed by: EMERGENCY MEDICINE

## 2020-02-09 PROCEDURE — 93005 ELECTROCARDIOGRAM TRACING: CPT

## 2020-02-09 PROCEDURE — 82948 REAGENT STRIP/BLOOD GLUCOSE: CPT

## 2020-02-09 PROCEDURE — 70496 CT ANGIOGRAPHY HEAD: CPT

## 2020-02-09 PROCEDURE — 99223 1ST HOSP IP/OBS HIGH 75: CPT | Performed by: STUDENT IN AN ORGANIZED HEALTH CARE EDUCATION/TRAINING PROGRAM

## 2020-02-09 PROCEDURE — 36415 COLL VENOUS BLD VENIPUNCTURE: CPT | Performed by: EMERGENCY MEDICINE

## 2020-02-09 PROCEDURE — 87081 CULTURE SCREEN ONLY: CPT | Performed by: STUDENT IN AN ORGANIZED HEALTH CARE EDUCATION/TRAINING PROGRAM

## 2020-02-09 PROCEDURE — 84484 ASSAY OF TROPONIN QUANT: CPT | Performed by: EMERGENCY MEDICINE

## 2020-02-09 PROCEDURE — 85027 COMPLETE CBC AUTOMATED: CPT | Performed by: EMERGENCY MEDICINE

## 2020-02-09 PROCEDURE — 85730 THROMBOPLASTIN TIME PARTIAL: CPT | Performed by: EMERGENCY MEDICINE

## 2020-02-09 PROCEDURE — 85610 PROTHROMBIN TIME: CPT | Performed by: EMERGENCY MEDICINE

## 2020-02-09 PROCEDURE — 80177 DRUG SCRN QUAN LEVETIRACETAM: CPT | Performed by: STUDENT IN AN ORGANIZED HEALTH CARE EDUCATION/TRAINING PROGRAM

## 2020-02-09 PROCEDURE — 84443 ASSAY THYROID STIM HORMONE: CPT | Performed by: STUDENT IN AN ORGANIZED HEALTH CARE EDUCATION/TRAINING PROGRAM

## 2020-02-09 PROCEDURE — 71045 X-RAY EXAM CHEST 1 VIEW: CPT

## 2020-02-09 PROCEDURE — 99285 EMERGENCY DEPT VISIT HI MDM: CPT

## 2020-02-09 PROCEDURE — 70498 CT ANGIOGRAPHY NECK: CPT

## 2020-02-09 RX ORDER — LEVOTHYROXINE SODIUM 88 UG/1
88 TABLET ORAL
Status: DISCONTINUED | OUTPATIENT
Start: 2020-02-10 | End: 2020-02-10 | Stop reason: HOSPADM

## 2020-02-09 RX ORDER — ATORVASTATIN CALCIUM 80 MG/1
80 TABLET, FILM COATED ORAL
Status: DISCONTINUED | OUTPATIENT
Start: 2020-02-09 | End: 2020-02-09

## 2020-02-09 RX ORDER — FLUTICASONE PROPIONATE 50 MCG
1 SPRAY, SUSPENSION (ML) NASAL DAILY
Status: DISCONTINUED | OUTPATIENT
Start: 2020-02-09 | End: 2020-02-10 | Stop reason: HOSPADM

## 2020-02-09 RX ORDER — ATORVASTATIN CALCIUM 40 MG/1
40 TABLET, FILM COATED ORAL DAILY
Status: DISCONTINUED | OUTPATIENT
Start: 2020-02-10 | End: 2020-02-09 | Stop reason: SDUPTHER

## 2020-02-09 RX ORDER — LORATADINE 10 MG/1
10 TABLET ORAL DAILY
Status: DISCONTINUED | OUTPATIENT
Start: 2020-02-09 | End: 2020-02-10 | Stop reason: HOSPADM

## 2020-02-09 RX ORDER — METOPROLOL SUCCINATE 50 MG/1
50 TABLET, EXTENDED RELEASE ORAL DAILY
Status: DISCONTINUED | OUTPATIENT
Start: 2020-02-09 | End: 2020-02-10 | Stop reason: HOSPADM

## 2020-02-09 RX ORDER — ONDANSETRON 2 MG/ML
4 INJECTION INTRAMUSCULAR; INTRAVENOUS EVERY 6 HOURS PRN
Status: DISCONTINUED | OUTPATIENT
Start: 2020-02-09 | End: 2020-02-10 | Stop reason: HOSPADM

## 2020-02-09 RX ORDER — LEVETIRACETAM 500 MG/1
500 TABLET ORAL EVERY 12 HOURS SCHEDULED
Status: DISCONTINUED | OUTPATIENT
Start: 2020-02-09 | End: 2020-02-09

## 2020-02-09 RX ORDER — LANOLIN ALCOHOL/MO/W.PET/CERES
3 CREAM (GRAM) TOPICAL
Status: DISCONTINUED | OUTPATIENT
Start: 2020-02-09 | End: 2020-02-10 | Stop reason: HOSPADM

## 2020-02-09 RX ORDER — ACETAMINOPHEN 325 MG/1
650 TABLET ORAL EVERY 6 HOURS PRN
Status: DISCONTINUED | OUTPATIENT
Start: 2020-02-09 | End: 2020-02-10 | Stop reason: HOSPADM

## 2020-02-09 RX ORDER — CLOPIDOGREL BISULFATE 75 MG/1
300 TABLET ORAL ONCE
Status: COMPLETED | OUTPATIENT
Start: 2020-02-09 | End: 2020-02-09

## 2020-02-09 RX ORDER — PANTOPRAZOLE SODIUM 40 MG/1
40 TABLET, DELAYED RELEASE ORAL
Status: DISCONTINUED | OUTPATIENT
Start: 2020-02-10 | End: 2020-02-10 | Stop reason: HOSPADM

## 2020-02-09 RX ORDER — FAMOTIDINE 20 MG/1
20 TABLET, FILM COATED ORAL DAILY
Status: DISCONTINUED | OUTPATIENT
Start: 2020-02-09 | End: 2020-02-10 | Stop reason: HOSPADM

## 2020-02-09 RX ORDER — CLOPIDOGREL BISULFATE 75 MG/1
75 TABLET ORAL DAILY
Status: DISCONTINUED | OUTPATIENT
Start: 2020-02-10 | End: 2020-02-10 | Stop reason: HOSPADM

## 2020-02-09 RX ORDER — LORAZEPAM 0.5 MG/1
0.5 TABLET ORAL EVERY 6 HOURS PRN
Status: DISCONTINUED | OUTPATIENT
Start: 2020-02-09 | End: 2020-02-10 | Stop reason: HOSPADM

## 2020-02-09 RX ORDER — ASPIRIN 81 MG/1
81 TABLET, CHEWABLE ORAL ONCE
Status: COMPLETED | OUTPATIENT
Start: 2020-02-09 | End: 2020-02-09

## 2020-02-09 RX ORDER — ATORVASTATIN CALCIUM 80 MG/1
80 TABLET, FILM COATED ORAL
Status: DISCONTINUED | OUTPATIENT
Start: 2020-02-09 | End: 2020-02-10 | Stop reason: HOSPADM

## 2020-02-09 RX ORDER — CITALOPRAM 20 MG/1
20 TABLET ORAL DAILY
Status: DISCONTINUED | OUTPATIENT
Start: 2020-02-09 | End: 2020-02-10 | Stop reason: HOSPADM

## 2020-02-09 RX ADMIN — ENOXAPARIN SODIUM 40 MG: 40 INJECTION SUBCUTANEOUS at 15:14

## 2020-02-09 RX ADMIN — LEVETIRACETAM 500 MG: 100 INJECTION, SOLUTION INTRAVENOUS at 20:30

## 2020-02-09 RX ADMIN — CLOPIDOGREL BISULFATE 300 MG: 75 TABLET ORAL at 13:18

## 2020-02-09 RX ADMIN — ATORVASTATIN CALCIUM 80 MG: 80 TABLET, FILM COATED ORAL at 13:30

## 2020-02-09 RX ADMIN — FLUTICASONE PROPIONATE 1 SPRAY: 50 SPRAY, METERED NASAL at 15:14

## 2020-02-09 RX ADMIN — IOHEXOL 85 ML: 350 INJECTION, SOLUTION INTRAVENOUS at 12:57

## 2020-02-09 RX ADMIN — ASPIRIN 81 MG 81 MG: 81 TABLET ORAL at 13:18

## 2020-02-09 NOTE — ASSESSMENT & PLAN NOTE
· Continue Lipitor, increased from 40mg daily to 80mg daily pending lipid panel and stroke workup  · Neurology recommending patient to remain on her home doses of Plavix 75 mg and Lipitor 40 mg   · Did discuss heart healthy diet with patient at length  · Follow-up with primary care physician 1-2 weeks

## 2020-02-09 NOTE — ASSESSMENT & PLAN NOTE
Hx of longstanding dysphagia, used to see Dr Ai Rose with [facility], has not had EGD in several years, and has increase in dysphagia symptoms over the last few months  Esophagram from 6/2018 showed Esophageal dysmotility with tertiary contractions in the mid and distal esophagus  · Continue PPI  · Patient failed bedside dysphagia screen, NPO pending speech therapy eval  · Will also obtain barium swallow eval  · Aspiration precautions

## 2020-02-09 NOTE — ASSESSMENT & PLAN NOTE
Hx of longstanding dysphagia, used to see Dr Yanira Stevens with [facility], has not had EGD in several years, and has increase in dysphagia symptoms over the last few months  Esophagram from 6/2018 showed Esophageal dysmotility with tertiary contractions in the mid and distal esophagus  · Patient initially failed bedside dysphagia screen  · Speech saw patient at bedside, performed bedside swallowing evaluation with administration of solid foods  · Recommend regular diet with thin liquids  · As patient has received solid foods is unable to perform barium swallow today  · Would recommend that patient have outpatient follow-up with barium swallow and follow-up with her GI doctor outpatient  · Prescription provided to patient for barium swallow  · Discussed with patient at bedside, verbalized understanding

## 2020-02-09 NOTE — ASSESSMENT & PLAN NOTE
Will resume home medications of Cozaar and metoprolol  · Blood pressure currently 148/65  · Did discuss with patient monitoring blood pressures on a daily basis, did discuss need to be in a seated position with feet resting flat on the floor and arm in neutral position  · Instructed her to bring blood pressure journal with her to her doctor's visits

## 2020-02-09 NOTE — ASSESSMENT & PLAN NOTE
Patient presents with AMS, workd finding difficulty and slurred speech, confusion  Hx of CVA in 2016, with symptoms similar to her prior stroke  In ED NIH 2, repeat once she arrived to [facility] is 1 (speech is still heavy)  Patient with hx of tPA and developed ICH as a result  No tpa given on admission  CT head and CTA Head/neck showed no acute bleed, no large vessel occlusion, no significant ICA occlusion  Patient already had echo in 2016 which showed no shunt      Tele monitor   Consult neurology, recs appreciated   Stroke pathway: ASA, statin (increase her home dose from 40mg to 80mg pending testing), permissive HTN   Check A1c and lipid panel   Check MRI brain   Patient failed bedside dysphagia screen, NPO pending formal speech therapy eval   PT/OT/ST

## 2020-02-09 NOTE — ASSESSMENT & PLAN NOTE
· In 6/2016, had left parietal lobe ischemia  Patient was tried on aggrenox but has severe HA with it, so was placed on plavix  She had a loop recorder done, but results of that are not available  · She returned in 6/2016 with recurrence of stroke-like symptoms and was given tpa   MRI did not show acute CVA but she did has ICH in the left parietal lobe

## 2020-02-09 NOTE — ASSESSMENT & PLAN NOTE
EKG on admission showed NSR  Patient is not on anticoagulation given hx of ICH  · Continue metoprolol  · Tele monitor

## 2020-02-09 NOTE — ED NOTES
Patient transported to CT via stretcher       [facility] [address], RN  02/09/20 0749

## 2020-02-09 NOTE — ASSESSMENT & PLAN NOTE
EKG on admission showed NSR  Patient is not on anticoagulation given hx of ICH  · Continue metoprolol  · Telemetry shows sinus rhythm, rate 60s and 70s  No events noted overnight  · Telemetry discontinued  · Patient reports that she follows up outpatient with Dr Hans Starr, cardiologist    · She will follow up outpatient

## 2020-02-09 NOTE — ASSESSMENT & PLAN NOTE
In 2016, left parietal lobe  Patient was tried on aggrenox but has severe HA with it, so was placed on plavix  She had a loop recorder done, but results of that are not available

## 2020-02-09 NOTE — ASSESSMENT & PLAN NOTE
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  No tpa given on admission  CT head and CTA Head/neck showed no acute bleed, no large vessel occlusion, no significant ICA occlusion  Patient already had echo in 2016 which showed no shunt   Tele monitor shows sinus rhythm, rate control 60s and 70s, no events noted overnight   Consulted neurology, recs appreciated   Will continue home dose of Plavix 75 mg and statin, Lipitor 40 mg    A1c 6 2%   Lipid panel shows cholesterol 140, triglycerides 92, HDL 51 and LDL 71     MRI brain shows no acute infarct, no acute hemorrhage, chronic small vessel ischemic changes   Patient initially failed bedside swallow evaluation on admission  This morning speech did a bedside swallow evaluation which patient passed, recommends regular diet with thin liquids  Patient unable to have barium swallow performed today as she has already received solid food with speech therapy for bedside eval    Prescription for outpatient barium swallow evaluation provided to patient; she follows with Dr Asif Manning, outpatient   Physical therapy worked with patient, making recommendation for outpatient physical [facility] with the [facility] as patient's gait is a little unsteady, may benefit from using cane   Patient indicates that she has canes at home   Referral made to [facility] for [facility]   Contact information for [facility] provided to patient in discharge information

## 2020-02-09 NOTE — ED PROVIDER NOTES
History  Chief Complaint   Patient presents with    Altered Mental Status     Pt was dropped off by friends from [employer]  They stated she was fine during [employer], but when getting ready to leave about 30 mins ago, she seemed to not be acting appropriately and was getting worse  Pt confused in waiting room, hesistant to come with staff  Pt stated she did have a headache earlier  62-year-old female dropped off by a friend from [employer] who states that during [employer] she started having difficulty with her speech seeming confused not acting appropriately  Patient apparently got in her car and drove home and when she got home she told her  that she did feel well and went lay down on bed when he started talk with her realize she was acting like when she had her last stroke so put her in the car brought her up here  Patient currently has resolved all her symptoms and her speech and actions are back to normal according to the family  Had a long discussion about tPA versus no tPA and in the medical records apparently when she had her stroke in 2016 was given tPA she did sustain a small intra parenchymal bleed from the tPA  At this point with her speech being back to normal no neuro focal deficits and that past bleed due to to PA family patient and myself as well as Dr Davon Avila from Neurology feel it would be best to hold tPA at this time  Prior to Admission Medications   Prescriptions Last Dose Informant Patient Reported? Taking? LORazepam (ATIVAN) 0 5 mg tablet   Yes No   Sig: Take 0 5 mg by mouth every 6 (six) hours as needed for anxiety   atorvastatin (LIPITOR) 40 mg tablet   Yes No   Sig: Take 40 mg by mouth daily  citalopram (CeleXA) 20 mg tablet  Self Yes No   Sig: Take 20 mg by mouth daily   clopidogrel (PLAVIX) 75 mg tablet 2/9/2020 at Unknown time  Yes Yes   Sig: Take 75 mg by mouth daily Indications: Stroke caused by a Blood Clot     divalproex sodium (DEPAKOTE) 500 mg EC tablet   No No Sig: Take 1 tablet (500 mg total) by mouth every 12 (twelve) hours   Patient not taking: Reported on 2019   famotidine (PEPCID) 20 mg tablet   Yes No   Sig: Take 20 mg by mouth daily    fluticasone (FLONASE) 50 mcg/act nasal spray  Self Yes No   Si spray into each nostril daily   levETIRAcetam (KEPPRA) 500 mg tablet   No No   Sig: Take 1 tablet (500 mg total) by mouth every 12 (twelve) hours   levothyroxine 88 mcg tablet   Yes No   Sig: levothyroxine 88 mcg tablet   loratadine (CLARITIN) 10 mg tablet  Self Yes No   Sig: Take 10 mg by mouth daily   losartan (COZAAR) 50 mg tablet   Yes No   Sig: Take 50 mg by mouth daily    melatonin 3 mg   Yes No   Sig: Take 3 mg by mouth daily at bedtime   metoprolol succinate (TOPROL-XL) 50 mg 24 hr tablet 2020 at Unknown time  Yes Yes   Sig: Take 50 mg by mouth daily   omeprazole (PriLOSEC) 20 mg delayed release capsule  Self Yes No   Sig: Take 20 mg by mouth daily   valsartan (DIOVAN) 80 mg tablet   Yes No   Sig: Take 80 mg by mouth daily at bedtime      Facility-Administered Medications: None       Past Medical History:   Diagnosis Date    [facility]    Adult hypothyroidism 2016    Anxiety 6/10/2019    Arthritis     Asthma     Disease of thyroid gland     Dyslipidemia 6/10/2019    Esophageal dysmotility 6/10/2019    Essential hypertension 2016    Hypertension     Paroxysmal atrial fibrillation ([facility] ) 2016    Seizure disorder ([facility] ) 2018    Seizures ([facility] )     Stenosis of left carotid artery 2019    Stenosis of left middle cerebral artery 2019    Stroke ([facility] ) 2016    Subarachnoid hemorrhage ([facility] ) 2016    Subdural hematoma ([facility] ) 2016       Past Surgical History:   Procedure Laterality Date    BACK SURGERY  2016    CARPAL TUNNEL RELEASE Right        Family History   Problem Relation Age of Onset    Kidney disease Mother     Heart disease Mother     Heart disease Father     Colon cancer Father     Heart disease Sister     Multiple sclerosis Son     No Known Problems Son      I have reviewed and agree with the history as documented  Social History     Tobacco Use    Smoking status: Never Smoker    Smokeless tobacco: Never Used   Substance Use Topics    Alcohol use: Never     Alcohol/week: 0 0 standard drinks     Frequency: Never     Drinks per session: Patient refused     Binge frequency: Never     Comment: 0    Drug use: No        Review of Systems   Constitutional: Negative for activity change, chills, diaphoresis and fever  HENT: Negative for congestion, ear pain, nosebleeds, sore throat, trouble swallowing and voice change  Eyes: Negative for pain, discharge and redness  Respiratory: Negative for apnea, cough, choking, shortness of breath, wheezing and stridor  Cardiovascular: Negative for chest pain and palpitations  Gastrointestinal: Negative for abdominal distention, abdominal pain, constipation, diarrhea, nausea and vomiting  Endocrine: Negative for polydipsia  Genitourinary: Negative for difficulty urinating, dysuria, flank pain, frequency, hematuria and urgency  Musculoskeletal: Negative for back pain, gait problem, joint swelling, myalgias, neck pain and neck stiffness  Skin: Negative for pallor and rash  Neurological: Positive for speech difficulty  Negative for dizziness, tremors, syncope, weakness, numbness and headaches  Hematological: Negative for adenopathy  Psychiatric/Behavioral: Negative for confusion, hallucinations, self-injury and suicidal ideas  The patient is not nervous/anxious  Physical Exam  Physical Exam   Constitutional: She appears well-developed and well-nourished  No distress  HENT:   Head: Normocephalic and atraumatic     Right Ear: External ear normal    Left Ear: External ear normal    Nose: Nose normal    Mouth/Throat: Oropharynx is clear and moist    Eyes: Pupils are equal, round, and reactive to light  Conjunctivae are normal    Neck: Normal range of motion  Neck supple  Cardiovascular: Normal rate, regular rhythm, normal heart sounds and intact distal pulses  Pulmonary/Chest: Effort normal and breath sounds normal    Abdominal: Soft  Bowel sounds are normal    Musculoskeletal: Normal range of motion  Neurological: She is alert  She has normal reflexes  She is disoriented  Patient initially had some difficulty with speech however that has cleared  Skin: Skin is warm and dry  She is not diaphoretic  Psychiatric: She has a normal mood and affect  Nursing note and vitals reviewed        Vital Signs  ED Triage Vitals   Temperature Pulse Respirations Blood Pressure SpO2   02/09/20 1302 02/09/20 1257 02/09/20 1257 02/09/20 1257 02/09/20 1257   (!) 97 °F (36 1 °C) 85 18 166/87 98 %      Temp Source Heart Rate Source Patient Position - Orthostatic VS BP Location FiO2 (%)   02/09/20 1302 02/09/20 1257 02/09/20 1257 02/09/20 1351 --   Tympanic Monitor Lying Left arm       Pain Score       02/09/20 1257       2           Vitals:    02/09/20 1305 02/09/20 1315 02/09/20 1330 02/09/20 1351   BP: 169/95 159/74 (!) 176/70 (!) 179/74   Pulse: 71 68 64 66   Patient Position - Orthostatic VS:    Lying         Visual Acuity  Visual Acuity      Most Recent Value   L Pupil Size (mm)  4   R Pupil Size (mm)  4          ED Medications  Medications   atorvastatin (LIPITOR) tablet 80 mg (80 mg Oral Given 2/9/20 1330)   citalopram (CeleXA) tablet 20 mg (has no administration in time range)   clopidogrel (PLAVIX) tablet 75 mg (has no administration in time range)   famotidine (PEPCID) tablet 20 mg (has no administration in time range)   fluticasone (FLONASE) 50 mcg/act nasal spray 1 spray (has no administration in time range)   levETIRAcetam (KEPPRA) tablet 500 mg (has no administration in time range)   loratadine (CLARITIN) tablet 10 mg (has no administration in time range)   melatonin tablet 3 mg (has no administration in time range)   pantoprazole (PROTONIX) EC tablet 40 mg (has no administration in time range)   LORazepam (ATIVAN) tablet 0 5 mg (has no administration in time range)   levothyroxine tablet 88 mcg (has no administration in time range)   ondansetron (ZOFRAN) injection 4 mg (has no administration in time range)   enoxaparin (LOVENOX) subcutaneous injection 40 mg (has no administration in time range)   acetaminophen (TYLENOL) tablet 650 mg (has no administration in time range)   metoprolol succinate (TOPROL-XL) 24 hr tablet 50 mg (has no administration in time range)   iohexol (OMNIPAQUE) 350 MG/ML injection (MULTI-DOSE) 85 mL (85 mL Intravenous Given 2/9/20 1257)   aspirin chewable tablet 81 mg (81 mg Oral Given 2/9/20 1318)   clopidogrel (PLAVIX) tablet 300 mg (300 mg Oral Given 2/9/20 1318)       Diagnostic Studies  Results Reviewed     Procedure Component Value Units Date/Time    Troponin I [[id number]]  (Normal) Collected:  02/09/20 1254    Lab Status:  Final result Specimen:  Blood from Arm, Left Updated:  02/09/20 1324     Troponin I <0 02 ng/mL     Basic metabolic panel [[id number]] Collected:  02/09/20 1254    Lab Status:  Final result Specimen:  Blood from Arm, Left Updated:  02/09/20 1315     Sodium 140 mmol/L      Potassium 4 1 mmol/L      Chloride 104 mmol/L      CO2 29 mmol/L      ANION GAP 7 mmol/L      BUN 9 mg/dL      Creatinine 0 99 mg/dL      Glucose 101 mg/dL      Calcium 8 8 mg/dL      eGFR 53 ml/min/1 73sq m     Narrative:       [place] guidelines for Chronic Kidney Disease (CKD):     Stage 1 with normal or high GFR (GFR > 90 mL/min/1 73 square meters)    Stage 2 Mild CKD (GFR = 60-89 mL/min/1 73 square meters)    Stage 3A Moderate CKD (GFR = 45-59 mL/min/1 73 square meters)    Stage 3B Moderate CKD (GFR = 30-44 mL/min/1 73 square meters)    Stage 4 Severe CKD (GFR = 15-29 mL/min/1 73 square meters)    Stage 5 End Stage CKD (GFR <15 mL/min/1 73 square meters)  Note: GFR calculation is accurate only with a steady state creatinine    APTT [[id number]]  (Normal) Collected:  02/09/20 1254    Lab Status:  Final result Specimen:  Blood from Arm, Left Updated:  02/09/20 1313     PTT 26 seconds     Protime-INR [[id number]]  (Normal) Collected:  02/09/20 1254    Lab Status:  Final result Specimen:  Blood from Arm, Left Updated:  02/09/20 1313     Protime 11 0 seconds      INR 1 03    CBC and Platelet [[id number]]  (Normal) Collected:  02/09/20 1254    Lab Status:  Final result Specimen:  Blood from Arm, Left Updated:  02/09/20 1301     WBC 6 62 Thousand/uL      RBC 4 36 Million/uL      Hemoglobin 13 5 g/dL      Hematocrit 41 9 %      MCV 96 fL      MCH 31 0 pg      MCHC 32 2 g/dL      RDW 12 7 %      Platelets 483 Thousands/uL      MPV 9 6 fL     Fingerstick Glucose (POCT) [[id number]]  (Normal) Collected:  02/09/20 1240    Lab Status:  Final result Updated:  02/09/20 1244     POC Glucose 93 mg/dl                  X-ray chest 1 view portable   Final Result by Shameka Maza MD (02/09 1339)      No acute cardiopulmonary disease  Workstation performed: [id number]         CTA stroke alert (head/neck)   Final Result by Kristofer Orozco DO (02/09 1313)      No large vessel occlusion  No significant carotid or vertebral artery stenosis in the neck  Narrowing of the distal M1 on the left at the level of the MCA trifurcation unchanged from prior study compatible with atheromatous sclerotic disease  Undulating appearance to the bilateral cervical internal carotid artery 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  The study was marked in [facility] for immediate notification  Workstation performed: [id number]         CT stroke alert brain   Final Result by Kristofer Orozco DO (02/09 1252)      No acute intracranial abnormality  Microangiopathic changes        Findings were directly discussed with Dewayne Arnold on 2/9/2020 12:49 PM       Workstation performed: [id number]         MRI Inpatient Order    (Results Pending)              Procedures  CriticalCare Time  Performed by: Bety Gay DO  Authorized by: Bety Gay DO     Critical care provider statement:     Critical care time (minutes):  30    Critical care start time:  2/9/2020 1:23 PM    Critical care end time:  2/9/2020 1:53 PM    Critical care time was exclusive of:  Separately billable procedures and treating other patients    Critical care was necessary to treat or prevent imminent or life-threatening deterioration of the following conditions:  CNS failure or compromise    Critical care was time spent personally by me on the following activities:  Obtaining history from patient or surrogate, development of treatment plan with patient or surrogate, discussions with consultants, discussions with primary provider, evaluation of patient's response to treatment, examination of patient, ordering and review of laboratory studies, ordering and review of radiographic studies, re-evaluation of patient's condition and review of old charts    I assumed direction of critical care for this patient from another provider in my specialty: no               ED Course             Stroke Assessment     Row Name 02/09/20 1245             NIH Stroke Scale    Interval        Level of Consciousness (1a )  0      LOC Questions (1b )  0      LOC Commands (1c )  0      Best Gaze (2 )  0      Visual (3 )  0      Facial Palsy (4 )  0      Motor Arm, Left (5a )  0      Motor Arm, Right (5b )  0      Motor Leg, Left (6a )  0      Motor Leg, Right (6b )  0      Limb Ataxia (7 )  0      Sensory (8 )  0      Best Language (9 )  1      Dysarthria (10 )  1      Extinction and Inattention (11 ) (Formerly Neglect)  0      Total  2          First Filed Value   TPA Decision  Patient not a TPA candidate  Patient is not a candidate options  Symptoms resolved/clearly non disabling  MDM  Number of Diagnoses or Management Options  Diagnosis management comments: Patient family Neurology and myself agree no tPA at this point patient's signs and symptoms of stroke of all but resolved at this point  Disposition  Final diagnoses:   CVA (cerebral vascular accident) [facility])     Time reflects when diagnosis was documented in both MDM as applicable and the Disposition within this note     Time User Action Codes Description Comment    2/9/2020  1:26 PM Digna Alcala Add [I63 9] CVA (cerebral vascular accident) ([facility] )     2/9/2020  2:06 PM Luis Alfredo Shanks Add [I63 9] Acute CVA (cerebrovascular accident) [facility])       ED Disposition     ED Disposition Condition Date/Time Comment    Admit Stable Sun Feb 9, 2020  1:25 PM Case was discussed with dr Di Giron and the patient's admission status was agreed to be Admission Status: inpatient status to the service of Dr Farzaneh Colon   Follow-up Information    None         Current Discharge Medication List      CONTINUE these medications which have NOT CHANGED    Details   clopidogrel (PLAVIX) 75 mg tablet Take 75 mg by mouth daily Indications: Stroke caused by a Blood Clot  metoprolol succinate (TOPROL-XL) 50 mg 24 hr tablet Take 50 mg by mouth daily  Refills: 0      atorvastatin (LIPITOR) 40 mg tablet Take 40 mg by mouth daily        citalopram (CeleXA) 20 mg tablet Take 20 mg by mouth daily      divalproex sodium (DEPAKOTE) 500 mg EC tablet Take 1 tablet (500 mg total) by mouth every 12 (twelve) hours  Qty: 60 tablet, Refills: 0    Associated Diagnoses: Seizure disorder (HCC)      famotidine (PEPCID) 20 mg tablet Take 20 mg by mouth daily       fluticasone (FLONASE) 50 mcg/act nasal spray 1 spray into each nostril daily      levETIRAcetam (KEPPRA) 500 mg tablet Take 1 tablet (500 mg total) by mouth every 12 (twelve) hours  Qty: 60 tablet, Refills: 0    Associated Diagnoses: Seizure disorder (HCC)      levothyroxine 88 mcg tablet levothyroxine 88 mcg tablet      loratadine (CLARITIN) 10 mg tablet Take 10 mg by mouth daily      LORazepam (ATIVAN) 0 5 mg tablet Take 0 5 mg by mouth every 6 (six) hours as needed for anxiety      losartan (COZAAR) 50 mg tablet Take 50 mg by mouth daily       melatonin 3 mg Take 3 mg by mouth daily at bedtime      omeprazole (PriLOSEC) 20 mg delayed release capsule Take 20 mg by mouth daily      valsartan (DIOVAN) 80 mg tablet Take 80 mg by mouth daily at bedtime           No discharge procedures on file      ED Provider  Electronically Signed by           Kermit Richardson DO  02/09/20 1888

## 2020-02-09 NOTE — H&P
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  No tpa given on admission  CT head and CTA Head/neck showed no acute bleed, no large vessel occlusion, no significant ICA occlusion  Patient already had echo in 2016 which showed no shunt      Tele monitor   Consult neurology, recs appreciated   Stroke pathway: ASA, statin (increase her home dose from 40mg to 80mg pending testing), permissive HTN   Check A1c and lipid panel   Check MRI brain   Patient failed bedside dysphagia screen, NPO pending formal speech therapy eval   PT/OT/ST    Esophageal dysmotility  Assessment & Plan  Hx of longstanding dysphagia, used to see Dr Liban Yen with [facility], has not had EGD in several years, and has increase in dysphagia symptoms over the last few months  Esophagram from 6/2018 showed Esophageal dysmotility with tertiary contractions in the mid and distal esophagus  · Continue PPI  · Patient failed bedside dysphagia screen, NPO pending speech therapy eval  · Will also obtain barium swallow eval  · Aspiration precautions    Essential hypertension  Assessment & Plan  · Hold cozaar, diovan for permissive HTN    Seizure disorder ([facility] [address] )  Assessment & Plan  · Continue keppra  · Check keppra level    Dyslipidemia  Assessment & Plan  · Continue Lipitor, increase from 40mg daily to 80mg daily pending lipid panel and stroke workup    Adult hypothyroidism  Assessment & Plan  · continue synthroid  · Check TSH    H/O: CVA (cerebrovascular accident)  Assessment & Plan  In 2016, left parietal lobe  Patient was tried on aggrenox but has severe HA with it, so was placed on plavix  She had a loop recorder done, but results of that are not available  Anxiety  Assessment & Plan  Continue ativan PRN    Paroxysmal atrial fibrillation (HCC)  Assessment & Plan  EKG on admission showed NSR  Patient is not on anticoagulation given hx of ICH  · Continue metoprolol  · Tele monitor      VTE Prophylaxis: Enoxaparin (Lovenox)  / sequential compression device   Code Status: Level 1 - Full Code    Anticipated Length of Stay:  Patient will be admitted on an Inpatient basis with an anticipated length of stay of  > 2 midnights  Justification for Hospital Stay: acute CVA, dysphagia    Total Time for Visit, including Counseling / Coordination of Care: 45 minutes  Greater than 50% of this total time spent on direct patient counseling and coordination of care  Chief Complaint:   Altered Mental Status (Pt was dropped off by friends from [employer]  They stated she was fine during [employer], but when getting ready to leave about 30 mins ago, she seemed to not be acting appropriately and was getting worse  Pt confused in waiting room, hesistant to come with staff  Pt stated she did have a headache earlier  )      History of Present Illness:    Grace Mcleod is a 82 y o  female with a PMH of hx CVA in 2016, [address] as sequelae of tpa, HTN, PAF, hypothyroid, seizure do, esophageal dysmotility, anxiety who presents with stroke-like symptoms  Patient was at [employer] at 12pm, when she suddenly had trouble with word finding when singing hymns and forgot what she was doing  She noted that something was off, and was concerned that this was similar to her prior stroke, so she drove herself home (with no issues with coordination)  When she got home her  noted that her speech was not normal, and she was not responding to him appropriately so he had her brought to the ED   She denies any motor or sensory changes, no HA  No CP, SOB or palpitations  She still does not feel back to baseline  She can find the words now but her speech still feels heavy  She had a stroke in 2016 which presented in similar way, she was given tpa and developed ICH as a result  She takes medications for stroke, and doesn't miss doses  She has a hx of PAF but is not on anticoagulation  She has a longstanding hx of esophageal dysmotility for which she used to see Steffi Crowe, Dr Zaida Saint and used to get EGDs, but hasnt had one in a while  She feels her dysphagia has worsened over the last few months, and she had coughing and occasional chocking with food or pills  shes not sure of liquids or solids are more of a problem  On arrival to the ED NIH score was 2, and by the time she arrived to the floor it was 1  Bedside dysphagia screen by RN noted coughing with water intake  Review of Systems:    Review of Systems   Constitutional: Negative for chills, fatigue and fever  HENT: Negative  Eyes: Negative for photophobia and visual disturbance  Respiratory: Positive for cough  Negative for shortness of breath  Cardiovascular: Negative for chest pain, palpitations and leg swelling  Gastrointestinal: Negative  Genitourinary: Negative  Musculoskeletal: Negative  Neurological: Positive for speech difficulty  Negative for dizziness, tremors, syncope, facial asymmetry, weakness, numbness and headaches  Psychiatric/Behavioral: The patient is nervous/anxious  All other systems reviewed and are negative        Past Medical and Surgical History:     Past Medical History:   Diagnosis Date    Acute CV[facility]ce[facility]) 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 Authorizing Provider   clopidogrel (PLAVIX) 75 mg tablet Take 75 mg by mouth daily Indications: Stroke caused by a Blood Clot  Yes Historical Provider, MD   metoprolol succinate (TOPROL-XL) 50 mg 24 hr tablet Take 50 mg by mouth daily 9/3/19  Yes Historical Provider, MD   atorvastatin (LIPITOR) 40 mg tablet Take 40 mg by mouth daily      Historical Provider, MD   citalopram (CeleXA) 20 mg tablet Take 20 mg by mouth daily    Historical Provider, MD   famotidine (PEPCID) 20 mg tablet Take 20 mg by mouth daily     Historical Provider, MD   fluticasone (FLONASE) 50 mcg/act nasal spray 1 spray into each nostril daily    Historical Provider, MD   levETIRAcetam (KEPPRA) 500 mg tablet Take 1 tablet (500 mg total) by mouth every 12 (twelve) hours 6/12/19   Zackary Crew, DO   levothyroxine 88 mcg tablet levothyroxine 88 mcg tablet    Historical Provider, MD   loratadine (CLARITIN) 10 mg tablet Take 10 mg by mouth daily    Historical Provider, MD   LORazepam (ATIVAN) 0 5 mg tablet Take 0 5 mg by mouth every 6 (six) hours as needed for anxiety    Historical Provider, MD   losartan (COZAAR) 50 mg tablet Take 50 mg by mouth daily     Historical Provider, MD   melatonin 3 mg Take 3 mg by mouth daily at bedtime    Historical Provider, MD   omeprazole (PriLOSEC) 20 mg delayed release capsule Take 20 mg by mouth daily    Historical Provider, MD   valsartan (DIOVAN) 80 mg tablet Take 80 mg by mouth daily at bedtime    Historical Provider, MD   divalproex sodium (DEPAKOTE) 500 mg EC tablet Take 1 tablet (500 mg total) by mouth every 12 (twelve) hours  Patient not taking: Reported on 8/1/2019 6/12/19 2/9/20  Vicente Foster DO       Allergies: Allergies   Allergen Reactions    Other      Hydralazine, Amlodipine    Penicillins        Social History:     Marital Status: /Civil Union   Substance Use History:   Social History     Substance and Sexual Activity   Alcohol Use Never    Alcohol/week: 0 0 standard drinks    Frequency: Never    Drinks per session: Patient refused    Binge frequency: Never    Comment: 0     Social History     Tobacco Use   Smoking Status Never Smoker   Smokeless Tobacco Never Used     Social History     Substance and Sexual Activity   Drug Use No       Family History:    non-contributory    Physical Exam:     Vitals:   Blood Pressure: (!) 179/74 (02/09/20 1351)  Pulse: 66 (02/09/20 1351)  Temperature: 97 9 °F (36 6 °C) (02/09/20 1351)  Temp Source: Oral (02/09/20 1351)  Respirations: 20 (02/09/20 1351)  Height: 5' (152 4 cm) (02/09/20 1351)  Weight - Scale: 72 5 kg (159 lb 13 3 oz) (02/09/20 1257)  SpO2: 97 % (02/09/20 1351)    Physical Exam   Constitutional: She is oriented to person, place, and time  She appears well-developed  No distress  HENT:   Head: Normocephalic and atraumatic  Cardiovascular: Normal rate and regular rhythm  Murmur heard  Pulmonary/Chest: Effort normal and breath sounds normal  No respiratory distress  She has no wheezes  She has no rales  Abdominal: Soft  Bowel sounds are normal  She exhibits no distension  There is no tenderness  There is no rebound and no guarding  Musculoskeletal: She exhibits no edema, tenderness or deformity  Neurological: She is alert and oriented to person, place, and time  No sensory deficit  She exhibits normal muscle tone  Speech is heavy   Skin: Skin is warm and dry  Psychiatric: She has a normal mood and affect   Her behavior is normal  Nursing note and vitals reviewed  Additional Data:     Lab Results: I have personally reviewed pertinent reports  Results from last 7 days   Lab Units 02/09/20  1254   WBC Thousand/uL 6 62   HEMOGLOBIN g/dL 13 5   HEMATOCRIT % 41 9   PLATELETS Thousands/uL 288     Results from last 7 days   Lab Units 02/09/20  1254   SODIUM mmol/L 140   POTASSIUM mmol/L 4 1   CHLORIDE mmol/L 104   CO2 mmol/L 29   BUN mg/dL 9   CREATININE mg/dL 0 99   CALCIUM mg/dL 8 8     Results from last 7 days   Lab Units 02/09/20  1254   INR  1 03     Results from last 7 days   Lab Units 02/09/20  1254   TROPONIN I ng/mL <0 02     Results from last 7 days   Lab Units 02/09/20  1240   POC GLUCOSE mg/dl 93           Imaging: I have personally reviewed pertinent reports  X-ray chest 1 view portable   Final Result by Cezar Burger MD (02/09 1339)      No acute cardiopulmonary disease  Workstation performed: [id number]         CTA stroke alert (head/neck)   Final Result by [facility] (02/09 1313)      No large vessel occlusion  No significant carotid or vertebral artery stenosis in the neck  Narrowing of the distal M1 on the left at the level of the MCA trifurcation unchanged from prior study compatible with atheromatous sclerotic disease  Undulating appearance to the bilateral cervical internal carotid artery 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  The study was marked in [facility] for immediate notification  Workstation performed: [id number]         CT stroke alert brain   Final Result by [facility],  (02/09 1252)      No acute intracranial abnormality  Microangiopathic changes        Findings were directly discussed with Npetali Bragg on 2/9/2020 12:49 PM       Workstation performed: [id number]         MRI Inpatient Order    (Results Pending)   FL barium swallow video w speech    (Results Pending)       X-ray chest 1 view portable   Final Result No acute cardiopulmonary disease  Workstation performed: [id number]         CTA stroke alert (head/neck)   Final Result      No large vessel occlusion  No significant carotid or vertebral artery stenosis in the neck  Narrowing of the distal M1 on the left at the level of the MCA trifurcation unchanged from prior study compatible with atheromatous sclerotic disease  Undulating appearance to the bilateral cervical internal carotid artery 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  The study was marked in [facility] for immediate notification  Workstation performed: [id number]         CT stroke alert brain   Final Result      No acute intracranial abnormality  Microangiopathic changes  Findings were directly discussed with Darren Solano on 2/9/2020 12:49 PM       Workstation performed: [id number]         MRI Inpatient Order    (Results Pending)   FL barium swallow video w speech    (Results Pending)       EKG, Pathology, and Other Studies Reviewed on Admission:   · EKG: NSR    Allscripts / Epic Records Reviewed: Yes     ** Please Note: This note has been constructed using a voice recognition system   **

## 2020-02-09 NOTE — PLAN OF CARE
Problem: PAIN - ADULT  Goal: Verbalizes/displays adequate comfort level or baseline comfort level  Description  Interventions:  - Encourage patient to monitor pain and request assistance  - Assess pain using appropriate pain scale  - Administer analgesics based on type and severity of pain and evaluate response  - Implement non-pharmacological measures as appropriate and evaluate response  - Consider cultural and social influences on pain and pain management  - Notify physician/advanced practitioner if interventions unsuccessful or patient reports new pain  Outcome: Progressing     Problem: SAFETY ADULT  Goal: Patient will remain free of falls  Description  INTERVENTIONS:  - Assess patient frequently for physical needs  -  Identify cognitive and physical deficits and behaviors that affect risk of falls    -  [place] fall precautions as indicated by assessment   - Educate patient/family on patient safety including physical limitations  - Instruct patient to call for assistance with activity based on assessment  - Modify environment to reduce risk of injury  - Consider OT/PT consult to assist with strengthening/mobility  Outcome: Progressing  Goal: Maintain or return to baseline ADL function  Description  INTERVENTIONS:  -  Assess patient's ability to carry out ADLs; assess patient's baseline for ADL function and identify physical deficits which impact ability to perform ADLs (bathing, care of mouth/teeth, toileting, grooming, dressing, etc )  - Assess/evaluate cause of self-care deficits   - Assess range of motion  - Assess patient's mobility; develop plan if impaired  - Assess patient's need for assistive devices and provide as appropriate  - Encourage maximum independence but intervene and supervise when necessary  - Involve family in performance of ADLs  - Assess for home care needs following discharge   - Consider OT consult to assist with ADL evaluation and planning for discharge  - Provide patient education as appropriate  Outcome: Progressing  Goal: Maintain or return mobility status to optimal level  Description  INTERVENTIONS:  - Assess patient's baseline mobility status (ambulation, transfers, stairs, etc )    - Identify cognitive and physical deficits and behaviors that affect mobility  - Identify mobility aids required to assist with transfers and/or ambulation (gait belt, sit-to-stand, lift, walker, cane, etc )  - [place] fall precautions as indicated by assessment  - Record patient progress and toleration of activity level on Mobility SBAR; progress patient to next Phase/Stage  - Instruct patient to call for assistance with activity based on assessment  - Consider rehabilitation consult to assist with strengthening/weightbearing, etc   Outcome: Progressing     Problem: DISCHARGE PLANNING  Goal: Discharge to home or other facility with appropriate resources  Description  INTERVENTIONS:  - Identify barriers to discharge w/patient and caregiver  - Arrange for needed discharge resources and transportation as appropriate  - Identify discharge learning needs (meds, wound care, etc )  - Arrange for interpretive services to assist at discharge as needed  - Refer to Case Management Department for coordinating discharge planning if the patient needs post-hospital services based on physician/advanced practitioner order or complex needs related to functional status, cognitive ability, or social support system  Outcome: Progressing     Problem: Knowledge Deficit  Goal: Patient/family/caregiver demonstrates understanding of disease process, treatment plan, medications, and discharge instructions  Description  Complete learning assessment and assess knowledge base    Interventions:  - Provide teaching at level of understanding  - Provide teaching via preferred learning methods  Outcome: Progressing     Problem: Neurological Deficit  Goal: Neurological status is stable or improving  Description  Interventions:  - Monitor and assess patient's level of consciousness, motor function, sensory function, and level of assistance needed for ADLs  - Monitor and report changes from baseline  Collaborate with interdisciplinary team to initiate plan and implement interventions as ordered  - Provide and maintain a safe environment  - Consider seizure precautions  - Consider fall precautions  - Consider aspiration precautions  - Consider bleeding precautions  Outcome: Progressing     Problem: Activity Intolerance/Impaired Mobility  Goal: Mobility/activity is maintained at optimum level for patient  Description  Interventions:  - Assess and monitor patient  barriers to mobility and need for assistive/adaptive devices  - Assess patient's emotional response to limitations  - Collaborate with interdisciplinary team and initiate plans and interventions as ordered  - Encourage independent activity per ability   - Maintain proper body alignment  - Perform active/passive rom as tolerated/ordered  - Plan activities to conserve energy   - Turn patient as appropriate  Outcome: Progressing     Problem: Communication Impairment  Goal: Ability to express needs and understand communication  Description  Assess patient's communication skills and ability to understand information  Patient will demonstrate use of effective communication techniques, alternative methods of communication and understanding even if not able to speak  - Encourage communication and provide alternate methods of communication as needed  - Collaborate with case management/ for discharge needs  - Include patient/family/caregiver in decisions related to communication  Outcome: Progressing     Problem: Potential for Aspiration  Goal: Non-ventilated patient's risk of aspiration is minimized  Description  Assess and monitor vital signs, respiratory status, and labs (WBC)  Monitor for signs of aspiration (tachypnea, cough, rales, wheezing, cyanosis, fever)      - Assess and monitor patient's ability to swallow  - Place patient up in chair to eat if possible  - HOB up at 90 degrees to eat if unable to get patient up into chair   - Supervise patient during oral intake  - Instruct patient/ family to take small bites  - Instruct patient/ family to take small single sips when taking liquids  - Follow patient-specific strategies generated by speech pathologist   Outcome: Progressing  Goal: Ventilated patient's risk of aspiration is minimized  Description  Assess and monitor vital signs, respiratory status, airway cuff pressure, and labs (WBC)  Monitor for signs of aspiration (tachypnea, cough, rales, wheezing, cyanosis, fever)  - Elevate head of bed 30 degrees if patient has tube feeding   - Monitor tube feeding  Outcome: Progressing     Problem: Nutrition  Goal: Nutrition/Hydration status is improving  Description  Monitor and assess patient's nutrition/hydration status for malnutrition (ex- brittle hair, bruises, dry skin, pale skin and conjunctiva, muscle wasting, smooth red tongue, and disorientation)  Collaborate with interdisciplinary team and initiate plan and interventions as ordered  Monitor patient's weight and dietary intake as ordered or per policy  Utilize nutrition screening tool and intervene per policy  Determine patient's food preferences and provide high-protein, high-caloric foods as appropriate  - Assist patient with eating   - Allow adequate time for meals   - Encourage patient to take dietary supplement as ordered  - Collaborate with clinical nutritionist   - Include patient/family/caregiver in decisions related to nutrition    Outcome: Progressing

## 2020-02-09 NOTE — ASSESSMENT & PLAN NOTE
· Continue keppra, was able to transition patient back to her oral dose of Keppra as she passed her swallowing evaluation with speech therapy this morning    · keppra level pending

## 2020-02-10 ENCOUNTER — APPOINTMENT (INPATIENT)
Dept: RADIOLOGY | Facility: HOSPITAL | Age: 85
DRG: 093 | End: 2020-02-10
Payer: COMMERCIAL

## 2020-02-10 VITALS
OXYGEN SATURATION: 97 % | BODY MASS INDEX: 31.38 KG/M2 | HEART RATE: 70 BPM | DIASTOLIC BLOOD PRESSURE: 70 MMHG | TEMPERATURE: 97.7 F | WEIGHT: 159.83 LBS | RESPIRATION RATE: 18 BRPM | HEIGHT: 60 IN | SYSTOLIC BLOOD PRESSURE: 160 MMHG

## 2020-02-10 LAB
ANION GAP SERPL CALCULATED.3IONS-SCNC: 11 MMOL/L (ref 4–13)
ATRIAL RATE: 79 BPM
BUN SERPL-MCNC: 10 MG/DL (ref 5–25)
CALCIUM SERPL-MCNC: 8.2 MG/DL (ref 8.3–10.1)
CHLORIDE SERPL-SCNC: 104 MMOL/L (ref 100–108)
CHOLEST SERPL-MCNC: 140 MG/DL (ref 50–200)
CO2 SERPL-SCNC: 24 MMOL/L (ref 21–32)
CREAT SERPL-MCNC: 0.97 MG/DL (ref 0.6–1.3)
ERYTHROCYTE [DISTWIDTH] IN BLOOD BY AUTOMATED COUNT: 12.6 % (ref 11.6–15.1)
EST. AVERAGE GLUCOSE BLD GHB EST-MCNC: 131 MG/DL
GFR SERPL CREATININE-BSD FRML MDRD: 55 ML/MIN/1.73SQ M
GLUCOSE SERPL-MCNC: 87 MG/DL (ref 65–140)
GLUCOSE SERPL-MCNC: 87 MG/DL (ref 65–140)
HBA1C MFR BLD: 6.2 % (ref 4.2–6.3)
HCT VFR BLD AUTO: 41 % (ref 34.8–46.1)
HDLC SERPL-MCNC: 51 MG/DL
HGB BLD-MCNC: 13.4 G/DL (ref 11.5–15.4)
LDLC SERPL CALC-MCNC: 71 MG/DL (ref 0–100)
MCH RBC QN AUTO: 31.2 PG (ref 26.8–34.3)
MCHC RBC AUTO-ENTMCNC: 32.7 G/DL (ref 31.4–37.4)
MCV RBC AUTO: 96 FL (ref 82–98)
P AXIS: 70 DEGREES
PLATELET # BLD AUTO: 250 THOUSANDS/UL (ref 149–390)
PMV BLD AUTO: 9.8 FL (ref 8.9–12.7)
POTASSIUM SERPL-SCNC: 3.9 MMOL/L (ref 3.5–5.3)
PR INTERVAL: 194 MS
QRS AXIS: 66 DEGREES
QRSD INTERVAL: 84 MS
QT INTERVAL: 372 MS
QTC INTERVAL: 426 MS
RBC # BLD AUTO: 4.29 MILLION/UL (ref 3.81–5.12)
SODIUM SERPL-SCNC: 139 MMOL/L (ref 136–145)
T WAVE AXIS: 47 DEGREES
TRIGL SERPL-MCNC: 92 MG/DL
VENTRICULAR RATE: 79 BPM
WBC # BLD AUTO: 4.78 THOUSAND/UL (ref 4.31–10.16)

## 2020-02-10 PROCEDURE — 97535 SELF CARE MNGMENT TRAINING: CPT

## 2020-02-10 PROCEDURE — 83036 HEMOGLOBIN GLYCOSYLATED A1C: CPT | Performed by: STUDENT IN AN ORGANIZED HEALTH CARE EDUCATION/TRAINING PROGRAM

## 2020-02-10 PROCEDURE — 97110 THERAPEUTIC EXERCISES: CPT

## 2020-02-10 PROCEDURE — 70551 MRI BRAIN STEM W/O DYE: CPT

## 2020-02-10 PROCEDURE — 92522 EVALUATE SPEECH PRODUCTION: CPT

## 2020-02-10 PROCEDURE — 82948 REAGENT STRIP/BLOOD GLUCOSE: CPT

## 2020-02-10 PROCEDURE — 93010 ELECTROCARDIOGRAM REPORT: CPT | Performed by: INTERNAL MEDICINE

## 2020-02-10 PROCEDURE — 80048 BASIC METABOLIC PNL TOTAL CA: CPT | Performed by: STUDENT IN AN ORGANIZED HEALTH CARE EDUCATION/TRAINING PROGRAM

## 2020-02-10 PROCEDURE — 99239 HOSP IP/OBS DSCHRG MGMT >30: CPT | Performed by: STUDENT IN AN ORGANIZED HEALTH CARE EDUCATION/TRAINING PROGRAM

## 2020-02-10 PROCEDURE — 99223 1ST HOSP IP/OBS HIGH 75: CPT | Performed by: PSYCHIATRY & NEUROLOGY

## 2020-02-10 PROCEDURE — 80061 LIPID PANEL: CPT | Performed by: STUDENT IN AN ORGANIZED HEALTH CARE EDUCATION/TRAINING PROGRAM

## 2020-02-10 PROCEDURE — 97163 PT EVAL HIGH COMPLEX 45 MIN: CPT

## 2020-02-10 PROCEDURE — 92610 EVALUATE SWALLOWING FUNCTION: CPT

## 2020-02-10 PROCEDURE — 97167 OT EVAL HIGH COMPLEX 60 MIN: CPT

## 2020-02-10 PROCEDURE — 85027 COMPLETE CBC AUTOMATED: CPT | Performed by: STUDENT IN AN ORGANIZED HEALTH CARE EDUCATION/TRAINING PROGRAM

## 2020-02-10 RX ORDER — LEVETIRACETAM 500 MG/1
500 TABLET ORAL EVERY 12 HOURS SCHEDULED
Status: DISCONTINUED | OUTPATIENT
Start: 2020-02-10 | End: 2020-02-10 | Stop reason: HOSPADM

## 2020-02-10 RX ORDER — LEVETIRACETAM 500 MG/1
500 TABLET ORAL EVERY 12 HOURS SCHEDULED
Status: COMPLETED | OUTPATIENT
Start: 2020-02-10 | End: 2020-02-10

## 2020-02-10 RX ORDER — ACETAMINOPHEN 325 MG/1
650 TABLET ORAL EVERY 6 HOURS PRN
Qty: 30 TABLET | Refills: 0
Start: 2020-02-10

## 2020-02-10 RX ADMIN — METOPROLOL SUCCINATE 50 MG: 50 TABLET, EXTENDED RELEASE ORAL at 09:51

## 2020-02-10 RX ADMIN — LEVETIRACETAM 500 MG: 500 TABLET, FILM COATED ORAL at 14:22

## 2020-02-10 RX ADMIN — LORATADINE 10 MG: 10 TABLET ORAL at 09:51

## 2020-02-10 RX ADMIN — FLUTICASONE PROPIONATE 1 SPRAY: 50 SPRAY, METERED NASAL at 09:52

## 2020-02-10 RX ADMIN — CITALOPRAM HYDROBROMIDE 20 MG: 20 TABLET ORAL at 09:51

## 2020-02-10 RX ADMIN — CLOPIDOGREL BISULFATE 75 MG: 75 TABLET ORAL at 10:45

## 2020-02-10 RX ADMIN — ENOXAPARIN SODIUM 40 MG: 40 INJECTION SUBCUTANEOUS at 09:52

## 2020-02-10 RX ADMIN — FAMOTIDINE 20 MG: 20 TABLET, FILM COATED ORAL at 09:51

## 2020-02-10 NOTE — SPEECH THERAPY NOTE
Motor Speech and Bedside Swallow Evaluation      Patient Name: Bonita Angel    AXINL'K Date: 2/10/2020     Problem List  Principal Problem:    Stroke-like symptoms  Active Problems:    Essential hypertension    Adult hypothyroidism    Paroxysmal atrial fibrillation (HCC)    Dyslipidemia    Anxiety    Esophageal dysmotility    Seizure disorder (HCC)    H/O: CVA (cerebrovascular accident)      Past Medical History  Past Medical History:   Diagnosis Date    Acute CVA (cerebrovascular accident) ([address] ) 6/18/2016    Adult hypothyroidism 9/12/2016    Anxiety 6/10/2019    Arthritis     Asthma     Disease of thyroid gland     Dyslipidemia 6/10/2019    Esophageal dysmotility 6/10/2019    Essential hypertension 9/12/2016    Hypertension     Paroxysmal atrial fibrillation ([address] ) 9/12/2016    Seizure disorder ([address] ) 8/24/2018    Seizures ([address] )     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    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    Stroke ([address] ) 06/2016    Subarachnoid hemorrhage ([address] ) 9/13/2016    Subdural hematoma ([address] ) 9/13/2016       Past Surgical History  Past Surgical History:   Procedure Laterality Date    BACK SURGERY  01/2016    CARPAL TUNNEL RELEASE Right        Summary   Pt presented with no s/s dysarthria and no s/s oral or pharyngeal stage dysphagia today    She admits to coughing when ingesting "Ice cold drinks" (+ h/o asthma/reactive airway)    Recommended Diet: regular diet and thin liquids   Recommended Form of Meds: whole with liquid   Reflux or esophageal clearance precautions recommended given h/o esophageal dysmotility (eat upright positioning during and for min of 340 mins after meals, slow rate of intake, chew well, alternate food with sips of liquid)      Current Medical Status  Bonita Angel is a 82 y o  female with a PMH of hx CVA in 2016, ICH as sequelae of tpa, HTN, PAF, hypothyroid, seizure do, anxiety and esophageal dysmotility (longstanding hx of esophageal dysmotility for which she used to see [facility], Dr Liban Yen and used to get EGDs, but hasnt had one in a while  She feels her dysphagia has worsened over the last few months, and she had coughing and occasional chocking with food or pills  shes not sure of liquids or solids are more of a problem)  She presented 2/9 with stroke-like symptoms (anomia, slured speech, confusion)  DX:  R/o CVA  Pt failed RN Dysphagia Assessment (she presented with prolonged coughing with sips of thin liquid)  SLP Speech/Swallowing Evaluation requested at this time  Current Precautions:  Fall & Aspiration      Past medical history:  Please see H&P for details    Special Studies of 2/9:   CT of head:  No acute intracranial abnormality  Microangiopathic changes  CTA: No large vessel occlusion  No significant carotid or vertebral artery stenosis in the neck  Narrowing of the distal M1 on the left at the level of the MCA trifurcation unchanged from prior study compatible with atheromatous sclerotic disease  Undulating appearance to the bilateral cervical internal carotid artery 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  CXR: No acute cardiopulmonary disease  MRI of brain pending      Social/Education/Vocational Hx:  Pt lives with family    Motor Speech Assessment (and Language Screening)  Behavior/Cognition: alert   Auditory Comprehension: comprehended all questions and commands  Motor Speech:   Respiration and Phonation:  Good respiratory support and coordination in conversational speech  Normal vocal volume  No dysphonia  Articulation and Coordination:  Normal articulatory precision at word, sentence and conversational speech level  Normal diadochokinesis  Expressive Language;  NO anomia in conversation today      Speech/wallow Mechanism Exam  Facial: symmetrical  Labial: WFL  Lingual: ?mild L deviation w/protrusion  Velum: symmetrical  Mandible: adequate ROM  Dentition: adequate and in excellent repair  Vocal quality:clear/adequate   Respiratory Status: on RA       Swallow Information   Current Risks for Dysphagia & Aspiration: r/o CVA  Current Symptoms/Concerns: coughing with thin liquid  Current Diet: NPO   Baseline Diet: regular diet and thin liquids    Consistencies Assessed and Performance   Consistencies Administered: thin liquids, nectar thick, mechanical soft solids and hard solids  Materials administered included clear juices, corn flakes, [place] Doone cookies    Oral Stage: WFL  Mastication was adequate with the materials administered today  Bolus formation and transfer were functional with no significant oral residue noted  No overt s/s reduced oral control  Pharyngeal Stage: WFL  Swallow Mechanics:  Swallowing initiation appeared prompt  Laryngeal rise was palpated and judged to be within functional limits  No coughing, throat clearing, change in vocal quality or respiratory status noted today       Esophageal Concerns: globus sensation and "I can feel things doing down slowly"    Strategies and Efficacy: I reinforced use of upright positioning, slow rate of intake, thorough chewing    Summary and Recommendations (see above)    Results Reviewed with: patient and RN     Treatment Recommended: None at this time

## 2020-02-10 NOTE — [place] UNIVERSAL TRANSFER FORM
[place] UN[place]ER FOR[place]  (ALL ITEMS MUST BE COMPLETED)    1  TRANSFER FROM: [address]      TRANSFER TO: [facility]    2  DATE OF TRANSFER: 2/10/2020             TIME OF TRANSFER: 1630     3  PATIENT NAME: YONG Palmer      YOB: 1935                             GENDER: female    4  LANGUAGE:   English    5  PHYSICIAN NAME:  Fazal Mike MD                   PHONE: [phone]    6  CODE STATUS: Level 1 - Full Code        Out of Hospital DNR Attached: No    7  :   Primo Banks                                   :  Extended Emergency Contact Information  Primary Emergency Contact: [employer]  Address: [address] [place] [address] Phone: [phone]  Mobile Phone: [phone]  Relation: Tia Whitmore Maggiegeovani Fernandez Representative/Proxy:  Yes           Legal Guardian:  No             NAME OF:           HEALTH CARE REPRESENTATIVE/PROXY:                                         OR           LEGAL GUARDIAN, IF NOT :                                               PHONE:  (Day)           (Night)                        (Cell)    8  REASON FOR TRANSFER: Patient no longer acute care, being transferred to short term rehab  V/S: /70 (BP Location: Left arm)   Pulse 70   Temp 97 7 °F (36 5 °C) (Oral)   Resp 18   Ht 5' (1 524 m)   Wt 72 5 kg (159 lb 13 3 oz)   SpO2 97%   BMI 31 22 kg/m²           PAIN: None    9  PRIMARY DIAGNOSIS: Stroke-like symptoms      Secondary Diagnosis:         Pacemaker: Yes      Internal Defib: No          Mental Health Diagnosis (if Applicable):    10  RESTRAINTS: No     11  RESPIRATORY NEEDS: None    12  ISOLATION/PRECAUTION: None    13  ALLERGY: Other and Penicillins    14  SENSORY:             15  SKIN CONDITION: No Wounds    16   DIET: Special (describe) Cardiac Diet     17  IV ACCESS: None    18  PERSONAL ITEMS SENT WITH PATIENT: Glasses    19  ATTACHED DOCUMENTS: MUST ATTACH CURRENT MEDICATION INFORMATION Face Sheet, Diagnostic Studies and Labs    20  AT RISK ALERTS:None        HARM TO: N/A    21  WEIGHT BEARING STATUS:         Left Leg: Full        Right Leg: Full    22  MENTAL STATUS:Alert and Oriented    23  FUNCTION:        Walk: Self        Transfer: Self        Toilet: Self        Feed: Self    24  IMMUNIZATIONS/SCREENING:     There is no immunization history on file for this patient  25  BOWEL: Continent    26  BLADDER: Continent    27   SENDING FACILITY CONTACT: Katie Marques RN                Title: RN        [facility]        Phone: [phone]          [address] (if known):        Title: Renée Engle        Unit:         Phone: [phone]         FORM PREFILLED BY Katie Marques RN       Title: RN       Unit: [address] [facility]       Phone: [phone]        FORM COMPLETED BY Katie Marques RN      Title: [facility]      Phone: [phone]

## 2020-02-10 NOTE — UTILIZATION REVIEW
Initial Clinical Review    Admission: Date/Time/Statement: Admission Orders (From admission, onward)     Ordered        02/09/20 1326  Inpatient Admission  Once                   Orders Placed This Encounter   Procedures    Inpatient Admission     Standing Status:   Standing     Number of Occurrences:   1     Order Specific Question:   Admitting Physician     Answer:   Keagan Arboleda     Order Specific Question:   Level of Care     Answer:   Med Surg [16]     Order Specific Question:   Estimated length of stay     Answer:   More than 2 Midnights     Order Specific Question:   Certification     Answer:   I certify that inpatient services are medically necessary for this patient for a duration of greater than two midnights  See H&P and MD Progress Notes for additional information about the patient's course of treatment  ED Arrival Information     Expected Arrival Acuity Means of Arrival Escorted By Service Admission Type    - 2/9/2020 12:37 Emergent Walk-In Friend Hospitalist Emergency    Arrival Complaint    Stroke Symptom        Chief Complaint   Patient presents with    Altered Mental Status     Pt was dropped off by friends from [employer]  They stated she was fine during [employer], but when getting ready to leave about 30 mins ago, she seemed to not be acting appropriately and was getting worse  Pt confused in waiting room, hesistant to come with staff  Pt stated she did have a headache earlier  Assessment/Plan: 82 y o  female with a PMH of hx CVA in 2016, ICH as sequelae of tpa, HTN, PAF, hypothyroid, seizure do, esophageal dysmotility, anxiety who presents with stroke-like symptoms  Patient was at [employer] at 12pm, when she suddenly had trouble with word finding when singing hymns and forgot what she was doing  She noted that something was off, and was concerned that this was similar to her prior stroke, so she drove herself home (with no issues with coordination)   When she got home her  noted that her speech was not normal, and she was not responding to him appropriately so he had her brought to the ED  In ED She still does not feel back to baseline  She can find the words now but her speech still feels heavy  NIH score was 2, and by the time she arrived to the floor it was 1  Bedside dysphagia screen by RN noted coughing with water intake  Patient was admitted inpatient with Stroke-like symptoms, AMS Patient with hx of tPA and developed ICH as a result  No tpa given on admission  CT head and CTA Head/neck showed no acute bleed, no large vessel occlusion, no significant ICA occlusion  Patient already had echo in 2016 which showed no shunt  Tele monitor,consult neurology, recs appreciated  Stroke pathway: ASA, statin (increase her home dose from 40mg to 80mg pending testing), permissive HTN  Check A1c and lipid panel, Check MRI brain  Patient failed bedside dysphagia screen, NPO pending formal speech therapy eval PT/OT/ST  Hold cozaar, diovan for permissive HTN Seizure hx Continue keppra check keppra level  H/O: CVA (cerebrovascular accident)  In 2016, left parietal lobe Patient was tried on aggrenox but has severe HA with it, so was placed on plavix  She had a loop recorder done, but results of that are not available       ED Triage Vitals   Temperature Pulse Respirations Blood Pressure SpO2   02/09/20 1302 02/09/20 1257 02/09/20 1257 02/09/20 1257 02/09/20 1257   (!) 97 °F (36 1 °C) 85 18 166/87 98 %      Temp Source Heart Rate Source Patient Position - Orthostatic VS BP Location FiO2 (%)   02/09/20 1302 02/09/20 1257 02/09/20 1257 02/09/20 1351 --   Tympanic Monitor Lying Left arm       Pain Score       02/09/20 1257       2        Wt Readings from Last 1 Encounters:   02/09/20 72 5 kg (159 lb 13 3 oz)     Additional Vital Signs:   02/10/20 0321  97 4 °F (36 3 °C)Abnormal   69  14  182/77Abnormal     93 %  None (Room air)  Lying   02/09/20 2350  99 3 °F (37 4 °C)  76  16  160/70    94 %  None (Room air)  Lying   02/09/20 2000              None (Room air)     02/09/20 1941  98 °F (36 7 °C)  70  20  115/68    95 %       02/09/20 1351  97 9 °F (36 6 °C)  66  20  179/74Abnormal   106  97 %  None (Room air)  Lying   02/09/20 1330    64  20  176/70Abnormal   101  96 %       02/09/20 1315    68    159/74  103         02/09/20 13:05:53    71  20  169/95    98 %       02/09/20 13:05:49        171/76Abnormal                Pertinent Labs/Diagnostic Test Results:   Ct stoke alert brain No acute intracranial abnormality   Microangiopathic changes  cta head neck No large vessel occlusion   No significant carotid or vertebral artery stenosis in the neck   Narrowing of the distal M1 on the left at the level of the MCA trifurcation unchanged from prior study compatible with atheromatous sclerotic disease  Undulating appearance to the bilateral cervical internal carotid artery 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  cxr No acute cardiopulmonary disease    2/10 mri brain No acute infarct seen  No acute hemorrhage   The moderate periventricular and white matter T2 hyperintensities, related to chronic small vessel ischemic changes, stable  Results from last 7 days   Lab Units 02/10/20  0624 02/09/20  1254   WBC Thousand/uL 4 78 6 62   HEMOGLOBIN g/dL 13 4 13 5   HEMATOCRIT % 41 0 41 9   PLATELETS Thousands/uL 250 288         Results from last 7 days   Lab Units 02/10/20  0624 02/09/20  1254   SODIUM mmol/L 139 140   POTASSIUM mmol/L 3 9 4 1   CHLORIDE mmol/L 104 104   CO2 mmol/L 24 29   ANION GAP mmol/L 11 7   BUN mg/dL 10 9   CREATININE mg/dL 0 97 0 99   EGFR ml/min/1 73sq m 55 53   CALCIUM mg/dL 8 2* 8 8         Results from last 7 days   Lab Units 02/09/20  1240   POC GLUCOSE mg/dl 93     Results from last 7 days   Lab Units 02/10/20  0624 02/09/20  1254   GLUCOSE RANDOM mg/dL 87 101     Results from last 7 days   Lab Units 02/10/20  0624   HEMOGLOBIN A1C % 6 2   EAG mg/dl 131     Results from last 7 days   Lab Units 02/09/20  1254   TROPONIN I ng/mL <0 02     Results from last 7 days   Lab Units 02/09/20  1254   PROTIME seconds 11 0   INR  1 03   PTT seconds 26     Results from last 7 days   Lab Units 02/09/20  1254   TSH 3RD GENERATON uIU/mL 0 881     ED Treatment:   Medication Administration from 02/09/2020 1236 to 02/09/2020 1344       Date/Time Order Dose Route Action Action by Comments     02/09/2020 1257 iohexol (OMNIPAQUE) 350 MG/ML injection (MULTI-DOSE) 85 mL 85 mL Intravenous Given Criselda Escobar      02/09/2020 1318 aspirin chewable tablet 81 mg 81 mg Oral Given Nasim Jacobo RN      02/09/2020 1318 clopidogrel (PLAVIX) tablet 300 mg 300 mg Oral Given Nasim Jacobo RN      02/09/2020 1330 atorvastatin (LIPITOR) tablet 80 mg 80 mg Oral Given Nasim Jacobo RN         Past Medical History:   Diagnosis Date    [facility]bro[facility]) [facility] ) 6/18/2016    Adult hypothyroidism 9/12/2016    Anxiety 6/10/2019    Arthritis     Asthma     Disease of thyroid gland     Dyslipidemia 6/10/2019    Esophageal dysmotility 6/10/2019    Essential hypertension 9/12/2016    Hypertension     Paroxysmal atrial fibrillation ([facility] ) 9/12/2016    Seizure disorder ([facility] ) 8/24/2018    Seizures ([facility] )     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    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    Stroke ([facility] ) 06/2016    Subarachnoid hemorrhage ([facility] ) 9/13/2016    Subdural hematoma ([facility] ) 9/13/2016     Present on Admission:   Paroxysmal atrial fibrillation ([address] )   Essential hypertension   Adult hypothyroidism   Dyslipidemia   Anxiety   Esophageal dysmotility   Seizure disorder (HCC)   Stroke-like symptoms      Admitting Diagnosis: Altered mental status [R41 82]  CVA (cerebral vascular accident) ([address] ) [I63 9]  Age/Sex: 82 y o  female  Admission Orders:  Inpatient  Tele mon  Neuro checks q4h    Scheduled Medications:    Medications:  atorvastatin 80 mg Oral Daily With Dinner   citalopram 20 mg Oral Daily   clopidogrel 75 mg Oral Daily   enoxaparin 40 mg Subcutaneous Daily   famotidine 20 mg Oral Daily   fluticasone 1 spray Nasal Daily   levETIRAcetam 500 mg Oral Q12H [facility]   levothyroxine 88 mcg Oral Early Morning   loratadine 10 mg Oral Daily   melatonin 3 mg Oral HS   metoprolol succinate 50 mg Oral Daily   pantoprazole 40 mg Oral Early Morning     Continuous IV Infusions:     PRN Meds:    acetaminophen 650 mg Oral Q6H PRN   LORazepam 0 5 mg Oral Q6H PRN   ondansetron 4 mg Intravenous Q6H PRN       IP CONSULT TO NEUROLOGY  IP CONSULT TO CASE MANAGEMENT  IP CONSULT TO NUTRITION SERVICES  IP CONSULT TO NEUROLOGY    Network Utilization Review Department  [email or web address]  ATTENTION: Please call with any questions or concerns to [phone] and carefully listen to the prompts so that you are directed to the right person  All voicemails are confidential   Dea Dominguez all requests for admission clinical reviews, approved or denied determinations and any other requests to dedicated fax number below belonging to the campus where the patient is receiving treatment   List of dedicated fax numbers for the Facilities:  [address] [place] [address] DENIALS (Administrative/Medical Necessity) [phone]   [address] [place] [address] (Maternity/NICU/Pediatrics) [phone]   Lesia Guthrie [phone]   Morro Strickland [phone]   [facility] [place] [phone]   Oz Hurt [phone]   [address] [facility] [address] [place] [address] [phone]   [facility]  [phone]   [address] [facility]  [address] [facility] [address] [facility] [phone]

## 2020-02-10 NOTE — OCCUPATIONAL THERAPY NOTE
Occupational Therapy Evaluation/Treatment       02/10/20 1110   Note Type   Note type Eval/Treat   Restrictions/Precautions   Other Precautions Fall Risk   Pain Assessment   Pain Assessment 0-10   Pain Score 3   Pain Location Leg   Pain Orientation Lower; Left  (just below left knee )   Home Living   Type of [address] One level  (3 GAVIN with rail )   Bathroom Shower/Tub Tub/shower unit   Bathroom Equipment Shower chair;Grab bars in shower   Additional Comments pt is caregiver for     Prior Function   Level of [place] Independent with ADLs and functional mobility   Lives With Mar[place] Help From Family   ADL Assistance Independent   IADLs Independent   Comments pt drives   ADL   Eating Assistance 7  Independent   Grooming Assistance 7  Independent   UB Bathing Assistance 5  Supervision/Setup   LB Bathing Assistance 4  Minimal Assistance   [address] 5  Supervision/Setup   LB Dressing Assistance 5  [address]  5  Supervision/Setup   Transfers   Sit to [address] to Sit 5  Supervision   Functional Mobility   Functional Mobility 4  Minimal assistance   Additional Comments 15 feet, unsteady and guarded    Balance   Static Sitting Good   Dynamic Sitting Fair +   Static Standing Fair   Dynamic Standing Fair -   Activity Tolerance   Activity Tolerance Patient limited by fatigue   Nurse Made Aware yes   RUE Assessment   RUE Assessment WFL  (shoulder and elbow 4/5 MMT,  4-/5)   LUE Assessment   LUE Assessment WFL  (grossly 4/5 MMT)   Hand Function   Fine Motor Coordination Impaired  (RUE with opposition )   Sensation   Light Touch No apparent deficits   Vision - Complex Assessment   Acuity Able to read employee name omar without difficulty   Additional Comments pt reports her right eye with vision loss and has been going to the eye doctor for that a few months ago    Patient with difficulty reading business card to R but with time able to read it  Patient with slight neglect/vision impairment to R  Able to see entire paper infront of her and find items on right side of paper and write/draw   Cognition   Overall Cognitive Status WFL   Arousal/Participation Cooperative   Attention Within functional limits   Orientation Level Oriented X4   Following Commands Follows all commands and directions without difficulty   Comments slight slurred speech at end of session pt reports as her norm and nurse aware    Assessment   Limitation Decreased ADL status; Decreased UE strength;Decreased Safe judgement during ADL;Decreased endurance;Visual deficit; Decreased fine motor control;Decreased self-care trans;Decreased high-level ADLs  (decreased balance and mobility )   Prognosis Good   Assessment Patient evaluated by Occupational Therapy  Patient admitted with Stroke-like symptoms  The patients occupational profile, medical and therapy history includes a extensive additional review of physical, cognitive, or psychosocial history related to current functional performance  Comorbidities affecting functional mobility and ADLS include: afib, anxiety, arthritis, asthma, CVA, hypertension and seizures  Prior to admission, patient was independent with functional mobility without assistive device, independent with ADLS and independent with IADLS  The evaluation identifies the following performance deficits: weakness, impaired balance, decreased endurance, increased fall risk, new onset of impairment of functional mobility, decreased ADLS, decreased IADLS, pain, decreased activity tolerance, decreased safety awareness, impaired judgement, decreased strength and visual deficits, that result in activity limitations and/or participation restrictions   This evaluation requires clinical decision making of high complexity, because the patient presents with comorbidites that affect occupational performance and required significant modification of tasks or assistance with consideration of multiple treatment options  The Barthel Index was used as a functional outcome tool presenting with a score of 45, indicating marked limitations of functional mobility and ADLS  Patient will benefit from skilled Occupational Therapy services to address above deficits and facilitate a safe return to prior level of function  Goals   Patient Goals go home   STG Time Frame   (1-7 days)   Short Term Goal  Goals established to promote patient goal of going home:  Patient will increase standing tolerance to 3 minutes during ADL task to decrease assistance level and decrease fall risk;  Patient will increase functional mobility to and from bathroom with assistive device with supervision to increase performance with ADLS and to use a toilet; Patient will tolerate 10 minutes of UE ROM/strengthening/fine motor coordination to increase general activity tolerance and performance in ADLS/IADLS; Patient will improve functional activity tolerance to 10 minutes of sustained functional tasks to increase participation in basic self-care and decrease assistance level;  Patient will increase dynamic standing balance to fair to improve postural stability and decrease fall risk during standing ADLS and transfers      LTG Time Frame   (8-14 days)   Long Term Goal Goals established to promote patient goal of going home:  Patient will increase standing tolerance to 6 minutes during ADL task to decrease assistance level and decrease fall risk;  Patient will increase functional mobility to and from bathroom with assistive device independently to increase performance with ADLS and to use a toilet; Patient will tolerate 20 minutes of UE ROM/strengthening/fine motor coordination to increase general activity tolerance and performance in ADLS/IADLS; Patient will improve functional activity tolerance to 20 minutes of sustained functional tasks to increase participation in basic self-care and decrease assistance level;  Patient will increase dynamic standing balance to fair+ to improve postural stability and decrease fall risk during standing ADLS and transfers  Functional Transfer Goals   Pt Will Perform All Functional Transfers   (STG independent )   ADL Goals   Pt Will Perform Grooming Standing at sink  (STG independent )   Pt Will Perform Bathing   (STG supervision LTG independent )   Pt Will Perform LE Dressing   (STG independent)   Pt Will Perform Toileting   (STG independent )   Plan   Treatment Interventions ADL retraining;Functional transfer training;UE strengthening/ROM; Endurance training;Patient/family training;Equipment evaluation/education; Activityengagement; Compensatory technique education; Fine motor coordination activities   Goal Expiration Date 02/24/20   OT Frequency 5x/wk   Additional Treatment Session   Start Time 1100   End Time 1110   Treatment Assessment Patient completed toilet transfer with supervision  Hygiene for urination supervision  Patient stood as since to wash hands with supervision  Functional mobility 20 feet with min assist/supervision  Seated in chair pt doffed/donned R shoe with supervision  Tolerated well  Cooperative and pleasant       Recommendation   OT Discharge Recommendation Outpatient OT  (for R hand coordination/weakness )   Barthel Index   Feeding 5   Bathing 0   Grooming Score 0   Dressing Score 5   Bladder Score 10   Bowels Score 10   Toilet Use Score 5   Transfers (Bed/Chair) Score 10   Mobility (Level Surface) Score 0   Stairs Score 0   Barthel Index Score 45   Modified [place] Scale   Modified [place] Scale 4   Licensure   [place] License Number  Darius Kalabonny Leon Anic[address] OTR/L [id number]

## 2020-02-10 NOTE — DISCHARGE SUMMARY
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  No tpa given on admission  CT head and CTA Head/neck showed no acute bleed, no large vessel occlusion, no significant ICA occlusion  Patient already had echo in 2016 which showed no shunt   Tele monitor shows sinus rhythm, rate control 60s and 70s, no events noted overnight   Consulted neurology, recs appreciated   Will continue home dose of Plavix 75 mg and statin, Lipitor 40 mg    A1c 6 2%   Lipid panel shows cholesterol 140, triglycerides 92, HDL 51 and LDL 71     MRI brain shows no acute infarct, no acute hemorrhage, chronic small vessel ischemic changes   Patient initially failed bedside swallow evaluation on admission  This morning speech did a bedside swallow evaluation which patient passed, recommends regular diet with thin liquids  Patient unable to have barium swallow performed today as she has already received solid food with speech therapy for bedside eval    Prescription for outpatient barium swallow evaluation provided to patient; she follows with Dr Renita Lopez, outpatient   Physical therapy worked with patient, making recommendation for outpatient physical therapy with the [facility] as patient's gait is a little unsteady, may benefit from using cane   Patient indicates that she has canes at home   Referral made to outpatient physical therapy for [facility]   [facility] information for [facility] provided to patient in discharge information      Essential hypertension  Assessment & Plan  Will resume home medications of Cozaar and metoprolol  · Blood pressure currently 148/65  · Did discuss with patient monitoring blood pressures on a daily basis, did discuss need to be in a seated position with feet resting flat on the floor and arm in neutral position  · Instructed her to bring blood pressure journal with her to her doctor's visits  Esophageal dysmotility  Assessment & Plan  Hx of longstanding dysphagia, used to see Dr Yanira Stevens with [facility], has not had EGD in several years, and has increase in dysphagia symptoms over the last few months  Esophagram from 6/2018 showed Esophageal dysmotility with tertiary contractions in the mid and distal esophagus  · Patient initially failed bedside dysphagia screen  · Speech saw patient at bedside, performed bedside swallowing evaluation with administration of solid foods  · Recommend regular diet with thin liquids  · As patient has received solid foods is unable to perform barium swallow today  · Would recommend that patient have outpatient follow-up with barium swallow and follow-up with her GI doctor outpatient  · Prescription provided to patient for barium swallow  · Discussed with patient at bedside, verbalized understanding  Paroxysmal atrial fibrillation (HCC)  Assessment & Plan  EKG on admission showed NSR  Patient is not on anticoagulation given hx of ICH  · Continue metoprolol  · Telemetry shows sinus rhythm, rate 60s and 70s  No events noted overnight  · Telemetry discontinued  · Patient reports that she follows up outpatient with Dr Fareed Lim, cardiologist    · She will follow up outpatient  Adult hypothyroidism  Assessment & Plan  · Continue synthroid  · TSH 0 811    Seizure disorder (HCC)  Assessment & Plan  · Continue keppra, was able to transition patient back to her oral dose of Keppra as she passed her swallowing evaluation with speech therapy this morning    · keppra level pending Dyslipidemia  Assessment & Plan  · Continue Lipitor, increased from 40mg daily to 80mg daily pending lipid panel and stroke workup  · Neurology recommending patient to remain on her home doses of Plavix 75 mg and Lipitor 40 mg   · Did discuss heart healthy diet with patient at length  · Follow-up with primary care physician 1-2 weeks  Anxiety  Assessment & Plan  Continue ativan PRN  Follow-up with primary care physician 1-2 weeks  H/O: CVA (cerebrovascular accident)  Assessment & Plan  · In 6/2016, had left parietal lobe ischemia  Patient was tried on aggrenox but has severe HA with it, so was placed on plavix  She had a loop recorder done, but results of that are not available  · She returned in 6/2016 with recurrence of stroke-like symptoms and was given tpa  MRI did not show acute CVA but she did has ICH in the left parietal lobe            Discharging Physician / Practitioner: SACHI Ernst  PCP: Annita Foster MD  Admission Date:   Admission Orders (From admission, onward)     Ordered        02/09/20 1326  Inpatient Admission  Once                   Discharge Date: 02/10/20    Resolved Problems  Date Reviewed: 2/10/2020    None          Consultations During Hospital Stay:  · Neurology    Procedures Performed:     · none    Significant Findings / Test Results:     · CT of head performed 2/9 shows no acute intracranial abnormality, microangiopathic changes as per radiology  · CTA of head and neck performed on 02/09 showed no large vessel occlusion, narrowing of distal M1 on left at level of the MCA trifurcation unchanged from prior study     · Chest x-ray performed 2/9 shows no acute cardiopulmonary disease  · MRI of brain performed 2/10 shows no acute infarct no acute hemorrhage, chronic small vessel ischemic changes as per radiology report  Incidental Findings:   · None        Test Results Pending at Discharge (will require follow up):   · Keppra level     Outpatient Tests Requested:  · Outpatient barium swallow evaluation    Complications:  None  Reason for Admission:  Stroke-like symptoms    Hospital Course:     Daysi Mcrae is a 82 y o  female patient past medical history of CVA in 2016, intracranial hemorrhage as a sequela of tPA, hypertension, proximal atrial fibrillation, hypothyroidism, seizures, esophageal dysmotility and anxiety who originally presented to the hospital on 2/9/2020 due to stroke-like symptoms  Patient noted that she was having difficulty at [employer] word-finding when singing hymns and forgot what she was doing  She felt that something was off, had concerns that this was similar to her previous stroke symptoms  Her  noted that she also did not have normal speech and was not responding to him appropriately so he brought her to the emergency department for further evaluation and treatment  NIH score in emergency department was 2, and patient felt that her speech was still [address]  CT and CTA of head and neck were performed which were negative for any acute intracranial abnormalities  Patient was admitted for further evaluation and treatment; MRI revealed no stroke  Hemoglobin A1c and lipid panel acceptable  Neurology consulted  Recommend that patient continue her home doses of Plavix 75 mg and Lipitor 40 mg daily  She will follow-up in the stroke clinic in 6-8 weeks post discharge  Patient has a history of dysphagia, had initially failed a bedside swallowing evaluation with nursing  Was evaluated by speech therapist at bedside, and patient did pass at that time and recommendation was for regular diet with thin liquids  The patient was also scheduled for a barium video swallow, unfortunately she was unable to perform this as she had been given foods prior to the testing    She is being given a referral for outpatient barium swallow, and she will follow-up with her outpatient GI doctor, Dr Zaida Saint   Patient is instructed to follow-up with her primary care physician in 1-2 weeks  Her blood pressures were on the elevated side, and it is recommended that she take her blood pressure twice a day for the next several weeks and keep a daily journal   This was discussed with the patient and her family at the bedside  They verbalized understanding and are agreeable to the plan  Patient is discharged home today  Please see above list of diagnoses and related plan for additional information  Condition at Discharge: good     Discharge Day Visit / Exam:     Subjective:  Patient reports that she feels back to her baseline, no slurred speech  Feels that her thought process is good  Is a little unsteady when ambulating  Reports that she has canes at home  Feels that she is swallowing without any difficulties  Vitals: Blood Pressure: 148/65 (02/10/20 1002)  Pulse: 75 (02/10/20 1002)  Temperature: 98 2 °F (36 8 °C) (02/10/20 1002)  Temp Source: Oral (02/10/20 1002)  Respirations: 18 (02/10/20 1002)  Height: 5' (152 4 cm) (02/09/20 1351)  Weight - Scale: 72 5 kg (159 lb 13 3 oz) (02/09/20 1257)  SpO2: 96 % (02/10/20 1002)     Exam:   Physical Exam   Constitutional: She is oriented to person, place, and time  She appears well-nourished  No distress  HENT:   Head: Normocephalic  Eyes: Pupils are equal, round, and reactive to light  Conjunctivae and EOM are normal    Neck: Normal range of motion  Cardiovascular: Normal rate, regular rhythm and normal heart sounds  Pulmonary/Chest: Effort normal and breath sounds normal    Abdominal: Soft  Bowel sounds are normal  She exhibits no distension  There is no tenderness  Genitourinary:   Genitourinary Comments: Voiding spontaneously  Musculoskeletal: Normal range of motion  Gait slightly unsteady  Neurological: She is alert and oriented to person, place, and time  Skin: Skin is warm and dry  Capillary refill takes less than 2 seconds  Psychiatric: She has a normal mood and affect   Her behavior is normal  Judgment and thought content normal    Nursing note and vitals reviewed  Discussion with Family:  I spoke with the patient and her son and daughter-in-law at the bedside, I have answered all questions to the best of my abilities  Discharge instructions/Information to patient and family:   See after visit summary for information provided to patient and family  Provisions for Follow-Up Care:  See after visit summary for information related to follow-up care and any pertinent home health orders  Disposition:     Home    For Discharges to [facility] SN[facility]:   · Not Applicable to this Patient - Not Applicable to this Patient    Planned Readmission:  No      Discharge Statement:  I spent greater than 30 minutes discharging the patient  This time was spent on the day of discharge  I had direct contact with the patient on the day of discharge  Greater than 50% of the total time was spent examining patient, answering all patient questions, arranging and discussing plan of care with patient as well as directly providing post-discharge instructions  Additional time then spent on discharge activities  Discharge Medications:  See after visit summary for reconciled discharge medications provided to patient and family        ** Please Note: This note has been constructed using a voice recognition system **

## 2020-02-10 NOTE — NURSING NOTE
Pt failed bedside swallow eval  After taking sip of water pt coughed persistently for several minutes  Dr King informed, pt made NPO  PO meds held including pt's Keppra  Consulted with Dr King on how pt would receive seizure medication  Keppra made IV  Pt and night nurse made aware

## 2020-02-10 NOTE — DISCHARGE INSTR - DIET
Please eat slowly, chew well, alternate small bites of food with sips of liquid and remain upright for 30 mins after eating to help promote clearance of food through the esophagus

## 2020-02-10 NOTE — CONSULTS
[facility]in [address]   Neurology Initial Consult    Information obtained from:  Patient  Chief Complaint   Patient presents with    Altered Mental Status     Pt was dropped off by friends from [employer]  They stated she was fine during [employer], but when getting ready to leave about 30 mins ago, she seemed to not be acting appropriately and was getting worse  Pt confused in waiting room, hesistant to come with staff  Pt stated she did have a headache earlier  Assessment/Plan:    1  TIA  2  Migraine-chronic history of with recent vision changes and TIA  3  Hypertensive emergency  4  HDL    -continue on Lipitor 80 mg daily  -continue on Plavix 75 mg daily  -continue on Keppra 500 mg b i d   -continue on current blood pressure regimen to maintain more normotensive BP  -patient to check blood pressure at home daily and record,  follow-up with Cardiology/neurology if hypertension persists  -continue with outpatient follow-up with Neurology May 2020 (appointment already scheduled)      HPI:  Cl Tinoco is an 29-year-old female with comorbidities significant for previous CVA with subdural hematoma, subarachnoid hemorrhage, anxiety, dyslipidemia, hypertension, seizure disorder and migraine  Patient was at [employer] when friends noticed she was starting to have difficulties with her speech and seemed confused  Patient had apparently got in her car and drove home told her  she did not feel well is going to bed, however he noticed she was acting correctly and advised to come to the hospital  Patient reports not remembering much except feeling confused like her mind was blank during this episode  Patient got to the hospital, her symptoms had resolved her speech was back to normal according to her family      CTA of the head and neck as well CT of the brain were completed patient was not a candidate for tPA at that time given the resolution of her symptoms with significant history of bleed from a prior stroke in 2016  Patient was placed on stroke pathway admitted to telemetry for 24 hours for ongoing workup and treatment  Currently patient reports feeling back to normal denies having any recent illness, headaches, lightheadedness dizziness, shortness of breath, chest pain, nausea, vomiting, abdominal pain  Patient noted having history migraine however has not had them for years  Reports some sensitivity to left side of her head indicating that sometimes her" hair hurts" and notes having photophobia/phonophobia which she reports is not new  Patient noted also history of kaleidoscope vision with colors, indicated this has not happened in quite some time until this event at which point she had 1 episode of seeing the colors again  Patient denies this at this time reports feeling back to normal   MRI completed noting no acute abnormalities however positive for chronic small-vessel changes  Patient to continue with current Plavix but regimen at home, statins and blood sugar control        Past Medical History:   Diagnosis Date    Acute CVA (cerebrovascular accident) ([facility] [address] [facility] ) 6/18/2016    Adult hypothyroidism 9/12/2016    Anxiety 6/10/2019    Arthritis     Asthma     Disease of thyroid gland     Dyslipidemia 6/10/2019    Esophageal dysmotility 6/10/2019    Essential hypertension 9/12/2016    Hypertension     Paroxysmal atrial fibrillation ([facility] ) 9/12/2016    Seizure disorder ([facility] ) 8/24/2018    Seizures ([facility] )     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    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    Stroke ([facility] [address] [facility] ) 06/2016    Subarachnoid hemorrhage ([address] ) 9/13/2016    Subdural hematoma ([facility] [address] ) 9/13/2016       Past Surgical History:   Procedure Laterality Date    BACK SURGERY  01/2016    CARPAL TUNNEL RELEASE Right        Allergies   Allergen Reactions    Other      Hydralazine, Amlodipine    Penicillins          Current Facility-Administered Medications:     acetaminophen (TYLENOL) tablet 650 mg, 650 mg, Oral, Q6H PRN, Anton Martinez MD    atorvastatin (LIPITOR) tablet 80 mg, 80 mg, Oral, Daily With Mckay Giordano MD, 80 mg at 02/09/20 1330    citalopram (CeleXA) tablet 20 mg, 20 mg, Oral, Daily, Anton Martinez MD, 20 mg at 02/10/20 0951    clopidogrel (PLAVIX) tablet 75 mg, 75 mg, Oral, Daily, Anton Martinez MD, 75 mg at 02/10/20 1045    enoxaparin (LOVENOX) subcutaneous injection 40 mg, 40 mg, Subcutaneous, Daily, Anton Martinez MD, 40 mg at 02/10/20 6897    famotidine (PEPCID) tablet 20 mg, 20 mg, Oral, Daily, Anton Martinez MD, 20 mg at 02/10/20 0951    fluticasone (FLONASE) 50 mcg/act nasal spray 1 spray, 1 spray, Nasal, Daily, Anton Martinez MD, 1 spray at 02/10/20 0124    levETIRAcetam (KEPPRA) tablet 500 mg, 500 mg, Oral, Q12H [address], Jah Scrape, CRNP    levothyroxine tablet 88 mcg, 88 mcg, Oral, Early Morning, Anton Martinez MD    loratadine (CLARITIN) tablet 10 mg, 10 mg, Oral, Daily, Anton Martinez MD, 10 mg at 02/10/20 0951    LORazepam (ATIVAN) tablet 0 5 mg, 0 5 mg, Oral, Q6H PRN, Anton Martinez MD    melatonin tablet 3 mg, 3 mg, Oral, HS, Anton Martinez MD    metoprolol succinate (TOPROL-XL) 24 hr tablet 50 mg, 50 mg, Oral, Daily, Anton Martinez MD, 50 mg at 02/10/20 0951    ondansetron (ZOFRAN) injection 4 mg, 4 mg, Intravenous, Q6H PRN, Anton Martinez MD    pantoprazole (PROTONIX) EC tablet 40 mg, 40 mg, Oral, Early Morning, Anton Martinez MD    Social History     Socioeconomic History    Marital status: /Civil Union     Spouse name: Not on file    Number of children: Not on file    Years of education: Not on file    Highest education level: Not on file   Occupational History    Not on file   Social Needs    Financial resource strain: Not on file    Food insecurity:     Worry: Not on file     Inability: Not on file    Transportation needs:     Medical: Not on file     Non-medical: Not on file   Tobacco Use    Smoking status: Never Smoker    Smokeless tobacco: Never Used Substance and Sexual Activity    Alcohol use: Never     Alcohol/week: 0 0 standard drinks     Frequency: Never     Drinks per session: Patient refused     Binge frequency: Never     Comment: 0    Drug use: No    Sexual activity: Yes     Partners: Male     Birth control/protection: Post-menopausal   Lifestyle    Physical activity:     Days per week: Not on file     Minutes per session: Not on file    Stress: Not on file   Relationships    Social connections:     Talks on phone: Not on file     Gets together: Not on file     Attends [employer] service: Not on file     Active member of club or organization: Not on file     Attends meetings of clubs or organizations: Not on file     Relationship status: Not on file    Intimate partner violence:     Fear of current or ex partner: Not on file     Emotionally abused: Not on file     Physically abused: Not on file     Forced sexual activity: Not on file   Other Topics Concern    Not on file   Social History Narrative    Not on file       Family History   Problem Relation Age of Onset    Kidney disease Mother     Heart disease Mother     Heart disease Father     Colon cancer Father     Heart disease Sister     Multiple sclerosis Son     No Known Problems Son      Review of systems:  Please see HPI for positive symptoms  No fever, no chills, no weight change  Ocular: No drainage, no blurred vision  HEENT:  No sore throat, earache, or congestion  No neck pain  COR:  No chest pain  No palpitations  Lungs:  no sob, wheezing,    GI:  no  nausea, no vomiting, no diarrhea, no constipation, no anorexia  :  No dysuria, frequency, or urgency  No hematuria  Musculoskeletal:  No joint pain or swelling or edema  Skin:  No rash or itching  Psychiatric:  no anxiety, no depression  Endocrine:  No polyuria or polydipsia      Physical examination:  Vitals:    02/10/20 1002   BP: 148/65   Pulse: 75   Resp: 18   Temp: 98 2 °F (36 8 °C)   SpO2: 96% GENERAL APPEARANCE:  The patient is alert, oriented, no acute distress  HEENT:  Head is normocephalic  Pupils are equal and reactive  NECK:  Supple without lymphadenopathy  HEART:  Regular rate and rhythm  LUNGS:  clear to auscultation  No crackles or wheezes are heard  ABDOMEN:  Soft, nontender, nondistended with good bowel sounds heard  EXTREMITIES:  Without cyanosis, clubbing or edema  Mental status: The patient is alert, attentive, and oriented  Speech is clear and fluent, good repetition, comprehension, and naming  She recalls 3/3 objects at 5 minutes  Cranial nerves:  CN II: Visual fields are full to confrontation  Pupils are 3 mm and briskly reactive to light  CN III, IV, VI: At primary gaze, there is no eye deviation  CN V: Facial sensation is intact in all 3 divisions bilaterally  Corneal responses are intact  CN VII: Face is symmetric with normal eye closure and smile  CN VIII: Hearing is normal to rubbing fingers  CN IX, X: Palate elevates symmetrically  Phonation is normal   CN XI: Head turning and shoulder shrug are intact  CN XII: Tongue is midline with normal movements and no atrophy  Motor: There is no pronator drift of out-stretched arms  Muscle bulk and tone are normal      Muscle exam  Arm Right Left Leg Right Left   Deltoid 5/5 5/5 Iliopsoas 5/5 5/5   Biceps 5/5 5/5 Quads 5/5 5/5   Triceps 5/5 5/5 Hamstrings 5/5 5/5   Wrist Extension   Ankle Dorsi Flexion 5/5 5/5   Wrist Flexion   Ankle Plantar Flexion 5/5 5/5   Interossei   Ankle Eversion     APB   Ankle Inversion         Reflexes   RJ BJ TJ KJ AJ Plantars Copeland's   Right 2+ 2+ 2+ 2+ 2+ Downgoing Not present   Left 2+ 2+ 2+ 2+ 2+ Downgoing Not present     Sensory:  Light touch, pinprick, position sense, and vibration sense are intact in fingers and toes  Coordination:  Rapid alternating movements and fine finger movements are intact  There is slight dysmetria on finger-to-nose and heel-knee-shin  There are no abnormal or extraneous movements  Romberg negative  Gait/Stance:  Heel/toe walking and tandem gait imbalanced  Lab Results   Component Value Date    WBC 4 78 02/10/2020    HGB 13 4 02/10/2020    HCT 41 0 02/10/2020    MCV 96 02/10/2020     02/10/2020     Lab Results   Component Value Date    HGBA1C 6 2 02/10/2020     Lab Results   Component Value Date    ALT 27 09/20/2019    AST 16 09/20/2019    ALKPHOS 73 09/20/2019     Lab Results   Component Value Date    CALCIUM 8 2 (L) 02/10/2020    K 3 9 02/10/2020    CO2 24 02/10/2020     02/10/2020    BUN 10 02/10/2020    CREATININE 0 97 02/10/2020     LDL 71    Independent Interpretation of images or specimens:    Review of reports and notes reveal:    Mri Brain Wo Contrast Result Date: 2/10/2020  No acute infarct seen No acute hemorrhage  The moderate periventricular and white matter T2 hyperintensities, related to chronic small vessel ischemic changes, stable     Ct Stroke Alert Brain Result Date: 2/9/2020  No acute intracranial abnormality  Microangiopathic changes  Cta Stroke Alert (head/neck) Result Date:  2/9/2020  No large vessel occlusion  No significant carotid or vertebral artery stenosis in the neck  Narrowing of the distal M1 on the left at the level of the MCA trifurcation unchanged from prior study compatible with atheromatous sclerotic disease  Undulating appearance to the bilateral cervical internal carotid artery 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  The study was marked in [facility] for immediate notification  Thank you for this consult  Total time of encounter 70mins  More than 50% of time was spent in counseling and coordination of care of patient

## 2020-02-10 NOTE — DISCHARGE INSTRUCTIONS

## 2020-02-10 NOTE — PHYSICAL THERAPY NOTE
PT EVALUATION       02/10/20 0950   Note Type   Note type Eval/Treat   Pain Assessment   Pain Assessment 0-10   Pain Score 4   Pain Type Acute pain   Pain Location Leg   Pain Orientation Lower; Left  (just below left knee)   Home Living   Type of [address] One level  (3 GAVIN with rail)   Bathroom Shower/Tub Tub/shower unit   [address] [place] chair   Additional Comments Pt does not have DME;pt's  has canes and walkers;pt is caregiver for her   Prior Function   Level of [place] Independent with ADLs and functional mobility  (pt amb w/out AD PTA)   Lives With Spouse   Receives Help From Family   ADL Assistance Independent   IADLs Independent   Restrictions/Precautions   Other Precautions Fall Risk   General   Additional Pertinent History Pt adm with stroke-like symptoms  Family/Caregiver Present No   Cognition   Overall Cognitive Status WFL   Arousal/Participation Cooperative   Orientation Level Oriented X4   Following Commands Follows all commands and directions without difficulty   RLE Assessment   RLE Assessment WFL  (3+ to 4-/5)   LLE Assessment   LLE Assessment WFL  (3+ to 4-/5)   Bed Mobility   Supine to Sit 7  Independent   Transfers   Sit to Stand 5  Supervision   Stand to Sit 5  Supervision   Ambulation/Elevation   Gait pattern   (generalized unsteadiness;gait is guarded)   Gait Assistance 4  Minimal assist   Additional items Assist x 1;Verbal cues; Tactile cues   Assistive Device None   Distance 50 feet with change in direction  Pt presents with minimal generalized unsteadiness with one episode of loss of balance to right side     Balance   Static Sitting Good   Dynamic Sitting Good  (pt able to don socks and sneakers)   Static Standing Fair   Dynamic Standing Fair -   Ambulatory   (P+/F-)   Activity Tolerance   Activity Tolerance Patient limited by fatigue  (and unsteadiness)   Assessment   Prognosis Good   Problem List Decreased strength;Decreased range of motion;Decreased endurance; Impaired balance;Decreased mobility; Decreased coordination;Decreased safety awareness   Assessment Patient seen for Physical Therapy evaluation  Patient admitted with Stroke-like symptoms  Comorbidities affecting patient's physical performance include: HTN, PAF, seizure disorder, history of CVA, hypokalemia, subarachnoid hemorrhage, subdural hematoma  Personal factors affecting patient at time of initial evaluation include: lives in one story house, stairs to enter home, inability to navigate community distances, inability to navigate level surfaces without external assistance and inability to perform dynamic tasks in community  Prior to admission, patient was independent with functional mobility without assistive device, independent with ADLS, independent with IADLS, living with spouse in a one level home with 3 steps to enter, ambulating household distance and ambulating community distances  Please find objective findings from Physical Therapy assessment regarding body systems outlined above with impairments and limitations including weakness, decreased ROM, impaired balance, decreased endurance, impaired coordination, gait deviations, decreased activity tolerance, decreased functional mobility tolerance, decreased safety awareness and fall risk  The Barthel Index was used as a functional outcome tool presenting with a score of 45 today indicating marked limitations of functional mobility and ADLS  Patient's clinical presentation is currently unstable/unpredictable as seen in patient's presentation of vital sign response, increased fall risk, new onset of impairment of functional mobility, decreased endurance and new onset of weakness  Pt would benefit from continued Physical Therapy treatment to address deficits as defined above and maximize level of functional mobility  As demonstrated by objective findings, the assigned level of complexity for this evaluation is high     Goals Patient Goals go home   STG Expiration Date 02/17/20   Short Term Goal #1 trans - I; pt will amb w/wout AD functional household distances - S   Short Term Goal #2 balance w/wout AD - F/F+ for safe mobility and to decrease fall risk; up/down 3 steps with rail and S so pt can enter/exit her home   LTG Expiration Date 02/24/20   Long Term Goal #1 pt will amb w/wout AD functional household and community distances - I   Long Term Goal #2 balance w/wout AD - F+/G for safe mobility and to decrease fall risk; up/down 3 steps with rail - I   Plan   Treatment/Interventions ADL retraining;Functional transfer training;LE strengthening/ROM; Elevations; Therapeutic exercise; Endurance training;Patient/family training;Equipment eval/education; Bed mobility;Gait training; Compensatory technique education   PT Frequency Once a day   Recommendation   Recommendation Outpatient PT  [facility])   Equipment Recommended   (possible SPC)   Barthel Index   Feeding 5   Bathing 0   Grooming Score 0   Dressing Score 5   Bladder Score 10   Bowels Score 10   Toilet Use Score 5   Transfers (Bed/Chair) Score 10   Mobility (Level Surface) Score 0   Stairs Score 0   Barthel Index Score 39   Licensure   [place] License Number  Femi [place], [place] [id number]     Time In:0950  Time Out:1000  Total Time: 10 mins      S:  "I do feel a little unsteady"  O:  Pt sat at EOB and doffed sipper socks and donned shoes and socks, however PT tied pt's shoes as bed was too high for pt safety to perform this task  Pt trans sit to stand and amb with min A x 40 feet with change in direction  PT treatment then cut short as pt needed to be transported to MRI  A:  Pt's gait with her shoes on remains minimally unsteady and guarded  Pt will benefit from skilled PT services to increase pt's strength, gait, balance and mobility  Pt may benefit from cont gait with a cane  P:  Cont per PT POC  DCP - OP PT at [facility]      Ria Wright, PT [id number]  [id number]

## 2020-02-11 LAB — MRSA NOSE QL CULT: NORMAL

## 2020-02-11 NOTE — UTILIZATION REVIEW
Notification of Discharge  This is a Notification of Discharge from our facility [address]  Please be advised that this patient has been discharge from our facility  Below you will find the admission and discharge date and time including the patients disposition  PRESENTATION DATE: 2/9/2020 12:38 PM  OBS ADMISSION DATE:   IP ADMISSION DATE: 2/9/20 1326   DISCHARGE DATE: 2/10/2020  5:52 PM  DISPOSITION: Home/Self Care Home/Self Care   Admission Orders listed below:  Admission Orders (From admission, onward)     Ordered        02/09/20 1326  Inpatient Admission  Once                   Please contact the UR Department if additional information is required to close this patient's authorization/case  [facility] Utilization Review Department  Main: [phone] x carefully listen to the prompts  All voicemails are confidential   [email or web address]  Send all requests for admission clinical reviews, approved or denied determinations and any other requests to dedicated fax number below belonging to the campus where the patient is receiving treatment   List of dedicated fax numbers:  [address] DENIALS (Administrative/Medical Necessity) [phone]   [address] (Maternity/NICU/Pediatrics) [phone]   [facility] [phone]   Rajesh Gardiner [phone]   Renetta Gallardo [phone]   Aimee Abdullahilov [facility] [address] [place] [address] [phone]   [facility]  [phone]   [address] [facility] [address]  [address] [facility] [address] [facility] [phone]

## 2020-02-12 LAB — LEVETIRACETAM SERPL-MCNC: 4.1 UG/ML (ref 10–40)

## 2020-03-02 ENCOUNTER — EVALUATION (OUTPATIENT)
Dept: PHYSICAL THERAPY | Facility: CLINIC | Age: 85
End: 2020-03-02
Payer: COMMERCIAL

## 2020-03-02 DIAGNOSIS — R29.90 STROKE-LIKE SYMPTOMS: Primary | ICD-10-CM

## 2020-03-02 PROCEDURE — 97163 PT EVAL HIGH COMPLEX 45 MIN: CPT

## 2020-03-02 NOTE — PROGRESS NOTES
PT Evaluation     Today's date: 3/2/2020  Patient name: Raman Culver  : 1935  MRN: [id number]  Referring provider: Tito Parker MD  Dx:   Encounter Diagnosis     ICD-10-CM    1  Stroke-like symptoms R29 90 Ambulatory referral to Physical Therapy     PT plan of care cert/re-cert       Start Time: 1215  Stop Time: 1300  Total time in clinic (min): 45 minutes    Assessment  Assessment details: Patient is a 82year old female reporting to skilled PT for gait and balance abnormalities secondary to hx of TIA  Patient presents with bilateral LE weakness via MMT, which can be attributed to her subjective reports as well as complications from her deconditioning and sedentary life style  Patient presents with inadequate balance on compliant surfaces and narrow base of support, indicated by mCTSIB testing in all conditions  Patient presents with difficulties in functional strength, evidenced with 5x sit to stand test as well as 30 second sit to stand test  Patient deemed a fall risk via gait speed, TUG, and 5x sit to stand test and YANES  Patient endurance limited via 6 minute walk test, 30 second sit to stand test  Patient requires skilled PT to improve her static and dynamic balance, improve her LE strength, improve her ambulation capabilities, and to maximize function to prevent loss of balance and falls  Impairments: abnormal coordination, abnormal gait, abnormal muscle firing, abnormal muscle tone, abnormal or restricted ROM, abnormal movement, activity intolerance, difficulty understanding, impaired balance, impaired physical strength, lacks appropriate home exercise program, pain with function, safety issue, scapular dyskinesis, weight-bearing intolerance, poor posture  and poor body mechanics  Other impairment: hx of TIA  Understanding of Dx/Px/POC: excellent   Prognosis: good    Goals  Short Term Goals:   1   Patient will complete a sit to stand without UE in 4 weeks in order to improve his functional LE strength  2  Patient will improve his gait speed by  10 meters/seconds or more in 4 weeks in order to improve his ability to cross the street safetly  3  Patient will improve his YANES Balance scale by 2 points or more in 4 weeks in order to improve his static balance capabilities  4  Patient will improve his 6 minute walk test by 100 feet or more in 4 weeks in order to improve his cardiovascular endurance  5  Patient will complete an FGA in 4 weeks to gain a more in depth via of his dynamic balance capabilities  Long Term Goals:   1  Patient will decrease his fall risk in 2/4 fall risk tests in 8 weeks in order to decrease his fall risk stratification  2  Patient will complete a DGI in 8 weeks in order to assess his dynamic balance capabilities     3  Patient will improve his 30 second sit to stand rep time by 2 seconds or more in 8 weeks to improve his transfer capabilities    Plan  Referral necessary: No  Planned modality interventions: cryotherapy, electrical stimulation/Russian stimulation, TENS, thermotherapy: hydrocollator packs and biofeedback  Planned therapy interventions: abdominal trunk stabilization, IADL retraining, joint mobilization, activity modification, ADL retraining, manual therapy, massage, ADL training, balance/weight bearing training, balance, muscle pump exercises, motor coordination training, Logan taping, neuromuscular re-education, body mechanics training, breathing training, canalith repositioning, patient education, postural training, compression, coordination, strengthening, fine motor coordination training, flexibility, therapeutic activities, therapeutic exercise, functional ROM exercises, therapeutic training, gait training, transfer training, graded activity, graded exercise, graded motor and home exercise program  Frequency: 2x week  Duration in weeks: 16  Plan of Care beginning date: 3/2/2020  Plan of Care expiration date: 6/22/2020  Treatment plan discussed with: patient        Subjective Evaluation    History of Present Illness  Mechanism of injury: Patient is an 82year old female reporting to skilled PT with reports of TIA symptoms previously  Patient reports that that she has had a history of strokes and TIAs, patient reports that she has noticed some weakness and balance challenges  Patient reports slight balance challenges denies falls  Patient states that she has some memory challenges, not interested in OT and speech therapist at this time  Recurrent probem    Quality of life: excellent    Pain  No pain reported  Progression: no change    Social Support  Steps to enter house: yes  5  Stairs in house: yes   10  Lives in: multiple-level home  Lives with: adult children and spouse    Employment status: not working  Hand dominance: right      Diagnostic Tests  No diagnostic tests performed  Treatments  No previous or current treatments  Patient Goals  Patient goals for therapy: improved balance, independence with ADLs/IADLs, increased strength and return to sport/leisure activities  Patient goal: Patient's goals are to improve strength and to maximize function           Objective     Strength/Myotome Testing     Left Hip   Planes of Motion   Flexion: 4  Extension: 4-  Abduction: 4-  Adduction: 4    Right Hip   Planes of Motion   Flexion: 4  Extension: 4-  Abduction: 4-  Adduction: 4    Left Knee   Flexion: 4-  Extension: 4    Right Knee   Flexion: 4-  Extension: 4    Left Ankle/Foot   Dorsiflexion: 4-  Plantar flexion: 4    Right Ankle/Foot   Dorsiflexion: 4-  Plantar flexion: 4    Functional Assessment        Comments  5x sit to stand- 10 23 seconds   30 second sit to stand- 13 reps  mCTSIB- FTEO firm- 30 seconds, FTEC firm- 30 seconds, FTEO Foam- 30 seconds, FTEC foam- 12 seconds  YANES- 47/56  Gait speed- 10 meters/10 45 seconds=  96 meters per second  6 minute walk test-  800 feet  TUG Test- 12 36 seconds  TUG Cog- 13 45 seconds  TUG Carry- 13 36 seconds             Precautions:   Past Medical History:   Diagnosis Date    Acute CVA (cerebrovascular accident) ([address] ) 6/18/2016    Adult hypothyroidism 9/12/2016    Anxiety 6/10/2019    Arthritis     Asthma     Disease of thyroid gland     Dyslipidemia 6/10/2019    Esophageal dysmotility 6/10/2019    Essential hypertension 9/12/2016    Hypertension     Paroxysmal atrial fibrillation ([address] ) 9/12/2016    Seizure disorder ([address] ) 8/24/2018    Seizures ([address] )     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    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

## 2020-03-10 ENCOUNTER — OFFICE VISIT (OUTPATIENT)
Dept: PHYSICAL THERAPY | Facility: CLINIC | Age: 85
End: 2020-03-10
Payer: COMMERCIAL

## 2020-03-10 DIAGNOSIS — R29.90 STROKE-LIKE SYMPTOMS: Primary | ICD-10-CM

## 2020-03-10 PROCEDURE — 97112 NEUROMUSCULAR REEDUCATION: CPT

## 2020-03-10 PROCEDURE — 97530 THERAPEUTIC ACTIVITIES: CPT

## 2020-03-10 PROCEDURE — 97110 THERAPEUTIC EXERCISES: CPT

## 2020-03-10 NOTE — PROGRESS NOTES
Daily Note     Today's date: 3/10/2020  Patient name: Deepa Marsh  : 1935  MRN: [id number]  Referring provider: Richard Dale MD  Dx:   Encounter Diagnosis     ICD-10-CM    1  Stroke-like symptoms R29 90        Start Time: 1115  Stop Time: 1200  Total time in clinic (min): 45 minutes    Subjective: Patient reports no falls since last session, but reports feeling a loss of balance 1x/day  She reports this feeling more when increasing speed  Objective: See treatment diary below  - Step ups: 20 x - forward, lateral  -STS with 3# weight, 15 reps  - FTEC, 30 seconds, on foam, 2 x  - Tandem walking on foam beam, 1 UE, 4 laps of 15 ft  - Standing hip flexion, 1#, 2 x10  - standing hip abduction, 20 reps        Assessment: Tolerated treatment well  Patient had increased difficulties in performing lateral step ups due to hip weakness  Patient had increased shortness of breath after exercise, oxygen was taken and was 96% immediately post exercise  Patient was educated on exercise tolerance, and performing rest breaks at home during physical exertion  Patient had increase difficulties with performing ambulation activity on foam surface, and required unilateral hand assistance throughout 100% of exercise  Patient would benefit from continued PT in order to address stated deficits  Plan: Progress treatment as tolerated         Precautions:   Past Medical History:   Diagnosis Date    Acute CVA [facility]ce[facility]brovascular accident) ([facility] ) 2016    Adult hypothyroidism 2016    Anxiety 6/10/2019    Arthritis     Asthma     Disease of thyroid gland     Dyslipidemia 6/10/2019    Esophageal dysmotility 6/10/2019    Essential hypertension 2016    Hypertension     [facility] ) 2016    Seizure disorder [facility] ) 2018    Seizures ([facility] )     Stenosis of left carotid artery 2019    Stenosis of left middle cerebral artery 2019    Stroke ([facility] ) 2016    Sub[facility]noid hemorrhage [facility] ) 9/13/2016    Subdural hematoma ([facility] ) 9/13/2016     Fall risk       Manual                                                                                   Exercise Diary                                                                                                                                                                                                                                                                                      Modalities

## 2020-03-17 ENCOUNTER — APPOINTMENT (OUTPATIENT)
Dept: PHYSICAL THERAPY | Facility: CLINIC | Age: 85
End: 2020-03-17
Payer: COMMERCIAL

## 2020-05-07 ENCOUNTER — TELEMEDICINE (OUTPATIENT)
Dept: NEUROLOGY | Facility: CLINIC | Age: 85
End: 2020-05-07
Payer: COMMERCIAL

## 2020-05-07 VITALS — SYSTOLIC BLOOD PRESSURE: 138 MMHG | DIASTOLIC BLOOD PRESSURE: 78 MMHG | HEART RATE: 84 BPM

## 2020-05-07 DIAGNOSIS — F43.21 GRIEF AT LOSS OF CHILD: ICD-10-CM

## 2020-05-07 DIAGNOSIS — I73.9 SMALL VESSEL DISEASE (HCC): Primary | ICD-10-CM

## 2020-05-07 DIAGNOSIS — G31.84 MCI (MILD COGNITIVE IMPAIRMENT): ICD-10-CM

## 2020-05-07 DIAGNOSIS — Z63.4 GRIEF AT LOSS OF CHILD: ICD-10-CM

## 2020-05-07 PROCEDURE — 99443 PR PHYS/QHP TELEPHONE EVALUATION 21-30 MIN: CPT | Performed by: PSYCHIATRY & NEUROLOGY

## 2020-05-07 SDOH — SOCIAL STABILITY - SOCIAL INSECURITY: DISSAPEARANCE AND DEATH OF FAMILY MEMBER: Z63.4

## 2020-09-08 ENCOUNTER — OFFICE VISIT (OUTPATIENT)
Dept: NEUROLOGY | Facility: CLINIC | Age: 85
End: 2020-09-08
Payer: COMMERCIAL

## 2020-09-08 VITALS
WEIGHT: 150 LBS | HEART RATE: 70 BPM | TEMPERATURE: 97.3 F | DIASTOLIC BLOOD PRESSURE: 69 MMHG | BODY MASS INDEX: 29.45 KG/M2 | HEIGHT: 60 IN | SYSTOLIC BLOOD PRESSURE: 171 MMHG

## 2020-09-08 DIAGNOSIS — G40.909 SEIZURE DISORDER (HCC): ICD-10-CM

## 2020-09-08 DIAGNOSIS — G31.84 MCI (MILD COGNITIVE IMPAIRMENT): Primary | ICD-10-CM

## 2020-09-08 DIAGNOSIS — E78.2 MIXED HYPERLIPIDEMIA: ICD-10-CM

## 2020-09-08 PROCEDURE — 99214 OFFICE O/P EST MOD 30 MIN: CPT | Performed by: PSYCHIATRY & NEUROLOGY

## 2020-09-08 RX ORDER — FLUTICASONE FUROATE AND VILANTEROL TRIFENATATE 100; 25 UG/1; UG/1
POWDER RESPIRATORY (INHALATION)
COMMUNITY

## 2020-09-08 RX ORDER — LEVETIRACETAM 500 MG/1
500 TABLET, EXTENDED RELEASE ORAL
Qty: 30 TABLET | Refills: 5 | Status: SHIPPED | OUTPATIENT
Start: 2020-09-08

## 2020-09-08 NOTE — PROGRESS NOTES
Return NeuroOutpatient Note        Pete Bourgeois  [id number]  82 y o   1935       CC: small vessel disease       History obtained from:  Patient     HPI/Subjective:    Pete Bourgeois is an 83 yo F with PMH of MCI, brain bleeds, HTN, seizure disorder presents as f/u  She had presented to [facility] on [facility]20 with symptoms of confusion  tPA was deferred as sxs resolved  Patient was kept on lipitor 80mg now 40mg, plavix 75mg, keppra 500mg bid  Patient is only taking 1 tab daily  She denies any stroke like symptoms  She denies any headaches  She does feel tired a lot  Per my previous history, she had developed inability to speak 30 min prior to arrival  She reported associated left sided headache and some vision disturbance  She could follow commands but wasn't able to communicate  Patient had similar episode in June 2016  She had similar sxs with addition of left lower extremity weakness in Sept of 2016 at which time she had received IV tPA  After she tPA she developed small area of SAH and SDH which was either after effect or because of uncontrolled blood pressure post tPA  Patient was not offered tPA during June admission due to high risk of bleed  Her MRI brain was negative for stroke  She was thought to have had possible migraine with aura vs complex migraine  She has known left M1 atherosclerotic plaque (50-69%)  She's on plavix 75mg daily and lipitor 40mg daily for stroke prevention     She has evidence of left parietal lacunar stroke in June 2016  After this there has been no evidence of stroke  She has loop recorder in place  At last time she was asked to see a therapist but she hasn't done so because didn't think it was going to help       Past Medical History:   Diagnosis Date    [facility]ce[facility]) [facility] ) 6/18/2016    Adult hypothyroidism 9/12/2016    Anxiety 6/10/2019    Arthritis     Asthma     Disease of thyroid gland     Dyslipidemia 6/10/2019    Esophageal dysmotility 6/10/2019    Essential hypertension 9/12/2016    Hypertension     Paroxysmal atrial fibrillation ([facility] ) 9/12/2016    Seizure disorder ([facility] ) 8/24/2018    Seizures ([place] [address] )     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    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    Stroke ([facility] ) 06/2016    Subarachnoid hemorrhage ([facility] ) 9/13/2016    Subdural hematoma ([facility] ) 9/13/2016     Social History     Socioeconomic History    Marital status: /Civil Union     Spouse name: Not on file    Number of children: Not on file    Years of education: Not on file    Highest education level: Not on file   Occupational History    Not on file   Social Needs    Financial resource strain: Not on file    Food insecurity     Worry: Not on file     Inability: Not on file   [employer] needs     Medical: Not on file     Non-medical: Not on file   Tobacco Use    Smoking status: Never Smoker    Smokeless tobacco: Never Used   Substance and Sexual Activity    Alcohol use: Never     Alcohol/week: 0 0 standard drinks     Frequency: Never     Drinks per session: Patient refused     Binge frequency: Never     Comment: 0    Drug use: No    Sexual activity: Yes     Partners: Male     Birth control/protection: Post-menopausal   Lifestyle    Physical activity     Days per week: Not on file     Minutes per session: Not on file    Stress: Not on file   Relationships    Social connections     Talks on phone: Not on file     Gets together: Not on file     Attends Hindu service: Not on file     Active member of club or organization: Not on file     Attends meetings of clubs or organizations: Not on file     Relationship status: Not on file    Intimate partner violence     Fear of current or ex partner: Not on file     Emotionally abused: Not on file     Physically abused: Not on file     Forced sexual activity: Not on file   Other Topics Concern    Not on file   Social History Narrative    Not on file Family History   Problem Relation Age of Onset    Kidney disease Mother     Heart disease Mother     Heart disease Father     Colon cancer Father     Heart disease Sister     Multiple sclerosis Son     No Known Problems Son      Allergies   Allergen Reactions    Other      Hydralazine, Amlodipine    Penicillins      Current Outpatient Medications on File Prior to Visit   Medication Sig Dispense Refill    acetaminophen (TYLENOL) 325 mg tablet Take 2 tablets (650 mg total) by mouth every 6 (six) hours as needed for mild pain, headaches or fever 30 tablet 0    atorvastatin (LIPITOR) 40 mg tablet Take 40 mg by mouth daily   clopidogrel (PLAVIX) 75 mg tablet Take 75 mg by mouth daily Indications: Stroke caused by a Blood Clot   famotidine (PEPCID) 20 mg tablet Take 20 mg by mouth daily       fluticasone (FLONASE) 50 mcg/act nasal spray 1 spray into each nostril daily      fluticasone-vilanterol (Breo Ellipta) 100-25 mcg/inh inhaler Breo Ellipta 100 mcg-25 mcg/dose powder for inhalation      levothyroxine 88 mcg tablet levothyroxine 88 mcg tablet      loratadine (CLARITIN) 10 mg tablet Take 10 mg by mouth daily      losartan (COZAAR) 50 mg tablet Take 50 mg by mouth daily       melatonin 3 mg Take 3 mg by mouth daily at bedtime      metoprolol succinate (TOPROL-XL) 50 mg 24 hr tablet Take 50 mg by mouth daily  0    omeprazole (PriLOSEC) 20 mg delayed release capsule Take 20 mg by mouth daily      [DISCONTINUED] levETIRAcetam (KEPPRA) 500 mg tablet Take 1 tablet (500 mg total) by mouth every 12 (twelve) hours (Patient taking differently: Take 500 mg by mouth daily ) 60 tablet 0    citalopram (CeleXA) 20 mg tablet Take 20 mg by mouth daily      LORazepam (ATIVAN) 0 5 mg tablet Take 0 5 mg by mouth every 6 (six) hours as needed for anxiety       No current facility-administered medications on file prior to visit  Review of Systems   Refer to positive review of systems in HPI  Review of Systems    Constitutional- No fever  Eyes- No visual change  ENT- Hearing normal  CV- No chest pain  Resp- No Shortness of breath  GI- No diarrhea  - Bladder normal  MS- No Arthritis   Skin- No rash  Psych- No depression  Endo- No DM  Heme- No nodes    Vitals:    09/08/20 1203   BP: (!) 171/69   BP Location: Left arm   Patient Position: Sitting   Cuff Size: Adult   Pulse: 70   Temp: (!) 97 3 °F (36 3 °C)   Weight: 68 kg (150 lb)   Height: 5' (1 524 m)       PHYSICAL EXAM:  Appearance: No Acute Distress  Ophthalmoscopic: Disc Flat, Normal fundus  Mental status:  Orientation: Awake, Alert, and Orientedx3  Memory: Registation 3/3 Recall 2/3  Attention: normal  Knowledge: good  Language: No aphasia  Speech: No dysarthria  Cranial Nerves:  2 No Visual Defect on Confrontation, Pupils round, equal, reactive to light  3,4,6 Extraocular Movements Intact, no nystagmus  5 Facial Sensation Intact  7 No facial asymmetry  8 Intact hearing  9,10 Palate symmetric, normal gag  11 Good shoulder shrug  12 Tongue Midline  Gait: Stable  Coordination: No ataxia with finger to nose testing, and heel to shin  Sensory: Intact, Symmetric to pinprick, light touch, vibration, and joint position  Muscle Tone: Normal              Muscle exam:  Arm Right Left Leg Right Left   Deltoid 5/5 5/5 Iliopsoas 5/5 5/5   Biceps 5/5 5/5 Quads 5/5 5/5   Triceps 5/5 5/5 Hamstrings 5/5 5/5   Wrist Extension 5/5 5/5 Ankle Dorsi Flexion 5/5 5/5   Wrist Flexion 5/5 5/5 Ankle Plantar Flexion 5/5 5/5   Interossei 5/5 5/5 Ankle Eversion 5/5 5/5   APB 5/5 5/5 Ankle Inversion 5/5 5/5       Reflexes   RJ BJ TJ KJ AJ Plantars Copeland's   Right 2+ 2+ 2+ 2+ 2+ Downgoing Not present   Left 2+ 2+ 2+ 2+ 2+ Downgoing Not present     Personal review of  Labs:                  Diagnoses and all orders for this visit:        1  MCI (mild cognitive impairment)     2  Seizure disorder (HCC)  levETIRAcetam (KEPPRA XR) 500 MG 24 hr tablet   3   Mixed hyperlipidemia Patient has remained stable from stroke stand point  She is to resume lipitor 40mg, plavix 75mg for stroke prevention  Patient is aware she hasn't been as active but since gyms and pools are closed  Encouraged going for walks  Her memory is stable  Discussed that if she wants to take keepra 500mg daily, then we have to switch it to XR form  Explained she needs to take XR form if taking 500mg once daily only                     Total time of encounter:  30 min  More than 50% of the time was used in counseling and/or coordination of care  Extent of counseling and/or coordination of care        MD [facility]  [address]  [address]  [phone]

## 2021-01-04 ENCOUNTER — TRANSCRIBE ORDERS (OUTPATIENT)
Dept: LAB | Facility: CLINIC | Age: 86
End: 2021-01-04

## 2021-01-04 ENCOUNTER — LAB (OUTPATIENT)
Dept: LAB | Facility: CLINIC | Age: 86
End: 2021-01-04
Payer: COMMERCIAL

## 2021-01-04 DIAGNOSIS — E78.5 HYPERLIPIDEMIA, UNSPECIFIED HYPERLIPIDEMIA TYPE: Primary | ICD-10-CM

## 2021-01-04 DIAGNOSIS — Z79.899 ENCOUNTER FOR LONG-TERM (CURRENT) USE OF OTHER MEDICATIONS: ICD-10-CM

## 2021-01-04 DIAGNOSIS — I10 ESSENTIAL HYPERTENSION, BENIGN: ICD-10-CM

## 2021-01-04 DIAGNOSIS — E55.9 AVITAMINOSIS D: ICD-10-CM

## 2021-01-04 DIAGNOSIS — E03.4 IDIOPATHIC ATROPHIC HYPOTHYROIDISM: ICD-10-CM

## 2021-01-04 LAB
25(OH)D3 SERPL-MCNC: 24.5 NG/ML (ref 30–100)
ALBUMIN SERPL BCP-MCNC: 3.6 G/DL (ref 3.5–5)
ALP SERPL-CCNC: 84 U/L (ref 46–116)
ALT SERPL W P-5'-P-CCNC: 24 U/L (ref 12–78)
ANION GAP SERPL CALCULATED.3IONS-SCNC: 3 MMOL/L (ref 4–13)
AST SERPL W P-5'-P-CCNC: 17 U/L (ref 5–45)
BACTERIA UR QL AUTO: ABNORMAL /HPF
BASOPHILS # BLD AUTO: 0.05 THOUSANDS/ΜL (ref 0–0.1)
BASOPHILS NFR BLD AUTO: 1 % (ref 0–1)
BILIRUB SERPL-MCNC: 0.4 MG/DL (ref 0.2–1)
BILIRUB UR QL STRIP: NEGATIVE
BUN SERPL-MCNC: 11 MG/DL (ref 5–25)
CALCIUM SERPL-MCNC: 8.9 MG/DL (ref 8.3–10.1)
CHLORIDE SERPL-SCNC: 111 MMOL/L (ref 100–108)
CHOLEST SERPL-MCNC: 160 MG/DL (ref 50–200)
CLARITY UR: ABNORMAL
CO2 SERPL-SCNC: 29 MMOL/L (ref 21–32)
COLOR UR: YELLOW
CREAT SERPL-MCNC: 0.98 MG/DL (ref 0.6–1.3)
EOSINOPHIL # BLD AUTO: 0.22 THOUSAND/ΜL (ref 0–0.61)
EOSINOPHIL NFR BLD AUTO: 4 % (ref 0–6)
ERYTHROCYTE [DISTWIDTH] IN BLOOD BY AUTOMATED COUNT: 13.5 % (ref 11.6–15.1)
GFR SERPL CREATININE-BSD FRML MDRD: 54 ML/MIN/1.73SQ M
GLUCOSE P FAST SERPL-MCNC: 104 MG/DL (ref 65–99)
GLUCOSE UR STRIP-MCNC: NEGATIVE MG/DL
HCT VFR BLD AUTO: 42.1 % (ref 34.8–46.1)
HDLC SERPL-MCNC: 49 MG/DL
HGB BLD-MCNC: 13.1 G/DL (ref 11.5–15.4)
HGB UR QL STRIP.AUTO: ABNORMAL
IMM GRANULOCYTES # BLD AUTO: 0.01 THOUSAND/UL (ref 0–0.2)
IMM GRANULOCYTES NFR BLD AUTO: 0 % (ref 0–2)
KETONES UR STRIP-MCNC: NEGATIVE MG/DL
LDLC SERPL CALC-MCNC: 88 MG/DL (ref 0–100)
LDLC SERPL DIRECT ASSAY-MCNC: 85 MG/DL (ref 0–100)
LEUKOCYTE ESTERASE UR QL STRIP: ABNORMAL
LYMPHOCYTES # BLD AUTO: 1.29 THOUSANDS/ΜL (ref 0.6–4.47)
LYMPHOCYTES NFR BLD AUTO: 23 % (ref 14–44)
MCH RBC QN AUTO: 30.6 PG (ref 26.8–34.3)
MCHC RBC AUTO-ENTMCNC: 31.1 G/DL (ref 31.4–37.4)
MCV RBC AUTO: 98 FL (ref 82–98)
MONOCYTES # BLD AUTO: 0.35 THOUSAND/ΜL (ref 0.17–1.22)
MONOCYTES NFR BLD AUTO: 6 % (ref 4–12)
NEUTROPHILS # BLD AUTO: 3.62 THOUSANDS/ΜL (ref 1.85–7.62)
NEUTS SEG NFR BLD AUTO: 66 % (ref 43–75)
NITRITE UR QL STRIP: NEGATIVE
NON-SQ EPI CELLS URNS QL MICRO: ABNORMAL /HPF
NONHDLC SERPL-MCNC: 111 MG/DL
NRBC BLD AUTO-RTO: 0 /100 WBCS
PH UR STRIP.AUTO: 6 [PH]
PLATELET # BLD AUTO: 277 THOUSANDS/UL (ref 149–390)
PMV BLD AUTO: 10.9 FL (ref 8.9–12.7)
POTASSIUM SERPL-SCNC: 4.6 MMOL/L (ref 3.5–5.3)
PROT SERPL-MCNC: 6.8 G/DL (ref 6.4–8.2)
PROT UR STRIP-MCNC: ABNORMAL MG/DL
RBC # BLD AUTO: 4.28 MILLION/UL (ref 3.81–5.12)
RBC #/AREA URNS AUTO: ABNORMAL /HPF
SODIUM SERPL-SCNC: 143 MMOL/L (ref 136–145)
SP GR UR STRIP.AUTO: 1.02 (ref 1–1.03)
T4 FREE SERPL-MCNC: 1.19 NG/DL (ref 0.76–1.46)
TRIGL SERPL-MCNC: 114 MG/DL
TSH SERPL DL<=0.05 MIU/L-ACNC: 0.48 UIU/ML (ref 0.36–3.74)
UROBILINOGEN UR QL STRIP.AUTO: 0.2 E.U./DL
WBC # BLD AUTO: 5.54 THOUSAND/UL (ref 4.31–10.16)
WBC #/AREA URNS AUTO: ABNORMAL /HPF

## 2021-01-04 PROCEDURE — 83721 ASSAY OF BLOOD LIPOPROTEIN: CPT

## 2021-01-04 PROCEDURE — 80061 LIPID PANEL: CPT

## 2021-01-04 PROCEDURE — 36415 COLL VENOUS BLD VENIPUNCTURE: CPT

## 2021-01-04 PROCEDURE — 85025 COMPLETE CBC W/AUTO DIFF WBC: CPT

## 2021-01-04 PROCEDURE — 84443 ASSAY THYROID STIM HORMONE: CPT

## 2021-01-04 PROCEDURE — 84439 ASSAY OF FREE THYROXINE: CPT

## 2021-01-04 PROCEDURE — 80053 COMPREHEN METABOLIC PANEL: CPT

## 2021-01-04 PROCEDURE — 82306 VITAMIN D 25 HYDROXY: CPT

## 2021-01-04 PROCEDURE — 81001 URINALYSIS AUTO W/SCOPE: CPT

## 2021-02-19 ENCOUNTER — IMMUNIZATIONS (OUTPATIENT)
Dept: FAMILY MEDICINE CLINIC | Facility: HOSPITAL | Age: 86
End: 2021-02-19

## 2021-02-19 DIAGNOSIS — Z23 ENCOUNTER FOR IMMUNIZATION: Primary | ICD-10-CM

## 2021-02-19 PROCEDURE — 0011A SARS-COV-2 / COVID-19 MRNA VACCINE (MODERNA) 100 MCG: CPT

## 2021-02-19 PROCEDURE — 91301 SARS-COV-2 / COVID-19 MRNA VACCINE (MODERNA) 100 MCG: CPT

## 2021-03-18 ENCOUNTER — IMMUNIZATIONS (OUTPATIENT)
Dept: FAMILY MEDICINE CLINIC | Facility: HOSPITAL | Age: 86
End: 2021-03-18

## 2021-03-18 DIAGNOSIS — Z23 ENCOUNTER FOR IMMUNIZATION: Primary | ICD-10-CM

## 2021-03-18 PROCEDURE — 91301 SARS-COV-2 / COVID-19 MRNA VACCINE (MODERNA) 100 MCG: CPT

## 2021-03-18 PROCEDURE — 0012A SARS-COV-2 / COVID-19 MRNA VACCINE (MODERNA) 100 MCG: CPT

## 2021-03-23 ENCOUNTER — APPOINTMENT (EMERGENCY)
Dept: RADIOLOGY | Facility: HOSPITAL | Age: 86
DRG: 158 | End: 2021-03-23
Payer: COMMERCIAL

## 2021-03-23 ENCOUNTER — HOSPITAL ENCOUNTER (INPATIENT)
Facility: HOSPITAL | Age: 86
LOS: 2 days | Discharge: HOME | DRG: 158 | End: 2021-03-26
Attending: EMERGENCY MEDICINE | Admitting: FAMILY MEDICINE
Payer: COMMERCIAL

## 2021-03-23 DIAGNOSIS — R41.82 ALTERED MENTAL STATUS: Primary | ICD-10-CM

## 2021-03-23 DIAGNOSIS — R44.3 HALLUCINATIONS: ICD-10-CM

## 2021-03-23 DIAGNOSIS — M26.609 TMJ (TEMPOROMANDIBULAR JOINT DISORDER): ICD-10-CM

## 2021-03-23 DIAGNOSIS — G93.40 ACUTE ENCEPHALOPATHY: ICD-10-CM

## 2021-03-23 DIAGNOSIS — H65.91 MIDDLE EAR EFFUSION, RIGHT: ICD-10-CM

## 2021-03-23 LAB
BASOPHILS # BLD AUTO: 0.03 THOUSANDS/ΜL (ref 0–0.1)
BASOPHILS NFR BLD AUTO: 1 % (ref 0–1)
EOSINOPHIL # BLD AUTO: 0.22 THOUSAND/ΜL (ref 0–0.61)
EOSINOPHIL NFR BLD AUTO: 4 % (ref 0–6)
ERYTHROCYTE [DISTWIDTH] IN BLOOD BY AUTOMATED COUNT: 13 % (ref 11.6–15.1)
ERYTHROCYTE [SEDIMENTATION RATE] IN BLOOD: 11 MM/HOUR (ref 0–29)
HCT VFR BLD AUTO: 37.6 % (ref 34.8–46.1)
HGB BLD-MCNC: 12.2 G/DL (ref 11.5–15.4)
IMM GRANULOCYTES # BLD AUTO: 0.01 THOUSAND/UL (ref 0–0.2)
IMM GRANULOCYTES NFR BLD AUTO: 0 % (ref 0–2)
LYMPHOCYTES # BLD AUTO: 1.67 THOUSANDS/ΜL (ref 0.6–4.47)
LYMPHOCYTES NFR BLD AUTO: 27 % (ref 14–44)
MCH RBC QN AUTO: 30.3 PG (ref 26.8–34.3)
MCHC RBC AUTO-ENTMCNC: 32.4 G/DL (ref 31.4–37.4)
MCV RBC AUTO: 93 FL (ref 82–98)
MONOCYTES # BLD AUTO: 0.52 THOUSAND/ΜL (ref 0.17–1.22)
MONOCYTES NFR BLD AUTO: 9 % (ref 4–12)
NEUTROPHILS # BLD AUTO: 3.67 THOUSANDS/ΜL (ref 1.85–7.62)
NEUTS SEG NFR BLD AUTO: 59 % (ref 43–75)
NRBC BLD AUTO-RTO: 0 /100 WBCS
PLATELET # BLD AUTO: 243 THOUSANDS/UL (ref 149–390)
PMV BLD AUTO: 9.7 FL (ref 8.9–12.7)
RBC # BLD AUTO: 4.03 MILLION/UL (ref 3.81–5.12)
WBC # BLD AUTO: 6.12 THOUSAND/UL (ref 4.31–10.16)

## 2021-03-23 PROCEDURE — 80053 COMPREHEN METABOLIC PANEL: CPT | Performed by: EMERGENCY MEDICINE

## 2021-03-23 PROCEDURE — 36415 COLL VENOUS BLD VENIPUNCTURE: CPT | Performed by: EMERGENCY MEDICINE

## 2021-03-23 PROCEDURE — 84484 ASSAY OF TROPONIN QUANT: CPT | Performed by: EMERGENCY MEDICINE

## 2021-03-23 PROCEDURE — 85610 PROTHROMBIN TIME: CPT | Performed by: EMERGENCY MEDICINE

## 2021-03-23 PROCEDURE — 70496 CT ANGIOGRAPHY HEAD: CPT

## 2021-03-23 PROCEDURE — 70498 CT ANGIOGRAPHY NECK: CPT

## 2021-03-23 PROCEDURE — 93005 ELECTROCARDIOGRAM TRACING: CPT

## 2021-03-23 PROCEDURE — 83735 ASSAY OF MAGNESIUM: CPT | Performed by: EMERGENCY MEDICINE

## 2021-03-23 PROCEDURE — 99285 EMERGENCY DEPT VISIT HI MDM: CPT

## 2021-03-23 PROCEDURE — 99285 EMERGENCY DEPT VISIT HI MDM: CPT | Performed by: EMERGENCY MEDICINE

## 2021-03-23 PROCEDURE — 85025 COMPLETE CBC W/AUTO DIFF WBC: CPT | Performed by: EMERGENCY MEDICINE

## 2021-03-23 PROCEDURE — 85730 THROMBOPLASTIN TIME PARTIAL: CPT | Performed by: EMERGENCY MEDICINE

## 2021-03-23 PROCEDURE — 85652 RBC SED RATE AUTOMATED: CPT | Performed by: EMERGENCY MEDICINE

## 2021-03-23 PROCEDURE — G1004 CDSM NDSC: HCPCS

## 2021-03-23 RX ADMIN — IOHEXOL 85 ML: 350 INJECTION, SOLUTION INTRAVENOUS at 23:59

## 2021-03-24 PROBLEM — G93.40 ACUTE ENCEPHALOPATHY: Status: ACTIVE | Noted: 2021-03-24

## 2021-03-24 PROBLEM — R44.3 HALLUCINATIONS: Status: ACTIVE | Noted: 2021-03-24

## 2021-03-24 LAB
ALBUMIN SERPL BCP-MCNC: 3.2 G/DL (ref 3.5–5)
ALP SERPL-CCNC: 77 U/L (ref 46–116)
ALT SERPL W P-5'-P-CCNC: 33 U/L (ref 12–78)
AMPHETAMINES SERPL QL SCN: NEGATIVE
ANION GAP SERPL CALCULATED.3IONS-SCNC: 11 MMOL/L (ref 4–13)
ANION GAP SERPL CALCULATED.3IONS-SCNC: 11 MMOL/L (ref 4–13)
APTT PPP: 20 SECONDS (ref 23–37)
AST SERPL W P-5'-P-CCNC: 31 U/L (ref 5–45)
BARBITURATES UR QL: NEGATIVE
BENZODIAZ UR QL: NEGATIVE
BILIRUB SERPL-MCNC: 0.5 MG/DL (ref 0.2–1)
BILIRUB UR QL STRIP: NEGATIVE
BUN SERPL-MCNC: 10 MG/DL (ref 5–25)
BUN SERPL-MCNC: 9 MG/DL (ref 5–25)
CALCIUM ALBUM COR SERPL-MCNC: 9 MG/DL (ref 8.3–10.1)
CALCIUM SERPL-MCNC: 7.8 MG/DL (ref 8.3–10.1)
CALCIUM SERPL-MCNC: 8.4 MG/DL (ref 8.3–10.1)
CHLORIDE SERPL-SCNC: 100 MMOL/L (ref 100–108)
CHLORIDE SERPL-SCNC: 101 MMOL/L (ref 100–108)
CLARITY UR: CLEAR
CO2 SERPL-SCNC: 24 MMOL/L (ref 21–32)
CO2 SERPL-SCNC: 24 MMOL/L (ref 21–32)
COCAINE UR QL: NEGATIVE
COLOR UR: NORMAL
CREAT SERPL-MCNC: 0.93 MG/DL (ref 0.6–1.3)
CREAT SERPL-MCNC: 1.25 MG/DL (ref 0.6–1.3)
ERYTHROCYTE [DISTWIDTH] IN BLOOD BY AUTOMATED COUNT: 13.3 % (ref 11.6–15.1)
GFR SERPL CREATININE-BSD FRML MDRD: 40 ML/MIN/1.73SQ M
GFR SERPL CREATININE-BSD FRML MDRD: 57 ML/MIN/1.73SQ M
GLUCOSE SERPL-MCNC: 100 MG/DL (ref 65–140)
GLUCOSE SERPL-MCNC: 124 MG/DL (ref 65–140)
GLUCOSE UR STRIP-MCNC: NEGATIVE MG/DL
HCT VFR BLD AUTO: 36.7 % (ref 34.8–46.1)
HGB BLD-MCNC: 11.9 G/DL (ref 11.5–15.4)
HGB UR QL STRIP.AUTO: NEGATIVE
INR PPP: 1.1 (ref 0.84–1.19)
KETONES UR STRIP-MCNC: NEGATIVE MG/DL
LEUKOCYTE ESTERASE UR QL STRIP: NEGATIVE
MAGNESIUM SERPL-MCNC: 1.8 MG/DL (ref 1.6–2.6)
MCH RBC QN AUTO: 30.6 PG (ref 26.8–34.3)
MCHC RBC AUTO-ENTMCNC: 32.4 G/DL (ref 31.4–37.4)
MCV RBC AUTO: 94 FL (ref 82–98)
METHADONE UR QL: NEGATIVE
NITRITE UR QL STRIP: NEGATIVE
OPIATES UR QL SCN: NEGATIVE
OXYCODONE+OXYMORPHONE UR QL SCN: NEGATIVE
PCP UR QL: NEGATIVE
PH UR STRIP.AUTO: 7.5 [PH]
PLATELET # BLD AUTO: 239 THOUSANDS/UL (ref 149–390)
PMV BLD AUTO: 9.7 FL (ref 8.9–12.7)
POTASSIUM SERPL-SCNC: 3.4 MMOL/L (ref 3.5–5.3)
POTASSIUM SERPL-SCNC: 3.8 MMOL/L (ref 3.5–5.3)
PROT SERPL-MCNC: 6.8 G/DL (ref 6.4–8.2)
PROT UR STRIP-MCNC: NEGATIVE MG/DL
PROTHROMBIN TIME: 14.1 SECONDS (ref 11.6–14.5)
RBC # BLD AUTO: 3.89 MILLION/UL (ref 3.81–5.12)
SODIUM SERPL-SCNC: 135 MMOL/L (ref 136–145)
SODIUM SERPL-SCNC: 136 MMOL/L (ref 136–145)
SP GR UR STRIP.AUTO: <=1.005 (ref 1–1.03)
THC UR QL: NEGATIVE
TROPONIN I SERPL-MCNC: <0.02 NG/ML
UROBILINOGEN UR QL STRIP.AUTO: 0.2 E.U./DL
VIT B12 SERPL-MCNC: 328 PG/ML (ref 100–900)
WBC # BLD AUTO: 6.45 THOUSAND/UL (ref 4.31–10.16)

## 2021-03-24 PROCEDURE — 99223 1ST HOSP IP/OBS HIGH 75: CPT | Performed by: FAMILY MEDICINE

## 2021-03-24 PROCEDURE — 80307 DRUG TEST PRSMV CHEM ANLYZR: CPT | Performed by: EMERGENCY MEDICINE

## 2021-03-24 PROCEDURE — 96374 THER/PROPH/DIAG INJ IV PUSH: CPT

## 2021-03-24 PROCEDURE — 99221 1ST HOSP IP/OBS SF/LOW 40: CPT | Performed by: PSYCHIATRY & NEUROLOGY

## 2021-03-24 PROCEDURE — 85027 COMPLETE CBC AUTOMATED: CPT | Performed by: PHYSICIAN ASSISTANT

## 2021-03-24 PROCEDURE — 80048 BASIC METABOLIC PNL TOTAL CA: CPT | Performed by: PHYSICIAN ASSISTANT

## 2021-03-24 PROCEDURE — 82607 VITAMIN B-12: CPT | Performed by: PHYSICIAN ASSISTANT

## 2021-03-24 PROCEDURE — 96375 TX/PRO/DX INJ NEW DRUG ADDON: CPT

## 2021-03-24 PROCEDURE — 81003 URINALYSIS AUTO W/O SCOPE: CPT | Performed by: EMERGENCY MEDICINE

## 2021-03-24 RX ORDER — LOSARTAN POTASSIUM 50 MG/1
50 TABLET ORAL DAILY
Status: DISCONTINUED | OUTPATIENT
Start: 2021-03-24 | End: 2021-03-26 | Stop reason: HOSPADM

## 2021-03-24 RX ORDER — HYDRALAZINE HYDROCHLORIDE 20 MG/ML
5 INJECTION INTRAMUSCULAR; INTRAVENOUS EVERY 6 HOURS PRN
Status: DISCONTINUED | OUTPATIENT
Start: 2021-03-24 | End: 2021-03-26 | Stop reason: HOSPADM

## 2021-03-24 RX ORDER — ATORVASTATIN CALCIUM 40 MG/1
40 TABLET, FILM COATED ORAL
Status: DISCONTINUED | OUTPATIENT
Start: 2021-03-24 | End: 2021-03-26 | Stop reason: HOSPADM

## 2021-03-24 RX ORDER — MORPHINE SULFATE 4 MG/ML
4 INJECTION, SOLUTION INTRAMUSCULAR; INTRAVENOUS ONCE
Status: COMPLETED | OUTPATIENT
Start: 2021-03-24 | End: 2021-03-24

## 2021-03-24 RX ORDER — FAMOTIDINE 20 MG/1
20 TABLET, FILM COATED ORAL DAILY
Status: DISCONTINUED | OUTPATIENT
Start: 2021-03-24 | End: 2021-03-24

## 2021-03-24 RX ORDER — LEVETIRACETAM 500 MG/1
250 TABLET ORAL EVERY 12 HOURS SCHEDULED
Status: DISCONTINUED | OUTPATIENT
Start: 2021-03-24 | End: 2021-03-26 | Stop reason: HOSPADM

## 2021-03-24 RX ORDER — LORATADINE 10 MG/1
10 TABLET ORAL DAILY
Status: DISCONTINUED | OUTPATIENT
Start: 2021-03-24 | End: 2021-03-26 | Stop reason: HOSPADM

## 2021-03-24 RX ORDER — PANTOPRAZOLE SODIUM 40 MG/1
40 TABLET, DELAYED RELEASE ORAL
Status: DISCONTINUED | OUTPATIENT
Start: 2021-03-24 | End: 2021-03-26 | Stop reason: HOSPADM

## 2021-03-24 RX ORDER — LORAZEPAM 0.5 MG/1
0.5 TABLET ORAL EVERY 6 HOURS PRN
Status: DISCONTINUED | OUTPATIENT
Start: 2021-03-24 | End: 2021-03-26 | Stop reason: HOSPADM

## 2021-03-24 RX ORDER — ACETAMINOPHEN 325 MG/1
650 TABLET ORAL EVERY 6 HOURS PRN
Status: DISCONTINUED | OUTPATIENT
Start: 2021-03-24 | End: 2021-03-26 | Stop reason: HOSPADM

## 2021-03-24 RX ORDER — CITALOPRAM 20 MG/1
20 TABLET ORAL DAILY
Status: DISCONTINUED | OUTPATIENT
Start: 2021-03-24 | End: 2021-03-24

## 2021-03-24 RX ORDER — FLUTICASONE PROPIONATE 50 MCG
1 SPRAY, SUSPENSION (ML) NASAL DAILY
Status: DISCONTINUED | OUTPATIENT
Start: 2021-03-24 | End: 2021-03-26 | Stop reason: HOSPADM

## 2021-03-24 RX ORDER — ACETAMINOPHEN 325 MG/1
975 TABLET ORAL EVERY 8 HOURS SCHEDULED
Status: DISCONTINUED | OUTPATIENT
Start: 2021-03-24 | End: 2021-03-24

## 2021-03-24 RX ORDER — LANOLIN ALCOHOL/MO/W.PET/CERES
3 CREAM (GRAM) TOPICAL
Status: DISCONTINUED | OUTPATIENT
Start: 2021-03-24 | End: 2021-03-26 | Stop reason: HOSPADM

## 2021-03-24 RX ORDER — LEVOTHYROXINE SODIUM 88 UG/1
88 TABLET ORAL
Status: DISCONTINUED | OUTPATIENT
Start: 2021-03-24 | End: 2021-03-26 | Stop reason: HOSPADM

## 2021-03-24 RX ORDER — CLOPIDOGREL BISULFATE 75 MG/1
75 TABLET ORAL DAILY
Status: DISCONTINUED | OUTPATIENT
Start: 2021-03-24 | End: 2021-03-26 | Stop reason: HOSPADM

## 2021-03-24 RX ORDER — METOPROLOL SUCCINATE 50 MG/1
50 TABLET, EXTENDED RELEASE ORAL DAILY
Status: DISCONTINUED | OUTPATIENT
Start: 2021-03-24 | End: 2021-03-26 | Stop reason: HOSPADM

## 2021-03-24 RX ORDER — FLUTICASONE FUROATE AND VILANTEROL 100; 25 UG/1; UG/1
1 POWDER RESPIRATORY (INHALATION) DAILY
Status: DISCONTINUED | OUTPATIENT
Start: 2021-03-24 | End: 2021-03-26 | Stop reason: HOSPADM

## 2021-03-24 RX ADMIN — ATORVASTATIN CALCIUM 40 MG: 40 TABLET, FILM COATED ORAL at 17:42

## 2021-03-24 RX ADMIN — MELATONIN TAB 3 MG 3 MG: 3 TAB at 21:57

## 2021-03-24 RX ADMIN — MORPHINE SULFATE 2 MG: 2 INJECTION, SOLUTION INTRAMUSCULAR; INTRAVENOUS at 00:33

## 2021-03-24 RX ADMIN — LEVETIRACETAM 250 MG: 500 TABLET, FILM COATED ORAL at 21:57

## 2021-03-24 RX ADMIN — METOPROLOL SUCCINATE 50 MG: 50 TABLET, EXTENDED RELEASE ORAL at 10:02

## 2021-03-24 RX ADMIN — MORPHINE SULFATE 4 MG: 4 INJECTION INTRAVENOUS at 01:02

## 2021-03-24 RX ADMIN — FLUTICASONE FUROATE AND VILANTEROL TRIFENATATE 1 PUFF: 100; 25 POWDER RESPIRATORY (INHALATION) at 13:55

## 2021-03-24 RX ADMIN — LEVOTHYROXINE SODIUM 88 MCG: 88 TABLET ORAL at 10:03

## 2021-03-24 RX ADMIN — LOSARTAN POTASSIUM 50 MG: 50 TABLET, FILM COATED ORAL at 10:03

## 2021-03-24 RX ADMIN — LORATADINE 10 MG: 10 TABLET ORAL at 10:02

## 2021-03-24 RX ADMIN — CLOPIDOGREL BISULFATE 75 MG: 75 TABLET ORAL at 10:02

## 2021-03-24 RX ADMIN — CITALOPRAM HYDROBROMIDE 20 MG: 20 TABLET ORAL at 10:02

## 2021-03-24 RX ADMIN — LEVETIRACETAM 250 MG: 500 TABLET, FILM COATED ORAL at 10:02

## 2021-03-24 RX ADMIN — PANTOPRAZOLE SODIUM 40 MG: 40 TABLET, DELAYED RELEASE ORAL at 10:03

## 2021-03-24 RX ADMIN — FLUTICASONE PROPIONATE 1 SPRAY: 50 SPRAY, METERED NASAL at 13:54

## 2021-03-24 NOTE — PLAN OF CARE
Problem: Potential for Falls  Goal: Patient will remain free of falls  Description: INTERVENTIONS:  - Assess patient frequently for physical needs  -  Identify cognitive and physical deficits and behaviors that affect risk of falls  -  [place] fall precautions as indicated by assessment   - Educate patient/family on patient safety including physical limitations  - Instruct patient to call for assistance with activity based on assessment  - Modify environment to reduce risk of injury  - Consider OT/PT consult to assist with strengthening/mobility  Outcome: Progressing     Problem: PAIN - ADULT  Goal: Verbalizes/displays adequate comfort level or baseline comfort level  Description: Interventions:  - Encourage patient to monitor pain and request assistance  - Assess pain using appropriate pain scale  - Administer analgesics based on type and severity of pain and evaluate response  - Implement non-pharmacological measures as appropriate and evaluate response  - Consider cultural and social influences on pain and pain management  - Notify physician/advanced practitioner if interventions unsuccessful or patient reports new pain  Outcome: Progressing     Problem: SAFETY ADULT  Goal: Patient will remain free of falls  Description: INTERVENTIONS:  - Assess patient frequently for physical needs  -  Identify cognitive and physical deficits and behaviors that affect risk of falls    -  [place] fall precautions as indicated by assessment   - Educate patient/family on patient safety including physical limitations  - Instruct patient to call for assistance with activity based on assessment  - Modify environment to reduce risk of injury  - Consider OT/PT consult to assist with strengthening/mobility  Outcome: Progressing     Problem: NEUROSENSORY - ADULT  Goal: Achieves stable or improved neurological status  Description: INTERVENTIONS  - Monitor and report changes in neurological status  - Monitor vital signs such as temperature, blood pressure, glucose, and any other labs ordered   - Initiate measures to prevent increased intracranial pressure  - Monitor for seizure activity and implement precautions if appropriate      Outcome: Progressing

## 2021-03-24 NOTE — ED NOTES
Pt awake, requests bedpan and voids clear, yellow urine  Pt assisted to reposition  Pt asking staff to let her go walk with Juan  Emotional support given  Pt yells out frequently, let me walk with Juan  Pt c/o pain to right ear         Roshan Richardson RN  03/24/21 1190

## 2021-03-24 NOTE — ED NOTES
Pt returns to Room after CT scan  Pt c/o pain to right ear continues  Pt reports dizziness continues  Dr Mendoza November aware of abopve and BP as noted  No new orders at this time         Ju Alegria RN  03/24/21 6612

## 2021-03-24 NOTE — ED NOTES
Pt continually repeating, " I can't fix my babies"  , [facility]   "I think that would make [place]aviola Field sad "       Gina Bauman RN  03/24/21 0430

## 2021-03-24 NOTE — ED NOTES
Pt reports pain is "stronger than I am"  Pt states, "I know why my son gave up " Pt tearful, States"I cant fix anybody, I can'T fix [employer] [place] either "   Pt repeats same and is loud and soft with voice        Andra Menjivar RN  03/24/21 100 Urban Carson RN  03/24/21 2245

## 2021-03-24 NOTE — ED PROVIDER NOTES
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  Pt is on Plavix  She denies a headache or visual changes  She has a hx of SAH/SDH/HTN/Afib/hypothyroidism/hyperlipidemia  History provided by:  Patient and EMS personnel  History limited by:  Mental status change   used: No    Earache  Location:  Right  Behind ear:  No abnormality  Quality:  Aching  Severity:  Moderate  Onset quality:  Gradual  Timing:  Constant  Progression:  Worsening  Chronicity:  New  Relieved by:  None tried  Worsened by:  Nothing  Ineffective treatments:  None tried  Associated symptoms: no abdominal pain, no cough, no diarrhea, no fever, no headaches, no neck pain and no vomiting        Prior to Admission Medications   Prescriptions Last Dose Informant Patient Reported? Taking? LORazepam (ATIVAN) 0 5 mg tablet   Yes No   Sig: Take 0 5 mg by mouth every 6 (six) hours as needed for anxiety   acetaminophen (TYLENOL) 325 mg tablet   No No   Sig: Take 2 tablets (650 mg total) by mouth every 6 (six) hours as needed for mild pain, headaches or fever   atorvastatin (LIPITOR) 40 mg tablet   Yes No   Sig: Take 40 mg by mouth daily     citalopram (CeleXA) 20 mg tablet  Self Yes No   Sig: Take 20 mg by mouth daily   clopidogrel (PLAVIX) 75 mg tablet   Yes No   Sig: Take 75 mg by mouth daily Indications: Stroke caused by a Blood Clot    famotidine (PEPCID) 20 mg tablet   Yes No   Sig: Take 20 mg by mouth daily    fluticasone (FLONASE) 50 mcg/act nasal spray  Self Yes No   Si spray into each nostril daily   fluticasone-vilanterol (Breo Ellipta) 100-25 mcg/inh inhaler   Yes No   Sig: Breo Ellipta 100 mcg-25 mcg/dose powder for inhalation   levETIRAcetam (KEPPRA XR) 500 MG 24 hr tablet   No No   Sig: Take 1 tablet (500 mg total) by mouth daily at bedtime   levothyroxine 88 mcg tablet   Yes No   Sig: levothyroxine 88 mcg tablet   loratadine (CLARITIN) 10 mg tablet  Self Yes No   Sig: Take 10 mg by mouth daily   losartan (COZAAR) 50 mg tablet   Yes No   Sig: Take 50 mg by mouth daily    melatonin 3 mg   Yes No   Sig: Take 3 mg by mouth daily at bedtime   metoprolol succinate (TOPROL-XL) 50 mg 24 hr tablet   Yes No   Sig: Take 50 mg by mouth daily   omeprazole (PriLOSEC) 20 mg delayed release capsule  Self Yes No   Sig: Take 20 mg by mouth daily      Facility-Administered Medications: None       Past Medical History:   Diagnosis Date    [facility] 6/18/2016    Adult hypothyroidism 9/12/2016    Anxiety 6/10/2019    Arthritis     Asthma     Disease of thyroid gland     Dyslipidemia 6/10/2019    Esophageal dysmotility 6/10/2019    Essential hypertension 9/12/2016    Hypertension     Paroxysmal atrial fibrillation (HCC) 9/12/2016    Seizure disorder ([facility] ) 8/24/2018    Seizures ([facility] )     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    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    Stroke ([facility] ) 06/2016    Subarachnoid hemorrhage ([facility] ) 9/13/2016    Subdural hematoma ([facility] ) 9/13/2016       Past Surgical History:   Procedure Laterality Date    BACK SURGERY  01/2016    CARPAL TUNNEL RELEASE Right        Family History   Problem Relation Age of Onset    Kidney disease Mother     Heart disease Mother     Heart disease Father     Colon cancer Father     Heart disease Sister     Multiple sclerosis Son     No Known Problems Son      I have reviewed and agree with the history as documented      E-Cigarette/Vaping    E-Cigarette Use Never User      E-Cigarette/Vaping Substances    Nicotine No     THC No     CBD No     Flavoring No     Other No     Unknown No      Social History     Tobacco Use    Smoking status: Never Smoker    Smokeless tobacco: Never Used   Substance Use Topics    Alcohol use: Never     Alcohol/week: 0 0 standard drinks     Frequency: Never     Drinks per session: Patient refused     Binge frequency: Never     Comment: 0    Drug use: No       Review of Systems   Constitutional: Negative for chills and fever  HENT: Positive for ear pain  Negative for trouble swallowing  Eyes: Negative for photophobia, pain, redness and itching  Respiratory: Negative for cough, chest tightness and shortness of breath  Gastrointestinal: Negative for abdominal pain, diarrhea, nausea and vomiting  Genitourinary: Negative for dysuria, frequency, hematuria and urgency  Musculoskeletal: Negative for back pain, neck pain and neck stiffness  Neurological: Negative for dizziness, facial asymmetry, light-headedness and headaches  Psychiatric/Behavioral: Positive for behavioral problems and confusion  The patient is nervous/anxious  All other systems reviewed and are negative  Physical Exam  Physical Exam  Vitals signs and nursing note reviewed  Constitutional:       General: She is not in acute distress  Appearance: She is well-developed  She is not diaphoretic  HENT:      Head: Normocephalic and atraumatic  Right Ear: External ear normal  No drainage, swelling or tenderness  A middle ear effusion is present  There is no impacted cerumen  No foreign body  No mastoid tenderness  Tympanic membrane is not injected, erythematous or bulging  Tympanic membrane has normal mobility  Left Ear: Hearing, tympanic membrane and ear canal normal  No mastoid tenderness  Eyes:      Conjunctiva/sclera: Conjunctivae normal       Pupils: Pupils are equal, round, and reactive to light  Neck:      Musculoskeletal: Normal range of motion and neck supple     Cardiovascular:      Rate and Rhythm: Normal rate and regular rhythm  Heart sounds: Normal heart sounds  No murmur  Pulmonary:      Effort: Pulmonary effort is normal  No respiratory distress  Breath sounds: Normal breath sounds  Abdominal:      General: Bowel sounds are normal  There is no distension  Palpations: Abdomen is soft  Tenderness: There is no abdominal tenderness  Musculoskeletal: Normal range of motion  General: No deformity  Skin:     General: Skin is warm and dry  Capillary Refill: Capillary refill takes less than 2 seconds  Coloration: Skin is not pale  Findings: No rash  Neurological:      General: No focal deficit present  Mental Status: She is alert and oriented to person, place, and time  Cranial Nerves: No cranial nerve deficit  Psychiatric:         Mood and Affect: Mood is anxious  Behavior: Behavior normal  Behavior is not agitated, aggressive or hyperactive  Thought Content: Thought content normal          Cognition and Memory: Cognition and memory normal          Judgment: Judgment is inappropriate           Vital Signs  ED Triage Vitals [03/23/21 2317]   Temperature Pulse Respirations Blood Pressure SpO2   98 4 °F (36 9 °C) 89 18 154/99 100 %      Temp Source Heart Rate Source Patient Position - Orthostatic VS BP Location FiO2 (%)   Tympanic Monitor Lying Right arm --      Pain Score       Worst Possible Pain           Vitals:    03/24/21 0245 03/24/21 0300 03/24/21 0315 03/24/21 0400   BP: (!) 183/76 (!) 183/77 (!) 185/74 (!) 190/79   Pulse:  78 78 84   Patient Position - Orthostatic VS: Lying Lying Lying Lying         Visual Acuity  Visual Acuity      Most Recent Value   L Pupil Size (mm)  2   R Pupil Size (mm)  2          ED Medications  Medications   iohexol (OMNIPAQUE) 350 MG/ML injection (SINGLE-DOSE) 85 mL (85 mL Intravenous Given 3/23/21 5573)   morphine injection 2 mg (2 mg Intravenous Given 3/24/21 0033)   morphine (PF) 4 mg/mL injection 4 mg (4 mg Intravenous Given 3/24/21 0102)       Diagnostic Studies  Results Reviewed     Procedure Component Value Units Date/Time    Rapid drug screen, urine [[id number]]  (Normal) Collected: 03/24/21 0036    Lab Status: Final result Specimen: Urine Updated: 03/24/21 0110     Amph/Meth UR Negative     Barbiturate Ur Negative     Benzodiazepine Urine Negative     Cocaine Urine Negative     Methadone Urine Negative     Opiate Urine Negative     PCP Ur Negative     THC Urine Negative     Oxycodone Urine Negative    Narrative:      FOR MEDICAL PURPOSES ONLY  IF CONFIRMATION NEEDED PLEASE CONTACT THE LAB WITHIN 5 DAYS      Drug Screen Cutoff Levels:  AMPHETAMINE/METHAMPHETAMINES  1000 ng/mL  BARBITURATES     200 ng/mL  BENZODIAZEPINES     200 ng/mL  COCAINE      300 ng/mL  METHADONE      300 ng/mL  OPIATES      300 ng/mL  PHENCYCLIDINE     25 ng/mL  THC       50 ng/mL  OXYCODONE      100 ng/mL    UA w Reflex to Microscopic w Reflex to Culture [[id number]] Collected: 03/24/21 0036    Lab Status: Final result Specimen: Urine, Clean Catch Updated: 03/24/21 0041     Color, UA Light Yellow     Clarity, UA Clear     Specific Gravity, UA <=1 005     pH, UA 7 5     Leukocytes, UA Negative     Nitrite, UA Negative     Protein, UA Negative mg/dl      Glucose, UA Negative mg/dl      Ketones, UA Negative mg/dl      Urobilinogen, UA 0 2 E U /dl      Bilirubin, UA Negative     Blood, UA Negative    Troponin I [[id number]]  (Normal) Collected: 03/23/21 2343    Lab Status: Final result Specimen: Blood from Arm, Left Updated: 03/24/21 0008     Troponin I <0 02 ng/mL     Comprehensive metabolic panel [[id number]]  (Abnormal) Collected: 03/23/21 2343    Lab Status: Final result Specimen: Blood from Arm, Left Updated: 03/24/21 0004     Sodium 135 mmol/L      Potassium 3 4 mmol/L      Chloride 100 mmol/L      CO2 24 mmol/L      ANION GAP 11 mmol/L      BUN 10 mg/dL      Creatinine 1 25 mg/dL      Glucose 100 mg/dL      Calcium 8 4 mg/dL      Corrected Calcium 9 0 mg/dL      AST 31 U/L      ALT 33 U/L      Alkaline Phosphatase 77 U/L      Total Protein 6 8 g/dL      Albumin 3 2 g/dL      Total Bilirubin 0 50 mg/dL      eGFR 40 ml/min/1 73sq m     Narrative:      National Kid[employer] Disease Foundation guidelines for Chronic Kidney Disease (CKD):     Stage 1 with normal or high GFR (GFR > 90 mL/min/1 73 square meters)    Stage 2 Mild CKD (GFR = 60-89 mL/min/1 73 square meters)    Stage 3A Moderate CKD (GFR = 45-59 mL/min/1 73 square meters)    Stage 3B Moderate CKD (GFR = 30-44 mL/min/1 73 square meters)    Stage 4 Severe CKD (GFR = 15-29 mL/min/1 73 square meters)    Stage 5 End Stage CKD (GFR <15 mL/min/1 73 square meters)  Note: GFR calculation is accurate only with a steady state creatinine    Magnesium [[id number]]  (Normal) Collected: 03/23/21 2343    Lab Status: Final result Specimen: Blood from Arm, Left Updated: 03/24/21 0004     Magnesium 1 8 mg/dL     Protime-INR [[id number]]  (Normal) Collected: 03/23/21 2343    Lab Status: Final result Specimen: Blood from Arm, Left Updated: 03/24/21 0003     Protime 14 1 seconds      INR 1 10    APTT [[id number]]  (Abnormal) Collected: 03/23/21 2343    Lab Status: Final result Specimen: Blood from Arm, Left Updated: 03/24/21 0003     PTT 20 seconds     CBC and differential [[id number]] Collected: 03/23/21 2343    Lab Status: Final result Specimen: Blood from Arm, Left Updated: 03/23/21 2349     WBC 6 12 Thousand/uL      RBC 4 03 Million/uL      Hemoglobin 12 2 g/dL      Hematocrit 37 6 %      MCV 93 fL      MCH 30 3 pg      MCHC 32 4 g/dL      RDW 13 0 %      MPV 9 7 fL      Platelets 997 Thousands/uL      nRBC 0 /100 WBCs      Neutrophils Relative 59 %      Immat GRANS % 0 %      Lymphocytes Relative 27 %      Monocytes Relative 9 %      Eosinophils Relative 4 %      Basophils Relative 1 %      Neutrophils Absolute 3 67 Thousands/µL      Immature Grans Absolute 0 01 Thousand/uL      Lymphocytes Absolute 1 67 Thousands/µL      Monocytes Absolute 0 52 Thousand/µL      Eosinophils Absolute 0 22 Thousand/µL      Basophils Absolute 0 03 Thousands/µL     Sedimentation rate, automated [[id number]]  (Normal) Collected: 03/23/21 2343    Lab Status: Final result Specimen: Blood from Arm, Left Updated: 03/23/21 2349     Sed Rate 11 mm/hour                  CTA head and neck with and without contrast   Final Result by Parmjit Garcia MD (03/24 0045)         1  No evidence of acute infarct, intracranial hemorrhage or mass effect  2   No hemodynamically significant stenosis, dissection or occlusion of the carotid or vertebral arteries or major vessels of the [place]  3   No evidence of stenosis or vasculitis in the superficial temporal arteries  Workstation performed: [id number]                    Procedures  ECG 12 Lead Documentation Only    Date/Time: 3/23/2021 11:26 PM  Performed by: Nataly Penny DO  Authorized by: Nataly Penny DO     Indications / Diagnosis:  Ms changes  ECG reviewed by me, the ED Provider: yes    Patient location:  ED  Previous ECG:     Previous ECG:  Unavailable    Comparison to cardiac monitor: Yes    Interpretation:     Interpretation: normal    Rate:     ECG rate:  84bpm    ECG rate assessment: normal    Rhythm:     Rhythm: sinus rhythm    Ectopy:     Ectopy: none    QRS:     QRS axis:  Normal  Conduction:     Conduction: normal    ST segments:     ST segments:  Normal  T waves:     T waves: normal               ED Course                                           MDM  Number of Diagnoses or Management Options  Altered mental status: new and requires workup  Middle ear effusion, right: new and requires workup  Diagnosis management comments: Patient emergency room with complaint of right-sided ear pain and being term ended by a demon  Labs and CT reviewed  No acute pathology identified  Patient remains confused, moaning and writhing in the emergency room  Multiple doses of morphine administered with some relief  Patient will be admitted did to hospitalist team for further evaluation  Amount and/or Complexity of Data Reviewed  Clinical lab tests: ordered and reviewed  Tests in the radiology section of CPT®: ordered and reviewed    Risk of Complications, Morbidity, and/or Mortality  Presenting problems: high  Diagnostic procedures: high  Management options: high    Patient Progress  Patient progress: stable      Disposition  Final diagnoses: Altered mental status   Middle ear effusion, right     Time reflects when diagnosis was documented in both MDM as applicable and the Disposition within this note     Time User Action Codes Description Comment    3/24/2021  1:29 AM Gabino Height O Add [R41 82] Altered mental status     3/24/2021  1:30 AM Gabino Height O Add [H65 91] Middle ear effusion, right     3/24/2021  1:42 AM Yuli Wren Add [R44 3] Hallucinations     3/24/2021  1:42 AM Jah Wren Add [G93 40] Acute encephalopathy       ED Disposition     ED Disposition Condition Date/Time Comment    Admit Stable Wed Mar 24, 2021  1:29 AM Case was discussed with TETE Jarrett and the patient's admission status was agreed to be Admission Status: inpatient status to the service of Dr Marcus Bowers   Follow-up Information    None         Patient's Medications   Discharge Prescriptions    No medications on file     No discharge procedures on file      PDMP Review     None          ED Provider  Electronically Signed by           Andrew Carey DO  03/24/21 7207

## 2021-03-24 NOTE — ED NOTES
Pt to CT scan  OK to bring pt to CT scan without lab results completed per Dr Severa Higashi Lezlie Otto, RN  03/24/21 4347

## 2021-03-24 NOTE — ED NOTES
Pt c/o dizziness after transfer to CT scan  No other change in pt status         Jesusita Aldrich, RN  03/24/21 6632

## 2021-03-24 NOTE — H&P
Adry 47  H&P- Alexus Luke 1935, 82 y o  female MRN: [id number]  Unit/Bed#: ED 08 Encounter: [id number]  Primary Care Provider: Milagro Bose MD   Date and time admitted to hospital: 3/23/2021 11:15 PM    * Acute encephalopathy  Assessment & Plan  Presented with c/o left ear pain   Otoscopic exam shows some fluid but otherwise WNL exam per ER   CTA H&N neg for dissection   Abnormal statements about demons and hallucinations in ER   Suspect underlying psych etiology   Consult to layne-psych   Given 6mg morphine in ER and now somnolent but answers questions appropriately     H/O: CVA (cerebrovascular accident)  Assessment & Plan  Continue asa, plavix   CTA H&N neg for stroke   Non focal exam     Seizure disorder ([facility] [address] )  Assessment & Plan  On Keppra XR at home, will order keppra q12 while admitted     Anxiety  Assessment & Plan  Continue prn ativan, celexa     Dyslipidemia  Assessment & Plan  Continue lipitor     Paroxysmal atrial fibrillation ([facility] [address] )  Assessment & Plan  Not on chronic AC   Continue toprol XL    Adult hypothyroidism  Assessment & Plan  Continue synthroid     Essential hypertension  Assessment & Plan  Continue cozaar, toprol XL     VTE Prophylaxis: Enoxaparin (Lovenox)  / sequential compression device   Code Status: full   POLST: POLST form is not discussed and not completed at this time  Discussion with family:      Anticipated Length of Stay:  Patient will be admitted on an Inpatient basis with an anticipated length of stay of  > 2 midnights  Justification for Hospital Stay: AMS     Total Time for Visit, including Counseling / Coordination of Care: 45 minutes  Greater than 50% of this total time spent on direct patient counseling and coordination of care  Chief Complaint:   Earache     History of Present Illness:    Alexus Luke is a 82 y o  female w/ PMH + HTN, HLD, hypothyroidism, sz disorder, CVA hx,  who presents with left ear pain   Patient was brought in by EMS  Per EMS patient was not answering questions and had slurred speech  When in the ER, per nurse patient was alert and oriented and offered history without slurred speech  She was complaining of ear pain and calling out about demons and Satan  She denies hallucinations but says she thinks her  has them and she worries about him a lot  Pt fixated on the loss of her son by Suicide 3 years ago and an ill son with MS  Says she wants to go on a walk with kacy  Says she cant "fix her babies" and states she worries about her  repeatedly  Currently complains of generalized headache but was sleeping soundly upon my assessment  Review of Systems:    Review of Systems   Constitutional: Negative for chills and fever  HENT: Positive for ear pain  Negative for congestion  Respiratory: Negative for shortness of breath  Cardiovascular: Negative for chest pain  Gastrointestinal: Positive for constipation  Genitourinary: Negative for difficulty urinating  Musculoskeletal: Negative for back pain  Neurological: Positive for headaches  Psychiatric/Behavioral: Positive for confusion and hallucinations  Negative for self-injury  The patient is nervous/anxious  The patient is not hyperactive          Past Medical and Surgical History:     Past Medical History:   Diagnosis Date    [facility]A [facility]cerebro[facility]) [facility] [place] ) 6/18/2016    Adult hypothyroidism 9/12/2016    Anxiety 6/10/2019    Arthritis     Asthma     Disease of thyroid gland     Dyslipidemia 6/10/2019    Esophageal dysmotility 6/10/2019    Essential hypertension 9/12/2016    Hypertension     Paroxysmal atrial fibrillation ([facility] [address] ) 9/12/2016    Seizure disorder ([facility] [address] ) 8/24/2018    Seizures ([facility] [place]     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 Authorizing Provider   acetaminophen (TYLENOL) 325 mg tablet Take 2 tablets (650 mg total) by mouth every 6 (six) hours as needed for mild pain, headaches or fever 2/10/20   SACHI Munoz   atorvastatin (LIPITOR) 40 mg tablet Take 40 mg by mouth daily  Historical Provider, MD   citalopram (CeleXA) 20 mg tablet Take 20 mg by mouth daily    Historical Provider, MD   clopidogrel (PLAVIX) 75 mg tablet Take 75 mg by mouth daily Indications: Stroke caused by a Blood Clot  Historical Provider, MD   famotidine (PEPCID) 20 mg tablet Take 20 mg by mouth daily     Historical Provider, MD   fluticasone (FLONASE) 50 mcg/act nasal spray 1 spray into each nostril daily    Historical Provider, MD   fluticasone-vilanterol (Breo Ellipta) 100-25 mcg/inh inhaler Breo Ellipta 100 mcg-25 mcg/dose powder for inhalation    Historical Provider, MD   levETIRAcetam (KEPPRA XR) 500 MG 24 hr tablet Take 1 tablet (500 mg total) by mouth daily at bedtime 9/8/20   Sandi Mcnair MD   levothyroxine 88 mcg tablet levothyroxine 88 mcg tablet    Historical Provider, MD   loratadine (CLARITIN) 10 mg tablet Take 10 mg by mouth daily    Historical Provider, MD   LORazepam (ATIVAN) 0 5 mg tablet Take 0 5 mg by mouth every 6 (six) hours as needed for anxiety    Historical Provider, MD   losartan (COZAAR) 50 mg tablet Take 50 mg by mouth daily     Historical Provider, MD   melatonin 3 mg Take 3 mg by mouth daily at bedtime    Historical Provider, MD   metoprolol succinate (TOPROL-XL) 50 mg 24 hr tablet Take 50 mg by mouth daily 9/3/19   Historical Provider, MD   omeprazole (PriLOSEC) 20 mg delayed release capsule Take 20 mg by mouth daily    Historical Provider, MD     I have reviewed home medications with patient personally      Allergies: Allergies   Allergen Reactions    Other      Hydralazine, Amlodipine    Penicillins        Social History:     Marital Status: /Civil Union   Occupation: retired   Patient Pre-hospital Living Situation: with    Patient Pre-hospital Level of Mobility: no limits   Patient Pre-hospital Diet Restrictions: none   Substance Use History:   Social History     Substance and Sexual Activity   Alcohol Use Never    Alcohol/week: 0 0 standard drinks    Frequency: Never    Drinks per session: Patient refused    Binge frequency: Never    Comment: 0     Social History     Tobacco Use   Smoking Status Never Smoker   Smokeless Tobacco Never Used     Social History     Substance and Sexual Activity   Drug Use No       Family History:    Family History   Problem Relation Age of Onset    Kidney disease Mother     Heart disease Mother     Heart disease Father     Colon cancer Father     Heart disease Sister     Multiple sclerosis Son     No Known Problems Son        Physical Exam:     Vitals:   Blood Pressure: (!) 183/76 (03/24/21 0245)  Pulse: 82 (03/24/21 0230)  Temperature: 98 8 °F (37 1 °C) (03/24/21 0200)  Temp Source: Tympanic (03/24/21 0200)  Respirations: 22 (03/24/21 0245)  Weight - Scale: 71 5 kg (157 lb 10 1 oz) (03/23/21 2317)  SpO2: 99 % (03/24/21 0255)    Physical Exam  Constitutional:       Appearance: Normal appearance  Comments: Sleeping but easily arousable to verbal stim   HENT:      Head: Normocephalic and atraumatic  Mouth/Throat:      Mouth: Mucous membranes are dry  Eyes:      Extraocular Movements: Extraocular movements intact  Neck:      Musculoskeletal: Normal range of motion and neck supple  Cardiovascular:      Rate and Rhythm: Normal rate and regular rhythm  Pulmonary:      Effort: Pulmonary effort is normal       Breath sounds: Normal breath sounds  Abdominal:      General: Abdomen is flat  Palpations: Abdomen is soft     Musculoskeletal: Normal range of motion  Skin:     General: Skin is warm  Neurological:      General: No focal deficit present  Mental Status: She is alert  Psychiatric:      Comments: Plat affect depressed mood        Additional Data:     Lab Results: I have personally reviewed pertinent reports  Results from last 7 days   Lab Units 03/23/21  2343   WBC Thousand/uL 6 12   HEMOGLOBIN g/dL 12 2   HEMATOCRIT % 37 6   PLATELETS Thousands/uL 243   NEUTROS PCT % 59   LYMPHS PCT % 27   MONOS PCT % 9   EOS PCT % 4     Results from last 7 days   Lab Units 03/23/21  2343   SODIUM mmol/L 135*   POTASSIUM mmol/L 3 4*   CHLORIDE mmol/L 100   CO2 mmol/L 24   BUN mg/dL 10   CREATININE mg/dL 1 25   ANION GAP mmol/L 11   CALCIUM mg/dL 8 4   ALBUMIN g/dL 3 2*   TOTAL BILIRUBIN mg/dL 0 50   ALK PHOS U/L 77   ALT U/L 33   AST U/L 31   GLUCOSE RANDOM mg/dL 100     Results from last 7 days   Lab Units 03/23/21  2343   INR  1 10                   Imaging: I have personally reviewed pertinent reports  CTA head and neck with and without contrast   Final Result by Dennis Bedoya MD (03/24 0045)         1  No evidence of acute infarct, intracranial hemorrhage or mass effect  2   No hemodynamically significant stenosis, dissection or occlusion of the carotid or vertebral arteries or major vessels of the [place]  3   No evidence of stenosis or vasculitis in the superficial temporal arteries  Workstation performed: [id number]             EKG, Pathology, and Other Studies Reviewed on Admission:   · EKG:     Allscripts / Epic Records Reviewed: Yes     ** Please Note: This note has been constructed using a voice recognition system   **

## 2021-03-24 NOTE — ED NOTES
Pt continues to yell out and moan of pain to ear  Dr Chivo Rebolledo at bedside to evaluate        Peg Aguilar RN  03/24/21 3668

## 2021-03-24 NOTE — ED NOTES
Pt dozing, but stating, "Vivienne Hilario go with Maria M Stephen go with Juan Aleman, Take me to your house  Heidi Chantelle you are evil "     Nicole Clarke, KAILYN  03/24/21 4416

## 2021-03-24 NOTE — UTILIZATION REVIEW
Initial Clinical Review    Admission: Date/Time/Statement:   Admission Orders (From admission, onward)     Ordered        03/24/21 0129  Inpatient Admission  Once                   Orders Placed This Encounter   Procedures    Inpatient Admission     Standing Status:   Standing     Number of Occurrences:   1     Order Specific Question:   Level of Care     Answer:   Med Surg [16]     Order Specific Question:   Estimated length of stay     Answer:   More than 2 Midnights     Order Specific Question:   Certification     Answer:   I certify that inpatient services are medically necessary for this patient for a duration of greater than two midnights  See H&P and MD Progress Notes for additional information about the patient's course of treatment  ED Arrival Information     Expected Arrival Acuity Means of Arrival Escorted By Service Admission Type    - 3/23/2021 23:13 Urgent Ambulance [place] Hospitalist Urgent    Arrival Complaint    Altered Mental status        Chief Complaint   Patient presents with    Earache     pt brought in for right ear pain  per EMS has history of strokes is on thinners  Assessment/Plan:     82year old female presents to ed from home via ems for evaluation and treatment of left ear pain  PMHX : CVA on plavix, SAH, SDH, AFIB  Clinical assessment significant for tachypnea, hypertension, pain 10/10,CTA head and neck without acute finding  Patent states that a childhood demon torments her and put something in her ear  Patient has been agitated in ed yelling out frequently and requiring emotional support  There is middle ear effusion  Treated in ed with iv mso4 x2  admit to inpatient medical surgical for acute encephalopathy  Plan includes: geropsychiatry, ua culture  3-24  Patient reports right ear pain continues with dizziness  Patient remains agitated and tearful  PT and OT evaluations ordered          Consult psychiatry 3-24 3 pm  Assessment:  Patient is a 82 y o  female presents with Altered mental status  Patient reports  That she was in severe pain and made some unusual statements while feeling confused  Patient's behaviors include  Being calm and cooperative during the assessment, able to demonstrate logical and coherent thought process  Relevant medical issues include  -see past medical history         Plan:   1   at this time discontinued Celexa as patient reports she does not take at home  2  No current psychiatric recommendations  Patient does not wish to see a therapist nor has access to virtual therapy  Do not recommend inpatient psychiatric admission  3   Patient is delusional statements were likely set in the context of delirium which is almost if not completely resolved      ED Triage Vitals [03/23/21 2317]   98 4 °F (36 9 °C) 89 18 154/99 100 %      Tympanic Monitor         Worst Possible Pain          03/23/21 71 5 kg (157 lb 10 1 oz)     Additional Vital Signs:     Date/  Time  Temp  Pulse  Resp  BP  MAP (mmHg)  SpO2  O2 Flow Rate (L/min)  O2 Device   03/24/21 0752  99 9 °F (37 7 °C)  75  18  159/68  --  99 %  2 L/min  None (Room air)   03/24/21 0718  --  82  18  190/74  Abnormal   --  99 %  --  None (Room air)   03/24/21 0400  --  84  17  190/79  Abnormal   114  90 %  --  None (Room air)   03/24/21 0315  --  78  16  185/74  Abnormal   106  100 %  --  None (Room air)   03/24/21 0300  --  78  19  183/77  Abnormal   110  99 %  2 L/min  Nasal cannula   03/24/21 0255  --  --  --  --  --  99 %  --  --   03/24/21 0245  --  --  22  183/76  Abnormal   109  91 %  --  None (Room air)    03/24/21 0230  --  82  20  167/71  102  94 %  --  None (Room air)   03/24/21 0200  98 8 °F (37 1 °C)  80  24Abnormal   176/75  Abnormal   108  99 %  --  None (Room air)   03/24/21 0145  --  82  24Abnormal   173/72  Abnormal   103  98 %  --  None (Room air)   03/24/21 0126  --  78  24Abnormal   187/77  Abnormal   --  100 %  --  None (Room air)   03/24/21 0045  --  80  26Abnormal 192/88  Abnormal   126  96 %  --  None (Room air)   03/24/21 0030  --  82  26Abnormal   171/78  Abnormal   109  96 %  --  None (Room air)   03/24/21 0000  --  88  37Abnormal   203/82  Abnormal   118  98 %  --  None (Room air)   03/23/21 2346  --  --  24Abnormal   193/79  Abnormal   --  98 %  --  None (Room air)   03/23/21 2320  --  --  --  --  --  --  --  None (Room air)   03/23/21 2317  98 4 °F (36 9 °C)  89  18  154/99  121  100 %  --  None (Room air)       Date and Time Eye Opening Best Verbal Response Best Motor Response Genet Coma Scale Score   03/23/21 2320 4 5 6 15           Pertinent Labs/Diagnostic Test Results:     ECG 12 Lead Documentation Only   Date/Time: 3/23/2021 11:26 PM       Indications / Diagnosis:  Ms changes    Patient location:  ED  Previous ECG:     Previous ECG:  Unavailable    Comparison to cardiac monitor: Yes    Interpretation:     Interpretation: normal    Rate:     ECG rate:  84bpm    ECG rate assessment: normal    Rhythm:     Rhythm: sinus rhythm    Ectopy:     Ectopy: none    QRS:     QRS axis:  Normal  Conduction:     Conduction: normal    ST segments:     ST segments:  Normal  T waves:     T waves: normal          CTA head and neck with and without contrast    (03/24 0045)         1  No evidence of acute infarct, intracranial hemorrhage or mass effect  2   No hemodynamically significant stenosis, dissection or occlusion of the carotid or vertebral arteries or major vessels of the [place]  3   No evidence of stenosis or vasculitis in the superficial temporal arteries              Results from last 7 days   Lab Units 03/24/21  1059 03/23/21  2343   WBC Thousand/uL 6 45 6 12   HEMOGLOBIN g/dL 11 9 12 2   HEMATOCRIT % 36 7 37 6   PLATELETS Thousands/uL 239 243   NEUTROS ABS Thousands/µL  --  3 67         Results from last 7 days   Lab Units 03/24/21  1059 03/23/21  2343   SODIUM mmol/L 136 135*   POTASSIUM mmol/L 3 8 3 4*   CHLORIDE mmol/L 101 100   CO2 mmol/L 24 24 ANION GAP mmol/L 11 11   BUN mg/dL 9 10   CREATININE mg/dL 0 93 1 25   EGFR ml/min/1 73sq m 57 40   CALCIUM mg/dL 7 8* 8 4   MAGNESIUM mg/dL  --  1 8     Results from last 7 days   Lab Units 03/23/21  2343   AST U/L 31   ALT U/L 33   ALK PHOS U/L 77   TOTAL PROTEIN g/dL 6 8   ALBUMIN g/dL 3 2*   TOTAL BILIRUBIN mg/dL 0 50         Results from last 7 days   Lab Units 03/24/21  1059 03/23/21  2343   GLUCOSE RANDOM mg/dL 124 100       Results from last 7 days   Lab Units 03/23/21  2343   TROPONIN I ng/mL <0 02         Results from last 7 days   Lab Units 03/23/21  2343   PROTIME seconds 14 1   INR  1 10   PTT seconds 20*       Results from last 7 days   Lab Units 03/23/21  2343   SED RATE mm/hour 11         Results from last 7 days   Lab Units 03/24/21  0036   CLARITY UA  Clear   COLOR UA  Light Yellow   SPEC GRAV UA  <=1 005   PH UA  7 5   GLUCOSE UA mg/dl Negative   KETONES UA mg/dl Negative   BLOOD UA  Negative   PROTEIN UA mg/dl Negative   NITRITE UA  Negative   BILIRUBIN UA  Negative   UROBILINOGEN UA E U /dl 0 2   LEUKOCYTES UA  Negative             Results from last 7 days   Lab Units 03/24/21  0036   AMPH/METH  Negative   BARBITURATE UR  Negative   BENZODIAZEPINE UR  Negative   COCAINE UR  Negative   METHADONE URINE  Negative   OPIATE UR  Negative   PCP UR  Negative   THC UR  Negative       ED Treatment:   Medication Administration from 03/23/2021 2313 to 03/24/2021 0749       Date/Time Order Dose Route Action     03/24/2021 0033 morphine injection 2 mg 2 mg Intravenous Given     03/24/2021 0102 morphine (PF) 4 mg/mL injection 4 mg 4 mg Intravenous Given        Past Medical History:   Diagnosis Date    [facility]cerebro[facility]) [facility] [address] ) 6/18/2016    Adult hypothyroidism 9/12/2016    Anxiety 6/10/2019    Arthritis     Asthma     Disease of thyroid gland     Dyslipidemia 6/10/2019    Esophageal dysmotility 6/10/2019    Essential hypertension 9/12/2016    Hypertension     Parox[facility]mal atrial fibrillation ([facility] ) 9/12/2016    Seizure disorder ([place] [address] ) 8/24/2018    Seizures (HCC)     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    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    Stroke ([place] [address] ) 06/2016    Subarachnoid hemorrhage ([place] [address] ) 9/13/2016    Subdural hematoma ([place] [address] ) 9/13/2016     Present on Admission:   Adult hypothyroidism   Anxiety   Dyslipidemia   Essential hypertension   Paroxysmal atrial fibrillation (HCC)   Seizure disorder (HCC)      Admitting Diagnosis:     Hallucinations [R44 3]  Earache [H92 09]  Altered mental status [R41 82]  Acute encephalopathy [G93 40]  Middle ear effusion, right [H65 91]    Age/Sex: 82 y o  female     Admission Orders:    Scheduled Medications:  atorvastatin, 40 mg, Oral, Daily With Dinner  citalopram, 20 mg, Oral, Daily  clopidogrel, 75 mg, Oral, Daily  fluticasone, 1 spray, Nasal, Daily  fluticasone-vilanterol, 1 puff, Inhalation, Daily  levETIRAcetam, 250 mg, Oral, Q12H [facility]  levothyroxine, 88 mcg, Oral, Early Morning  loratadine, 10 mg, Oral, Daily  losartan, 50 mg, Oral, Daily  melatonin, 3 mg, Oral, HS  metoprolol succinate, 50 mg, Oral, Daily  pantoprazole, 40 mg, Oral, Early Morning      Continuous IV Infusions:     PRN Meds:  acetaminophen, 650 mg, Oral, Q6H PRN  hydrALAZINE, 5 mg, Intravenous, Q6H PRN  LORazepam, 0 5 mg, Oral, Q6H PRN        IP CONSULT TO PSYCHIATRY    Network Utilization Review Department  ATTENTION: Please call with any questions or concerns to [phone] and carefully listen to the prompts so that you are directed to the right person  All voicemails are confidential   [place] all requests for admission clinical reviews, approved or denied determinations and any other requests to dedicated fax number below belonging to the campus where the patient is receiving treatment   List of dedicated fax numbers for the Facilities:  FACILITY NAME UR FAX NUMBER   ADMISSION DENIALS (Administrative/Medical Necessity) [phone]   PARENT CHILD HEALTH (Maternity/NICU/Pediatrics) [address] [facility] [facility] [address] [facility] [address] (  [address]) [address] [place] [address] Breana Dia [facility] [phone]   [facility] [place] [id number] [phone]

## 2021-03-24 NOTE — ASSESSMENT & PLAN NOTE
Presented with c/o left ear pain   Otoscopic exam shows some fluid but otherwise WNL exam per ER   CTA H&N neg for dissection   Abnormal statements about demons and hallucinations in ER   Suspect underlying psych etiology   Consult to layne-psych   Given 6mg morphine in ER and now somnolent but answers questions appropriately

## 2021-03-24 NOTE — ED NOTES
Upon receiving pt from medics, pt reports the "Suzei Subramanian" is trying to get me  Squad explains the Suzie Subramanian is the pt childhood "boogieman"  Pt reports the Suzie Subramanian is causing the pain  Pt reports the Pain is stronger than me       Cristhian Healy, RN  03/24/21 [id number]

## 2021-03-25 PROBLEM — M26.609 TMJ (TEMPOROMANDIBULAR JOINT DISORDER): Status: ACTIVE | Noted: 2021-03-25

## 2021-03-25 LAB
ANION GAP SERPL CALCULATED.3IONS-SCNC: 8 MMOL/L (ref 4–13)
BUN SERPL-MCNC: 10 MG/DL (ref 5–25)
CALCIUM SERPL-MCNC: 8.2 MG/DL (ref 8.3–10.1)
CHLORIDE SERPL-SCNC: 103 MMOL/L (ref 100–108)
CO2 SERPL-SCNC: 26 MMOL/L (ref 21–32)
CREAT SERPL-MCNC: 0.86 MG/DL (ref 0.6–1.3)
ERYTHROCYTE [DISTWIDTH] IN BLOOD BY AUTOMATED COUNT: 13.1 % (ref 11.6–15.1)
GFR SERPL CREATININE-BSD FRML MDRD: 63 ML/MIN/1.73SQ M
GLUCOSE SERPL-MCNC: 96 MG/DL (ref 65–140)
HCT VFR BLD AUTO: 36.6 % (ref 34.8–46.1)
HGB BLD-MCNC: 11.6 G/DL (ref 11.5–15.4)
MCH RBC QN AUTO: 30.2 PG (ref 26.8–34.3)
MCHC RBC AUTO-ENTMCNC: 31.7 G/DL (ref 31.4–37.4)
MCV RBC AUTO: 95 FL (ref 82–98)
PLATELET # BLD AUTO: 208 THOUSANDS/UL (ref 149–390)
PMV BLD AUTO: 9.1 FL (ref 8.9–12.7)
POTASSIUM SERPL-SCNC: 3.6 MMOL/L (ref 3.5–5.3)
RBC # BLD AUTO: 3.84 MILLION/UL (ref 3.81–5.12)
SODIUM SERPL-SCNC: 137 MMOL/L (ref 136–145)
WBC # BLD AUTO: 5.14 THOUSAND/UL (ref 4.31–10.16)

## 2021-03-25 PROCEDURE — 99232 SBSQ HOSP IP/OBS MODERATE 35: CPT | Performed by: PHYSICIAN ASSISTANT

## 2021-03-25 PROCEDURE — 85027 COMPLETE CBC AUTOMATED: CPT | Performed by: FAMILY MEDICINE

## 2021-03-25 PROCEDURE — 80048 BASIC METABOLIC PNL TOTAL CA: CPT | Performed by: FAMILY MEDICINE

## 2021-03-25 RX ORDER — METAXALONE 800 MG/1
400 TABLET ORAL 3 TIMES DAILY
Status: DISCONTINUED | OUTPATIENT
Start: 2021-03-25 | End: 2021-03-26 | Stop reason: HOSPADM

## 2021-03-25 RX ORDER — NAPROXEN 250 MG/1
250 TABLET ORAL 2 TIMES DAILY WITH MEALS
Status: DISCONTINUED | OUTPATIENT
Start: 2021-03-25 | End: 2021-03-26 | Stop reason: HOSPADM

## 2021-03-25 RX ADMIN — METAXALONE 400 MG: 800 TABLET ORAL at 20:55

## 2021-03-25 RX ADMIN — ENOXAPARIN SODIUM 40 MG: 40 INJECTION SUBCUTANEOUS at 16:13

## 2021-03-25 RX ADMIN — LOSARTAN POTASSIUM 50 MG: 50 TABLET, FILM COATED ORAL at 09:10

## 2021-03-25 RX ADMIN — FLUTICASONE FUROATE AND VILANTEROL TRIFENATATE 1 PUFF: 100; 25 POWDER RESPIRATORY (INHALATION) at 09:09

## 2021-03-25 RX ADMIN — ACETAMINOPHEN 650 MG: 325 TABLET, FILM COATED ORAL at 09:10

## 2021-03-25 RX ADMIN — METAXALONE 400 MG: 800 TABLET ORAL at 16:14

## 2021-03-25 RX ADMIN — LORATADINE 10 MG: 10 TABLET ORAL at 09:10

## 2021-03-25 RX ADMIN — PANTOPRAZOLE SODIUM 40 MG: 40 TABLET, DELAYED RELEASE ORAL at 05:32

## 2021-03-25 RX ADMIN — MELATONIN TAB 3 MG 3 MG: 3 TAB at 22:01

## 2021-03-25 RX ADMIN — ATORVASTATIN CALCIUM 40 MG: 40 TABLET, FILM COATED ORAL at 16:13

## 2021-03-25 RX ADMIN — LEVETIRACETAM 250 MG: 500 TABLET, FILM COATED ORAL at 20:54

## 2021-03-25 RX ADMIN — LEVOTHYROXINE SODIUM 88 MCG: 88 TABLET ORAL at 05:32

## 2021-03-25 RX ADMIN — NAPROXEN 250 MG: 250 TABLET ORAL at 16:13

## 2021-03-25 RX ADMIN — METOPROLOL SUCCINATE 50 MG: 50 TABLET, EXTENDED RELEASE ORAL at 09:10

## 2021-03-25 RX ADMIN — FLUTICASONE PROPIONATE 1 SPRAY: 50 SPRAY, METERED NASAL at 12:12

## 2021-03-25 RX ADMIN — CLOPIDOGREL BISULFATE 75 MG: 75 TABLET ORAL at 09:10

## 2021-03-25 RX ADMIN — LEVETIRACETAM 250 MG: 500 TABLET, FILM COATED ORAL at 09:10

## 2021-03-25 RX ADMIN — METAXALONE 400 MG: 800 TABLET ORAL at 12:12

## 2021-03-25 NOTE — ASSESSMENT & PLAN NOTE
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  Described stabbing right-sided inner ear pain which has resolved  Now with migrating right-sided facial pain  Dental exam benign  Otoscopic exam shows some fluid but otherwise WNL exam per ER   CTA H&N neg for dissection  Did not show any neck masses  No superficial vasculitis  The piercing pain has resolved  However patient still with dull right-sided facial pain; seems to extend from base of right jaw to right temple  Patient also in sources right-sided throat fullness  It is worsened by talking  Start on scheduled low-dose naproxen  Will start scheduled metaxalone  Monitor pain levels  Given severity of pain which likely caused hallucinations and metabolic encephalopathy, will request ENT evaluation  Otherwise will need outpatient follow-up
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
## 2021-03-25 NOTE — PLAN OF CARE
Problem: Potential for Falls  Goal: Patient will remain free of falls  Description: INTERVENTIONS:  - Assess patient frequently for physical needs  -  Identify cognitive and physical deficits and behaviors that affect risk of falls  -  [place]ts fall precautions as indicated by assessment   - Educate patient/family on patient safety including physical limitations  - Instruct patient to call for assistance with activity based on assessment  - Modify environment to reduce risk of injury  - Consider OT/PT consult to assist with strengthening/mobility  Outcome: Progressing     Problem: PAIN - ADULT  Goal: Verbalizes/displays adequate comfort level or baseline comfort level  Description: Interventions:  - Encourage patient to monitor pain and request assistance  - Assess pain using appropriate pain scale  - Administer analgesics based on type and severity of pain and evaluate response  - Implement non-pharmacological measures as appropriate and evaluate response  - Consider cultural and social influences on pain and pain management  - Notify physician/advanced practitioner if interventions unsuccessful or patient reports new pain  Outcome: Progressing     Problem: SAFETY ADULT  Goal: Patient will remain free of falls  Description: INTERVENTIONS:  - Assess patient frequently for physical needs  -  Identify cognitive and physical deficits and behaviors that affect risk of falls    -  [place]ts fall precautions as indicated by assessment   - Educate patient/family on patient safety including physical limitations  - Instruct patient to call for assistance with activity based on assessment  - Modify environment to reduce risk of injury  - Consider OT/PT consult to assist with strengthening/mobility  Outcome: Progressing     Problem: NEUROSENSORY - ADULT  Goal: Achieves stable or improved neurological status  Description: INTERVENTIONS  - Monitor and report changes in neurological status  - Monitor vital signs such as temperature, blood pressure, glucose, and any other labs ordered   - Initiate measures to prevent increased intracranial pressure  - Monitor for seizure activity and implement precautions if appropriate      Outcome: Progressing

## 2021-03-25 NOTE — QUICK NOTE
Called the Dept of Aging for [facility] to inform them of the patient's home situation and asked for an  inspection of the patient's home for any needs such as care taking, nutrition etc  Also discussed patient's concerns about her 's possible delusions/hallucinations/dementia    Naomi Mcgarry MD  3/25/2021

## 2021-03-25 NOTE — NURSING NOTE
Received patient from [facility], alert and verbally responsive, able to make needs known  No SOB, lungs clear  No c/o pain or discomfort  Masimo in place  SCD reapplied  Oriented to the unit and to staffs  Reinforced the use of call bell for assistance  Needs attended  Continue plan of care

## 2021-03-25 NOTE — ASSESSMENT & PLAN NOTE
Presented with c/o excruciating left ear pain  At the time, she had hallucinations and was making abnormal statements prompting psych eval   Suspect encephalopathy was due to pain  No sign of infection  Electrolytes within normal limits  As patient's pain has resolved, acute encephalopathy has also resolved  A&Ox4  Calm and cooperative  Treat underlying etiology of pain, see plan for TMJ  Patient will be discharged home today  She is to follow-up with her PCP to 2 weeks post discharge  Contact information provided for oral maxillary surgery to follow-up with TMJ

## 2021-03-25 NOTE — ASSESSMENT & PLAN NOTE
Presented with c/o excruciating left ear pain, see associated plan  At the time, she had hallucinations and was making abnormal statements prompting psych eval   Suspect encephalopathy was due to pain  No sign of infection  Electrolytes within normal limits  As patient's pain has resolved, acute encephalopathy has also resolved  A&Ox4  Calm and cooperative    Treat underlying etiology of pain, see plan for TMJ

## 2021-03-25 NOTE — PROGRESS NOTES
[facility]  Progress Note - Matthew Kate 1935, 82 y o  female MRN: [id number]  Unit/Bed#: [address] [place] [address] Encounter: [id number]  Primary Care Provider: Preet Lutz MD   Date and time admitted to hospital: 3/23/2021 11:15 PM    TMJ (temporomandibular joint disorder)  Assessment & Plan  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  Described stabbing right-sided inner ear pain which has resolved  Now with migrating right-sided facial pain  Dental exam benign  Otoscopic exam shows some fluid but otherwise WNL exam per ER   CTA H&N neg for dissection  Did not show any neck masses  No superficial vasculitis  The piercing pain has resolved  However patient still with dull right-sided facial pain; seems to extend from base of right jaw to right temple  Patient also endorses right-sided throat fullness  It is worsened by talking  Start on scheduled low-dose naproxen  Will start scheduled metaxalone  Monitor pain levels  Given severity of pain which likely caused hallucinations and metabolic encephalopathy, discussed with on-call ENT, Dr Alejandro Higginbotham   CTA scan is reassuring that there is no mass  Ears are clear on the scan  Patient can follow with outpatient ENT  If concern for trigeminal neuralgia persists, can follow outpatient Neurology  Outpatient OMS may be more appropriate referral for TMJ  * Acute encephalopathy  Assessment & Plan  Presented with c/o excruciating left ear pain, see associated plan  At the time, she had hallucinations and was making abnormal statements prompting psych eval   Suspect encephalopathy was due to pain  No sign of infection  Electrolytes within normal limits  As patient's pain has resolved, acute encephalopathy has also resolved  A&Ox4  Calm and cooperative    Treat underlying etiology of pain, see plan for TMJ      H/O: CVA (cerebrovascular accident)  Assessment & Plan  Continue asa, plavix CTA H&N neg for stroke   Non focal exam     Seizure disorder ([facility] [address] )  Assessment & Plan  On Keppra XR at home, will order keppra q12 while admitted     Anxiety  Assessment & Plan  Continue prn ativan, celexa     Dyslipidemia  Assessment & Plan  Continue lipitor     Paroxysmal atrial fibrillation ([facility] [address] )  Assessment & Plan  Not on chronic AC   Continue toprol XL    Adult hypothyroidism  Assessment & Plan  Continue synthroid     Essential hypertension  Assessment & Plan  Continue cozaar, toprol XL       VTE Pharmacologic Prophylaxis:   Pharmacologic: Enoxaparin (Lovenox)  Mechanical VTE Prophylaxis in Place: Yes    Patient Centered Rounds: I have performed bedside rounds with nursing staff today  Discussions with Specialists or Other Care Team Provider:  Discussed with nursing, case management  Discussed with on-call ENT  Education and Discussions with Family / Patient:  I discussed with the patient  I offered to update family however the patient declined the need for this  Time Spent for Care: 45 minutes  More than 50% of total time spent on counseling and coordination of care as described above  Current Length of Stay: 1 day(s)    Current Patient Status: Inpatient   Certification Statement: The patient will continue to require additional inpatient hospital stay due to Monitoring and adjustment of pain regimen in the setting of severe pain possibly related to TMJ  Discharge Plan:  Pending, possible than 24 hours if pain better controlled    Code Status: Level 1 - Full Code      Subjective:   Patient does not remember when she was admitted into the hospital however she is now alert and oriented  She is worried about how severe the pain was although the stabbing right inner ear pain has not recurred  She now feels pain that "creeps up the face" from jaw to temple  No numbness of the face  She believes she does have slight right eye blurriness  No tearing  No fevers or chills    No nausea or vomiting  No tooth pain or tongue masses  No sinus congestion  Her right throat feels "full"  No dysphagia or odynophagia  Objective:     Vitals:   Temp (24hrs), Av 9 °F (36 6 °C), Min:97 4 °F (36 3 °C), Max:98 5 °F (36 9 °C)    Temp:  [97 4 °F (36 3 °C)-98 5 °F (36 9 °C)] 98 5 °F (36 9 °C)  HR:  [58-68] 58  Resp:  [16] 16  BP: (128-156)/(59-69) 144/65  SpO2:  [96 %-98 %] 96 %  Body mass index is 30 78 kg/m²  Input and Output Summary (last 24 hours): Intake/Output Summary (Last 24 hours) at 3/25/2021 1527  Last data filed at 3/25/2021 0901  Gross per 24 hour   Intake --   Output 550 ml   Net -550 ml       Physical Exam:     Physical Exam  Vitals signs and nursing note reviewed  Exam conducted with a chaperone present  Constitutional:       General: She is not in acute distress  Appearance: She is well-developed  Comments: The patient is pleasant and fully conversational   She is calm and alert and oriented at this time  HENT:      Head: Normocephalic and atraumatic  Jaw: Pain on movement present  No swelling  Eyes:      Conjunctiva/sclera: Conjunctivae normal    Neck:      Musculoskeletal: Normal range of motion and neck supple  Thyroid: No thyroid mass  Trachea: No tracheal tenderness  Cardiovascular:      Rate and Rhythm: Normal rate and regular rhythm  Heart sounds: No murmur  Pulmonary:      Effort: Pulmonary effort is normal  No respiratory distress  Breath sounds: Normal breath sounds  Abdominal:      Palpations: Abdomen is soft  Tenderness: There is no abdominal tenderness  Lymphadenopathy:      Cervical: No cervical adenopathy  Right cervical: No superficial, deep or posterior cervical adenopathy  Left cervical: No superficial, deep or posterior cervical adenopathy  Skin:     General: Skin is warm and dry  Neurological:      Mental Status: She is alert           Additional Data:     Labs:    Results from last 7 days   Lab Units 03/25/21  0528  03/23/21  2343   WBC Thousand/uL 5 14   < > 6 12   HEMOGLOBIN g/dL 11 6   < > 12 2   HEMATOCRIT % 36 6   < > 37 6   PLATELETS Thousands/uL 208   < > 243   NEUTROS PCT %  --   --  59   LYMPHS PCT %  --   --  27   MONOS PCT %  --   --  9   EOS PCT %  --   --  4    < > = values in this interval not displayed  Results from last 7 days   Lab Units 03/25/21  0528  03/23/21  2343   SODIUM mmol/L 137   < > 135*   POTASSIUM mmol/L 3 6   < > 3 4*   CHLORIDE mmol/L 103   < > 100   CO2 mmol/L 26   < > 24   BUN mg/dL 10   < > 10   CREATININE mg/dL 0 86   < > 1 25   ANION GAP mmol/L 8   < > 11   CALCIUM mg/dL 8 2*   < > 8 4   ALBUMIN g/dL  --   --  3 2*   TOTAL BILIRUBIN mg/dL  --   --  0 50   ALK PHOS U/L  --   --  77   ALT U/L  --   --  33   AST U/L  --   --  31   GLUCOSE RANDOM mg/dL 96   < > 100    < > = values in this interval not displayed  Results from last 7 days   Lab Units 03/23/21  2343   INR  1 10                       * I Have Reviewed All Lab Data Listed Above  * Additional Pertinent Lab Tests Reviewed:  All Labs Within Last 24 Hours Reviewed    Imaging:    Imaging Reports Reviewed Today Include:  CTA head/neck  Imaging Personally Reviewed by Myself Includes:  none    Recent Cultures (last 7 days):           Last 24 Hours Medication List:   Current Facility-Administered Medications   Medication Dose Route Frequency Provider Last Rate    acetaminophen  650 mg Oral Q6H PRN Eddy Wren PA-C      atorvastatin  40 mg Oral Daily With Sumit Wren PA-C      clopidogrel  75 mg Oral Daily Eddy Wren PA-C      Diclofenac Sodium  2 g Topical 4x Daily PRN Kim Jenkins PA-C      enoxaparin  40 mg Subcutaneous Daily Kim Jenkins PA-C      fluticasone  1 spray Nasal Daily Eddy Wren PA-C      fluticasone-vilanterol  1 puff Inhalation Daily Eddy Wren PA-C      hydrALAZINE  5 mg Intravenous Q6H PRN Eddy RODRÍGUEZ SHILOH Wren      levETIRAcetam  250 mg Oral Q12H [address] Eddy Wren PA-C      levothyroxine  88 mcg Oral Early Morning Eddy Wren PA-C      loratadine  10 mg Oral Daily Eddy Wren PA-C      LORazepam  0 5 mg Oral Q6H PRN Eddy Wren PA-C      losartan  50 mg Oral Daily Eddy Wren PA-C      melatonin  3 mg Oral HS Eddy Wren PA-C      metaxalone  400 mg Oral TID Kim Jenkins PA-C      metoprolol succinate  50 mg Oral Daily Eddy Wren PA-C      naproxen  250 mg Oral BID With Meals Kim Jenkins PA-C      pantoprazole  40 mg Oral Early Morning Eddy Wren PA-C          Today, Patient Was Seen By: Betty Finnegan PA-C    ** Please Note: Dictation voice to text software may have been used in the creation of this document   **

## 2021-03-25 NOTE — ASSESSMENT & PLAN NOTE
On Keppra XR at home, had ordered keppra q12 while admitted  Resume Keppra XR home  Follow-up outpatient PCP

## 2021-03-25 NOTE — ASSESSMENT & PLAN NOTE
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  Described stabbing right-sided inner ear pain which has resolved  Right-sided facial pain also resolved entirely  Dental exam benign  Otoscopic exam shows some fluid but otherwise WNL exam per ER   CTA H&N neg for dissection  Did not show any neck masses  No superficial vasculitis  The piercing pain has resolved  Dull pain noted on right side of face also has resolved  Given severity of pain which likely caused hallucinations and metabolic encephalopathy, discussed with on-call ENT, Dr Salima Rosas   CTA scan is reassuring that there is no mass  Ears are clear on the scan  Patient can follow with outpatient ENT  If concern for trigeminal neuralgia persists, can follow outpatient Neurology  Outpatient OMS may be more appropriate referral for TMJ    Contact information provided for outpatient OMS  Patient will follow-up 1-2 weeks with PCP

## 2021-03-26 VITALS
RESPIRATION RATE: 18 BRPM | DIASTOLIC BLOOD PRESSURE: 68 MMHG | WEIGHT: 157.63 LBS | SYSTOLIC BLOOD PRESSURE: 157 MMHG | TEMPERATURE: 97.8 F | OXYGEN SATURATION: 92 % | BODY MASS INDEX: 30.78 KG/M2 | HEART RATE: 61 BPM

## 2021-03-26 PROCEDURE — 97163 PT EVAL HIGH COMPLEX 45 MIN: CPT

## 2021-03-26 PROCEDURE — 99239 HOSP IP/OBS DSCHRG MGMT >30: CPT | Performed by: NURSE PRACTITIONER

## 2021-03-26 PROCEDURE — 97110 THERAPEUTIC EXERCISES: CPT

## 2021-03-26 RX ADMIN — NAPROXEN 250 MG: 250 TABLET ORAL at 09:24

## 2021-03-26 RX ADMIN — CLOPIDOGREL BISULFATE 75 MG: 75 TABLET ORAL at 09:24

## 2021-03-26 RX ADMIN — METOPROLOL SUCCINATE 50 MG: 50 TABLET, EXTENDED RELEASE ORAL at 09:24

## 2021-03-26 RX ADMIN — LEVOTHYROXINE SODIUM 88 MCG: 88 TABLET ORAL at 05:33

## 2021-03-26 RX ADMIN — METAXALONE 400 MG: 800 TABLET ORAL at 09:24

## 2021-03-26 RX ADMIN — ENOXAPARIN SODIUM 40 MG: 40 INJECTION SUBCUTANEOUS at 09:25

## 2021-03-26 RX ADMIN — LORATADINE 10 MG: 10 TABLET ORAL at 09:24

## 2021-03-26 RX ADMIN — LOSARTAN POTASSIUM 50 MG: 50 TABLET, FILM COATED ORAL at 09:24

## 2021-03-26 RX ADMIN — FLUTICASONE FUROATE AND VILANTEROL TRIFENATATE 1 PUFF: 100; 25 POWDER RESPIRATORY (INHALATION) at 11:12

## 2021-03-26 RX ADMIN — PANTOPRAZOLE SODIUM 40 MG: 40 TABLET, DELAYED RELEASE ORAL at 05:33

## 2021-03-26 RX ADMIN — LEVETIRACETAM 250 MG: 500 TABLET, FILM COATED ORAL at 09:30

## 2021-03-26 NOTE — DISCHARGE SUMMARY
Adry 47  Discharge- Ples Canary 1935, 82 y o  female MRN: [id number]  Unit/Bed#: [address] [place] [address] Encounter: [id number]  Primary Care Provider: Artemio Robles MD   Date and time admitted to hospital: 3/23/2021 11:15 PM    * Acute encephalopathy  Assessment & Plan  Presented with c/o excruciating left ear pain  At the time, she had hallucinations and was making abnormal statements prompting psych eval   Suspect encephalopathy was due to pain  No sign of infection  Electrolytes within normal limits  As patient's pain has resolved, acute encephalopathy has also resolved  A&Ox4  Calm and cooperative  Treat underlying etiology of pain, see plan for TMJ  Patient will be discharged home today  She is to follow-up with her PCP to 2 weeks post discharge  Contact information provided for oral maxillary surgery to follow-up with TMJ  Hallucinations  Assessment & Plan  Resolved  Most likely secondary to pain as noted above    Essential hypertension  Assessment & Plan  Continue cozaar, toprol XL   Vital signs stable  Follow-up with PCP post discharge 1-2 weeks    TMJ (temporomandibular joint disorder)  Assessment & Plan  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  Described stabbing right-sided inner ear pain which has resolved  Right-sided facial pain also resolved entirely  Dental exam benign  Otoscopic exam shows some fluid but otherwise WNL exam per ER   CTA H&N neg for dissection  Did not show any neck masses  No superficial vasculitis  The piercing pain has resolved  Dull pain noted on right side of face also has resolved  Given severity of pain which likely caused hallucinations and metabolic encephalopathy, discussed with on-call ENT, Dr Hina Gomez   CTA scan is reassuring that there is no mass  Ears are clear on the scan  Patient can follow with outpatient ENT    If concern for trigeminal neuralgia persists, can follow outpatient Neurology  Outpatient OMS may be more appropriate referral for TMJ  Contact information provided for outpatient OMS  Patient will follow-up 1-2 weeks with PCP      Paroxysmal atrial fibrillation ([facility] [address] )  Assessment & Plan  Not on chronic AC   Continue toprol XL  Follow-up outpatient PCP    Seizure disorder ([facility] [address] )  Assessment & Plan  On Keppra XR at home, had ordered keppra q12 while admitted  Resume Keppra XR home  Follow-up outpatient PCP     Adult hypothyroidism  Assessment & Plan  Continue synthroid     Dyslipidemia  Assessment & Plan  Continue lipitor   Heart healthy diet    Anxiety  Assessment & Plan  Continue prn ativan, celexa   Supportive care    H/O: CVA (cerebrovascular accident)  Assessment & Plan  Continue asa, plavix   CTA H&N neg for stroke   Non focal exam         Discharging Physician / Practitioner: SACHI Leyva  PCP: Terrell Mcneal MD  Admission Date:   Admission Orders (From admission, onward)     Ordered        03/24/21 0129  Inpatient Admission  Once                   Discharge Date: 03/26/21    Resolved Problems  Date Reviewed: 3/26/2021    None          Consultations During Hospital Stay:  · Discussion with ENT on-call  · Psychiatry    Procedures Performed:     · CTA of head performed shows no evidence of acute infarct, intracranial hemorrhage or mass effect, no significant stenosis, dissection or occlusion of carotid or vertebral arteries, no evidence of stenosis or vasculitis in the superficial temporal arteries as per radiology report    Significant Findings / Test Results:     · See above    Incidental Findings:   · None     Test Results Pending at Discharge (will require follow up):    · None     Outpatient Tests Requested:  · None    Complications:  None    Reason for Admission:  Left ear pain    Hospital Course:     Theresa Bridges is a 82 y o  female patient past medical history of hypertension, hyperlipidemia, hypothyroidism, seizure disorder, history of CVA in past who originally presented to the hospital on 3/23/2021 due to left ear pain  Patient was brought in by EMS, EMS indicated that patient was not answering questions and had slurred speech  In the ER per nurse patient was awake week alert and oriented and offered history without slurred speech  She was complaining of ear pain, talking about demons and having hallucinations in the ER  Psych was consulted, and it was deemed that patient was having extreme pain that was causing her to say things like I do not want to be doing this any longer, later patient indicates to psychiatry that she had no recollection of hallucinations or speaking out of turn  CT of head neck were negative, on-call ENT reviewed images in indicated that there appeared to be no masses or issues with ears  Exam of bilateral ears showed no fluid  She was admitted for further evaluation and treatment, was placed on nsaid and muscle relaxants, Voltaren cream   Patient reported on day of discharge that the pain had completely dissipated  Suspect that patient may have component of TMJ, recommend that she follow-up with outpatient OMS  Contact information provided to her  Patient will also follow up with her primary care physician 1-2 weeks post discharge  She is discharged home today  Please see above list of diagnoses and related plan for additional information  Condition at Discharge: good     Discharge Day Visit / Exam:     Subjective:  Patient seen sitting up in bed resting comfortably  Reports that she had slept well last night  Denies any pain at all with her head, face or neck or jaw  Reports good appetite and is hopeful that she will be able to go home today  Denies any headache, dizziness, chest pain, shortness of breath, abdominal pain, nausea, vomiting or diarrhea    Vitals: Blood Pressure: 157/68 (03/26/21 0732)  Pulse: 61 (03/26/21 0732)  Temperature: 97 8 °F (36 6 °C) (03/26/21 0732)  Temp Source: Oral (03/25/21 1542)  Respirations: 18 (03/25/21 2320)  Weight - Scale: 71 5 kg (157 lb 10 1 oz) (03/23/21 2317)  SpO2: 92 % (03/26/21 0732)  Exam:   Physical Exam  Vitals signs and nursing note reviewed  Constitutional:       General: She is not in acute distress  Appearance: Normal appearance  HENT:      Head: Normocephalic  Comments: Denies any pain in scalp, face or neck     Nose: Nose normal       Mouth/Throat:      Mouth: Mucous membranes are moist    Eyes:      Extraocular Movements: Extraocular movements intact  Pupils: Pupils are equal, round, and reactive to light  Neck:      Musculoskeletal: Normal range of motion  Cardiovascular:      Rate and Rhythm: Normal rate and regular rhythm  Pulses: Normal pulses  Heart sounds: Normal heart sounds  Pulmonary:      Effort: Pulmonary effort is normal       Breath sounds: Normal breath sounds  Abdominal:      General: Bowel sounds are normal  There is no distension  Palpations: Abdomen is soft  Tenderness: There is no abdominal tenderness  Genitourinary:     Comments: Voiding spontaneously  Musculoskeletal: Normal range of motion  General: No swelling  Skin:     General: Skin is warm and dry  Capillary Refill: Capillary refill takes less than 2 seconds  Neurological:      Mental Status: She is alert and oriented to person, place, and time  Psychiatric:         Mood and Affect: Mood normal          Behavior: Behavior normal          Thought Content: Thought content normal          Judgment: Judgment normal            Discussion with Family:  Spoke with the patient at the bedside, I have answered all questions to the best of my abilities  Discharge instructions/Information to patient and family:   See after visit summary for information provided to patient and family        Provisions for Follow-Up Care:  See after visit summary for information related to follow-up care and any pertinent home health orders  Disposition:     Home    For Discharges to [facility]:   · Not Applicable to this Patient - Not Applicable to this Patient    Planned Readmission:  No     Discharge Statement:  I spent greater than 30 minutes discharging the patient  This time was spent on the day of discharge  I had direct contact with the patient on the day of discharge  Greater than 50% of the total time was spent examining patient, answering all patient questions, arranging and discussing plan of care with patient as well as directly providing post-discharge instructions  Additional time then spent on discharge activities  Discharge Medications:  See after visit summary for reconciled discharge medications provided to patient and family        ** Please Note: This note has been constructed using a voice recognition system **

## 2021-03-26 NOTE — PLAN OF CARE
Problem: Potential for Falls  Goal: Patient will remain free of falls  Description: INTERVENTIONS:  - Assess patient frequently for physical needs  -  Identify cognitive and physical deficits and behaviors that affect risk of falls  -  [place] fall precautions as indicated by assessment   - Educate patient/family on patient safety including physical limitations  - Instruct patient to call for assistance with activity based on assessment  - Modify environment to reduce risk of injury  - Consider OT/PT consult to assist with strengthening/mobility  3/26/2021 1337 by Belkis Abbott RN  Outcome: Completed  3/26/2021 1337 by Belkis Abbott RN  Outcome: Progressing     Problem: PAIN - ADULT  Goal: Verbalizes/displays adequate comfort level or baseline comfort level  Description: Interventions:  - Encourage patient to monitor pain and request assistance  - Assess pain using appropriate pain scale  - Administer analgesics based on type and severity of pain and evaluate response  - Implement non-pharmacological measures as appropriate and evaluate response  - Consider cultural and social influences on pain and pain management  - Notify physician/advanced practitioner if interventions unsuccessful or patient reports new pain  3/26/2021 1337 by Belkis Abbott RN  Outcome: Completed  3/26/2021 1337 by Belkis Abbott RN  Outcome: Progressing     Problem: SAFETY ADULT  Goal: Patient will remain free of falls  Description: INTERVENTIONS:  - Assess patient frequently for physical needs  -  Identify cognitive and physical deficits and behaviors that affect risk of falls    -  [place] fall precautions as indicated by assessment   - Educate patient/family on patient safety including physical limitations  - Instruct patient to call for assistance with activity based on assessment  - Modify environment to reduce risk of injury  - Consider OT/PT consult to assist with strengthening/mobility  3/26/2021 1337 by Belkis Abbott RN  Outcome: Completed  3/26/2021 1337 by Umu Forrest RN  Outcome: Progressing     Problem: NEUROSENSORY - ADULT  Goal: Achieves stable or improved neurological status  Description: INTERVENTIONS  - Monitor and report changes in neurological status  - Monitor vital signs such as temperature, blood pressure, glucose, and any other labs ordered   - Initiate measures to prevent increased intracranial pressure  - Monitor for seizure activity and implement precautions if appropriate      3/26/2021 1337 by Umu Forrest RN  Outcome: Completed  3/26/2021 1337 by Umu Forrest RN  Outcome: Progressing

## 2021-03-26 NOTE — PLAN OF CARE
Problem: Potential for Falls  Goal: Patient will remain free of falls  Description: INTERVENTIONS:  - Assess patient frequently for physical needs  -  Identify cognitive and physical deficits and behaviors that affect risk of falls  -  [place] fall precautions as indicated by assessment   - Educate patient/family on patient safety including physical limitations  - Instruct patient to call for assistance with activity based on assessment  - Modify environment to reduce risk of injury  - Consider OT/PT consult to assist with strengthening/mobility  Outcome: Progressing     Problem: PAIN - ADULT  Goal: Verbalizes/displays adequate comfort level or baseline comfort level  Description: Interventions:  - Encourage patient to monitor pain and request assistance  - Assess pain using appropriate pain scale  - Administer analgesics based on type and severity of pain and evaluate response  - Implement non-pharmacological measures as appropriate and evaluate response  - Consider cultural and social influences on pain and pain management  - Notify physician/advanced practitioner if interventions unsuccessful or patient reports new pain  Outcome: Progressing     Problem: SAFETY ADULT  Goal: Patient will remain free of falls  Description: INTERVENTIONS:  - Assess patient frequently for physical needs  -  Identify cognitive and physical deficits and behaviors that affect risk of falls    -  [place] fall precautions as indicated by assessment   - Educate patient/family on patient safety including physical limitations  - Instruct patient to call for assistance with activity based on assessment  - Modify environment to reduce risk of injury  - Consider OT/PT consult to assist with strengthening/mobility  Outcome: Progressing     Problem: NEUROSENSORY - ADULT  Goal: Achieves stable or improved neurological status  Description: INTERVENTIONS  - Monitor and report changes in neurological status  - Monitor vital signs such as temperature, blood pressure, glucose, and any other labs ordered   - Initiate measures to prevent increased intracranial pressure  - Monitor for seizure activity and implement precautions if appropriate      Outcome: Progressing

## 2021-03-26 NOTE — DISCHARGE INSTRUCTIONS
Encephalopathy   WHAT YOU NEED TO KNOW:   Encephalopathy is a term used to describe brain disease or brain damage  It usually develops because of a health condition such as cirrhosis, or a brain injury  Symptoms may be mild or severe, and may be short-term or permanent  DISCHARGE INSTRUCTIONS:   Call 911 or have someone else call for any of the following:   · You cannot be woken  · You had a seizure  Seek care immediately if:   · You had a seizure  · You feel confused, dizzy, or lightheaded  · Your heart is beating faster than is normal for you  · You have sudden shortness of breath  Contact your healthcare provider if:   · You have a fever  · You are sleeping more than usual     · You have questions or concerns about your condition or care  Medicines: You may need any of the following:  · Blood pressure medicine  may be given to raise or lower your blood pressure  · Antibiotics  help treat an infection caused by bacteria  · A vitamin B supplement  may be needed if the level in your blood is too low  · Take your medicine as directed  Contact your healthcare provider if you think your medicine is not helping or if you have side effects  Tell him or her if you are allergic to any medicine  Keep a list of the medicines, vitamins, and herbs you take  Include the amounts, and when and why you take them  Bring the list or the pill bottles to follow-up visits  Carry your medicine list with you in case of an emergency  Manage encephalopathy:   · Limit or do not drink alcohol  Alcohol can worsen your condition  Alcohol can also cause new or worsening damage to your liver and brain  Ask your healthcare provider if it is safe for you to drink alcohol  Limit alcohol to 1 drink a day if you are a woman, or 2 drinks a day if you are a man  A drink of alcohol is 12 ounces of beer, 5 ounces of wine, or 1½ ounces of liquor  · Eat low-protein and low-sodium foods, if directed    If your symptoms are caused by a liver disease, you may be asked to eat less protein and sodium (salt)  Some foods high in protein include beef, pork, poultry (chicken, turkey), beans, and nuts  Some foods high in sodium include salty snacks, canned foods, condiments, deli meats, and cured meat such as yuen  Ask your dietitian for more information about foods to limit or avoid  Follow up with your healthcare provider as directed:  Write down your questions so you remember to ask them during your visits  © Copyright [address] [place] [address] Drive Information is for End User's use only and may not be sold, redistributed or otherwise used for commercial purposes  All illustrations and images included in CareNotes® are the copyrighted property of A D A M , Inc  or [facility] Nidhi Nogueira   The above information is an  only  It is not intended as medical advice for individual conditions or treatments  Talk to your doctor, nurse or pharmacist before following any medical regimen to see if it is safe and effective for you  Temporomandibular Disorder   WHAT YOU NEED TO KNOW:   Temporomandibular disorder is a condition that causes pain in your jaw  The disorder affects the joint between your temporal bone and your mandible (jawbone)  The muscles and nerves around the joint are also affected  DISCHARGE INSTRUCTIONS:   Medicines:   · Pain medicine: You may be given a prescription medicine to decrease pain  Do not wait until the pain is severe before you take this medicine  · NSAIDs:  These medicines decrease swelling and pain  You can buy NSAIDs without a doctor's order  Ask your healthcare provider which medicine is right for you, and how much to take  Take as directed  NSAIDs can cause stomach bleeding or kidney problems if not taken correctly  · Muscle relaxers  help decrease pain and muscle spasms  · Take your medicine as directed    Contact your healthcare provider if you think your medicine is not helping or if you have side effects  Tell him or her if you are allergic to any medicine  Keep a list of the medicines, vitamins, and herbs you take  Include the amounts, and when and why you take them  Bring the list or the pill bottles to follow-up visits  Carry your medicine list with you in case of an emergency  Follow up with your healthcare provider as directed:  Write down your questions so you remember to ask them during your visits  Manage your symptoms:   · Eat soft foods: Your healthcare provider may suggest that you eat only soft foods for several days  A dietitian may work with you to find foods that are easier to bite, chew, or swallow  Examples are soup, applesauce, cottage cheese, pudding, yogurt, and soft fruits  · Use jaw supporting devices:  Splints may be used to support your jaw or keep it from moving  You may need to wear a mouth guard to keep you from clenching or grinding your teeth while you are sleeping  · Use ice and heat:  Ice helps decrease swelling and pain  Ice may also help prevent tissue damage  Use an ice pack, or put crushed ice in a plastic bag  Cover it with a towel and place it on your jaw for 15 to 20 minutes every hour or as directed  After the first 24 to 48 hours, use heat to decrease pain, swelling, and muscle spasms  Apply heat on the area for 20 to 30 minutes every 2 hours for as many days as directed  Use a heating pad, moist warm compress, or a hot water bottle  · Go to physical therapy:  A physical therapist teaches you exercises to help improve movement and strength, and to decrease pain in your jaw  A speech therapist may also help you with swallowing and speech exercises  Contact your healthcare provider if:   · You have a fever  · Your splint or mouth guard is loose  · You have questions or concerns about your condition or care      Seek care immediately or call 911 if:   · You have nausea, are vomiting, or cannot keep liquids down     · You have pain that does not go away even after you take your pain medicine  · You have problems breathing, talking, drinking, eating, or swallowing  · Your splint or mouth guard gets damaged or broken  © Copyright [address] Information is for End User's use only and may not be sold, redistributed or otherwise used for commercial purposes  All illustrations and images included in CareNotes® are the copyrighted property of A D A M , Inc  or [facility] Nidhi Nogueira   The above information is an  only  It is not intended as medical advice for individual conditions or treatments  Talk to your doctor, nurse or pharmacist before following any medical regimen to see if it is safe and effective for you

## 2021-03-26 NOTE — OCCUPATIONAL THERAPY NOTE
Occupational Therapy Screen       03/26/21 1236   Note Type   Note type Screen   Cancel Reasons   (Pt independent ADLs/mobility, no OT needs)   Licensure   [place] License Number  Bautista Thacker, OTR/L [id number]

## 2021-03-26 NOTE — PHYSICAL THERAPY NOTE
Home (commanded estimated the top of my note spotting     PT EVALUATION     03/26/21 1035   Note Type   Note type Evaluation   Pain Assessment   Pain Assessment Tool Pain Assessment not indicated - pt denies pain   Home Living   Type of [address] One level;Stairs to enter with rails  (3 stairs to enter)   [address] Staples chair   Home Equipment Walker;Cane   Additional Comments Patient independent without assistive device prior to admission   Prior Function   Level of [place] Independent with ADLs and functional mobility   Lives With Spouse   ADL Assistance Independent   IADLs Independent   Comments Patient caring for her  as well ass self  prior to admission   Restrictions/Precautions   Other Precautions Fall Risk   General   Additional Pertinent History Chart reviewed, patient admitted with acute encephalopathy  Patient is caregiver  at now presents as generally deconditioned   Family/Caregiver Present No   Cognition   Overall Cognitive Status WFL   Arousal/Participation Cooperative   Orientation Level Oriented X4   Following Commands Follows all commands and directions without difficulty   RLE Assessment   RLE Assessment   (ROM WFL, strength 3+/ 4-)   LLE Assessment   LLE Assessment   (ROM WFL, strength 3+/ 4-)   Coordination   Movements are Fluid and Coordinated 0   Bed Mobility   Supine to Sit 5  Supervision   Transfers   Sit to Stand 5  Supervision   Stand to Sit 5  Supervision   Ambulation/Elevation   Gait Assistance   (Supervision to Min assist)   Additional items Assist x 1   Assistive Device   (None)   Distance 200 ft with change in direction occasional balance loss noted requiring minimal assist for correction    Education completed in possible use of roller walker   Balance   Static Sitting Fair +   Dynamic Sitting Fair +   Static Standing Fair   Dynamic Standing Fair   Ambulatory Fair -   Activity Tolerance   Activity Tolerance Patient tolerated treatment well;Patient limited by fatigue   Nurse Made Aware yes   Assessment   Prognosis Good   Problem List Decreased strength;Decreased range of motion;Decreased endurance; Impaired balance;Decreased mobility; Decreased coordination;Pain   Assessment Patient seen for Physical Therapy evaluation  Patient admitted with Acute encephalopathy  Comorbidities affecting patient's physical performance include:HTN, HLD, hypothyroidism, sz disorder, CVA hx,  who presents with left ear pain   Personal factors affecting patient at time of initial evaluation include: stairs to enter home, inability to ambulate household distances, inability to navigate community distances, inability to navigate level surfaces without external assistance, inability to perform dynamic tasks in community, limited home support, inability to perform physical activity, inability to perform ADLS and inability to perform IADLS   Prior to admission, patient was independent with functional mobility without assistive device, independent with ADLS, independent with IADLS, ambulating household distance and ambulating community distances  Please find objective findings from Physical Therapy assessment regarding body systems outlined above with impairments and limitations including weakness, decreased ROM, impaired balance, decreased endurance, impaired coordination, gait deviations, decreased activity tolerance, decreased functional mobility tolerance and fall risk  The Barthel Index was used as a functional outcome tool presenting with a score of 80 today indicating marked limitations of functional mobility and ADLS  Patient's clinical presentation is currently unstable/unpredictable as seen in patient's presentation of vital sign response, changing level of pain, varying levels of cognitive performance, increased fall risk, new onset of impairment of functional mobility, decreased endurance and new onset of weakness   Pt would benefit from continued Physical Therapy treatment to address deficits as defined above and maximize level of functional mobility  As demonstrated by objective findings, the assigned level of complexity for this evaluation is high  The patient's AM-PAC Basic Mobility Inpatient Short Form Raw Score is 20, Standardized Score is 43 99  A standardized score greater than 42 9 suggests the patient may benefit from discharge to home  Please also refer to the recommendation of the Physical Therapist for safe discharge planning  Goals   Patient Goals To go home today   STG Expiration Date 04/02/21   Short Term Goal #1 Transfers and gait with as needed independently   Short Term Goal #2 Gait endurance to functional household distances to meet patient goal returning   LTG Expiration Date 04/09/21   Long Term Goal #1 Independent gait without assistive device   Long Term Goal #2 Gait endurance to functional community distances   Plan   Treatment/Interventions ADL retraining;Functional transfer training;LE strengthening/ROM; Elevations; Therapeutic exercise; Endurance training;Patient/family training;Equipment eval/education;Gait training; Compensatory technique education   PT Frequency 5x/wk   Recommendation   PT Discharge Recommendation Return to previous environment with social support   Additional Comments Patient educated to use roller walker as needed for return home as needed   [address] [place] [address] Mobility Inpatient   Turning in Bed Without Bedrails 4   Lying on Back to Sitting on Edge of Flat Bed 4   Moving Bed to Chair 3   Standing Up From Chair 3   Walk in Room 3   Climb 3-5 Stairs 3   Basic Mobility Inpatient Raw Score 20   Basic Mobility Standardized Score 43 99   Barthel Index   Feeding 10   Bathing 0   Grooming Score 5   Dressing Score 10   Bladder Score 10   Bowels Score 10   Toilet Use Score 10   Transfers (Bed/Chair) Score 10   Mobility (Level Surface) Score 10   Stairs Score 5   Barthel Index Score 80   Licensure   [place] License Number  Cynda Sas PT [id number]     PT TREATMENT  Time In:1025  Time Out:1035  Total Time: 10min      S:  Patient without pain at this time and cognition intact  O:  -gait training on stairs with 1 rail step to patterning with supervision, 4 stairs  -exercise completed sitting in bedside chair:  Hip flexion, ankle pum[s, long arc quads all 10 reps BLEs  A:  Patient with improved cognition from admission and with good safety awareness and understanding a possibility using roller as needed at home  P:  Return home with use of roller walker as needed    Bartholome Frames, PT

## 2021-03-27 LAB
ATRIAL RATE: 84 BPM
P AXIS: 74 DEGREES
PR INTERVAL: 184 MS
QRS AXIS: 75 DEGREES
QRSD INTERVAL: 88 MS
QT INTERVAL: 394 MS
QTC INTERVAL: 465 MS
T WAVE AXIS: 68 DEGREES
VENTRICULAR RATE: 84 BPM

## 2021-03-27 PROCEDURE — 93010 ELECTROCARDIOGRAM REPORT: CPT | Performed by: INTERNAL MEDICINE

## 2021-03-29 NOTE — UTILIZATION REVIEW
Initial Clinical Review    Admission: Date/Time/Statement:   Admission Orders (From admission, onward)     Ordered        03/24/21 0129  Inpatient Admission  Once                   Orders Placed This Encounter   Procedures    Inpatient Admission     Standing Status:   Standing     Number of Occurrences:   1     Order Specific Question:   Level of Care     Answer:   Med Surg [16]     Order Specific Question:   Estimated length of stay     Answer:   More than 2 Midnights     Order Specific Question:   Certification     Answer:   I certify that inpatient services are medically necessary for this patient for a duration of greater than two midnights  See H&P and MD Progress Notes for additional information about the patient's course of treatment  ED Arrival Information     Expected Arrival Acuity Means of Arrival Escorted By Service Admission Type    - 3/23/2021 23:13 Urgent Ambulance [place] Hospitalist Urgent    Arrival Complaint    Altered Mental status        Chief Complaint   Patient presents with    Earache     pt brought in for right ear pain  per EMS has history of strokes is on thinners  Assessment/Plan:     82year old female presents to ed from home via ems for evaluation and treatment of left ear pain  PMHX : CVA on plavix, SAH, SDH, AFIB  Clinical assessment significant for tachypnea, hypertension, pain 10/10,CTA head and neck without acute finding  Patent states that a childhood demon torments her and put something in her ear  Patient has been agitated in ed yelling out frequently and requiring emotional support  There is middle ear effusion  Treated in ed with iv mso4 x2  admit to inpatient medical surgical for acute encephalopathy  Plan includes: geropsychiatry, ua culture  3-24  Patient reports right ear pain continues with dizziness  Patient remains agitated and tearful  PT and OT evaluations ordered          Consult psychiatry 3-24 3 pm  Assessment:  Patient is a 82 y o  female presents with Altered mental status  Patient reports  That she was in severe pain and made some unusual statements while feeling confused  Patient's behaviors include  Being calm and cooperative during the assessment, able to demonstrate logical and coherent thought process  Relevant medical issues include  -see past medical history         Plan:   1   at this time discontinued Celexa as patient reports she does not take at home  2  No current psychiatric recommendations  Patient does not wish to see a therapist nor has access to virtual therapy  Do not recommend inpatient psychiatric admission  3   Patient is delusional statements were likely set in the context of delirium which is almost if not completely resolved      ED Triage Vitals [03/23/21 2317]   98 4 °F (36 9 °C) 89 18 154/99 100 %      Tympanic Monitor         Worst Possible Pain          03/23/21 71 5 kg (157 lb 10 1 oz)     Additional Vital Signs:     Date/  Time  Temp  Pulse  Resp  BP  MAP (mmHg)  SpO2  O2 Flow Rate (L/min)  O2 Device   03/24/21 0752  99 9 °F (37 7 °C)  75  18  159/68  --  99 %  2 L/min  None (Room air)   03/24/21 0718  --  82  18  190/74  Abnormal   --  99 %  --  None (Room air)   03/24/21 0400  --  84  17  190/79  Abnormal   114  90 %  --  None (Room air)   03/24/21 0315  --  78  16  185/74  Abnormal   106  100 %  --  None (Room air)   03/24/21 0300  --  78  19  183/77  Abnormal   110  99 %  2 L/min  Nasal cannula   03/24/21 0255  --  --  --  --  --  99 %  --  --   03/24/21 0245  --  --  22  183/76  Abnormal   109  91 %  --  None (Room air)    03/24/21 0230  --  82  20  167/71  102  94 %  --  None (Room air)   03/24/21 0200  98 8 °F (37 1 °C)  80  24Abnormal   176/75  Abnormal   108  99 %  --  None (Room air)   03/24/21 0145  --  82  24Abnormal   173/72  Abnormal   103  98 %  --  None (Room air)   03/24/21 0126  --  78  24Abnormal   187/77  Abnormal   --  100 %  --  None (Room air)   03/24/21 0045  --  80  26Abnormal 192/88  Abnormal   126  96 %  --  None (Room air)   03/24/21 0030  --  82  26Abnormal   171/78  Abnormal   109  96 %  --  None (Room air)   03/24/21 0000  --  88  37Abnormal   203/82  Abnormal   118  98 %  --  None (Room air)   03/23/21 2346  --  --  24Abnormal   193/79  Abnormal   --  98 %  --  None (Room air)   03/23/21 2320  --  --  --  --  --  --  --  None (Room air)   03/23/21 2317  98 4 °F (36 9 °C)  89  18  154/99  121  100 %  --  None (Room air)       Date and Time Eye Opening Best Verbal Response Best Motor Response Genet Coma Scale Score   03/23/21 2320 4 5 6 15           Pertinent Labs/Diagnostic Test Results:     ECG 12 Lead Documentation Only   Date/Time: 3/23/2021 11:26 PM       Indications / Diagnosis:  Ms changes    Patient location:  ED  Previous ECG:     Previous ECG:  Unavailable    Comparison to cardiac monitor: Yes    Interpretation:     Interpretation: normal    Rate:     ECG rate:  84bpm    ECG rate assessment: normal    Rhythm:     Rhythm: sinus rhythm    Ectopy:     Ectopy: none    QRS:     QRS axis:  Normal  Conduction:     Conduction: normal    ST segments:     ST segments:  Normal  T waves:     T waves: normal          CTA head and neck with and without contrast    (03/24 0045)         1  No evidence of acute infarct, intracranial hemorrhage or mass effect  2   No hemodynamically significant stenosis, dissection or occlusion of the carotid or vertebral arteries or major vessels of the [place]  3   No evidence of stenosis or vasculitis in the superficial temporal arteries              Results from last 7 days   Lab Units 03/25/21 0528 03/24/21  1059 03/23/21  2343   WBC Thousand/uL 5 14 6 45 6 12   HEMOGLOBIN g/dL 11 6 11 9 12 2   HEMATOCRIT % 36 6 36 7 37 6   PLATELETS Thousands/uL 208 239 243   NEUTROS ABS Thousands/µL  --   --  3 67         Results from last 7 days   Lab Units 03/25/21  0528 03/24/21  1059 03/23/21  2343   SODIUM mmol/L 137 136 135*   POTASSIUM mmol/L 3 6 3 8 3 4*   CHLORIDE mmol/L 103 101 100   CO2 mmol/L 26 24 24   ANION GAP mmol/L 8 11 11   BUN mg/dL 10 9 10   CREATININE mg/dL 0 86 0 93 1 25   EGFR ml/min/1 73sq m 63 57 40   CALCIUM mg/dL 8 2* 7 8* 8 4   MAGNESIUM mg/dL  --   --  1 8     Results from last 7 days   Lab Units 03/23/21  2343   AST U/L 31   ALT U/L 33   ALK PHOS U/L 77   TOTAL PROTEIN g/dL 6 8   ALBUMIN g/dL 3 2*   TOTAL BILIRUBIN mg/dL 0 50         Results from last 7 days   Lab Units 03/25/21  0528 03/24/21  1059 03/23/21  2343   GLUCOSE RANDOM mg/dL 96 124 100       Results from last 7 days   Lab Units 03/23/21  2343   TROPONIN I ng/mL <0 02         Results from last 7 days   Lab Units 03/23/21  2343   PROTIME seconds 14 1   INR  1 10   PTT seconds 20*       Results from last 7 days   Lab Units 03/23/21  2343   SED RATE mm/hour 11         Results from last 7 days   Lab Units 03/24/21  0036   CLARITY UA  Clear   COLOR UA  Light Yellow   SPEC GRAV UA  <=1 005   PH UA  7 5   GLUCOSE UA mg/dl Negative   KETONES UA mg/dl Negative   BLOOD UA  Negative   PROTEIN UA mg/dl Negative   NITRITE UA  Negative   BILIRUBIN UA  Negative   UROBILINOGEN UA E U /dl 0 2   LEUKOCYTES UA  Negative             Results from last 7 days   Lab Units 03/24/21  0036   AMPH/METH  Negative   BARBITURATE UR  Negative   BENZODIAZEPINE UR  Negative   COCAINE UR  Negative   METHADONE URINE  Negative   OPIATE UR  Negative   PCP UR  Negative   THC UR  Negative       ED Treatment:   Medication Administration from 03/23/2021 2313 to 03/24/2021 0749       Date/Time Order Dose Route Action     03/24/2021 0033 morphine injection 2 mg 2 mg Intravenous Given     03/24/2021 0102 morphine (PF) 4 mg/mL injection 4 mg 4 mg Intravenous Given        Past Medical History:   Diagnosis Date    Acute CVA (cerebrovascular accident) ([facility] [address] ) 6/18/2016    Adult hypothyroidism 9/12/2016    Anxiety 6/10/2019    Arthritis     Asthma     Disease of thyroid gland     Dyslipidemia 6/10/2019    Esophageal dysmotility 6/10/2019    Essential hypertension 9/12/2016    Hypertension     Paroxysmal atrial fibrillation ([facility] [address] ) 9/12/2016    Seizure disorder ([facility] [address] ) 8/24/2018    Seizures ([facility] [address] )     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    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    Stroke ([facility] [address] ) 06/2016    Subarachnoid hemorrhage ([facility] [address] ) 9/13/2016    Subdural hematoma ([facility] [address] ) 9/13/2016     Present on Admission:   Adult hypothyroidism   Anxiety   Dyslipidemia   Essential hypertension   Paroxysmal atrial fibrillation (HCC)   Seizure disorder (HCC)      Admitting Diagnosis:     Hallucinations [R44 3]  Earache [H92 09]  Altered mental status [R41 82]  Acute encephalopathy [G93 40]  Middle ear effusion, right [H65 91]    Age/Sex: 82 y o  female     Admission Orders:    Scheduled Medications:  No current facility-administered medications for this encounter  Continuous IV Infusions:  No current facility-administered medications for this encounter  PRN Meds:  No current facility-administered medications for this encounter  IP CONSULT TO PSYCHIATRY    Network Utilization Review Department  ATTENTION: Please call with any questions or concerns to [phone] and carefully listen to the prompts so that you are directed to the right person  All voicemails are confidential   Abril Boyce all requests for admission clinical reviews, approved or denied determinations and any other requests to dedicated fax number below belonging to the campus where the patient is receiving treatment   List of dedicated fax numbers for the Facilities:  [address] DENIALS (Administrative/Medical Necessity) [phone]   [address] (Maternity/NICU/Pediatrics) [phone]   [address] [place] [phone]     [address]) [address] [place] [address] [address] [phone]   [place] [address] [phone]

## 2021-03-29 NOTE — UTILIZATION REVIEW
Notification of Discharge  This is a Notification of Discharge from our facility [address]  Please be advised that this patient has been discharge from our facility  Below you will find the admission and discharge date and time including the patients disposition  PRESENTATION DATE: 3/23/2021 11:15 PM  OBS ADMISSION DATE:   IP ADMISSION DATE: 3/24/21 0130   DISCHARGE DATE: 3/26/2021  3:25 PM  DISPOSITION: Home/Self Care Home/Self Care   Admission Orders listed below:  Admission Orders (From admission, onward)     Ordered        03/24/21 0129  Inpatient Admission  Once                   Please contact the UR Department if additional information is required to close this patient's authorization/case  Jojo Zuniga  [facility] Utilization Review Department  Main: [phone] x carefully listen to the prompts  All voicemails are confidential   [email or web address]  Send all requests for admission clinical reviews, approved or denied determinations and any other requests to dedicated fax number below belonging to the campus where the patient is receiving treatment   List of dedicated fax numbers:  [address] DENIALS (Administrative/Medical Necessity) [phone]   [address] (Maternity/NICU/Pediatrics) [phone]   Gustavo Congress [phone]     [address]  [phone]   [facility] [phone]   Irene Alea [facility] [address] [facility] [phone]   [facility]  [phone]   [address] [facility] [address]  [address] [place] [address] [address] [facility] [phone]

## 2021-04-14 ENCOUNTER — OFFICE VISIT (OUTPATIENT)
Dept: NEUROLOGY | Facility: CLINIC | Age: 86
End: 2021-04-14
Payer: COMMERCIAL

## 2021-04-14 VITALS
BODY MASS INDEX: 29.84 KG/M2 | HEIGHT: 60 IN | SYSTOLIC BLOOD PRESSURE: 140 MMHG | WEIGHT: 152 LBS | HEART RATE: 68 BPM | DIASTOLIC BLOOD PRESSURE: 82 MMHG

## 2021-04-14 DIAGNOSIS — Z86.73 HISTORY OF STROKE: ICD-10-CM

## 2021-04-14 DIAGNOSIS — G31.84 MCI (MILD COGNITIVE IMPAIRMENT): Primary | ICD-10-CM

## 2021-04-14 DIAGNOSIS — F32.A MILD DEPRESSION: ICD-10-CM

## 2021-04-14 DIAGNOSIS — R45.86 MOOD DISTURBANCE: ICD-10-CM

## 2021-04-14 PROCEDURE — 1160F RVW MEDS BY RX/DR IN RCRD: CPT | Performed by: PSYCHIATRY & NEUROLOGY

## 2021-04-14 PROCEDURE — 99215 OFFICE O/P EST HI 40 MIN: CPT | Performed by: PSYCHIATRY & NEUROLOGY

## 2021-04-14 PROCEDURE — 1036F TOBACCO NON-USER: CPT | Performed by: PSYCHIATRY & NEUROLOGY

## 2021-04-14 RX ORDER — CITALOPRAM 20 MG/1
20 TABLET ORAL DAILY
Qty: 30 TABLET | Refills: 3 | Status: SHIPPED | OUTPATIENT
Start: 2021-04-14

## 2021-04-14 NOTE — PROGRESS NOTES
Return NeuroOutpatient Note        Jeanette Choi  [id number]  82 y o   1935       MCI and Trigeminal Neuralgia (HFU- Dentist has forms for you to see- In Media )        History obtained from:  Patient     HPI/Subjective:    Jeanette Choi is an 81 yo F with PMH of MCI, seizure disorder  returns as f/u  She had presented to [facility] on 2/10/20 with symptoms of confusion  tPA was deferred as sxs resolved  Patient was kept on lipitor 80mg now 40mg, plavix 75mg, keppra 500mg bid  Patient is only taking 1 tab daily  Denies any seizure like activity  With taking only once daily she feels more alert and awake  She hasn't had any stroke like sxs since      Per my previous history, she had developed inability to speak 30 min prior to arrival  She reported associated left sided headache and some vision disturbance  She could follow commands but wasn't able to communicate  Patient had similar episode in June 2016  She had similar sxs with addition of left lower extremity weakness in Sept of 2016 at which time she had received IV tPA  After she tPA she developed small area of SAH and SDH which was either after effect or because of uncontrolled blood pressure post tPA  Patient was not offered tPA during June admission due to high risk of bleed  Her MRI brain was negative for stroke  She was thought to have had possible migraine with aura vs complex migraine    She has known left M1 atherosclerotic plaque (50-69%)  She's on plavix 75mg daily and lipitor 40mg daily for stroke prevention     Of note, she has evidence of left parietal lacunar stroke in June 2016  After this there has been no evidence of stroke  She has loop recorder in place       Patient had developed tooth pain recently and it was thought to be from TN vs TMJ dysfunction  Patient denies pain anymore  Her memory is getting worse  Says being isolated has made it worse  There has been a lot of stress in past few years         Past Medical History: Diagnosis Date    [facility] 6/18/2016    Adult hypothyroidism 9/12/2016    Anxiety 6/10/2019    Arthritis     Asthma     Disease of thyroid gland     Dyslipidemia 6/10/2019    Esophageal dysmotility 6/10/2019    Essential hypertension 9/12/2016    Hypertension     Paroxysmal atrial fibrillation ([facility] ) 9/12/2016    Seizure disorder ([facility] ) 8/24/2018    Seizures ([place] [address] )     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    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    Stroke ([facility] ) 06/2016    Subarachnoid hemorrhage ([place] [address] ) 9/13/2016    Subdural hematoma ([place] [address] ) 9/13/2016     Social History     Socioeconomic History    Marital status: /Civil Union     Spouse name: Not on file    Number of children: Not on file    Years of education: Not on file    Highest education level: Not on file   Occupational History    Not on file   Social Needs    Financial resource strain: Not on file    Food insecurity     Worry: Not on file     Inability: Not on file   [employer] needs     Medical: Not on file     Non-medical: Not on file   Tobacco Use    Smoking status: Never Smoker    Smokeless tobacco: Never Used   Substance and Sexual Activity    Alcohol use: Never     Alcohol/week: 0 0 standard drinks     Frequency: Never     Drinks per session: Patient refused     Binge frequency: Never     Comment: 0    Drug use: No    Sexual activity: Not Currently     Partners: Male     Birth control/protection: Post-menopausal   Lifestyle    Physical activity     Days per week: Not on file     Minutes per session: Not on file    Stress: Not on file   Relationships    Social connections     Talks on phone: Not on file     Gets together: Not on file     Attends [employer] service: Not on file     Active member of club or organization: Not on file     Attends meetings of clubs or organizations: Not on file     Relationship status: Not on file    Intimate partner violence Fear of current or ex partner: Not on file     Emotionally abused: Not on file     Physically abused: Not on file     Forced sexual activity: Not on file   Other Topics Concern    Not on file   Social History Narrative    Not on file     Family History   Problem Relation Age of Onset    Kidney disease Mother     Heart disease Mother     Heart disease Father     Colon cancer Father     Heart disease Sister     Multiple sclerosis Son     No Known Problems Son      Allergies   Allergen Reactions    Other      Hydralazine, Amlodipine    Penicillins      Current Outpatient Medications on File Prior to Visit   Medication Sig Dispense Refill    acetaminophen (TYLENOL) 325 mg tablet Take 2 tablets (650 mg total) by mouth every 6 (six) hours as needed for mild pain, headaches or fever 30 tablet 0    atorvastatin (LIPITOR) 40 mg tablet Take 40 mg by mouth daily   clopidogrel (PLAVIX) 75 mg tablet Take 75 mg by mouth daily Indications: Stroke caused by a Blood Clot        famotidine (PEPCID) 20 mg tablet Take 20 mg by mouth daily       fluticasone (FLONASE) 50 mcg/act nasal spray 1 spray into each nostril daily      fluticasone-vilanterol (Breo Ellipta) 100-25 mcg/inh inhaler Breo Ellipta 100 mcg-25 mcg/dose powder for inhalation      levETIRAcetam (KEPPRA XR) 500 MG 24 hr tablet Take 1 tablet (500 mg total) by mouth daily at bedtime 30 tablet 5    levothyroxine 88 mcg tablet levothyroxine 88 mcg tablet      loratadine (CLARITIN) 10 mg tablet Take 10 mg by mouth as needed       losartan (COZAAR) 50 mg tablet Take 50 mg by mouth daily       melatonin 3 mg Take 3 mg by mouth daily at bedtime      metoprolol succinate (TOPROL-XL) 50 mg 24 hr tablet Take 50 mg by mouth daily  0    omeprazole (PriLOSEC) 20 mg delayed release capsule Take 20 mg by mouth daily      Diclofenac Sodium (VOLTAREN) 1 % Apply 2 g topically 4 (four) times a day as needed (jaw pain) for up to 15 days Available over the counter as Voltaren cream (Patient not taking: Reported on 4/14/2021) 1 Tube 0    LORazepam (ATIVAN) 0 5 mg tablet Take 0 5 mg by mouth every 6 (six) hours as needed for anxiety      [DISCONTINUED] citalopram (CeleXA) 20 mg tablet Take 20 mg by mouth daily       No current facility-administered medications on file prior to visit  Review of Systems   Refer to positive review of systems in HPI     Review of Systems    Constitutional- No fever  Eyes- No visual change  ENT- Hearing normal  CV- No chest pain  Resp- No Shortness of breath  GI- No diarrhea  - Bladder normal  MS- No Arthritis   Skin- No rash  Psych- No depression  Endo- No DM  Heme- No nodes    Vitals:    04/14/21 1034   BP: 140/82   BP Location: Left arm   Patient Position: Sitting   Cuff Size: Adult   Pulse: 68   Weight: 68 9 kg (152 lb)   Height: 5' (1 524 m)       PHYSICAL EXAM:  Appearance: No Acute Distress  Ophthalmoscopic: Disc Flat, Normal fundus  Mental status:  Orientation: Awake, Alert, and Orientedx3  Memory: Registation 3/3 Recall 3/3  Attention: normal  Knowledge: good  Language: No aphasia  Speech: No dysarthria  Cranial Nerves:  2 No Visual Defect on Confrontation, Pupils round, equal, reactive to light  3,4,6 Extraocular Movements Intact, no nystagmus  5 Facial Sensation Intact  7 No facial asymmetry  8 Intact hearing  9,10 Palate symmetric, normal gag  11 Good shoulder shrug  12 Tongue Midline  Gait: Stable  Coordination: No ataxia with finger to nose testing, and heel to shin  Sensory: Intact, Symmetric to pinprick, light touch, vibration, and joint position  Muscle Tone: Normal              Muscle exam:  Arm Right Left Leg Right Left   Deltoid 5/5 5/5 Iliopsoas 5/5 5/5   Biceps 5/5 5/5 Quads 5/5 5/5   Triceps 5/5 5/5 Hamstrings 5/5 5/5   Wrist Extension 5/5 5/5 Ankle Dorsi Flexion 5/5 5/5   Wrist Flexion 5/5 5/5 Ankle Plantar Flexion 5/5 5/5   Interossei 5/5 5/5 Ankle Eversion 5/5 5/5   APB 5/5 5/5 Ankle Inversion 5/5 5/5 Reflexes   RJ BJ TJ KJ AJ Plantars Copeland's   Right 2+ 2+ 2+ 2+ 2+ Downgoing Not present   Left 2+ 2+ 2+ 2+ 2+ Downgoing Not present     Personal review of  Labs:                    Diagnoses and all orders for this visit:      1  MCI (mild cognitive impairment)  MRI brain NeuroQuant wo and w contrast    Ambulatory referral to Neuropsychology   2  History of stroke     3  Mood disturbance  citalopram (CeleXA) 20 mg tablet   4  Mild depression (HCC)  citalopram (CeleXA) 20 mg tablet       Patient has remained stable in terms of stroke  patient is to resume plavix and lipitor for stroke prevention  Will get one time MRI brain neuroquant  For memory evaluation, patient is ordered to have neuropsych testing  For her mood, will restart celexa                   Total time of encounter:  40 min  More than 50% of the time was used in counseling and/or coordination of care  Extent of counseling and/or coordination of care        [facility]  [address] [place] [address] 6  [phone]

## 2021-04-26 ENCOUNTER — TELEPHONE (OUTPATIENT)
Dept: NEUROLOGY | Facility: CLINIC | Age: 86
End: 2021-04-26

## 2021-04-26 NOTE — TELEPHONE ENCOUNTER
Neuropsychological Evaluation  [facility]  [address] [facility]  P: [phone] F: [phone]    Intake Note  Date of Referral to Neuropsychology: 4/14/2021  Referring Provider: Dr Muñoz See to conduct screening prior to scheduling neuropsychological evaluation  Spoke to patient directly to answer intake questions  Explained nature of neuropsychological evaluation  Patient is agreeable to evaluation  The following questions were answered  1 ) Does the patient have the ability to do a virtual intake? No    2 ) Does the patient have any problems that would limit her ability to participate in testing that requires interaction with another person, such as hearing or language impairments? No    3 ) Does the patient have any problems that would limit her ability to complete testing during one appointment that can last for more than 2 hours? (e g , severe fatigue, pain, or other debility) No    4 ) Does the patient have a preference between completing the evaluation in one session, or over the course of a few sessions? No    5 ) Does the patient have difficulties ambulating (e g , does she need a walker, cane, or wheelchair)? No    6 ) Would the patient be available for a last minute appointment if the opportunity arises? Yes, fairly available    Referral will be reviewed by providers and we will call to schedule as appropriate  Referral is pending insurance review  Insurance company will be contacted to verify participation of providers and authorization/pre-certification for Neuropsychological evaluation  Patient has been made aware of this

## 2021-05-11 ENCOUNTER — HOSPITAL ENCOUNTER (OUTPATIENT)
Dept: RADIOLOGY | Facility: HOSPITAL | Age: 86
Discharge: HOME | End: 2021-05-11
Attending: PSYCHIATRY & NEUROLOGY
Payer: COMMERCIAL

## 2021-05-11 DIAGNOSIS — G31.84 MCI (MILD COGNITIVE IMPAIRMENT): ICD-10-CM

## 2021-05-11 PROCEDURE — 70553 MRI BRAIN STEM W/O & W/DYE: CPT

## 2021-05-11 PROCEDURE — A9585 GADOBUTROL INJECTION: HCPCS | Performed by: PSYCHIATRY & NEUROLOGY

## 2021-05-11 PROCEDURE — G1004 CDSM NDSC: HCPCS

## 2021-05-11 RX ADMIN — GADOBUTROL 7 ML: 604.72 INJECTION INTRAVENOUS at 12:29

## 2021-08-12 ENCOUNTER — APPOINTMENT (OUTPATIENT)
Dept: LAB | Facility: CLINIC | Age: 86
End: 2021-08-12
Payer: COMMERCIAL

## 2021-08-12 PROCEDURE — 84166 PROTEIN E-PHORESIS/URINE/CSF: CPT | Performed by: PATHOLOGY

## 2021-09-20 ENCOUNTER — HOSPITAL ENCOUNTER (OUTPATIENT)
Dept: MRI IMAGING | Facility: HOSPITAL | Age: 86
Discharge: HOME | End: 2021-09-20
Payer: COMMERCIAL

## 2021-09-20 DIAGNOSIS — E85.9 AMYLOIDOSIS, UNSPECIFIED (HCC): ICD-10-CM

## 2021-09-20 PROCEDURE — G1004 CDSM NDSC: HCPCS

## 2021-09-20 PROCEDURE — 75561 CARDIAC MRI FOR MORPH W/DYE: CPT

## 2021-09-20 PROCEDURE — A9585 GADOBUTROL INJECTION: HCPCS | Performed by: RADIOLOGY

## 2021-09-20 RX ADMIN — GADOBUTROL 6 ML: 604.72 INJECTION INTRAVENOUS at 20:07

## 2021-10-13 ENCOUNTER — OFFICE VISIT (OUTPATIENT)
Dept: GASTROENTEROLOGY | Facility: CLINIC | Age: 86
End: 2021-10-13
Payer: COMMERCIAL

## 2021-10-13 ENCOUNTER — TELEPHONE (OUTPATIENT)
Dept: GASTROENTEROLOGY | Facility: CLINIC | Age: 86
End: 2021-10-13

## 2021-10-13 VITALS
WEIGHT: 150 LBS | BODY MASS INDEX: 29.45 KG/M2 | DIASTOLIC BLOOD PRESSURE: 63 MMHG | HEIGHT: 60 IN | HEART RATE: 76 BPM | SYSTOLIC BLOOD PRESSURE: 140 MMHG

## 2021-10-13 DIAGNOSIS — Z86.73 H/O: CVA (CEREBROVASCULAR ACCIDENT): ICD-10-CM

## 2021-10-13 DIAGNOSIS — R13.14 PHARYNGOESOPHAGEAL DYSPHAGIA: Primary | ICD-10-CM

## 2021-10-13 DIAGNOSIS — K22.4 ESOPHAGEAL DYSMOTILITY: ICD-10-CM

## 2021-10-13 PROCEDURE — 99204 OFFICE O/P NEW MOD 45 MIN: CPT | Performed by: INTERNAL MEDICINE

## 2021-10-15 ENCOUNTER — TELEPHONE (OUTPATIENT)
Dept: PREADMISSION TESTING | Facility: HOSPITAL | Age: 86
End: 2021-10-15

## 2021-10-18 ENCOUNTER — TELEPHONE (OUTPATIENT)
Dept: GASTROENTEROLOGY | Facility: CLINIC | Age: 86
End: 2021-10-18

## 2021-10-18 ENCOUNTER — DOCUMENTATION (OUTPATIENT)
Dept: NEUROLOGY | Facility: CLINIC | Age: 86
End: 2021-10-18

## 2021-10-21 ENCOUNTER — HOSPITAL ENCOUNTER (OUTPATIENT)
Dept: GASTROENTEROLOGY | Facility: AMBULARY SURGERY CENTER | Age: 86
Setting detail: OUTPATIENT SURGERY
Discharge: HOME | End: 2021-10-21
Attending: INTERNAL MEDICINE
Payer: COMMERCIAL

## 2021-10-21 ENCOUNTER — ANESTHESIA (OUTPATIENT)
Dept: GASTROENTEROLOGY | Facility: AMBULARY SURGERY CENTER | Age: 86
End: 2021-10-21

## 2021-10-21 ENCOUNTER — ANESTHESIA EVENT (OUTPATIENT)
Dept: GASTROENTEROLOGY | Facility: AMBULARY SURGERY CENTER | Age: 86
End: 2021-10-21

## 2021-10-21 VITALS
HEART RATE: 56 BPM | DIASTOLIC BLOOD PRESSURE: 65 MMHG | SYSTOLIC BLOOD PRESSURE: 148 MMHG | RESPIRATION RATE: 18 BRPM | TEMPERATURE: 96 F | OXYGEN SATURATION: 97 %

## 2021-10-21 DIAGNOSIS — R13.14 PHARYNGOESOPHAGEAL DYSPHAGIA: ICD-10-CM

## 2021-10-21 PROCEDURE — 88313 SPECIAL STAINS GROUP 2: CPT | Performed by: PATHOLOGY

## 2021-10-21 PROCEDURE — 43251 EGD REMOVE LESION SNARE: CPT | Performed by: INTERNAL MEDICINE

## 2021-10-21 PROCEDURE — 88305 TISSUE EXAM BY PATHOLOGIST: CPT | Performed by: PATHOLOGY

## 2021-10-21 PROCEDURE — 43239 EGD BIOPSY SINGLE/MULTIPLE: CPT | Performed by: INTERNAL MEDICINE

## 2021-10-21 RX ORDER — LIDOCAINE HYDROCHLORIDE 10 MG/ML
INJECTION, SOLUTION EPIDURAL; INFILTRATION; INTRACAUDAL; PERINEURAL AS NEEDED
Status: DISCONTINUED | OUTPATIENT
Start: 2021-10-21 | End: 2021-10-21

## 2021-10-21 RX ORDER — SODIUM CHLORIDE, SODIUM LACTATE, POTASSIUM CHLORIDE, CALCIUM CHLORIDE 600; 310; 30; 20 MG/100ML; MG/100ML; MG/100ML; MG/100ML
20 INJECTION, SOLUTION INTRAVENOUS CONTINUOUS
Status: CANCELLED | OUTPATIENT
Start: 2021-10-21

## 2021-10-21 RX ORDER — PROPOFOL 10 MG/ML
INJECTION, EMULSION INTRAVENOUS AS NEEDED
Status: DISCONTINUED | OUTPATIENT
Start: 2021-10-21 | End: 2021-10-21

## 2021-10-21 RX ORDER — SODIUM CHLORIDE, SODIUM LACTATE, POTASSIUM CHLORIDE, CALCIUM CHLORIDE 600; 310; 30; 20 MG/100ML; MG/100ML; MG/100ML; MG/100ML
INJECTION, SOLUTION INTRAVENOUS CONTINUOUS PRN
Status: DISCONTINUED | OUTPATIENT
Start: 2021-10-21 | End: 2021-10-21

## 2021-10-21 RX ADMIN — PROPOFOL 30 MG: 10 INJECTION, EMULSION INTRAVENOUS at 10:20

## 2021-10-21 RX ADMIN — LIDOCAINE HYDROCHLORIDE 50 MG: 10 INJECTION, SOLUTION EPIDURAL; INFILTRATION; INTRACAUDAL; PERINEURAL at 10:17

## 2021-10-21 RX ADMIN — PROPOFOL 100 MG: 10 INJECTION, EMULSION INTRAVENOUS at 10:17

## 2021-10-21 RX ADMIN — SODIUM CHLORIDE, SODIUM LACTATE, POTASSIUM CHLORIDE, AND CALCIUM CHLORIDE: .6; .31; .03; .02 INJECTION, SOLUTION INTRAVENOUS at 10:08

## 2021-11-03 ENCOUNTER — OFFICE VISIT (OUTPATIENT)
Dept: NEUROLOGY | Facility: CLINIC | Age: 86
End: 2021-11-03

## 2021-11-03 DIAGNOSIS — G31.84 MCI (MILD COGNITIVE IMPAIRMENT): Primary | ICD-10-CM

## 2021-11-03 PROCEDURE — NC001 PR NO CHARGE

## 2021-11-03 PROCEDURE — NC001 PR NO CHARGE: Performed by: CLINICAL NEUROPSYCHOLOGIST

## 2021-11-03 NOTE — LETTER
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 started taking an old prescription for Celexa; however, there do not appear to be any providers actively prescribing this for her at this time  She was strongly encouraged to discuss this with her PCP or other medical providers  3  In a similar vein, Ms Sloane Young may benefit from working with a mental health therapist/counselor for management of her depressive symptoms and for general emotional support  The patient reported feelings of isolation and has lost connections with friends over the years while 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  Supportive counseling and assistance in developing effective coping strategies would likely be helpful for the patient  4  The patient is currently prescribed Ativan, which can be associated with cognitive side-effects  She may wish to discuss treatment alternatives with her medical provider(s)  5  The patient reported questions of poor sleep quality despite adequate sleep quantity   She may wish to incorporate behavioral strategies for sleep into her daily routine  A good summary of commonly used behavioral strategies for sleep improvement can be found at http://sleepeducation  org/essentials-in-sleep/healthy-sleep-habits  Should these strategies prove ineffective, she may benefit from evaluation with sleep medicine  6  The patient may experience cognitive inefficiencies in her day-to-day life  As such, she may benefit from incorporating the following compensatory strategies into her routine practices  a  Write information down, rather than relying upon your memory alone (e g , use a notepad by the phone, Post-It notes, or a dry erase board set up in a central location in the home)     b  Set alarms, timers, or reminders for important events or repeating occurrences on a cell phone, watch, or other electronic device (e g , alarm to remember to take medications, reminder for daily tasks, create an event on your electronic calendar to remember birthdays/appointments)  c  It can be helpful to set up a central location where items used on a daily basis (e g , keys, wallet) are always placed  d  Repetition can be helpful in forming new memories  Recite important information several times to encourage retention of information  7  There is some research that suggests cognitive benefit from regular engagement in cognitively stimulating activities  The patient may wish to consider participating in conventional [address] such as sudoku puzzles, word searches, crossword puzzles, or other activities available in puzzle books or on websites (such as [employer] or [employer])  Additionally, other, novel activities that require cognitive flexibility and new skill building (e g , learning a new language, learning to play an instrument) are also likely to provide mental stimulation  8  Regular physical activity can have a significant impact on physical, emotional, and cognitive health   The patient is encouraged to incorporate aspects of cardiovascular and strength-building exercise into her life  Discussion of current medical status with health care providers is strongly encouraged before starting any new exercise regimen  9  Research supports eating healthy foods that have been shown to have a positive impact on brain health  The patient may wish to consider incorporating these sorts of foods into her diet  The MIND diet is a popular option that combines the health benefits of the Mediterranean and DASH diets and has been proven to boost brain health and potentially reduce your risk of cognitive decline  A good summary of the MIND diet can be found at: [employer]  10  Should the patient, her family members, or her medical providers have ongoing concerns regarding her cognitive functioning, repeat evaluation can be conducted no sooner than one year from the time of the present evaluation (November 2022)  Arnel Osorio PsyD  Neuropsychologist  [place] Licensed Psychologist  Lic  #[id number]

## 2021-11-03 NOTE — PROGRESS NOTES
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 support  The patient reported feelings of isolation and has lost connections with friends over the years while 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  Supportive counseling and assistance in developing effective coping strategies would likely be helpful for the patient  4  The patient is currently prescribed Ativan, which can be associated with cognitive side-effects  She may wish to discuss treatment alternatives with her medical provider(s)  5  The patient reported questions of poor sleep quality despite adequate sleep quantity  She may wish to incorporate behavioral strategies for sleep into her daily routine  A good summary of commonly used behavioral strategies for sleep improvement can be found at http://sleepeducation  org/essentials-in-sleep/healthy-sleep-habits  Should these strategies prove ineffective, she may benefit from evaluation with sleep medicine  6  The patient may experience cognitive inefficiencies in her day-to-day life    As such, she may benefit from incorporating the following compensatory strategies into her routine practices  a  Write information down, rather than relying upon your memory alone (e g , use a notepad by the phone, Post-It notes, or a dry erase board set up in a central location in the home)     b  Set alarms, timers, or reminders for important events or repeating occurrences on a cell phone, watch, or other electronic device (e g , alarm to remember to take medications, reminder for daily tasks, create an event on your electronic calendar to remember birthdays/appointments)  c  It can be helpful to set up a central location where items used on a daily basis (e g , keys, wallet) are always placed  d  Repetition can be helpful in forming new memories  Recite important information several times to encourage retention of information  7  There is some research that suggests cognitive benefit from regular engagement in cognitively stimulating activities  The patient may wish to consider participating in conventional [address] such as sudoku puzzles, word searches, crossword puzzles, or other activities available in puzzle books or on websites (such as [employer] or [employer])  Additionally, other, novel activities that require cognitive flexibility and new skill building (e g , learning a new language, learning to play an instrument) are also likely to provide mental stimulation  8  Regular physical activity can have a significant impact on physical, emotional, and cognitive health  The patient is encouraged to incorporate aspects of cardiovascular and strength-building exercise into her life  Discussion of current medical status with health care providers is strongly encouraged before starting any new exercise regimen  9  Research supports eating healthy foods that have been shown to have a positive impact on brain health  The patient may wish to consider incorporating these sorts of foods into her diet   The MIND diet is a popular option that combines the health benefits of the [address] and DASH diets and has been proven to boost brain health and potentially reduce your risk of cognitive decline  A good summary of the MIND diet can be found at: [employer]  10  Should the patient, her family members, or her medical providers have ongoing concerns regarding her cognitive functioning, repeat evaluation can be conducted no sooner than one year from the time of the present evaluation (November 2022)  Suni Gutierrez PsyD  Neuropsychologist  [place] Licensed Psychologist  Lic  #[id number]

## 2021-11-03 NOTE — PROGRESS NOTES
Marco Burrell was seen by francisca Smith, neuropsychometrist, for a neuropsychological assesment on 11/3/21

## 2021-11-23 ENCOUNTER — IMMUNIZATIONS (OUTPATIENT)
Dept: FAMILY MEDICINE CLINIC | Facility: HOSPITAL | Age: 86
End: 2021-11-23

## 2021-11-23 DIAGNOSIS — Z23 ENCOUNTER FOR IMMUNIZATION: Primary | ICD-10-CM

## 2021-11-23 PROCEDURE — 91306 COVID-19 MODERNA VACC 0.25 ML BOOSTER: CPT

## 2021-11-23 PROCEDURE — 0064A COVID-19 MODERNA VACC 0.25 ML BOOSTER: CPT

## 2021-11-30 ENCOUNTER — OFFICE VISIT (OUTPATIENT)
Dept: NEUROLOGY | Facility: CLINIC | Age: 86
End: 2021-11-30
Payer: COMMERCIAL

## 2021-11-30 DIAGNOSIS — G31.84 MCI (MILD COGNITIVE IMPAIRMENT): Primary | ICD-10-CM

## 2021-11-30 PROCEDURE — 96139 PSYCL/NRPSYC TST TECH EA: CPT | Performed by: CLINICAL NEUROPSYCHOLOGIST

## 2021-11-30 PROCEDURE — 96116 NUBHVL XM PHYS/QHP 1ST HR: CPT | Performed by: CLINICAL NEUROPSYCHOLOGIST

## 2021-11-30 PROCEDURE — 96133 NRPSYC TST EVAL PHYS/QHP EA: CPT | Performed by: CLINICAL NEUROPSYCHOLOGIST

## 2021-11-30 PROCEDURE — 96138 PSYCL/NRPSYC TECH 1ST: CPT | Performed by: CLINICAL NEUROPSYCHOLOGIST

## 2021-11-30 PROCEDURE — 96121 NUBHVL XM PHY/QHP EA ADDL HR: CPT | Performed by: CLINICAL NEUROPSYCHOLOGIST

## 2021-11-30 PROCEDURE — 96132 NRPSYC TST EVAL PHYS/QHP 1ST: CPT | Performed by: CLINICAL NEUROPSYCHOLOGIST

## 2022-01-17 NOTE — PROGRESS NOTES
Neuropsychological Evaluation  [facility]  [address]  [place] [address]  P: [phone] F: [phone]    Feedback from Neuropsychological Evaluation    Ms Marcus Medina returned for a feedback appointment to review the findings and recommendations from a neuropsychological evaluation conducted on 11/3/2021  Findings from the evaluation, including broadly normal performances, were discussed and the patient expressed understanding  Recommendations were reviewed (see evaluation report from 11/3/2021 for full details)  The patient was given the opportunity to ask questions and expressed satisfaction with answers provided  Contact information for this provider was given to the patient and she was encouraged to contact the office with any further questions or concerns  The neuropsychological evaluation report was routed to the referring provider, Dr Evelyne Schirmer, for review and follow-up as needed  Charity Bajwa PsyD  Neuropsychologist  [place] Licensed Psychologist  [facility]  #[id number]     Tasks Conducted Total Time Spent Associated CPT Codes   Clinical Interview 82 min  76024 x 1 unit  96121 x 3 units   Report Writing 134 min  Neuropsychological Test Administration (PhD/PsyD) 0 min  46611 x 0 unit  96137 x 0 units   Scoring (PhD/PsyD) 0 min  Neuropsychological Test Administration [facility] ) 250 min  16768 x 1 unit  96139 x 9 units   Scoring (Tech ) 65 min  Chart Review 55 min  25777 x 1 unit  96133 x 1 units   Interpretation of Test Data 23 min  Feedback to Patient/Family Members 29 min

## 2022-06-29 ENCOUNTER — HOSPITAL ENCOUNTER (EMERGENCY)
Facility: HOSPITAL | Age: 87
Discharge: HOME | End: 2022-06-29
Attending: EMERGENCY MEDICINE
Payer: COMMERCIAL

## 2022-06-29 ENCOUNTER — APPOINTMENT (EMERGENCY)
Dept: RADIOLOGY | Facility: HOSPITAL | Age: 87
End: 2022-06-29
Payer: COMMERCIAL

## 2022-06-29 VITALS
RESPIRATION RATE: 20 BRPM | OXYGEN SATURATION: 98 % | SYSTOLIC BLOOD PRESSURE: 140 MMHG | TEMPERATURE: 98.1 F | HEART RATE: 68 BPM | DIASTOLIC BLOOD PRESSURE: 60 MMHG

## 2022-06-29 DIAGNOSIS — S63.501A SPRAIN OF RIGHT WRIST, INITIAL ENCOUNTER: ICD-10-CM

## 2022-06-29 DIAGNOSIS — S73.102A SPRAIN OF LEFT HIP, INITIAL ENCOUNTER: ICD-10-CM

## 2022-06-29 DIAGNOSIS — M25.552 LEFT HIP PAIN: Primary | ICD-10-CM
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
## 2022-06-29 PROCEDURE — 70450 CT HEAD/BRAIN W/O DYE: CPT

## 2022-06-29 PROCEDURE — G1004 CDSM NDSC: HCPCS

## 2022-06-29 PROCEDURE — 99284 EMERGENCY DEPT VISIT MOD MDM: CPT | Performed by: EMERGENCY MEDICINE

## 2022-06-29 PROCEDURE — 73110 X-RAY EXAM OF WRIST: CPT

## 2022-06-29 PROCEDURE — 97163 PT EVAL HIGH COMPLEX 45 MIN: CPT

## 2022-06-29 PROCEDURE — 97116 GAIT TRAINING THERAPY: CPT

## 2022-06-29 PROCEDURE — 99284 EMERGENCY DEPT VISIT MOD MDM: CPT

## 2022-06-29 PROCEDURE — 73502 X-RAY EXAM HIP UNI 2-3 VIEWS: CPT

## 2022-06-29 PROCEDURE — 97167 OT EVAL HIGH COMPLEX 60 MIN: CPT

## 2022-06-29 RX ORDER — ACETAMINOPHEN 325 MG/1
650 TABLET ORAL ONCE
Status: COMPLETED | OUTPATIENT
Start: 2022-06-29 | End: 2022-06-29

## 2022-06-29 RX ADMIN — ACETAMINOPHEN 650 MG: 325 TABLET ORAL at 17:11

## 2022-06-29 NOTE — PHYSICAL THERAPY NOTE
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  Lives With Spouse   ADL Assistance Independent   General   Additional Pertinent History Pt reports an accidental mechanical fall yesterday at home when her  fell on top of her  X-ray showed L pubic ramus fx  Pt also c/o L wrist pain but no fx on x-ray  OT applied a wrist brace  Pt is the caregiver for her  with dementia  Cognition   Overall Cognitive Status WFL   Arousal/Participation Alert   Orientation Level Oriented X4   Subjective   Subjective "I can go home, right?"   RLE Assessment   RLE Assessment WNL   LLE Assessment   LLE Assessment WFL  (painful L hip ROM, MMT hip at least 3/5)   Bed Mobility   Supine to Sit 5  Supervision   Sit to Supine 5  Supervision   Transfers   Sit to Stand 5  Supervision   Stand to Sit 5  Supervision   Stand pivot 5  Supervision   Additional items   (with walker)   Ambulation/Elevation   Gait pattern   (antalgic L)   Gait Assistance 5  Supervision   Assistive Device Rolling walker   Distance 10 feet   Ambulation/Elevation Additional Comments Pt verbally instructed in stair climbing  Up with the L, down with the R    Balance   Static Sitting Good   Dynamic Sitting Fair +   Static Standing Fair   Dynamic Standing Fair -   Activity Tolerance   Activity Tolerance Patient tolerated treatment well   Assessment   Prognosis Good   Problem List Decreased strength; Impaired balance;Decreased mobility;Pain   Assessment Patient seen for Physical Therapy evaluation  Patient admitted with L pubic ramus fx  Comorbidities affecting patient's physical performance include: anxiety  Personal factors affecting patient at time of initial evaluation include: stairs to enter home, inability to navigate community distances, positive fall history, anxiety and inability to perform caregiver support/tasks  Prior to admission, patient was independent with functional mobility without assistive device and independent with ADLS  Please find objective findings from Physical Therapy assessment regarding body systems outlined above with impairments and limitations including weakness, gait deviations, pain, decreased activity tolerance, decreased functional mobility tolerance and fall risk  The Barthel Index was used as a functional outcome tool presenting with a score of Barthel Index Score: 65 today indicating marked limitations of functional mobility and ADLS  Patient's clinical presentation is currently unstable/unpredictable as seen in patient's presentation of changing level of pain, increased fall risk, new onset of impairment of functional mobility and new onset of weakness  Pt would benefit from continued Physical Therapy treatment to address deficits as defined above and maximize level of functional mobility  As demonstrated by objective findings, the assigned level of complexity for this evaluation is high  The patient's -[facility] Basic Mobility Inpatient Short Form Raw Score is 18  A Raw score of greater than 16 suggests the patient may benefit from discharge to home  Goals   Patient Goals "go home"   STG Expiration Date 07/06/22   Short Term Goal #1 independent ambulation indoor level surfaces with a walker with a non antalgic gati x 50 feet so pt can negotiate her apartment, supervision up and down 3 steps with a rail and a cane so pt can enter and exit her home     LTG Expiration Date 07/13/22 Long Term Goal #1 improve L LE strength to at least 4/5, independent ambulation outdoor surfaces 100 feet with a walker, independent up and down 3 steps with a rail and a cane so pt can enter and exit her home, no falls, pt will be able to stand x 15 minutes with 0-1 UE support so pt can prepare a simple meal   Plan   Treatment/Interventions Functional transfer training; Therapeutic exercise;Gait training;Bed mobility; Equipment eval/education;Patient/family training;Elevations; Spoke to MD   PT Frequency 5-7x/wk   Recommendation   PT Discharge Recommendation Home with home health rehabilitation   Equipment Recommended   (pt has a cane and walker)   AM-PAC Basic Mobility Inpatient   Turning in Bed Without Bedrails 3   Lying on Back to Sitting on Edge of Flat Bed 3   Moving Bed to Chair 3   Standing Up From Chair 3   Walk in Room 3   Climb 3-5 Stairs 3   Basic Mobility Inpatient Raw Score 18   Basic Mobility Standardized Score 41 05   Highest Level Of Mobility   JH-HLM Goal 6: Walk 10 steps or more   JH-HLM Achieved 7: Walk 25 feet or more   Barthel Index   Feeding 10   Bathing 0   Grooming Score 0   Dressing Score 5   Bladder Score 10   Bowels Score 10   Toilet Use Score 5   Transfers (Bed/Chair) Score 15   Mobility (Level Surface) Score 0   Stairs Score 5   Barthel Index Score 60   Additional Treatment Session   Start Time 1520   End Time 1530   Treatment Assessment S:  "I can go home, right?" O:  gait training with a rolling walker on level surfaces x 25 feet with verbal cues to WBAT on L LE and to use arms to decrease weight on L LE  Pt verbally instructed in stair climbing with a rail and a cane leading with the right going up and the L going down   Pt demonstrated mod I gait after activity  A:  Pt demonstrates generally steady gait with a walker P:  pt is safe to go home, recommend home PT, pt has a walker   [employer] License Number  David Newby PT [id number]

## 2022-06-29 NOTE — ED PROVIDER NOTES
History  Chief Complaint   Patient presents with    Fall    Hip Pain     Pt fell 3 steps yest, now reports of l hip pain     Patient presents after mechanical fall  She states that she was assisting her also on 3 steps when he lost his balance and caused her to fall  She fell onto her left hip  She denies head trauma loss of consciousness  Incident occurred yesterday and has had worsening pain with weight-bearing since  She denies other somatic complaints  Patient has a prior medical history of dyslipidemia, anxiety, previous CVA and paroxysmal AFib on Plavix, hypertension, hypothyroidism        History provided by:  Patient  History limited by:  Acuity of condition   used: No    Fall  Mechanism of injury: fall    Injury location:  Pelvis  Pelvic injury location:  L hip  Incident location:  Outdoors  Time since incident:  1 day  Arrived directly from scene: no    Fall:     Entrapped after fall: no    Suspicion of alcohol use: no    Suspicion of drug use: no    Tetanus status:  Unknown  Prior to arrival data:     Bystander interventions:  None    Patient ambulatory at scene: yes      Blood loss:  None    Responsiveness at scene:  Alert    Orientation at scene:  Person, place, situation and time    Loss of consciousness: no      Amnesic to event: no      Airway interventions:  None    Breathing interventions:  None    IV access status:  None    IO access:  None    Fluids administered:  None    Cardiac interventions:  None    Medications administered:  None    Immobilization:  None    Airway condition since incident:  Stable    Breathing condition since incident:  Stable    Circulation condition since incident:  Stable    Mental status condition since incident:  Stable    Disability condition since incident:  Stable  Associated symptoms: no abdominal pain, no back pain, no nausea, no neck pain and no vomiting    Hip Pain  Associated symptoms: no abdominal pain, no cough, no diarrhea, no fever, no nausea, no rash, no shortness of breath and no vomiting    Wrist Pain  Location:  Right wrist  Severity:  Mild  Onset quality:  Sudden  Timing:  Constant  Progression:  Unchanged  Chronicity:  New  Associated symptoms: no abdominal pain, no cough, no diarrhea, no fever, no nausea, no rash, no shortness of breath and no vomiting        Prior to Admission Medications   Prescriptions Last Dose Informant Patient Reported? Taking? LORazepam (ATIVAN) 0 5 mg tablet  Self Yes No   Sig: Take 0 5 mg by mouth every 6 (six) hours as needed for anxiety   acetaminophen (TYLENOL) 325 mg tablet  Self No No   Sig: Take 2 tablets (650 mg total) by mouth every 6 (six) hours as needed for mild pain, headaches or fever   atorvastatin (LIPITOR) 40 mg tablet  Self Yes No   Sig: Take 40 mg by mouth daily     citalopram (CeleXA) 20 mg tablet  Self No No   Sig: Take 1 tablet (20 mg total) by mouth daily   clopidogrel (PLAVIX) 75 mg tablet  Self Yes No   Sig: Take 75 mg by mouth daily Indications: Stroke caused by a Blood Clot    famotidine (PEPCID) 20 mg tablet  Self Yes No   Sig: Take 20 mg by mouth daily    fluticasone (FLONASE) 50 mcg/act nasal spray  Self Yes No   Si spray into each nostril daily   fluticasone-vilanterol (Breo Ellipta) 100-25 mcg/inh inhaler  Self Yes No   Sig: Breo Ellipta 100 mcg-25 mcg/dose powder for inhalation   levETIRAcetam (KEPPRA XR) 500 MG 24 hr tablet  Self No No   Sig: Take 1 tablet (500 mg total) by mouth daily at bedtime   levothyroxine 88 mcg tablet  Self Yes No   Sig: levothyroxine 88 mcg tablet   loratadine (CLARITIN) 10 mg tablet  Self Yes No   Sig: Take 10 mg by mouth as needed    losartan (COZAAR) 50 mg tablet  Self Yes No   Sig: Take 50 mg by mouth daily    melatonin 3 mg  Self Yes No   Sig: Take 3 mg by mouth daily at bedtime   metoprolol succinate (TOPROL-XL) 50 mg 24 hr tablet  Self Yes No   Sig: Take 50 mg by mouth daily   omeprazole (PriLOSEC) 20 mg delayed release capsule  Self Yes No   Sig: Take 20 mg by mouth daily      Facility-Administered Medications: None       Past Medical History:   Diagnosis Date    [facility] 6/18/2016    Adult hypothyroidism 9/12/2016    Anxiety 6/10/2019    Arthritis     Asthma     Disease of thyroid gland     Dyslipidemia 6/10/2019    Esophageal dysmotility 6/10/2019    Essential hypertension 9/12/2016    Hypertension     Paroxysmal atrial fibrillation ([facility] ) 9/12/2016    Seizure disorder [facility] ) 8/24/2018    Seizures ([facility] )     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    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    Stroke ([facility] ) 06/2016    Subarachnoid hemorrhage ([facility] ) 9/13/2016    Subdural hematoma ([facility] ) 9/13/2016       Past Surgical History:   Procedure Laterality Date    BACK SURGERY  01/2016    CARPAL TUNNEL RELEASE Right        Family History   Problem Relation Age of Onset    Kidney disease Mother     Heart disease Mother     Heart disease Father     Colon cancer Father     Heart disease Sister     Multiple sclerosis Son     No Known Problems Son      I have reviewed and agree with the history as documented  E-Cigarette/Vaping    E-Cigarette Use Never User      E-Cigarette/Vaping Substances    Nicotine No     THC No     CBD No     Flavoring No     Other No     Unknown No      Social History     Tobacco Use    Smoking status: Never Smoker    Smokeless tobacco: Never Used   Vaping Use    Vaping Use: Never used   Substance Use Topics    Alcohol use: Never     Alcohol/week: 0 0 standard drinks     Comment: 0    Drug use: No       Review of Systems   Constitutional: Negative for chills and fever  Respiratory: Negative for cough, chest tightness and shortness of breath  Gastrointestinal: Negative for abdominal pain, diarrhea, nausea and vomiting  Genitourinary: Negative for dysuria, frequency, hematuria and urgency  Musculoskeletal: Positive for gait problem   Negative for back pain, neck pain and neck stiffness  Skin: Negative for color change, pallor, rash and wound  All other systems reviewed and are negative  Physical Exam  Physical Exam  Vitals and nursing note reviewed  Constitutional:       General: She is not in acute distress  Appearance: She is well-developed  She is not diaphoretic  HENT:      Head: Normocephalic and atraumatic  Eyes:      Conjunctiva/sclera: Conjunctivae normal       Pupils: Pupils are equal, round, and reactive to light  Cardiovascular:      Rate and Rhythm: Normal rate and regular rhythm  Heart sounds: Normal heart sounds  No murmur heard  Pulmonary:      Effort: Pulmonary effort is normal  No respiratory distress  Breath sounds: Normal breath sounds  Abdominal:      General: Bowel sounds are normal  There is no distension  Palpations: Abdomen is soft  Tenderness: There is no abdominal tenderness  Musculoskeletal:         General: Tenderness and signs of injury present  No swelling or deformity  Cervical back: Normal range of motion and neck supple  Right hip: Normal       Left hip: Tenderness and bony tenderness present  Decreased range of motion  Right upper leg: Normal       Left upper leg: Normal       Right knee: Normal    Skin:     General: Skin is warm and dry  Capillary Refill: Capillary refill takes less than 2 seconds  Coloration: Skin is not pale  Findings: No rash  Neurological:      General: No focal deficit present  Mental Status: She is alert and oriented to person, place, and time  Cranial Nerves: No cranial nerve deficit     Psychiatric:         Behavior: Behavior normal          Vital Signs  ED Triage Vitals [06/29/22 1410]   Temperature Pulse Respirations Blood Pressure SpO2   98 1 °F (36 7 °C) 65 18 (!) 178/76 96 %      Temp Source Heart Rate Source Patient Position - Orthostatic VS BP Location FiO2 (%)   Oral Monitor Lying Left arm --      Pain Score       8           Vitals:    06/29/22 1410   BP: (!) 178/76   Pulse: 65   Patient Position - Orthostatic VS: Lying         Visual Acuity      ED Medications  Medications   acetaminophen (TYLENOL) tablet 650 mg (650 mg Oral Given 6/29/22 1711)       Diagnostic Studies  Results Reviewed     None                 CT head without contrast   Final Result by Reynaldo Hay DO (06/29 1718)      No acute intracranial abnormality  Stable microangiopathic changes within the brain  Workstation performed: [id number]         XR hip/pelv 2-3 vws left if performed   ED Interpretation by Dionne Price DO (06/29 1505)   Inf ramus fx      Final Result by Xiomara Orellana MD (06/29 1514)   Mild degenerative changes both hips      No acute osseous abnormality  Workstation performed: [id number]         XR wrist 3+ views RIGHT   ED Interpretation by Dionne Price DO (06/29 1505)   nad      Final Result by Xiomy Hay DO (06/29 1521)      No acute osseous abnormality              Workstation performed: [id number]                    Procedures  Procedures         ED Course                                             MDM  Number of Diagnoses or Management Options  Fall, initial encounter: new and requires workup  Left hip pain: new and requires workup  Sprain of left hip, initial encounter: new and requires workup  Sprain of right wrist, initial encounter: new and requires workup     Amount and/or Complexity of Data Reviewed  Tests in the radiology section of CPT®: ordered and reviewed    Risk of Complications, Morbidity, and/or Mortality  Presenting problems: high  Diagnostic procedures: high  Management options: high    Patient Progress  Patient progress: stable      Disposition  Final diagnoses:   Left hip pain   Sprain of right wrist, initial encounter   Fall, initial encounter   Sprain of left hip, initial encounter     Time reflects when diagnosis was documented in both MDM as applicable and the Disposition within this note     Time User Action Codes Description Comment    6/29/2022  4:33 PM [place] Cl[place] Add [M25 552] Left hip pain     6/29/2022  4:33 PM Ilene Clutter Add [S63 501A] Sprain of right wrist, initial encounter     6/29/2022  4:50 PM [place] Add [X01  BCQL] Fall, initial encounter     6/29/2022  4:50 PM Ilene Clutter Add [H58 803U] Sprain of left hip, initial encounter       ED Disposition     ED Disposition   Discharge    Condition   Stable    Date/Time   Wed Jun 29, 2022  5:27 PM    Comment   Felicity Gordon [facility] discharge to home/self care                 Follow-up Information     Follow up With Specialties Details Why Contact Info Additional Information    Alexandria Lowery MD Internal Medicine Schedule an appointment as soon as possible for a visit in 2 days for follow up [address] [phone]       [address] [facility] Schedule an appointment as soon as possible for a visit in 2 days for follow up [address], [place] [address], [address] [place] [address] [address] [facility], [place], [place], [address] [phone]          Patient's Medications   Discharge Prescriptions    No medications on file           PDMP Review       Value Time User    PDMP Reviewed  Yes 3/26/2021 11:37 AM Ernesto Kan, [address]          ED Provider  Electronically Signed by           Claude Daring, DO  06/29/22 2285

## 2022-06-29 NOTE — ED NOTES
Zheng called for [facility] cab, will call with number     Chiara Katz, KAILYN  06/29/22 4037

## 2022-06-29 NOTE — OCCUPATIONAL THERAPY NOTE
OT EVALUATION       06/29/22 1547   Note Type   Note type Evaluation   Restrictions/Precautions   LLE Weight Bearing Per Order WBAT  (s/p L inferior pubic rami fracture per MD)   Braces or Orthoses Splint  (R wrist for comfort)   Other Precautions Chair Alarm; Bed Alarm; Fall Risk   Pain Assessment   Pain Assessment Tool 0-10   Pain Score 6   Pain Location/Orientation Orientation: Right;Location: Arm;Orientation: Left; Location: Pelvis   Home Living   Type of [address] [address] level  (3 GAVIN)   Bathroom Shower/Tub Tub/shower unit   Bathroom Equipment Shower chair;Grab bars in shower;Commode   Home Equipment Walker;Cane   Additional Comments Patient s/p fall, used walker only after fall not at baseline   Prior Function   Level of [place] Independent with ADLs and functional mobility   Lives With [facility] Help From Family   ADL Assistance Independent   IADLs Independent   Falls in the last 6 months 1 to 4   Comments Patient admitted s/p fall with L pubic rami fracture   ADL   Eating Assistance 7  Independent   Grooming Assistance 7  Independent   UB Bathing Assistance 7  Independent   LB Bathing Assistance 4  Minimal Assistance   [facility] [phone]  5  Supervision/Setup   Additional Comments R wrist thumb spica splint given by nurse for comfort with mobility and use of R wrist on walker   Bed Mobility   Supine to Sit 5  Supervision   Transfers   Sit to Stand 5  Supervision   Stand to Sit 5  Supervision   Toilet transfer 5  Supervision   Functional Mobility   Functional Mobility 5  Supervision   Additional Comments functional household distance to bathroom and back   Additional items Rolling walker   Balance   Static Sitting Good   Dynamic Sitting Fair +   Static Standing Fair +   Dynamic Standing Fair   Activity Tolerance   Activity Tolerance Patient limited by pain   Nurse Made Aware yes   RUE Assessment RUE Assessment WFL   LUE Assessment   LUE Assessment WFL  (L elbow bruise and scraps from fall ROM WFL)   Cognition   Overall Cognitive Status WFL   Arousal/Participation Cooperative   Attention Within functional limits   Orientation Level Oriented X4   Following Commands Follows all commands and directions without difficulty   Assessment   Limitation Decreased ADL status; Decreased Safe judgement during ADL;Decreased endurance;Decreased UE strength;Decreased self-care trans;Decreased high-level ADLs  (decreased balance and mobility)   Prognosis Good   Assessment Patient evaluated by Occupational Therapy  Patient admitted with s/p fall with L pubic rami fracture  The patients occupational profile, medical and therapy history includes a extensive additional review of physical, cognitive, or psychosocial history related to current functional performance  Comorbidities affecting functional mobility and ADLS include: Afib, anxiety, asthma, CVA, hypertension and seizures  Prior to admission, patient was independent with functional mobility without assistive device, independent with ADLS and independent with IADLS  The evaluation identifies the following performance deficits: weakness, impaired balance, decreased endurance, increased fall risk, new onset of impairment of functional mobility, decreased ADLS, decreased IADLS, pain, decreased activity tolerance, decreased safety awareness, impaired judgement, decreased cognition and decreased strength, that result in activity limitations and/or participation restrictions  This evaluation requires clinical decision making of high complexity, because the patient presents with comorbidites that affect occupational performance and required significant modification of tasks or assistance with consideration of multiple treatment options    The Barthel Index was used as a functional outcome tool presenting with a score of Barthel Index Score: 65, indicating marked limitations of functional mobility and ADLS  The patient's raw score on the AM-PAC Daily Activity inpatient short form is 21, standardized score is 44 27, greater than 39 4  Patients at this level are likely to benefit from DC to home  Please refer to the recommendation of the Occupational Therapist for safe DC planning  Patient will benefit from skilled Occupational Therapy services to address above deficits and facilitate a safe return to prior level of function  Goals   Patient Goals to go home   STG Time Frame   (1-7 days)   Short Term Goal  Goals established to promote Patient Goals: to go home:  Patient will increase standing tolerance to 3 minutes during ADL task to decrease assistance level and decrease fall risk; Patient will increase bed mobility to independent in preparation for ADLS and transfers; Patient will increase functional mobility to and from bathroom with rolling walker independently to increase performance with ADLS and to use a toilet; Patient will tolerate 10 minutes of UE ROM/strengthening to increase general activity tolerance and performance in ADLS/IADLS; Patient will improve functional activity tolerance to 10 minutes of sustained functional tasks to increase participation in basic self-care and decrease assistance level;  Patient will be able to to verbalize understanding and perform energy conservation/proper body mechanics during ADLS and functional mobility at least 75% of the time with minimal cueing to decrease signs of fatigue and increase stamina to return to prior level of function; Patient will increase dynamic sitting balance to fair+ to improve the ability to sit at edge of bed or on a chair for ADLS;  Patient will increase dynamic standing balance to fair to improve postural stability and decrease fall risk during standing ADLS and transfers     LTG Time Frame   (8-14 days)   Long Term Goal Patient will increase standing tolerance to 6 minutes during ADL task to decrease assistance level and decrease fall risk;  Patient will increase functional mobility to and from bathroom with cane independently to increase performance with ADLS and to use a toilet; Patient will tolerate 20 minutes of UE ROM/strengthening to increase general activity tolerance and performance in ADLS/IADLS; Patient will improve functional activity tolerance to 20 minutes of sustained functional tasks to increase participation in basic self-care and decrease assistance level;  Patient will be able to to verbalize understanding and perform energy conservation/proper body mechanics during ADLS and functional mobility at least 90% of the time to decrease signs of fatigue and increase stamina to return to prior level of function; Patient will increase dynamic sitting balance to good to improve the ability to sit at edge of bed or on a chair for ADLS;  Patient will increase dynamic standing balance to fair+ to improve postural stability and decrease fall risk during standing ADLS and transfers  Pt will score >/= 24/24 on AM-PAC Daily Activity Inpatient scale to promote safe independence with ADLs and functional mobility; Pt will score >/= 95/100 on Barthel Index in order to decrease caregiver assistance needed and increase ability to perform ADLs and functional mobility  Functional Transfer Goals   Pt Will Perform All Functional Transfers   (STG independent)   ADL Goals   Pt Will Perform Grooming Standing at sink  (STG independent)   Pt Will Perform Bathing   (STG supervision LTG Independent)   Pt Will Perform LE Dressing   (STG independent)   Pt Will Perform Toileting   (STG independent)   Plan   Treatment Interventions ADL retraining;Functional transfer training;UE strengthening/ROM; Endurance training;Patient/family training;Equipment evaluation/education; Activityengagement; Compensatory technique education   Goal Expiration Date 07/13/22   OT Frequency 3-5x/wk   Recommendation   OT Discharge Recommendation Home with home health rehabilitation   AM-PAC Daily Activity Inpatient   Lower Body Dressing 3   Bathing 3   Toileting 3   Upper Body Dressing 4   Grooming 4   Eating 4   Daily Activity Raw Score 21   Daily Activity Standardized Score (Calc for Raw Score >=11) 44 27   AM-PAC Applied Cognition Inpatient   Following a Speech/Presentation 4   Understanding Ordinary Conversation 4   Taking Medications 4   Remembering Where Things Are Placed or Put Away 4   Remembering List of 4-5 Errands 4   Taking Care of Complicated Tasks 4   Applied Cognition Raw Score 24   Applied Cognition Standardized Score 62 21   Barthel Index   Feeding 10   Bathing 0   Grooming Score 5   Dressing Score 5   Bladder Score 10   Bowels Score 10   Toilet Use Score 10   Transfers (Bed/Chair) Score 10   Mobility (Level Surface) Score 0   Stairs Score 5   Barthel Index Score 72   Licensure   [place] License Number  Arunabartolome Medina Carolyn [address] OTR/L [id number]

## 2022-06-29 NOTE — DISCHARGE INSTRUCTIONS
Return to the ER for further concerns or worsening symptoms  Follow up with your primary care physician and orthopedics in 1-2 days  Use walker when walking until cleared by a physician  Continue Tylenol or ibuprofen for pain relief

## 2022-08-01 DIAGNOSIS — E78.2 MIXED HYPERLIPIDEMIA: Primary | ICD-10-CM

## 2022-08-01 RX ORDER — ATORVASTATIN CALCIUM 40 MG/1
40 TABLET, FILM COATED ORAL DAILY
Qty: 90 TABLET | Refills: 0
Start: 2022-08-01 | End: 2022-08-02

## 2022-08-01 NOTE — TELEPHONE ENCOUNTER
My name is Christiano Garcia live in my birthday is October 30th, 1935 and I want a refill on the Lipitor  ATORVASTATIN 40 milligrams and the place is a [facility] in [place]  I guess that's about it  Let me see  Alright  And that is a [phone] is the phone number of the [address] [place] [address] [facility]  Thank you   Bye

## 2022-08-02 RX ORDER — ATORVASTATIN CALCIUM 40 MG/1
40 TABLET, FILM COATED ORAL DAILY
Qty: 90 TABLET | Refills: 0 | Status: SHIPPED | OUTPATIENT
Start: 2022-08-02 | End: 2022-10-26 | Stop reason: SDUPTHER

## 2022-08-05 ENCOUNTER — VBI (OUTPATIENT)
Dept: ADMINISTRATIVE | Facility: OTHER | Age: 87
End: 2022-08-05

## 2022-08-06 NOTE — TELEPHONE ENCOUNTER
Upon review of the In Basket request we were able to locate, review, and update the patient chart as requested for Medicare AW  Any additional questions or concerns should be emailed to the Practice Liaisons via [email or web address] email, please do not reply via In Basket      Thank you  Estelita Mitchell

## 2022-08-12 ENCOUNTER — TELEPHONE (OUTPATIENT)
Dept: FAMILY MEDICINE CLINIC | Facility: CLINIC | Age: 87
End: 2022-08-12

## 2022-08-12 DIAGNOSIS — G40.909 SEIZURE DISORDER (HCC): Primary | ICD-10-CM

## 2022-08-12 RX ORDER — LEVETIRACETAM 500 MG/1
500 TABLET ORAL EVERY 12 HOURS SCHEDULED
Qty: 60 TABLET | Refills: 2 | Status: SHIPPED | OUTPATIENT
Start: 2022-08-12 | End: 2022-10-26 | Stop reason: SDUPTHER

## 2022-08-12 NOTE — TELEPHONE ENCOUNTER
[facility] left a msg on the refill hotline requesting a refill on the Keppra 500mg tabs  The last order for Keppra in epic has 500mg 24hr tab, daily  pharmacy is requesting 500mg BID  Clarification is needed on this  [facility]  Patient has enough medication until next week     Elliott Berry MA

## 2022-08-13 PROBLEM — E78.5 DYSLIPIDEMIA: Status: RESOLVED | Noted: 2019-06-10 | Resolved: 2022-08-13

## 2022-08-13 PROBLEM — R29.90 STROKE-LIKE SYMPTOMS: Status: RESOLVED | Noted: 2020-02-09 | Resolved: 2022-08-13

## 2022-08-13 PROBLEM — E78.2 MIXED HYPERLIPIDEMIA: Status: ACTIVE | Noted: 2022-08-13

## 2022-08-13 PROBLEM — J45.20 MILD INTERMITTENT ASTHMA WITHOUT COMPLICATION: Status: ACTIVE | Noted: 2022-08-13

## 2022-08-13 PROBLEM — M26.609 TMJ (TEMPOROMANDIBULAR JOINT DISORDER): Status: RESOLVED | Noted: 2021-03-25 | Resolved: 2022-08-13

## 2022-08-13 PROBLEM — G93.40 ACUTE ENCEPHALOPATHY: Status: RESOLVED | Noted: 2021-03-24 | Resolved: 2022-08-13

## 2022-08-13 PROBLEM — F41.9 ANXIETY: Status: RESOLVED | Noted: 2019-06-10 | Resolved: 2022-08-13

## 2022-08-13 PROBLEM — J30.1 SEASONAL ALLERGIC RHINITIS DUE TO POLLEN: Status: ACTIVE | Noted: 2022-08-13

## 2022-08-13 PROBLEM — R44.3 HALLUCINATIONS: Status: RESOLVED | Noted: 2021-03-24 | Resolved: 2022-08-13

## 2022-09-07 ENCOUNTER — TELEPHONE (OUTPATIENT)
Dept: FAMILY MEDICINE CLINIC | Facility: CLINIC | Age: 87
End: 2022-09-07

## 2022-09-07 DIAGNOSIS — I10 ESSENTIAL HYPERTENSION: Primary | ICD-10-CM

## 2022-09-07 DIAGNOSIS — I10 ESSENTIAL HYPERTENSION: ICD-10-CM

## 2022-09-07 RX ORDER — METOPROLOL SUCCINATE 50 MG/1
50 TABLET, EXTENDED RELEASE ORAL DAILY
Qty: 90 TABLET | Refills: 0 | Status: SHIPPED | OUTPATIENT
Start: 2022-09-07 | End: 2022-09-07 | Stop reason: SDUPTHER

## 2022-09-07 RX ORDER — METOPROLOL SUCCINATE 50 MG/1
50 TABLET, EXTENDED RELEASE ORAL DAILY
Qty: 90 TABLET | Refills: 0 | Status: SHIPPED | OUTPATIENT
Start: 2022-09-07

## 2022-09-07 NOTE — TELEPHONE ENCOUNTER
This is a [address] calling for a refill on metropolol  It's METO PR0L 0LS UCCER 50 milligrams quantity 90  My birthday is October the 30th, 1935  My phone number is [phone]  My address is [address] [place] [address] Road, [address] [place] [address]  Let me see what else? I guess that's it  OK  Thank you   Bye

## 2022-10-11 ENCOUNTER — OFFICE VISIT (OUTPATIENT)
Dept: FAMILY MEDICINE CLINIC | Facility: CLINIC | Age: 87
End: 2022-10-11
Payer: COMMERCIAL

## 2022-10-11 VITALS
WEIGHT: 142.2 LBS | BODY MASS INDEX: 27.92 KG/M2 | HEART RATE: 66 BPM | DIASTOLIC BLOOD PRESSURE: 80 MMHG | TEMPERATURE: 98.7 F | OXYGEN SATURATION: 97 % | RESPIRATION RATE: 16 BRPM | HEIGHT: 60 IN | SYSTOLIC BLOOD PRESSURE: 160 MMHG

## 2022-10-11 DIAGNOSIS — F32.1 CURRENT MODERATE EPISODE OF MAJOR DEPRESSIVE DISORDER WITHOUT PRIOR EPISODE (HCC): ICD-10-CM

## 2022-10-11 DIAGNOSIS — Z23 ENCOUNTER FOR IMMUNIZATION: ICD-10-CM

## 2022-10-11 DIAGNOSIS — I48.20 ATRIAL FIBRILLATION, CHRONIC (HCC): ICD-10-CM

## 2022-10-11 DIAGNOSIS — I10 HTN (HYPERTENSION), BENIGN: ICD-10-CM

## 2022-10-11 DIAGNOSIS — E78.2 MIXED HYPERLIPIDEMIA: ICD-10-CM

## 2022-10-11 DIAGNOSIS — E03.9 ACQUIRED HYPOTHYROIDISM: ICD-10-CM

## 2022-10-11 DIAGNOSIS — Z00.00 MEDICARE ANNUAL WELLNESS VISIT, SUBSEQUENT: Primary | ICD-10-CM

## 2022-10-11 DIAGNOSIS — E55.9 VITAMIN D DEFICIENCY: ICD-10-CM

## 2022-10-11 DIAGNOSIS — G40.909 SEIZURE DISORDER (HCC): ICD-10-CM

## 2022-10-11 DIAGNOSIS — I48.0 PAROXYSMAL ATRIAL FIBRILLATION (HCC): Chronic | ICD-10-CM

## 2022-10-11 DIAGNOSIS — K21.9 GASTROESOPHAGEAL REFLUX DISEASE WITHOUT ESOPHAGITIS: ICD-10-CM

## 2022-10-11 PROCEDURE — G0439 PPPS, SUBSEQ VISIT: HCPCS

## 2022-10-11 PROCEDURE — G0008 ADMIN INFLUENZA VIRUS VAC: HCPCS

## 2022-10-11 PROCEDURE — 99214 OFFICE O/P EST MOD 30 MIN: CPT

## 2022-10-11 PROCEDURE — 90662 IIV NO PRSV INCREASED AG IM: CPT

## 2022-10-11 RX ORDER — CITALOPRAM 20 MG/1
20 TABLET ORAL DAILY
Qty: 30 TABLET | Refills: 3 | Status: SHIPPED | OUTPATIENT
Start: 2022-10-11

## 2022-10-11 NOTE — ASSESSMENT & PLAN NOTE
Under control  Continue current medication  We will re-evaluate at next office visit  The scribe's documentation has been prepared under my direction and personally reviewed by me in its entirety. I confirm that the note above accurately reflects all work, treatment, procedures, and medical decision making performed by me.

## 2022-10-11 NOTE — ASSESSMENT & PLAN NOTE
Patient has not been taking citalopram   We will restart citalopram and refer her to psychiatrist and we will re-evaluate at next office visit

## 2022-10-11 NOTE — PROGRESS NOTES
Health Risk Assessment:   Patient rates overall health as good  Patient feels that their physical health rating is same  Patient is satisfied with their life  Eyesight was rated as slightly worse  Hearing was rated as same  Patient feels that their emotional and mental health rating is slightly worse  Patients states they are sometimes angry  Patient states they are often unusually tired/fatigued  Pain experienced in the last 7 days has been none  Patient states that she has experienced no weight loss or gain in last 6 months  Depression Screening:   PHQ-2 Score: 3  PHQ-9 Score: 13      Fall Risk Screening: In the past year, patient has experienced: history of falling in past year    Number of falls: 1  Injured during fall?: Yes    Feels unsteady when standing or walking?: Yes    Worried about falling?: Yes      Urinary Incontinence Screening:   Patient has not leaked urine accidently in the last six months  Home Safety:  Patient does not have trouble with stairs inside or outside of their home  Patient has working smoke alarms and has no working carbon monoxide detector  Home safety hazards include: none  Nutrition:   Current diet is Regular and Limited junk food  Medications:   Patient is currently taking over-the-counter supplements  OTC medications include: see medication list  Patient is able to manage medications  Activities of Daily Living (ADLs)/Instrumental Activities of Daily Living (IADLs):   Walk and transfer into and out of bed and chair?: Yes  Dress and groom yourself?: Yes    Bathe or shower yourself?: Yes    Do your laundry/housekeeping?: Yes  Manage your money, pay your bills and track your expenses?: Yes  Make your own meals?: Yes    Do your own shopping?: Yes    Previous Hospitalizations:   Any hospitalizations or ED visits within the last 12 months?: Yes    How many hospitalizations have you had in the last year?: 1-2    Advance Care Planning:   Living will:  Yes Durable POA for healthcare: No    Advanced directive: Yes      PREVENTIVE SCREENINGS      Cardiovascular Screening:    General: Screening Not Indicated and History Lipid Disorder      Cervical Cancer Screening:    General: Screening Not Indicated      Lung Cancer Screening:     General: Screening Not Indicated    Screening, Brief Intervention, and Referral to Treatment (SBIRT)    Screening  Typical number of drinks in a day: 0  Typical number of drinks in a week: 0  Interpretation: Low risk drinking behavior  AUDIT-C Screenin) How often did you have a drink containing alcohol in the past year? never  2) How many drinks did you have on a typical day when you were drinking in the past year? 0  3) How often did you have 6 or more drinks on one occasion in the past year? never    AUDIT-C Score: 0  Interpretation: Score 0-2 (female): Negative screen for alcohol misuse    Single Item Drug Screening:  How often have you used an illegal drug (including marijuana) or a prescription medication for non-medical reasons in the past year? never    Single Item Drug Screen Score: 0  Interpretation: Negative screen for possible drug use disorder    Assessment/Plan:         Problem List Items Addressed This Visit        Digestive    Gastroesophageal reflux disease without esophagitis     Under control  Continue current medication  We will re-evaluate at next office visit  Cardiovascular and Mediastinum    Paroxysmal atrial fibrillation (HCC) (Chronic)     Under control  Continue current medication  We will re-evaluate at next office visit  Has been followed by a cardiologist         HTN (hypertension), benign     Under control  Continue current medication  We will re-evaluate at next office visit  Nervous and Auditory    Seizure disorder ([address] )     Under control  Continue current medication  We will re-evaluate at next office visit    All has been followed by a neurologist Other    Mixed hyperlipidemia     Under control  Continue current medication  We will re-evaluate at next office visit  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 episode [facility])     Patient has not been taking citalopram   We will restart citalopram and refer her to psychiatrist and we will re-evaluate at next office visit         Relevant Medications    citalopram (CeleXA) 20 mg tablet    Other Relevant Orders    Ambulatory Referral to Psychiatry      Other Visit Diagnoses     Medicare annual wellness visit, subsequent    -  Primary    BMI 27 0-27 9,adult        Encounter for immunization        Relevant Orders    influenza vaccine, high-dose, PF 0 7 mL (FLUZONE HIGH-DOSE)            Subjective:      Patient ID: Melissa Barajas is a 82 y o  female  Patient here for review of chronic medical problems and review of the labs and imaging if it is applicable  Currently has no specific complaints other than mentioned in the review of systems  Denies chest pain, SOB, cough, abdominal pain, nausea, vomiting, fever, chills, lightheadedness, dizziness,headache, tingling or numbness  No bowel or bladder problem  Patient is feeling very depressed and crying all the time        The following portions of the patient's history were reviewed and updated as appropriate:   Past Medical History:  She has a past medical history of Acute CVA (cerebrovascular accident) ([facility] ) (06/18/2016), Adult hypothyroidism (09/12/2016), Anxiety (06/10/2019), Arthritis, Asthma, Colon polyp, Depression, Disease of thyroid gland, Dyslipidemia (06/10/2019), Esophageal dysmotility (06/10/2019), Essential hypertension (09/12/2016), GERD (gastroesophageal reflux disease), Hyperlipidemia, Hypertension, Hypothyroidism, Obesity, Paroxysmal atrial fibrillation ([facility] ) (09/12/2016), Seizure disorder ([facility] ) (08/24/2018), Seizures ([facility] ), 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), 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), Stroke ([facility] ) (06/2016), Subarachnoid hemorrhage ([facility] ) (09/13/2016), and Subdural hematoma (09/13/2016)  ,  _______________________________________________________________________  Medical Problems:  does not have any pertinent problems on file ,  _______________________________________________________________________  Past Surgical History:   has a past surgical history that includes Back surgery (01/2016); Carpal tunnel release (Right); Appendectomy; Colonoscopy; and Tubal ligation  ,  _______________________________________________________________________  Family History:  family history includes Cancer in her father; Colon cancer in her father; Diabetes in her son; Heart disease in her father, maternal grandmother, mother, and sister; Kidney disease in her mother; Multiple sclerosis in her son; No Known Problems in her son ,  _______________________________________________________________________  Social History:   reports that she has never smoked  She has never used smokeless tobacco  She reports that she does not drink alcohol and does not use drugs  ,  _______________________________________________________________________  Allergies:  is allergic to amlodipine, hydralazine, and penicillins     _______________________________________________________________________  Current Outpatient Medications   Medication Sig Dispense Refill   • acetaminophen (TYLENOL) 325 mg tablet Take 2 tablets (650 mg total) by mouth every 6 (six) hours as needed for mild pain, headaches or fever 30 tablet 0   • atorvastatin (LIPITOR) 40 mg tablet Take 1 tablet (40 mg total) by mouth daily 90 tablet 0   • citalopram (CeleXA) 20 mg tablet Take 1 tablet (20 mg total) by mouth daily 30 tablet 3   • clopidogrel (PLAVIX) 75 mg tablet Take 75 mg by mouth daily Indications: Stroke caused by a Blood Clot       • famotidine (PEPCID) 20 mg tablet Take 20 mg by mouth daily      • fluticasone (FLONASE) 50 mcg/act nasal spray 1 spray into each nostril daily     • fluticasone-vilanterol (Breo Ellipta) 100-25 mcg/inh inhaler Breo Ellipta 100 mcg-25 mcg/dose powder for inhalation     • levETIRAcetam (Roweepra) 500 mg tablet Take 1 tablet (500 mg total) by mouth every 12 (twelve) hours 60 tablet 2   • levothyroxine 88 mcg tablet levothyroxine 88 mcg tablet     • loratadine (CLARITIN) 10 mg tablet Take 10 mg by mouth as needed      • LORazepam (ATIVAN) 0 5 mg tablet Take 0 5 mg by mouth every 6 (six) hours as needed for anxiety     • losartan (COZAAR) 50 mg tablet Take 50 mg by mouth daily      • melatonin 3 mg Take 3 mg by mouth daily at bedtime     • metoprolol succinate (TOPROL-XL) 50 mg 24 hr tablet Take 1 tablet (50 mg total) by mouth daily 90 tablet 0   • omeprazole (PriLOSEC) 20 mg delayed release capsule Take 20 mg by mouth daily       No current facility-administered medications for this visit      _______________________________________________________________________  Review of Systems   Constitutional: Positive for fatigue  Negative for chills and fever  HENT: Negative for congestion, ear pain, rhinorrhea, sneezing and sore throat  Eyes: Negative for pain, discharge, redness, itching and visual disturbance  Respiratory: Negative for cough, chest tightness and shortness of breath  Cardiovascular: Negative for chest pain, palpitations and leg swelling  Gastrointestinal: Negative for abdominal pain, blood in stool, diarrhea, nausea and vomiting  Endocrine: Negative for cold intolerance and heat intolerance  Genitourinary: Negative for dysuria, frequency, hematuria and urgency  Musculoskeletal: Negative for arthralgias, back pain and myalgias  Skin: Negative for color change and rash  Neurological: Negative for dizziness, weakness, light-headedness, numbness and headaches  Hematological: Does not bruise/bleed easily  Psychiatric/Behavioral: Positive for behavioral problems  Negative for agitation and confusion  Objective:  Vitals:    10/11/22 1514   BP: 160/80   BP Location: Left arm   Patient Position: Sitting   Cuff Size: Standard   Pulse: 66   Resp: 16   Temp: 98 7 °F (37 1 °C)   TempSrc: Temporal   SpO2: 97%   Weight: 64 5 kg (142 lb 3 2 oz)   Height: 5' (1 524 m)     Body mass index is 27 77 kg/m²  Physical Exam  Vitals and nursing note reviewed  Constitutional:       General: She is not in acute distress  Appearance: Normal appearance  She is not ill-appearing, toxic-appearing or diaphoretic  HENT:      Head: Normocephalic and atraumatic  Right Ear: Tympanic membrane normal       Left Ear: Tympanic membrane normal       Nose: Nose normal  No congestion  Mouth/Throat:      Mouth: Mucous membranes are moist    Eyes:      General: No scleral icterus  Right eye: No discharge  Left eye: No discharge  Extraocular Movements: Extraocular movements intact  Conjunctiva/sclera: Conjunctivae normal       Pupils: Pupils are equal, round, and reactive to light  Cardiovascular:      Rate and Rhythm: Normal rate and regular rhythm  Pulses: Normal pulses  Heart sounds: Normal heart sounds  No murmur heard  No gallop  Pulmonary:      Effort: Pulmonary effort is normal  No respiratory distress  Breath sounds: Normal breath sounds  No wheezing, rhonchi or rales  Abdominal:      General: Abdomen is flat  Bowel sounds are normal  There is no distension  Palpations: Abdomen is soft  Tenderness: There is no abdominal tenderness  There is no guarding  Musculoskeletal:         General: No swelling or tenderness  Normal range of motion  Cervical back: Normal range of motion and neck supple  No rigidity  Lymphadenopathy:      Cervical: No cervical adenopathy  Skin:     General: Skin is warm  Capillary Refill: Capillary refill takes 2 to 3 seconds  Coloration: Skin is not jaundiced  Findings: No bruising or rash     Neurological: General: No focal deficit present  Mental Status: She is alert and oriented to person, place, and time  Mental status is at baseline  Gait: Gait normal    Psychiatric:      Comments: Patient is sad and crying       BMI Counseling: Body mass index is 27 77 kg/m²  The BMI is above normal  Nutrition recommendations include decreasing portion sizes, decreasing fast food intake, limiting drinks that contain sugar, moderation in carbohydrate intake, increasing intake of lean protein and reducing intake of saturated and trans fat  Exercise recommendations include vigorous physical activity 75 minutes/week  No pharmacotherapy was ordered  Rationale for BMI follow-up plan is due to patient being overweight or obese  Depression Screening and Follow-up Plan: Patient's depression screening was positive with a PHQ-2 score of 3  Their PHQ-9 score was 13

## 2022-10-11 NOTE — ASSESSMENT & PLAN NOTE
Under control  Continue current medication  We will re-evaluate at next office visit    All has been followed by a neurologist

## 2022-10-14 ENCOUNTER — TELEPHONE (OUTPATIENT)
Dept: PSYCHIATRY | Facility: CLINIC | Age: 87
End: 2022-10-14

## 2022-10-14 NOTE — TELEPHONE ENCOUNTER
Connected with patient via telephone and verified that the patient is interested in talk therapy services and would like to be added to the waitlist

## 2022-10-26 DIAGNOSIS — I10 HTN (HYPERTENSION), BENIGN: Primary | ICD-10-CM

## 2022-10-26 DIAGNOSIS — G40.909 SEIZURE DISORDER (HCC): ICD-10-CM

## 2022-10-26 DIAGNOSIS — E78.2 MIXED HYPERLIPIDEMIA: ICD-10-CM

## 2022-10-26 RX ORDER — LOSARTAN POTASSIUM 50 MG/1
50 TABLET ORAL DAILY
Qty: 90 TABLET | Refills: 0 | Status: SHIPPED | OUTPATIENT
Start: 2022-10-26

## 2022-10-26 RX ORDER — ATORVASTATIN CALCIUM 40 MG/1
40 TABLET, FILM COATED ORAL DAILY
Qty: 90 TABLET | Refills: 0 | Status: SHIPPED | OUTPATIENT
Start: 2022-10-26

## 2022-10-26 RX ORDER — LEVETIRACETAM 500 MG/1
500 TABLET ORAL EVERY 12 HOURS SCHEDULED
Qty: 60 TABLET | Refills: 2 | Status: SHIPPED | OUTPATIENT
Start: 2022-10-26

## 2022-10-26 NOTE — TELEPHONE ENCOUNTER
This is Hank Parra calling  I'd like refills on ATORVASTATIN 40 milligrams  That's let me see  And it's a [employer], [address]  My number is [phone]  My birthday is October 30th, 1935  And another one is  LOSARTANPOTASSIUM 50 milligrams  Same address, [facility] and then one more [facility]  E g  milligrams  And it's the same [employer]  So that one is Keppra and the one is Lipitor and the other one is cozaar, COZAR and I guess that's it  Thank you  Bye  You received a voice mail from [facility]

## 2022-11-08 DIAGNOSIS — I48.0 PAROXYSMAL ATRIAL FIBRILLATION (HCC): Primary | ICD-10-CM

## 2022-11-08 RX ORDER — CLOPIDOGREL BISULFATE 75 MG/1
75 TABLET ORAL DAILY
Qty: 90 TABLET | Refills: 0 | Status: SHIPPED | OUTPATIENT
Start: 2022-11-08

## 2022-11-08 NOTE — TELEPHONE ENCOUNTER
My name is Lucrecia [facility] October the 30th, 1935  I'm calling for your refills on my medicine  CLOPIDOGREL 75 milligrams and the other one is Famotidine 20 milligrams  Thank you   Bye

## 2022-11-21 DIAGNOSIS — K21.9 GASTROESOPHAGEAL REFLUX DISEASE WITHOUT ESOPHAGITIS: Primary | ICD-10-CM

## 2022-11-21 RX ORDER — FAMOTIDINE 20 MG/1
20 TABLET, FILM COATED ORAL DAILY
Qty: 90 TABLET | Refills: 0 | Status: SHIPPED | OUTPATIENT
Start: 2022-11-21

## 2022-12-16 DIAGNOSIS — G40.909 SEIZURE DISORDER (HCC): ICD-10-CM

## 2022-12-16 DIAGNOSIS — E03.9 ACQUIRED HYPOTHYROIDISM: Primary | ICD-10-CM

## 2022-12-16 DIAGNOSIS — I48.0 PAROXYSMAL ATRIAL FIBRILLATION (HCC): ICD-10-CM

## 2022-12-16 RX ORDER — CLOPIDOGREL BISULFATE 75 MG/1
75 TABLET ORAL DAILY
Qty: 90 TABLET | Refills: 0 | Status: SHIPPED | OUTPATIENT
Start: 2022-12-16

## 2022-12-16 RX ORDER — LEVOTHYROXINE SODIUM 88 UG/1
88 TABLET ORAL DAILY
Qty: 90 TABLET | Refills: 0 | Status: SHIPPED | OUTPATIENT
Start: 2022-12-16

## 2022-12-16 RX ORDER — LEVETIRACETAM 500 MG/1
500 TABLET ORAL EVERY 12 HOURS SCHEDULED
Qty: 60 TABLET | Refills: 2 | Status: SHIPPED | OUTPATIENT
Start: 2022-12-16

## 2022-12-16 NOTE — TELEPHONE ENCOUNTER
My name is Syed Ferrari  My birthday, October 30th, 1935  My address to [address] [place] [address], [address]  My I want the Synthroid it's century 88 milligrams, quality quantity 90 and it's [id number]  That's one one I want and another one I want is kepra same thing  The number is [phone]  And let me see I have one more and  Plavix Plavix  [phone]  I think that's it  I think that's it for now  Thank you  Bye  Refilled 12/16    Tami Velásquez, RN

## 2022-12-27 DIAGNOSIS — I10 ESSENTIAL HYPERTENSION: ICD-10-CM

## 2022-12-27 DIAGNOSIS — E03.9 ACQUIRED HYPOTHYROIDISM: ICD-10-CM

## 2022-12-27 RX ORDER — LEVOTHYROXINE SODIUM 88 UG/1
88 TABLET ORAL DAILY
Qty: 90 TABLET | Refills: 0 | Status: SHIPPED | OUTPATIENT
Start: 2022-12-27

## 2022-12-27 RX ORDER — METOPROLOL SUCCINATE 50 MG/1
50 TABLET, EXTENDED RELEASE ORAL DAILY
Qty: 90 TABLET | Refills: 0 | Status: SHIPPED | OUTPATIENT
Start: 2022-12-27

## 2023-01-09 ENCOUNTER — RA CDI HCC (OUTPATIENT)
Dept: OTHER | Facility: HOSPITAL | Age: 88
End: 2023-01-09

## 2023-01-09 NOTE — PROGRESS NOTES
[facility]  coding opportunities       Chart reviewed, no opportunity found:   [address]        Patients Insurance     Medicare Insurance: [employer]

## 2023-01-13 ENCOUNTER — OFFICE VISIT (OUTPATIENT)
Dept: FAMILY MEDICINE CLINIC | Facility: CLINIC | Age: 88
End: 2023-01-13

## 2023-01-13 VITALS
BODY MASS INDEX: 28.47 KG/M2 | HEART RATE: 68 BPM | SYSTOLIC BLOOD PRESSURE: 130 MMHG | OXYGEN SATURATION: 95 % | TEMPERATURE: 98.9 F | WEIGHT: 145 LBS | HEIGHT: 60 IN | DIASTOLIC BLOOD PRESSURE: 66 MMHG

## 2023-01-13 DIAGNOSIS — I48.0 PAROXYSMAL ATRIAL FIBRILLATION (HCC): Chronic | ICD-10-CM

## 2023-01-13 DIAGNOSIS — G40.909 SEIZURE DISORDER (HCC): ICD-10-CM

## 2023-01-13 DIAGNOSIS — I10 HTN (HYPERTENSION), BENIGN: Primary | ICD-10-CM

## 2023-01-13 DIAGNOSIS — E78.2 MIXED HYPERLIPIDEMIA: ICD-10-CM

## 2023-01-13 DIAGNOSIS — J45.20 MILD INTERMITTENT ASTHMA WITHOUT COMPLICATION: ICD-10-CM

## 2023-01-13 DIAGNOSIS — K21.9 GASTROESOPHAGEAL REFLUX DISEASE WITHOUT ESOPHAGITIS: ICD-10-CM

## 2023-01-13 DIAGNOSIS — E03.9 ACQUIRED HYPOTHYROIDISM: ICD-10-CM

## 2023-01-13 NOTE — ASSESSMENT & PLAN NOTE
Under control  Continue current medication  We will re-evaluate at next office visit    Has been followed by a neurologist

## 2023-01-13 NOTE — ASSESSMENT & PLAN NOTE
Under control  Continue current medication including anticoagulation medication to prevent stroke  We will reevaluate at next office visit    Has been followed by cardiologist

## 2023-01-13 NOTE — PROGRESS NOTES
Assessment/Plan:         Problem List Items Addressed This Visit        Digestive    Gastroesophageal reflux disease without esophagitis     Under control  Continue current medication  We will re-evaluate at next office visit  Endocrine    Acquired hypothyroidism     Under control  Continue current medication  We will re-evaluate at next office visit  Respiratory    Mild intermittent asthma without complication     Under control  Continue current medication  We will re-evaluate at next office visit  Cardiovascular and Mediastinum    Paroxysmal atrial fibrillation (HCC) (Chronic)     Under control  Continue current medication including anticoagulation medication to prevent stroke  We will reevaluate at next office visit  Has been followed by cardiologist            HTN (hypertension), benign - Primary     Under control  Continue current medication  We will re-evaluate at next office visit  Nervous and Auditory    Seizure disorder ([facility] )     Under control  Continue current medication  We will re-evaluate at next office visit  Has been followed by a neurologist            Other    Mixed hyperlipidemia     Under control  Continue current medication  We will re-evaluate at next office visit  Subjective:      Patient ID: Deonte Quinn is a 82 y o  female  Patient here for review of chronic medical problems and review of the labs and imaging if it is applicable  Currently has no specific complaints other than mentioned in the review of systems  Denies chest pain, SOB, cough, abdominal pain, nausea, vomiting, fever, chills, lightheadedness, dizziness,headache, tingling or numbness  No bowel or bladder problem        The following portions of the patient's history were reviewed and updated as appropriate:   Past Medical History:  She has a past medical history of Acute CVA (cerebrovascular accident) ([facility] ) (06/18/2016), Adult hypothyroidism (09/12/2016), Anxiety (06/10/2019), Arthritis, Asthma, Colon polyp, Depression, Disease of thyroid gland, Dyslipidemia (06/10/2019), Esophageal dysmotility (06/10/2019), Essential hypertension (09/12/2016), GERD (gastroesophageal reflux disease), Hyperlipidemia, Hypertension, Hypothyroidism, Obesity, Paroxysmal atrial fibrillation ([place] [address] ) (09/12/2016), Seizure disorder ([place] [address] ) (08/24/2018), Seizures ([place] [address] ), 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), 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), Stroke ([place] [address] ) (06/2016), Subarachnoid hemorrhage ([place] [address] ) (09/13/2016), and Subdural hematoma (09/13/2016)  ,  _______________________________________________________________________  Medical Problems:  does not have any pertinent problems on file ,  _______________________________________________________________________  Past Surgical History:   has a past surgical history that includes Back surgery (01/2016); Carpal tunnel release (Right); Appendectomy; Colonoscopy; and Tubal ligation  ,  _______________________________________________________________________  Family History:  family history includes Cancer in her father; Colon cancer in her father; Diabetes in her son; Heart disease in her father, maternal grandmother, mother, and sister; Kidney disease in her mother; Multiple sclerosis in her son; No Known Problems in her son ,  _______________________________________________________________________  Social History:   reports that she has never smoked  She has never used smokeless tobacco  She reports that she does not drink alcohol and does not use drugs  ,  _______________________________________________________________________  Allergies:  is allergic to amlodipine, hydralazine, and penicillins     _______________________________________________________________________  Current Outpatient Medications   Medication Sig Dispense Refill   • acetaminophen (TYLENOL) 325 mg tablet Take 2 tablets (650 mg total) by mouth every 6 (six) hours as needed for mild pain, headaches or fever 30 tablet 0   • atorvastatin (LIPITOR) 40 mg tablet Take 1 tablet (40 mg total) by mouth daily 90 tablet 0   • citalopram (CeleXA) 20 mg tablet Take 1 tablet (20 mg total) by mouth daily 30 tablet 3   • clopidogrel (PLAVIX) 75 mg tablet Take 1 tablet (75 mg total) by mouth daily 90 tablet 0   • famotidine (PEPCID) 20 mg tablet Take 1 tablet (20 mg total) by mouth daily 90 tablet 0   • fluticasone (FLONASE) 50 mcg/act nasal spray 1 spray into each nostril daily     • fluticasone-vilanterol (Breo Ellipta) 100-25 mcg/inh inhaler Breo Ellipta 100 mcg-25 mcg/dose powder for inhalation     • levETIRAcetam (Roweepra) 500 mg tablet Take 1 tablet (500 mg total) by mouth every 12 (twelve) hours 60 tablet 2   • levothyroxine 88 mcg tablet Take 1 tablet (88 mcg total) by mouth daily 90 tablet 0   • loratadine (CLARITIN) 10 mg tablet Take 10 mg by mouth as needed      • losartan (COZAAR) 50 mg tablet Take 1 tablet (50 mg total) by mouth daily 90 tablet 0   • melatonin 3 mg Take 3 mg by mouth daily at bedtime     • metoprolol succinate (TOPROL-XL) 50 mg 24 hr tablet Take 1 tablet (50 mg total) by mouth daily 90 tablet 0   • omeprazole (PriLOSEC) 20 mg delayed release capsule Take 20 mg by mouth daily     • citalopram (CeleXA) 20 mg tablet Take 1 tablet (20 mg total) by mouth daily 30 tablet 3   • fluticasone (FLONASE) 50 mcg/act nasal spray 1 spray into each nostril daily     • fluticasone-vilanterol (Breo Ellipta) 100-25 mcg/inh inhaler Breo Ellipta 100 mcg-25 mcg/dose powder for inhalation     • loratadine (CLARITIN) 10 mg tablet Take 10 mg by mouth as needed      • LORazepam (ATIVAN) 0 5 mg tablet Take 0 5 mg by mouth every 6 (six) hours as needed for anxiety     • losartan (COZAAR) 50 mg tablet Take 1 tablet (50 mg total) by mouth daily 90 tablet 0   • omeprazole (PriLOSEC) 20 mg delayed release capsule Take 20 mg by mouth daily No current facility-administered medications for this visit      _______________________________________________________________________  Review of Systems   Constitutional: Positive for fatigue  Negative for chills and fever  HENT: Negative for congestion, ear pain, rhinorrhea, sneezing and sore throat  Eyes: Negative for pain, discharge, redness, itching and visual disturbance  Respiratory: Negative for cough, chest tightness and shortness of breath  Cardiovascular: Negative for chest pain, palpitations and leg swelling  Gastrointestinal: Negative for abdominal pain, blood in stool, diarrhea, nausea and vomiting  Endocrine: Negative for cold intolerance and heat intolerance  Genitourinary: Negative for dysuria, frequency, hematuria and urgency  Musculoskeletal: Negative for arthralgias, back pain and myalgias  Skin: Negative for color change and rash  Neurological: Negative for dizziness, weakness, light-headedness, numbness and headaches  Hematological: Does not bruise/bleed easily  Psychiatric/Behavioral: Negative for agitation, behavioral problems and confusion  Objective:  Vitals:    01/13/23 1156   BP: 130/66   BP Location: Right arm   Patient Position: Sitting   Cuff Size: Adult   Pulse: 68   Temp: 98 9 °F (37 2 °C)   TempSrc: Tympanic   SpO2: 95%   Weight: 65 8 kg (145 lb)   Height: 5' (1 524 m)     Body mass index is 28 32 kg/m²  Physical Exam  Vitals and nursing note reviewed  Constitutional:       General: She is not in acute distress  Appearance: Normal appearance  She is not ill-appearing, toxic-appearing or diaphoretic  HENT:      Head: Normocephalic and atraumatic  Right Ear: Tympanic membrane normal       Left Ear: Tympanic membrane normal       Nose: Nose normal  No congestion  Mouth/Throat:      Mouth: Mucous membranes are moist    Eyes:      General: No scleral icterus  Right eye: No discharge  Left eye: No discharge  Extraocular Movements: Extraocular movements intact  Conjunctiva/sclera: Conjunctivae normal       Pupils: Pupils are equal, round, and reactive to light  Cardiovascular:      Rate and Rhythm: Normal rate and regular rhythm  Pulses: Normal pulses  Heart sounds: Normal heart sounds  No murmur heard  No gallop  Pulmonary:      Effort: Pulmonary effort is normal  No respiratory distress  Breath sounds: Normal breath sounds  No wheezing, rhonchi or rales  Abdominal:      General: Abdomen is flat  Bowel sounds are normal  There is no distension  Palpations: Abdomen is soft  Tenderness: There is no abdominal tenderness  There is no guarding  Musculoskeletal:         General: No swelling or tenderness  Normal range of motion  Cervical back: Normal range of motion and neck supple  No rigidity  Lymphadenopathy:      Cervical: No cervical adenopathy  Skin:     General: Skin is warm  Capillary Refill: Capillary refill takes 2 to 3 seconds  Coloration: Skin is not jaundiced  Findings: No bruising or rash  Neurological:      General: No focal deficit present  Mental Status: She is alert and oriented to person, place, and time  Mental status is at baseline        Gait: Gait normal    Psychiatric:      Comments: Patient is sad and crying

## 2023-01-18 DIAGNOSIS — I10 HTN (HYPERTENSION), BENIGN: ICD-10-CM

## 2023-01-18 RX ORDER — LOSARTAN POTASSIUM 50 MG/1
50 TABLET ORAL DAILY
Qty: 90 TABLET | Refills: 0 | Status: SHIPPED | OUTPATIENT
Start: 2023-01-18

## 2023-01-31 DIAGNOSIS — K21.9 GASTROESOPHAGEAL REFLUX DISEASE WITHOUT ESOPHAGITIS: Primary | ICD-10-CM

## 2023-01-31 RX ORDER — OMEPRAZOLE 20 MG/1
20 CAPSULE, DELAYED RELEASE ORAL DAILY
Qty: 90 CAPSULE | Refills: 1 | Status: SHIPPED | OUTPATIENT
Start: 2023-01-31

## 2023-03-06 DIAGNOSIS — K21.9 GASTROESOPHAGEAL REFLUX DISEASE WITHOUT ESOPHAGITIS: ICD-10-CM

## 2023-03-06 RX ORDER — FAMOTIDINE 20 MG/1
20 TABLET, FILM COATED ORAL DAILY
Qty: 90 TABLET | Refills: 0 | Status: SHIPPED | OUTPATIENT
Start: 2023-03-06

## 2023-03-08 DIAGNOSIS — G40.909 SEIZURE DISORDER (HCC): ICD-10-CM

## 2023-03-08 RX ORDER — LEVETIRACETAM 500 MG/1
500 TABLET ORAL EVERY 12 HOURS SCHEDULED
Qty: 60 TABLET | Refills: 2 | Status: SHIPPED | OUTPATIENT
Start: 2023-03-08

## 2023-03-22 DIAGNOSIS — I10 ESSENTIAL HYPERTENSION: ICD-10-CM

## 2023-03-22 RX ORDER — METOPROLOL SUCCINATE 50 MG/1
50 TABLET, EXTENDED RELEASE ORAL DAILY
Qty: 90 TABLET | Refills: 0 | Status: SHIPPED | OUTPATIENT
Start: 2023-03-22

## 2023-04-07 ENCOUNTER — TELEPHONE (OUTPATIENT)
Dept: PSYCHIATRY | Facility: CLINIC | Age: 88
End: 2023-04-07

## 2023-05-02 DIAGNOSIS — E03.9 ACQUIRED HYPOTHYROIDISM: ICD-10-CM

## 2023-05-02 RX ORDER — LEVOTHYROXINE SODIUM 88 UG/1
88 TABLET ORAL DAILY
Qty: 90 TABLET | Refills: 0 | Status: SHIPPED | OUTPATIENT
Start: 2023-05-02

## 2023-05-02 NOTE — TELEPHONE ENCOUNTER
Juan, this is Timmy Holguin  My birthday is October 30th, 1935 I would like a refill on my levothyroxine, 88 milligrams  So I don't know what else to tell you on that  I get it [employer]  So my phone number is [phone]  Thank you   Bymelody

## 2023-05-08 ENCOUNTER — TELEPHONE (OUTPATIENT)
Dept: FAMILY MEDICINE CLINIC | Facility: CLINIC | Age: 88
End: 2023-05-08

## 2023-05-08 NOTE — TELEPHONE ENCOUNTER
Patient left a message asking for refill on zolpidem I dont see that this medication has been filled since 2016

## 2023-06-13 ENCOUNTER — OFFICE VISIT (OUTPATIENT)
Dept: FAMILY MEDICINE CLINIC | Facility: CLINIC | Age: 88
End: 2023-06-13
Payer: COMMERCIAL

## 2023-06-13 VITALS
DIASTOLIC BLOOD PRESSURE: 78 MMHG | HEART RATE: 73 BPM | SYSTOLIC BLOOD PRESSURE: 140 MMHG | HEIGHT: 60 IN | TEMPERATURE: 98.8 F | WEIGHT: 146.6 LBS | OXYGEN SATURATION: 95 % | BODY MASS INDEX: 28.78 KG/M2 | RESPIRATION RATE: 16 BRPM

## 2023-06-13 DIAGNOSIS — E28.39 MENOPAUSE OVARIAN FAILURE: ICD-10-CM

## 2023-06-13 DIAGNOSIS — L98.9 SKIN LESION OF HAND: ICD-10-CM

## 2023-06-13 DIAGNOSIS — E78.2 MIXED HYPERLIPIDEMIA: ICD-10-CM

## 2023-06-13 DIAGNOSIS — I10 HTN (HYPERTENSION), BENIGN: Primary | ICD-10-CM

## 2023-06-13 DIAGNOSIS — I48.0 PAROXYSMAL ATRIAL FIBRILLATION (HCC): Chronic | ICD-10-CM

## 2023-06-13 DIAGNOSIS — K21.9 GASTROESOPHAGEAL REFLUX DISEASE WITHOUT ESOPHAGITIS: ICD-10-CM

## 2023-06-13 DIAGNOSIS — I10 ESSENTIAL HYPERTENSION: ICD-10-CM

## 2023-06-13 DIAGNOSIS — G40.909 SEIZURE DISORDER (HCC): ICD-10-CM

## 2023-06-13 DIAGNOSIS — J30.1 SEASONAL ALLERGIC RHINITIS DUE TO POLLEN: ICD-10-CM

## 2023-06-13 PROCEDURE — 99214 OFFICE O/P EST MOD 30 MIN: CPT

## 2023-06-13 RX ORDER — CLOPIDOGREL BISULFATE 75 MG/1
75 TABLET ORAL DAILY
Qty: 90 TABLET | Refills: 0 | Status: SHIPPED | OUTPATIENT
Start: 2023-06-13

## 2023-06-13 RX ORDER — METOPROLOL SUCCINATE 50 MG/1
50 TABLET, EXTENDED RELEASE ORAL DAILY
Qty: 90 TABLET | Refills: 0 | Status: SHIPPED | OUTPATIENT
Start: 2023-06-13 | End: 2023-06-20 | Stop reason: SDUPTHER

## 2023-06-13 RX ORDER — FAMOTIDINE 20 MG/1
20 TABLET, FILM COATED ORAL DAILY
Qty: 90 TABLET | Refills: 0 | Status: SHIPPED | OUTPATIENT
Start: 2023-06-13

## 2023-06-13 NOTE — PROGRESS NOTES
Assessment/Plan:         Problem List Items Addressed This Visit        Digestive    Gastroesophageal reflux disease without esophagitis     Under control  Continue current medication  We will re-evaluate at next office visit  Relevant Medications    famotidine (PEPCID) 20 mg tablet       Respiratory    Seasonal allergic rhinitis due to pollen     Under control  Continue current medication  We will re-evaluate at next office visit  Cardiovascular and Mediastinum    Paroxysmal atrial fibrillation (HCC) (Chronic)     Under control  Continue current medication including anticoagulation medication to prevent stroke  We will reevaluate at next office visit  Has been followed by cardiologist            Relevant Medications    metoprolol succinate (TOPROL-XL) 50 mg 24 hr tablet    clopidogrel (PLAVIX) 75 mg tablet    HTN (hypertension), benign - Primary     Under control  Continue current medication  We will re-evaluate at next office visit  Relevant Medications    metoprolol succinate (TOPROL-XL) 50 mg 24 hr tablet       Nervous and Auditory    Seizure disorder (HCC)     Under control  Patient is seizure-free  Continue current medication  We will re-evaluate at next office visit  Other    Mixed hyperlipidemia     Under control  Continue current medication  We will re-evaluate at next office visit  Other Visit Diagnoses     Skin lesion of hand        Relevant Orders    Ambulatory referral to Dermatology    Essential hypertension        Relevant Medications    metoprolol succinate (TOPROL-XL) 50 mg 24 hr tablet    Menopause ovarian failure        Relevant Orders    DXA bone density spine hip and pelvis            Subjective:      Patient ID: Ras Welsh is a 82 y o  female  Patient here for review of chronic medical problems and  the labs and imaging if it is applicable    Currently has no specific complaints other than mentioned in the review of systems  Denies chest pain, SOB, cough, abdominal pain, nausea, vomiting, fever, chills, lightheadedness, dizziness,headache, tingling or numbness  No bowel or bladder problem  The following portions of the patient's history were reviewed and updated as appropriate:   Past Medical History:  She has a past medical history of Acute CVA (cerebrovascular accident) ([place] [address] ) (06/18/2016), Adult hypothyroidism (09/12/2016), Anxiety (06/10/2019), Arthritis, Asthma, Colon polyp, Depression, Disease of thyroid gland, Dyslipidemia (06/10/2019), Esophageal dysmotility (06/10/2019), Essential hypertension (09/12/2016), GERD (gastroesophageal reflux disease), Hyperlipidemia, Hypertension, Hypothyroidism, Obesity, Paroxysmal atrial fibrillation ([place] [address] ) (09/12/2016), Seizure disorder ([place] [address] ) (08/24/2018), Seizures ([place] [address] ), 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), 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), Stroke ([place] [address] ) (06/2016), Subarachnoid hemorrhage ([place] [address] ) (09/13/2016), and Subdural hematoma ([place] [address] ) (09/13/2016)  ,  _______________________________________________________________________  Medical Problems:  does not have any pertinent problems on file ,  _______________________________________________________________________  Past Surgical History:   has a past surgical history that includes Back surgery (01/2016); Carpal tunnel release (Right); Appendectomy; Colonoscopy; and Tubal ligation  ,  _______________________________________________________________________  Family History:  family history includes Cancer in her father; Colon cancer in her father; Diabetes in her son; Heart disease in her father, maternal grandmother, mother, and sister; Kidney disease in her mother; Multiple sclerosis in her son; No Known Problems in her son ,  _______________________________________________________________________  Social History:   reports that she has never smoked   She has never used smokeless tobacco  She reports that she does not drink alcohol and does not use drugs  ,  _______________________________________________________________________  Allergies:  is allergic to amlodipine, hydralazine, and penicillins     _______________________________________________________________________  Current Outpatient Medications   Medication Sig Dispense Refill   • acetaminophen (TYLENOL) 325 mg tablet Take 2 tablets (650 mg total) by mouth every 6 (six) hours as needed for mild pain, headaches or fever 30 tablet 0   • atorvastatin (LIPITOR) 40 mg tablet Take 1 tablet (40 mg total) by mouth daily 90 tablet 0   • citalopram (CeleXA) 20 mg tablet Take 1 tablet (20 mg total) by mouth daily 30 tablet 3   • clopidogrel (PLAVIX) 75 mg tablet Take 1 tablet (75 mg total) by mouth daily 90 tablet 0   • famotidine (PEPCID) 20 mg tablet Take 1 tablet (20 mg total) by mouth daily 90 tablet 0   • fluticasone (FLONASE) 50 mcg/act nasal spray 1 spray into each nostril daily     • levETIRAcetam (Roweepra) 500 mg tablet Take 1 tablet (500 mg total) by mouth every 12 (twelve) hours 60 tablet 2   • levothyroxine 88 mcg tablet Take 1 tablet (88 mcg total) by mouth daily 90 tablet 0   • loratadine (CLARITIN) 10 mg tablet Take 10 mg by mouth as needed      • LORazepam (ATIVAN) 0 5 mg tablet Take 0 5 mg by mouth every 6 (six) hours as needed for anxiety     • losartan (COZAAR) 50 mg tablet Take 1 tablet (50 mg total) by mouth daily 90 tablet 0   • melatonin 3 mg Take 3 mg by mouth daily at bedtime     • metoprolol succinate (TOPROL-XL) 50 mg 24 hr tablet Take 1 tablet (50 mg total) by mouth daily 90 tablet 0   • omeprazole (PriLOSEC) 20 mg delayed release capsule Take 1 capsule (20 mg total) by mouth daily 90 capsule 1     No current facility-administered medications for this visit      _______________________________________________________________________  Review of Systems   Constitutional: Positive for fatigue  Negative for chills and fever     HENT: Negative for congestion, ear pain, rhinorrhea, sneezing and sore throat  Eyes: Negative for pain, discharge, redness, itching and visual disturbance  Respiratory: Negative for cough, chest tightness and shortness of breath  Cardiovascular: Negative for chest pain, palpitations and leg swelling  Gastrointestinal: Negative for abdominal pain, blood in stool, diarrhea, nausea and vomiting  Endocrine: Negative for cold intolerance and heat intolerance  Genitourinary: Negative for dysuria, frequency, hematuria and urgency  Musculoskeletal: Negative for arthralgias, back pain and myalgias  Skin: Negative for color change and rash  Neurological: Negative for dizziness, weakness, light-headedness, numbness and headaches  Hematological: Does not bruise/bleed easily  Psychiatric/Behavioral: Negative for agitation and behavioral problems  Objective:  Vitals:    06/13/23 1212   BP: 140/78   BP Location: Left arm   Patient Position: Sitting   Cuff Size: Standard   Pulse: 73   Resp: 16   Temp: 98 8 °F (37 1 °C)   TempSrc: Tympanic   SpO2: 95%   Weight: 66 5 kg (146 lb 9 6 oz)   Height: 5' (1 524 m)     Body mass index is 28 63 kg/m²  Physical Exam  Vitals and nursing note reviewed  Constitutional:       General: She is not in acute distress  Appearance: Normal appearance  She is not ill-appearing, toxic-appearing or diaphoretic  HENT:      Head: Normocephalic and atraumatic  Right Ear: Tympanic membrane normal       Left Ear: Tympanic membrane normal       Nose: Nose normal  No congestion  Mouth/Throat:      Mouth: Mucous membranes are moist    Eyes:      General: No scleral icterus  Right eye: No discharge  Left eye: No discharge  Extraocular Movements: Extraocular movements intact  Conjunctiva/sclera: Conjunctivae normal       Pupils: Pupils are equal, round, and reactive to light     Cardiovascular:      Rate and Rhythm: Normal rate and regular rhythm  Pulses: Normal pulses  Heart sounds: Normal heart sounds  No murmur heard  No gallop  Pulmonary:      Effort: Pulmonary effort is normal  No respiratory distress  Breath sounds: Normal breath sounds  No wheezing, rhonchi or rales  Abdominal:      General: Abdomen is flat  Bowel sounds are normal  There is no distension  Palpations: Abdomen is soft  Tenderness: There is no abdominal tenderness  There is no guarding  Musculoskeletal:         General: No swelling or tenderness  Normal range of motion  Cervical back: Normal range of motion and neck supple  No rigidity  Right lower leg: No edema  Left lower leg: No edema  Lymphadenopathy:      Cervical: No cervical adenopathy  Skin:     General: Skin is warm  Capillary Refill: Capillary refill takes 2 to 3 seconds  Coloration: Skin is not jaundiced  Findings: No bruising or rash  Neurological:      General: No focal deficit present  Mental Status: She is alert and oriented to person, place, and time  Mental status is at baseline        Gait: Gait normal    Psychiatric:         Mood and Affect: Mood normal          Behavior: Behavior normal

## 2023-06-13 NOTE — ASSESSMENT & PLAN NOTE
Under control  Patient is seizure-free  Continue current medication  We will re-evaluate at next office visit

## 2023-06-20 ENCOUNTER — TELEPHONE (OUTPATIENT)
Dept: FAMILY MEDICINE CLINIC | Facility: CLINIC | Age: 88
End: 2023-06-20

## 2023-06-20 DIAGNOSIS — I10 ESSENTIAL HYPERTENSION: ICD-10-CM

## 2023-06-20 RX ORDER — METOPROLOL SUCCINATE 50 MG/1
50 TABLET, EXTENDED RELEASE ORAL DAILY
Qty: 90 TABLET | Refills: 0 | Status: SHIPPED | OUTPATIENT
Start: 2023-06-20

## 2023-06-20 RX ORDER — METOPROLOL SUCCINATE 50 MG/1
50 TABLET, EXTENDED RELEASE ORAL DAILY
Qty: 90 TABLET | Refills: 0 | Status: SHIPPED | OUTPATIENT
Start: 2023-06-20 | End: 2023-06-20 | Stop reason: SDUPTHER

## 2023-08-08 DIAGNOSIS — E03.9 ACQUIRED HYPOTHYROIDISM: ICD-10-CM

## 2023-08-08 RX ORDER — LEVOTHYROXINE SODIUM 88 UG/1
88 TABLET ORAL DAILY
Qty: 90 TABLET | Refills: 0 | Status: SHIPPED | OUTPATIENT
Start: 2023-08-08

## 2023-08-25 ENCOUNTER — OFFICE VISIT (OUTPATIENT)
Dept: PODIATRY | Facility: CLINIC | Age: 88
End: 2023-08-25
Payer: COMMERCIAL

## 2023-08-25 VITALS
HEIGHT: 60 IN | DIASTOLIC BLOOD PRESSURE: 78 MMHG | SYSTOLIC BLOOD PRESSURE: 140 MMHG | RESPIRATION RATE: 17 BRPM | BODY MASS INDEX: 28.66 KG/M2 | WEIGHT: 146 LBS

## 2023-08-25 DIAGNOSIS — B35.1 ONYCHOMYCOSIS: ICD-10-CM

## 2023-08-25 DIAGNOSIS — M79.671 RIGHT FOOT PAIN: ICD-10-CM

## 2023-08-25 DIAGNOSIS — M89.8X9 BONY EXOSTOSIS: ICD-10-CM

## 2023-08-25 DIAGNOSIS — D49.2: Primary | ICD-10-CM

## 2023-08-25 PROCEDURE — 99203 OFFICE O/P NEW LOW 30 MIN: CPT | Performed by: PODIATRIST

## 2023-08-25 PROCEDURE — 73620 X-RAY EXAM OF FOOT: CPT | Performed by: PODIATRIST

## 2023-08-25 RX ORDER — MELOXICAM 7.5 MG/1
7.5 TABLET ORAL DAILY
Qty: 20 TABLET | Refills: 0 | Status: SHIPPED | OUTPATIENT
Start: 2023-08-25 | End: 2023-09-14

## 2023-08-25 NOTE — PROGRESS NOTES
Assessment/Plan:  Pain right heel. Bony mass right calcaneus. Retrocalcaneal spur right calcaneus. Mycosis of nail. Plan. Chart reviewed. X-rays taken reviewed. Patient advised on condition. At this time MRI ordered to rule out origin of cystic lesion right heel. Patient may need surgical intervention. In order to help with pain, we will add Mobic. All nails debrided without pain or complication. Return for follow-up. Diagnoses and all orders for this visit:    Neoplasm of bone of right foot  -     MRI ankle/heel right w wo contrast; Future    Bony exostosis    Right foot pain  -     meloxicam (MOBIC) 7.5 mg tablet; Take 1 tablet (7.5 mg total) by mouth daily for 20 days    Onychomycosis          Subjective: Patient has pain. She has pain in her right heel. This has been ongoing for months. No history of trauma. She also gets ingrown toenails.     Allergies   Allergen Reactions   • Amlodipine Other (See Comments)     Unknown reaction   • Hydralazine Other (See Comments)     Unknown reaction   • Penicillins Other (See Comments)     Unknown since childhood         Current Outpatient Medications:   •  meloxicam (MOBIC) 7.5 mg tablet, Take 1 tablet (7.5 mg total) by mouth daily for 20 days, Disp: 20 tablet, Rfl: 0  •  acetaminophen (TYLENOL) 325 mg tablet, Take 2 tablets (650 mg total) by mouth every 6 (six) hours as needed for mild pain, headaches or fever, Disp: 30 tablet, Rfl: 0  •  atorvastatin (LIPITOR) 40 mg tablet, Take 1 tablet (40 mg total) by mouth daily, Disp: 90 tablet, Rfl: 0  •  citalopram (CeleXA) 20 mg tablet, Take 1 tablet (20 mg total) by mouth daily, Disp: 30 tablet, Rfl: 3  •  clopidogrel (PLAVIX) 75 mg tablet, Take 1 tablet (75 mg total) by mouth daily, Disp: 90 tablet, Rfl: 0  •  famotidine (PEPCID) 20 mg tablet, Take 1 tablet (20 mg total) by mouth daily, Disp: 90 tablet, Rfl: 0  •  fluticasone (FLONASE) 50 mcg/act nasal spray, 1 spray into each nostril daily, Disp: , Rfl: •  levETIRAcetam (Roweepra) 500 mg tablet, Take 1 tablet (500 mg total) by mouth every 12 (twelve) hours, Disp: 60 tablet, Rfl: 2  •  levothyroxine 88 mcg tablet, Take 1 tablet (88 mcg total) by mouth daily, Disp: 90 tablet, Rfl: 0  •  loratadine (CLARITIN) 10 mg tablet, Take 10 mg by mouth as needed , Disp: , Rfl:   •  LORazepam (ATIVAN) 0.5 mg tablet, Take 0.5 mg by mouth every 6 (six) hours as needed for anxiety, Disp: , Rfl:   •  losartan (COZAAR) 50 mg tablet, Take 1 tablet (50 mg total) by mouth daily, Disp: 90 tablet, Rfl: 0  •  melatonin 3 mg, Take 3 mg by mouth daily at bedtime, Disp: , Rfl:   •  metoprolol succinate (TOPROL-XL) 50 mg 24 hr tablet, Take 1 tablet (50 mg total) by mouth daily, Disp: 90 tablet, Rfl: 0  •  omeprazole (PriLOSEC) 20 mg delayed release capsule, Take 1 capsule (20 mg total) by mouth daily, Disp: 90 capsule, Rfl: 1    Patient Active Problem List   Diagnosis   • HTN (hypertension), benign   • Acquired hypothyroidism   • Paroxysmal atrial fibrillation (HCC)   • Esophageal dysmotility   • Seizure disorder (HCC)   • Stenosis of left middle cerebral artery   • H/O: CVA (cerebrovascular accident)   • Seasonal allergic rhinitis due to pollen   • Mild intermittent asthma without complication   • Mixed hyperlipidemia   • Gastroesophageal reflux disease without esophagitis   • 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 episode ([address] [facility])          Patient ID: Eleazar Aragon is a 82 y.o. female. HPI    The following portions of the patient's history were reviewed and updated as appropriate:     family history includes Cancer in her father; Colon cancer in her father; Diabetes in her son; Heart disease in her father, maternal grandmother, mother, and sister; Kidney disease in her mother; Multiple sclerosis in her son; No Known Problems in her son. reports that she has never smoked.  She has never used smokeless tobacco. She reports that she does not drink alcohol and does not use drugs. Vitals:    08/25/23 1022   BP: 140/78   Resp: 17       Review of Systems      Objective:  Patient's shoes and socks removed. Foot Exam    General  General Appearance: appears stated age and healthy   Orientation: alert and oriented to person, place, and time   Affect: appropriate   Gait: antalgic       Right Foot/Ankle     Inspection and Palpation  Tenderness: calcaneus tenderness and bony tenderness   Swelling: none   Arch: pes planus    Neurovascular  Dorsalis pedis: 2+  Posterior tibial: 2+      Left Foot/Ankle      Inspection and Palpation  Swelling: none   Arch: pes planus    Neurovascular  Dorsalis pedis: 2+  Posterior tibial: 2+        Physical Exam  Vitals and nursing note reviewed. Constitutional:       Appearance: Normal appearance. Cardiovascular:      Rate and Rhythm: Normal rate and regular rhythm. Pulses:           Dorsalis pedis pulses are 2+ on the right side and 2+ on the left side. Posterior tibial pulses are 2+ on the right side and 2+ on the left side. Musculoskeletal:      Right foot: Bony tenderness present. Feet:      Comments: Patient demonstrates cystic lesion on the posterior medial aspect of the calcaneus. There is elevation of bone. It is indurated. There is also pain with calculation of Achilles tendon insertion. X-ray demonstrates large retrocalcaneal spur however there is bone lysis with cystic change in the area of the medial tuberosity. Skin:     Capillary Refill: Capillary refill takes less than 2 seconds. Comments: All nails are dystrophic. They demonstrate distal mycosis   Neurological:      Mental Status: She is alert. Psychiatric:         Mood and Affect: Mood normal.         Behavior: Behavior normal.         Thought Content:  Thought content normal.         Judgment: Judgment normal.

## 2023-08-30 DIAGNOSIS — E78.2 MIXED HYPERLIPIDEMIA: ICD-10-CM

## 2023-08-30 RX ORDER — ATORVASTATIN CALCIUM 40 MG/1
40 TABLET, FILM COATED ORAL DAILY
Qty: 90 TABLET | Refills: 0 | Status: SHIPPED | OUTPATIENT
Start: 2023-08-30

## 2023-09-14 ENCOUNTER — OFFICE VISIT (OUTPATIENT)
Dept: FAMILY MEDICINE CLINIC | Facility: CLINIC | Age: 88
End: 2023-09-14
Payer: COMMERCIAL

## 2023-09-14 VITALS
DIASTOLIC BLOOD PRESSURE: 90 MMHG | SYSTOLIC BLOOD PRESSURE: 160 MMHG | RESPIRATION RATE: 16 BRPM | TEMPERATURE: 98.2 F | WEIGHT: 150.8 LBS | OXYGEN SATURATION: 97 % | HEIGHT: 60 IN | HEART RATE: 83 BPM | BODY MASS INDEX: 29.61 KG/M2

## 2023-09-14 DIAGNOSIS — I48.0 PAROXYSMAL ATRIAL FIBRILLATION (HCC): Chronic | ICD-10-CM

## 2023-09-14 DIAGNOSIS — K21.9 GASTROESOPHAGEAL REFLUX DISEASE WITHOUT ESOPHAGITIS: ICD-10-CM

## 2023-09-14 DIAGNOSIS — E78.2 MIXED HYPERLIPIDEMIA: ICD-10-CM

## 2023-09-14 DIAGNOSIS — G40.909 SEIZURE DISORDER (HCC): ICD-10-CM

## 2023-09-14 DIAGNOSIS — I10 HTN (HYPERTENSION), BENIGN: Primary | ICD-10-CM

## 2023-09-14 DIAGNOSIS — E55.9 VITAMIN D DEFICIENCY: ICD-10-CM

## 2023-09-14 DIAGNOSIS — E03.9 ACQUIRED HYPOTHYROIDISM: ICD-10-CM

## 2023-09-14 DIAGNOSIS — F32.A DEPRESSIVE DISORDER: ICD-10-CM

## 2023-09-14 PROCEDURE — 99213 OFFICE O/P EST LOW 20 MIN: CPT

## 2023-09-14 NOTE — ASSESSMENT & PLAN NOTE
Patient is still off-and-on depressed and cries patient discontinued her Celexa.   Patient was advised to restart Celexa side effects discussed with patient again if no improvement or getting worse get back to us otherwise we will continue to monitor

## 2023-09-14 NOTE — ASSESSMENT & PLAN NOTE
Patient currently remaining in sinus rhythm.   Patient has been followed by cardiologist.  We will continue to monitor

## 2023-09-14 NOTE — ASSESSMENT & PLAN NOTE
Under control. Last TSH within normal limits. Continue current medication. We will continue to monitor.

## 2023-09-14 NOTE — PROGRESS NOTES
Assessment/Plan:         Problem List Items Addressed This Visit        Digestive    Gastroesophageal reflux disease without esophagitis     Under control. Continue current medication. We will re-evaluate at next office visit. Endocrine    Acquired hypothyroidism     Under control. Last TSH within normal limits. Continue current medication. We will continue to monitor. Cardiovascular and Mediastinum    Paroxysmal atrial fibrillation (HCC) (Chronic)     Patient currently remaining in sinus rhythm. Patient has been followed by cardiologist.  We will continue to monitor         HTN (hypertension), benign - Primary     Under control. Continue metoprolol and losartan. We will continue to monitor         Relevant Orders    CBC and differential    Comprehensive metabolic panel    UA w Reflex to Microscopic w Reflex to Culture       Nervous and Auditory    Seizure disorder (HCC)     Under control. Continue current medication. Continue follow-up with neurologist.  We will monitor            Other    Mixed hyperlipidemia     Under control. Continue atorvastatin. We will continue to monitor         Relevant Orders    Lipid Panel with Direct LDL reflex    Depressive disorder     Patient is still off-and-on depressed and cries patient discontinued her Celexa. Patient was advised to restart Celexa side effects discussed with patient again if no improvement or getting worse get back to us otherwise we will continue to monitor        Other Visit Diagnoses     Vitamin D deficiency        Relevant Orders    Vitamin D 25 hydroxy            Subjective:      Patient ID: Rosemarie Canada is a 82 y.o. female. Patient here for review of chronic medical problems and  the labs and imaging if it is applicable.   Currently has no specific complaints other than mentioned in the review of systems  Denies chest pain, SOB, cough, abdominal pain, nausea, vomiting, fever, chills, lightheadedness, dizziness,headache, tingling or numbness. No bowel or bladder problem. The following portions of the patient's history were reviewed and updated as appropriate:   Past Medical History:  She has a past medical history of Acute CVA (cerebrovascular accident) ([address]) (06/18/2016), Adult hypothyroidism (09/12/2016), Anxiety (06/10/2019), Arthritis, Asthma, Colon polyp, Depression, Disease of thyroid gland, Dyslipidemia (06/10/2019), Esophageal dysmotility (06/10/2019), Essential hypertension (09/12/2016), GERD (gastroesophageal reflux disease), Hyperlipidemia, Hypertension, Hypothyroidism, Obesity, Paroxysmal atrial fibrillation ([address]) (09/12/2016), Seizure disorder ([address]) (08/24/2018), Seizures ([address]), 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), 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), Stroke ([address]) (06/2016), Subarachnoid hemorrhage ([address]) (09/13/2016), and Subdural hematoma ([address]) (09/13/2016). ,  _______________________________________________________________________  Medical Problems:  does not have any pertinent problems on file.,  _______________________________________________________________________  Past Surgical History:   has a past surgical history that includes Back surgery (01/2016); Carpal tunnel release (Right); Appendectomy; Colonoscopy; and Tubal ligation. ,  _______________________________________________________________________  Family History:  family history includes Cancer in her father; Colon cancer in her father; Diabetes in her son; Heart disease in her father, maternal grandmother, mother, and sister; Kidney disease in her mother; Multiple sclerosis in her son; No Known Problems in her son.,  _______________________________________________________________________  Social History:   reports that she has never smoked.  She has never used smokeless tobacco. She reports that she does not drink alcohol and does not use drugs.,  _______________________________________________________________________  Allergies:  is allergic to amlodipine, hydralazine, and penicillins. .  _______________________________________________________________________  Current Outpatient Medications   Medication Sig Dispense Refill   • acetaminophen (TYLENOL) 325 mg tablet Take 2 tablets (650 mg total) by mouth every 6 (six) hours as needed for mild pain, headaches or fever 30 tablet 0   • atorvastatin (LIPITOR) 40 mg tablet Take 1 tablet (40 mg total) by mouth daily 90 tablet 0   • citalopram (CeleXA) 20 mg tablet Take 1 tablet (20 mg total) by mouth daily 30 tablet 3   • clopidogrel (PLAVIX) 75 mg tablet Take 1 tablet (75 mg total) by mouth daily 90 tablet 0   • famotidine (PEPCID) 20 mg tablet Take 1 tablet (20 mg total) by mouth daily 90 tablet 0   • fluticasone (FLONASE) 50 mcg/act nasal spray 1 spray into each nostril daily     • levETIRAcetam (Roweepra) 500 mg tablet Take 1 tablet (500 mg total) by mouth every 12 (twelve) hours 60 tablet 2   • levothyroxine 88 mcg tablet Take 1 tablet (88 mcg total) by mouth daily 90 tablet 0   • loratadine (CLARITIN) 10 mg tablet Take 10 mg by mouth as needed      • losartan (COZAAR) 50 mg tablet Take 1 tablet (50 mg total) by mouth daily 90 tablet 0   • metoprolol succinate (TOPROL-XL) 50 mg 24 hr tablet Take 1 tablet (50 mg total) by mouth daily 90 tablet 0   • omeprazole (PriLOSEC) 20 mg delayed release capsule Take 1 capsule (20 mg total) by mouth daily 90 capsule 1     No current facility-administered medications for this visit.     _______________________________________________________________________  Review of Systems   Constitutional: Positive for fatigue. Negative for chills and fever. HENT: Negative for congestion, ear pain, rhinorrhea, sneezing and sore throat. Eyes: Negative for pain, discharge, redness, itching and visual disturbance.    Respiratory: Negative for cough, chest tightness and shortness of breath. Cardiovascular: Negative for chest pain, palpitations and leg swelling. Gastrointestinal: Negative for abdominal pain, blood in stool, diarrhea, nausea and vomiting. Endocrine: Negative for cold intolerance and heat intolerance. Genitourinary: Negative for dysuria, frequency, hematuria and urgency. Musculoskeletal: Negative for arthralgias, back pain and myalgias. Skin: Negative for color change and rash. Neurological: Negative for dizziness, weakness, light-headedness, numbness and headaches. Hematological: Does not bruise/bleed easily. Psychiatric/Behavioral: Negative for agitation and behavioral problems. Objective:  Vitals:    09/14/23 1202   BP: 160/90   BP Location: Left arm   Patient Position: Sitting   Cuff Size: Standard   Pulse: 83   Resp: 16   Temp: 98.2 °F (36.8 °C)   TempSrc: Tympanic   SpO2: 97%   Weight: 68.4 kg (150 lb 12.8 oz)   Height: 5' (1.524 m)     Body mass index is 29.45 kg/m². Physical Exam  Vitals and nursing note reviewed. Constitutional:       General: She is not in acute distress. Appearance: Normal appearance. She is not ill-appearing, toxic-appearing or diaphoretic. HENT:      Head: Normocephalic and atraumatic. Right Ear: Tympanic membrane normal.      Left Ear: Tympanic membrane normal.      Nose: Nose normal. No congestion. Mouth/Throat:      Mouth: Mucous membranes are moist.   Eyes:      General: No scleral icterus. Right eye: No discharge. Left eye: No discharge. Extraocular Movements: Extraocular movements intact. Conjunctiva/sclera: Conjunctivae normal.      Pupils: Pupils are equal, round, and reactive to light. Cardiovascular:      Rate and Rhythm: Normal rate and regular rhythm. Pulses: Normal pulses. Heart sounds: Normal heart sounds. No murmur heard. No gallop. Pulmonary:      Effort: Pulmonary effort is normal. No respiratory distress.       Breath sounds: Normal breath sounds. No wheezing, rhonchi or rales. Abdominal:      General: Abdomen is flat. Bowel sounds are normal. There is no distension. Palpations: Abdomen is soft. Tenderness: There is no abdominal tenderness. There is no guarding. Musculoskeletal:         General: No swelling or tenderness. Normal range of motion. Cervical back: Normal range of motion and neck supple. No rigidity. Right lower leg: No edema. Left lower leg: No edema. Lymphadenopathy:      Cervical: No cervical adenopathy. Skin:     General: Skin is warm. Capillary Refill: Capillary refill takes 2 to 3 seconds. Coloration: Skin is not jaundiced. Findings: No bruising or rash. Neurological:      General: No focal deficit present. Mental Status: She is alert and oriented to person, place, and time. Mental status is at baseline.       Gait: Gait normal.   Psychiatric:         Mood and Affect: Mood normal.         Behavior: Behavior normal.

## 2023-09-22 ENCOUNTER — TELEPHONE (OUTPATIENT)
Dept: FAMILY MEDICINE CLINIC | Facility: CLINIC | Age: 88
End: 2023-09-22

## 2023-09-22 ENCOUNTER — NURSE TRIAGE (OUTPATIENT)
Age: 88
End: 2023-09-22

## 2023-09-22 ENCOUNTER — TELEPHONE (OUTPATIENT)
Age: 88
End: 2023-09-22

## 2023-09-22 NOTE — TELEPHONE ENCOUNTER
Regarding: dizziness  ----- Message from Dayan Stephens MA sent at 9/22/2023 10:56 AM EDT -----  Patient calling experiencing extreme dizziness and cannot get up from bed.  Recently saw Dr. Terrial Schwab

## 2023-09-22 NOTE — TELEPHONE ENCOUNTER
Pharmacy calling in regarding medication prescription sent to them.  They state that their  is not delivering this afternoon and there is no way for patient to obtain her medication    Please advise

## 2023-09-22 NOTE — TELEPHONE ENCOUNTER
Patient c/o ongoing dizziness for the past week. Patient states her BP is 163/88. She feels lightheaded and faint when changing positions. Patient states she has been staying hydrated and resting. Patient is advised to be seen in the hospital at this time. Patient refuses disposition. Patient states she will see if her symptoms improve/ worsen before going to ED. Reason for Disposition  • SEVERE dizziness (e.g., unable to stand, requires support to walk, feels like passing out now)    Answer Assessment - Initial Assessment Questions  1. DESCRIPTION: "Describe your dizziness."      Lightheaded    2. LIGHTHEADED: "Do you feel lightheaded?" (e.g., somewhat faint, woozy, weak upon standing)      Weak upon standing    3. VERTIGO: "Do you feel like either you or the room is spinning or tilting?" (i.e. vertigo)       Room is spinning    4. SEVERITY: "How bad is it?"  "Do you feel like you are going to faint?" "Can you stand and walk?"    - MODERATE: Feels very unsteady when walking, but not falling; interferes with normal activities (e.g., school, work) . 5. ONSET:  "When did the dizziness begin?"      Past few days    6. AGGRAVATING FACTORS: "Does anything make it worse?" (e.g., standing, change in head position)      Changing positions       8.  CAUSE: "What do you think is causing the dizziness?"      Unknown    9. RECURRENT SYMPTOM: "Have you had dizziness before?" If Yes, ask: "When was the last time?" "What happened that time?"               11. PREGNANCY: "Is there any chance you are pregnant?" "When was your last menstrual period?"        N/A    163/88    Protocols used: [facility]

## 2023-09-22 NOTE — TELEPHONE ENCOUNTER
Called and spoke to patient regarding her dizziness and not feeling well, as per patient she has not taken her BP medication in a few days since [facility] has not notified her that they are ready for p/u. She called them today and they are too busy  to deliver them. I called the pharmacy because I did not want her to drive since she is not feeling. Spoke to Laura Carmona the pharmacist and he has arranged for [facility] to delivery the patients medications this afternoon. I  called patient again to make her aware that [facility] will be delivering  Her medication and will ring the door bell. She verbalized understanding and was very grateful. She is feeling a little better with this information and has been eating and taking fluids.

## 2023-09-25 ENCOUNTER — APPOINTMENT (OUTPATIENT)
Dept: LAB | Facility: CLINIC | Age: 88
End: 2023-09-25
Payer: COMMERCIAL

## 2023-09-25 DIAGNOSIS — E78.2 MIXED HYPERLIPIDEMIA: ICD-10-CM

## 2023-09-25 DIAGNOSIS — I10 HTN (HYPERTENSION), BENIGN: ICD-10-CM

## 2023-09-25 DIAGNOSIS — E55.9 VITAMIN D DEFICIENCY: ICD-10-CM

## 2023-09-25 DIAGNOSIS — E03.9 ACQUIRED HYPOTHYROIDISM: ICD-10-CM

## 2023-09-25 LAB
25(OH)D3 SERPL-MCNC: 21 NG/ML (ref 30–100)
ALBUMIN SERPL BCP-MCNC: 3.9 G/DL (ref 3.5–5)
ALP SERPL-CCNC: 67 U/L (ref 34–104)
ALT SERPL W P-5'-P-CCNC: 15 U/L (ref 7–52)
ANION GAP SERPL CALCULATED.3IONS-SCNC: 9 MMOL/L
AST SERPL W P-5'-P-CCNC: 17 U/L (ref 13–39)
BACTERIA UR QL AUTO: ABNORMAL /HPF
BASOPHILS # BLD AUTO: 0.05 THOUSANDS/ÂΜL (ref 0–0.1)
BASOPHILS NFR BLD AUTO: 1 % (ref 0–1)
BILIRUB SERPL-MCNC: 0.67 MG/DL (ref 0.2–1)
BILIRUB UR QL STRIP: NEGATIVE
BUN SERPL-MCNC: 9 MG/DL (ref 5–25)
CALCIUM SERPL-MCNC: 8.6 MG/DL (ref 8.4–10.2)
CHLORIDE SERPL-SCNC: 106 MMOL/L (ref 96–108)
CHOLEST SERPL-MCNC: 159 MG/DL
CLARITY UR: ABNORMAL
CO2 SERPL-SCNC: 27 MMOL/L (ref 21–32)
COLOR UR: YELLOW
CREAT SERPL-MCNC: 0.82 MG/DL (ref 0.6–1.3)
EOSINOPHIL # BLD AUTO: 0.21 THOUSAND/ÂΜL (ref 0–0.61)
EOSINOPHIL NFR BLD AUTO: 3 % (ref 0–6)
ERYTHROCYTE [DISTWIDTH] IN BLOOD BY AUTOMATED COUNT: 12.8 % (ref 11.6–15.1)
GFR SERPL CREATININE-BSD FRML MDRD: 65 ML/MIN/1.73SQ M
GLUCOSE P FAST SERPL-MCNC: 98 MG/DL (ref 65–99)
GLUCOSE UR STRIP-MCNC: NEGATIVE MG/DL
HCT VFR BLD AUTO: 40.8 % (ref 34.8–46.1)
HDLC SERPL-MCNC: 43 MG/DL
HGB BLD-MCNC: 13.3 G/DL (ref 11.5–15.4)
HGB UR QL STRIP.AUTO: ABNORMAL
IMM GRANULOCYTES # BLD AUTO: 0.02 THOUSAND/UL (ref 0–0.2)
IMM GRANULOCYTES NFR BLD AUTO: 0 % (ref 0–2)
KETONES UR STRIP-MCNC: NEGATIVE MG/DL
LDLC SERPL CALC-MCNC: 89 MG/DL (ref 0–100)
LEUKOCYTE ESTERASE UR QL STRIP: ABNORMAL
LYMPHOCYTES # BLD AUTO: 1.36 THOUSANDS/ÂΜL (ref 0.6–4.47)
LYMPHOCYTES NFR BLD AUTO: 22 % (ref 14–44)
MCH RBC QN AUTO: 31.8 PG (ref 26.8–34.3)
MCHC RBC AUTO-ENTMCNC: 32.6 G/DL (ref 31.4–37.4)
MCV RBC AUTO: 98 FL (ref 82–98)
MONOCYTES # BLD AUTO: 0.34 THOUSAND/ÂΜL (ref 0.17–1.22)
MONOCYTES NFR BLD AUTO: 5 % (ref 4–12)
MUCOUS THREADS UR QL AUTO: ABNORMAL
NEUTROPHILS # BLD AUTO: 4.28 THOUSANDS/ÂΜL (ref 1.85–7.62)
NEUTS SEG NFR BLD AUTO: 69 % (ref 43–75)
NITRITE UR QL STRIP: NEGATIVE
NON-SQ EPI CELLS URNS QL MICRO: ABNORMAL /HPF
NRBC BLD AUTO-RTO: 0 /100 WBCS
PH UR STRIP.AUTO: 5.5 [PH]
PLATELET # BLD AUTO: 277 THOUSANDS/UL (ref 149–390)
PMV BLD AUTO: 11.2 FL (ref 8.9–12.7)
POTASSIUM SERPL-SCNC: 3.9 MMOL/L (ref 3.5–5.3)
PROT SERPL-MCNC: 6.5 G/DL (ref 6.4–8.4)
PROT UR STRIP-MCNC: ABNORMAL MG/DL
RBC # BLD AUTO: 4.18 MILLION/UL (ref 3.81–5.12)
RBC #/AREA URNS AUTO: ABNORMAL /HPF
SODIUM SERPL-SCNC: 142 MMOL/L (ref 135–147)
SP GR UR STRIP.AUTO: 1.01 (ref 1–1.03)
TRIGL SERPL-MCNC: 134 MG/DL
TSH SERPL DL<=0.05 MIU/L-ACNC: 2.32 UIU/ML (ref 0.45–4.5)
UROBILINOGEN UR STRIP-ACNC: <2 MG/DL
WBC # BLD AUTO: 6.26 THOUSAND/UL (ref 4.31–10.16)
WBC #/AREA URNS AUTO: ABNORMAL /HPF

## 2023-09-25 PROCEDURE — 80053 COMPREHEN METABOLIC PANEL: CPT

## 2023-09-25 PROCEDURE — 82306 VITAMIN D 25 HYDROXY: CPT

## 2023-09-25 PROCEDURE — 85025 COMPLETE CBC W/AUTO DIFF WBC: CPT

## 2023-09-25 PROCEDURE — 36415 COLL VENOUS BLD VENIPUNCTURE: CPT

## 2023-09-25 PROCEDURE — 81001 URINALYSIS AUTO W/SCOPE: CPT

## 2023-09-25 PROCEDURE — 80061 LIPID PANEL: CPT

## 2023-09-25 PROCEDURE — 84443 ASSAY THYROID STIM HORMONE: CPT

## 2023-10-12 ENCOUNTER — OFFICE VISIT (OUTPATIENT)
Dept: FAMILY MEDICINE CLINIC | Facility: CLINIC | Age: 88
End: 2023-10-12
Payer: COMMERCIAL

## 2023-10-12 VITALS
TEMPERATURE: 99.3 F | OXYGEN SATURATION: 96 % | DIASTOLIC BLOOD PRESSURE: 70 MMHG | RESPIRATION RATE: 16 BRPM | BODY MASS INDEX: 29.49 KG/M2 | HEIGHT: 60 IN | WEIGHT: 150.2 LBS | HEART RATE: 77 BPM | SYSTOLIC BLOOD PRESSURE: 130 MMHG

## 2023-10-12 DIAGNOSIS — Z23 ENCOUNTER FOR IMMUNIZATION: ICD-10-CM

## 2023-10-12 DIAGNOSIS — E03.9 ACQUIRED HYPOTHYROIDISM: ICD-10-CM

## 2023-10-12 DIAGNOSIS — I48.0 PAROXYSMAL ATRIAL FIBRILLATION (HCC): Chronic | ICD-10-CM

## 2023-10-12 DIAGNOSIS — K21.9 GASTROESOPHAGEAL REFLUX DISEASE WITHOUT ESOPHAGITIS: ICD-10-CM

## 2023-10-12 DIAGNOSIS — F33.9 RECURRENT DEPRESSION (HCC): ICD-10-CM

## 2023-10-12 DIAGNOSIS — G40.909 SEIZURE DISORDER (HCC): ICD-10-CM

## 2023-10-12 DIAGNOSIS — I10 HTN (HYPERTENSION), BENIGN: ICD-10-CM

## 2023-10-12 DIAGNOSIS — Z00.00 MEDICARE ANNUAL WELLNESS VISIT, SUBSEQUENT: Primary | ICD-10-CM

## 2023-10-12 DIAGNOSIS — F32.1 CURRENT MODERATE EPISODE OF MAJOR DEPRESSIVE DISORDER WITHOUT PRIOR EPISODE (HCC): ICD-10-CM

## 2023-10-12 DIAGNOSIS — J30.1 SEASONAL ALLERGIC RHINITIS DUE TO POLLEN: ICD-10-CM

## 2023-10-12 PROCEDURE — 90662 IIV NO PRSV INCREASED AG IM: CPT

## 2023-10-12 PROCEDURE — G0439 PPPS, SUBSEQ VISIT: HCPCS

## 2023-10-12 PROCEDURE — G0008 ADMIN INFLUENZA VIRUS VAC: HCPCS

## 2023-10-12 PROCEDURE — 99214 OFFICE O/P EST MOD 30 MIN: CPT

## 2023-10-12 RX ORDER — CITALOPRAM 20 MG/1
20 TABLET ORAL DAILY
Qty: 30 TABLET | Refills: 3 | Status: SHIPPED | OUTPATIENT
Start: 2023-10-12

## 2023-10-12 NOTE — PATIENT INSTRUCTIONS
Medicare Preventive Visit Patient Instructions  Thank you for completing your Welcome to Medicare Visit or Medicare Annual Wellness Visit today. Your next wellness visit will be due in one year (10/12/2024). The screening/preventive services that you may require over the next 5-10 years are detailed below. Some tests may not apply to you based off risk factors and/or age. Screening tests ordered at today's visit but not completed yet may show as past due. Also, please note that scanned in results may not display below. Preventive Screenings:  Service Recommendations Previous Testing/Comments   Colorectal Cancer Screening  * Colonoscopy    * Fecal Occult Blood Test (FOBT)/Fecal Immunochemical Test (FIT)  * Fecal DNA/Cologuard Test  * Flexible Sigmoidoscopy Age: 43-73 years old   Colonoscopy: every 10 years (may be performed more frequently if at higher risk)  OR  FOBT/FIT: every 1 year  OR  Cologuard: every 3 years  OR  Sigmoidoscopy: every 5 years  Screening may be recommended earlier than age 39 if at higher risk for colorectal cancer. Also, an individualized decision between you and your healthcare provider will decide whether screening between the ages of 77-80 would be appropriate. Colonoscopy: Not on file  FOBT/FIT: Not on file  Cologuard: Not on file  Sigmoidoscopy: Not on file          Breast Cancer Screening Age: 36 years old  Frequency: every 1-2 years  Not required if history of left and right mastectomy Mammogram: 11/05/2010        Cervical Cancer Screening Between the ages of 21-29, pap smear recommended once every 3 years. Between the ages of 32-69, can perform pap smear with HPV co-testing every 5 years.    Recommendations may differ for women with a history of total hysterectomy, cervical cancer, or abnormal pap smears in past. Pap Smear: Not on file        Hepatitis C Screening Once for adults born between [address] [place] [address]  More frequently in patients at high risk for Hepatitis C Hep C Antibody: Not on file        Diabetes Screening 1-2 times per year if you're at risk for diabetes or have pre-diabetes Fasting glucose: 98 mg/dL (9/25/2023)  A1C: 6.2 % (2/10/2020)      Cholesterol Screening Once every 5 years if you don't have a lipid disorder. May order more often based on risk factors. Lipid panel: 09/25/2023          Other Preventive Screenings Covered by Medicare:  Abdominal Aortic Aneurysm (AAA) Screening: covered once if your at risk. You're considered to be at risk if you have a family history of AAA. Lung Cancer Screening: covers low dose CT scan once per year if you meet all of the following conditions: (1) Age 48-67; (2) No signs or symptoms of lung cancer; (3) Current smoker or have quit smoking within the last 15 years; (4) You have a tobacco smoking history of at least 20 pack years (packs per day multiplied by number of years you smoked); (5) You get a written order from a healthcare provider. Glaucoma Screening: covered annually if you're considered high risk: (1) You have diabetes OR (2) Family history of glaucoma OR (3)  aged 48 and older OR (3)  American aged 72 and older  Osteoporosis Screening: covered every 2 years if you meet one of the following conditions: (1) You're estrogen deficient and at risk for osteoporosis based off medical history and other findings; (2) Have a vertebral abnormality; (3) On glucocorticoid therapy for more than 3 months; (4) Have primary hyperparathyroidism; (5) On osteoporosis medications and need to assess response to drug therapy. Last bone density test (DXA Scan): Not on file. HIV Screening: covered annually if you're between the age of 14-79. Also covered annually if you are younger than 13 and older than 72 with risk factors for HIV infection. For pregnant patients, it is covered up to 3 times per pregnancy.     Immunizations:  Immunization Recommendations   Influenza Vaccine Annual influenza vaccination during flu season is recommended for all persons aged >= 6 months who do not have contraindications   Pneumococcal Vaccine   * Pneumococcal conjugate vaccine = PCV13 (Prevnar 13), PCV15 (Vaxneuvance), PCV20 (Prevnar 20)  * Pneumococcal polysaccharide vaccine = PPSV23 (Pneumovax) Adults 19-46 yo with certain risk factors or if 69+ yo  If never received any pneumonia vaccine: recommend Prevnar 20 (PCV20)  Give PCV20 if previously received 1 dose of PCV13 or PPSV23   Hepatitis B Vaccine 3 dose series if at intermediate or high risk (ex: diabetes, end stage renal disease, liver disease)   Respiratory syncytial virus (RSV) Vaccine - COVERED BY MEDICARE PART D  * RSVPreF3 (Arexvy) CDC recommends that adults 61years of age and older may receive a single dose of RSV vaccine using shared clinical decision-making (SCDM)   Tetanus (Td) Vaccine - COST NOT COVERED BY MEDICARE PART B Following completion of primary series, a booster dose should be given every 10 years to maintain immunity against tetanus. Td may also be given as tetanus wound prophylaxis. Tdap Vaccine - COST NOT COVERED BY MEDICARE PART B Recommended at least once for all adults. For pregnant patients, recommended with each pregnancy. Shingles Vaccine (Shingrix) - COST NOT COVERED BY MEDICARE PART B  2 shot series recommended in those 19 years and older who have or will have weakened immune systems or those 50 years and older     Health Maintenance Due:  There are no preventive care reminders to display for this patient. Immunizations Due:      Topic Date Due   • COVID-19 Vaccine (4 - Moderna series) 01/18/2022   • Influenza Vaccine (1) 09/01/2023     Advance Directives   What are advance directives? Advance directives are legal documents that state your wishes and plans for medical care. These plans are made ahead of time in case you lose your ability to make decisions for yourself.  Advance directives can apply to any medical decision, such as the treatments you want, and if you want to donate organs. What are the types of advance directives? There are many types of advance directives, and each state has rules about how to use them. You may choose a combination of any of the following:  Living will: This is a written record of the treatment you want. You can also choose which treatments you do not want, which to limit, and which to stop at a certain time. This includes surgery, medicine, IV fluid, and tube feedings. Durable power of  for healthcare [facility]): This is a written record that states who you want to make healthcare choices for you when you are unable to make them for yourself. This person, called a proxy, is usually a family member or a friend. You may choose more than 1 proxy. Do not resuscitate (DNR) order:  A DNR order is used in case your heart stops beating or you stop breathing. It is a request not to have certain forms of treatment, such as CPR. A DNR order may be included in other types of advance directives. Medical directive: This covers the care that you want if you are in a coma, near death, or unable to make decisions for yourself. You can list the treatments you want for each condition. Treatment may include pain medicine, surgery, blood transfusions, dialysis, IV or tube feedings, and a ventilator (breathing machine). Values history: This document has questions about your views, beliefs, and how you feel and think about life. This information can help others choose the care that you would choose. Why are advance directives important? An advance directive helps you control your care. Although spoken wishes may be used, it is better to have your wishes written down. Spoken wishes can be misunderstood, or not followed. Treatments may be given even if you do not want them. An advance directive may make it easier for your family to make difficult choices about your care.    Weight Management   Why it is important to manage your weight:  Being overweight increases your risk of health conditions such as heart disease, high blood pressure, type 2 diabetes, and certain types of cancer. It can also increase your risk for osteoarthritis, sleep apnea, and other respiratory problems. Aim for a slow, steady weight loss. Even a small amount of weight loss can lower your risk of health problems. How to lose weight safely:  A safe and healthy way to lose weight is to eat fewer calories and get regular exercise. You can lose up about 1 pound a week by decreasing the number of calories you eat by 500 calories each day. Healthy meal plan for weight management:  A healthy meal plan includes a variety of foods, contains fewer calories, and helps you stay healthy. A healthy meal plan includes the following:  Eat whole-grain foods more often. A healthy meal plan should contain fiber. Fiber is the part of grains, fruits, and vegetables that is not broken down by your body. Whole-grain foods are healthy and provide extra fiber in your diet. Some examples of whole-grain foods are whole-wheat breads and pastas, oatmeal, brown rice, and bulgur. Eat a variety of vegetables every day. Include dark, leafy greens such as spinach, kale, maria luisa greens, and mustard greens. Eat yellow and orange vegetables such as carrots, sweet potatoes, and winter squash. Eat a variety of fruits every day. Choose fresh or canned fruit (canned in its own juice or light syrup) instead of juice. Fruit juice has very little or no fiber. Eat low-fat dairy foods. Drink fat-free (skim) milk or 1% milk. Eat fat-free yogurt and low-fat cottage cheese. Try low-fat cheeses such as mozzarella and other reduced-fat cheeses. Choose meat and other protein foods that are low in fat. Choose beans or other legumes such as split peas or lentils. Choose fish, skinless poultry (chicken or turkey), or lean cuts of red meat (beef or pork). Before you cook meat or poultry, cut off any visible fat.    Use less fat and oil. Try baking foods instead of frying them. Add less fat, such as margarine, sour cream, regular salad dressing and mayonnaise to foods. Eat fewer high-fat foods. Some examples of high-fat foods include french fries, doughnuts, ice cream, and cakes. Eat fewer sweets. Limit foods and drinks that are high in sugar. This includes candy, cookies, regular soda, and sweetened drinks. Exercise:  Exercise at least 30 minutes per day on most days of the week. Some examples of exercise include walking, biking, dancing, and swimming. You can also fit in more physical activity by taking the stairs instead of the elevator or parking farther away from stores. Ask your healthcare provider about the best exercise plan for you. © Copyright [employer] 2018 Information is for End User's use only and may not be sold, redistributed or otherwise used for commercial purposes.  All illustrations and images included in CareNotes® are the copyrighted property of [employer] or [address] [place] [address]

## 2023-10-12 NOTE — PROGRESS NOTES
Assessment and Plan:     Problem List Items Addressed This Visit        Digestive    Gastroesophageal reflux disease without esophagitis     Under control. Continue current medication. We will re-evaluate at next office visit. Endocrine    Acquired hypothyroidism     Under control. Continue current medication. We will re-evaluate at next office visit. Respiratory    Seasonal allergic rhinitis due to pollen     Under control. Continue current medication. We will re-evaluate at next office visit. Cardiovascular and Mediastinum    Paroxysmal atrial fibrillation (HCC) (Chronic)     Currently in sinus rhythm. Has been followed by cardiologist.  Adonay Wilson current treatment. We will continue to monitor. HTN (hypertension), benign     Under control. Continue current medication. We will re-evaluate at next office visit. Nervous and Auditory    Seizure disorder ([address])     Under control. Continue current medication. We will re-evaluate at next office visit. 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 ([address])    Relevant Medications    citalopram (CeleXA) 20 mg tablet    Recurrent depression ([address])     Patient has not been taking citalopram so we will restart citalopram 20 mg daily and monitor  Advised to make a follow-up appointment with your psychiatrist         Relevant Medications    citalopram (CeleXA) 20 mg tablet   Other Visit Diagnoses     Medicare annual wellness visit, subsequent    -  Primary    Encounter for immunization        Relevant Orders    influenza vaccine, high-dose, PF 0.7 mL (FLUZONE HIGH-DOSE) (Completed)        BMI Counseling: Body mass index is 29.33 kg/m².  The BMI is above normal. Nutrition recommendations include decreasing portion sizes, decreasing fast food intake, limiting drinks that contain sugar, moderation in carbohydrate intake, increasing intake of lean protein and reducing intake of saturated and trans fat. Exercise recommendations include vigorous physical activity 75 minutes/week. No pharmacotherapy was ordered. Rationale for BMI follow-up plan is due to patient being overweight or obese. Preventive health issues were discussed with patient, and age appropriate screening tests were ordered as noted in patient's After Visit Summary. Personalized health advice and appropriate referrals for health education or preventive services given if needed, as noted in patient's After Visit Summary. History of Present Illness:     Patient presents for a Medicare Wellness Visit    Patient here for review of chronic medical problems and  the labs and imaging if it is applicable. Currently has no specific complaints other than mentioned in the review of systems  Denies chest pain, SOB, cough, abdominal pain, nausea, vomiting, fever, chills, lightheadedness, dizziness,headache, tingling or numbness. No bowel or bladder problem. Patient Care Team:  Clarice Simental MD as PCP - General (Internal Medicine)     Review of Systems:     Review of Systems   Constitutional:  Positive for fatigue. Negative for chills and fever. HENT:  Negative for congestion, ear pain, rhinorrhea, sneezing and sore throat. Eyes:  Negative for pain, discharge, redness, itching and visual disturbance. Respiratory:  Positive for cough (Occasional dry cough). Negative for chest tightness and shortness of breath. Cardiovascular:  Negative for chest pain, palpitations and leg swelling. Gastrointestinal:  Negative for abdominal pain, blood in stool, diarrhea, nausea and vomiting. Endocrine: Negative for cold intolerance and heat intolerance. Genitourinary:  Negative for dysuria, frequency, hematuria and urgency. Musculoskeletal:  Negative for arthralgias, back pain and myalgias. Skin:  Negative for color change and rash.    Neurological:  Positive for light-headedness (Occasional). Negative for dizziness, weakness, numbness and headaches. Hematological:  Does not bruise/bleed easily. Psychiatric/Behavioral:  Negative for agitation and behavioral problems.          Problem List:     Patient Active Problem List   Diagnosis   • HTN (hypertension), benign   • Acquired hypothyroidism   • Paroxysmal atrial fibrillation (HCC)   • Esophageal dysmotility   • Seizure disorder (HCC)   • Stenosis of left middle cerebral artery   • H/O: CVA (cerebrovascular accident)   • Seasonal allergic rhinitis due to pollen   • Mild intermittent asthma without complication   • Mixed hyperlipidemia   • Gastroesophageal reflux disease without esophagitis   • 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 episode ([address])   • Recurrent depression ([address])      Past Medical and Surgical History:     Past Medical History:   Diagnosis Date   • Acute CVA (cerebrovascular accident) ([address]) 06/18/2016   • Adult hypothyroidism 09/12/2016   • Anxiety 06/10/2019   • Arthritis    • Asthma    • Colon polyp    • Depression    • Disease of thyroid gland    • Dyslipidemia 06/10/2019   • Esophageal dysmotility 06/10/2019   • Essential hypertension 09/12/2016   • GERD (gastroesophageal reflux disease)    • Hyperlipidemia    • Hypertension    • Hypothyroidism    • Obesity    • Paroxysmal atrial fibrillation ([address]) 09/12/2016   • Seizure disorder ([address]) 08/24/2018   • Seizures (HCC)    • 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   • 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   • Stroke ([address]) 06/2016   • Subarachnoid hemorrhage ([address]) 09/13/2016   • Subdural hematoma ([address]) 09/13/2016     Past Surgical History:   Procedure Laterality Date   • APPENDECTOMY     • BACK SURGERY  01/2016   • CARPAL TUNNEL RELEASE Right    • COLONOSCOPY     • TUBAL LIGATION        Family History:     Family History   Problem Relation Age of Onset   • Kidney disease Mother    • Heart disease Mother    • Cancer Father colon   • Heart disease Father    • Colon cancer Father    • Heart disease Sister    • Diabetes Son    • Multiple sclerosis Son    • No Known Problems Son    • Heart disease Maternal Grandmother       Social History:     Social History     Socioeconomic History   • Marital status: /Civil Union     Spouse name: None   • Number of children: None   • Years of education: None   • Highest education level: None   Occupational History   • None   Tobacco Use   • Smoking status: Never   • Smokeless tobacco: Never   Vaping Use   • Vaping Use: Never used   Substance and Sexual Activity   • Alcohol use: Never     Alcohol/week: 0.0 standard drinks of alcohol     Comment: 0   • Drug use: No   • Sexual activity: Not Currently     Partners: Male     Birth control/protection: Post-menopausal   Other Topics Concern   • None   Social History Narrative   • None     Social Determinants of Health     Financial Resource Strain: Low Risk  (10/12/2023)    Overall Financial Resource Strain (CARDIA)    • Difficulty of Paying Living Expenses: Not hard at all   Food Insecurity: Not on file   Transportation Needs: No Transportation Needs (10/12/2023)    PRAPARE - Transportation    • Lack of Transportation (Medical): No    • Lack of Transportation (Non-Medical):  No   Physical Activity: Not on file   Stress: Not on file   Social Connections: Not on file   Intimate Partner Violence: Not on file   Housing Stability: Not on file      Medications and Allergies:     Current Outpatient Medications   Medication Sig Dispense Refill   • acetaminophen (TYLENOL) 325 mg tablet Take 2 tablets (650 mg total) by mouth every 6 (six) hours as needed for mild pain, headaches or fever 30 tablet 0   • atorvastatin (LIPITOR) 40 mg tablet Take 1 tablet (40 mg total) by mouth daily 90 tablet 0   • citalopram (CeleXA) 20 mg tablet Take 1 tablet (20 mg total) by mouth daily 30 tablet 3   • clopidogrel (PLAVIX) 75 mg tablet Take 1 tablet (75 mg total) by mouth daily 90 tablet 0   • famotidine (PEPCID) 20 mg tablet Take 1 tablet (20 mg total) by mouth daily 90 tablet 0   • fluticasone (FLONASE) 50 mcg/act nasal spray 1 spray into each nostril daily     • levETIRAcetam (Roweepra) 500 mg tablet Take 1 tablet (500 mg total) by mouth every 12 (twelve) hours 60 tablet 2   • levothyroxine 88 mcg tablet Take 1 tablet (88 mcg total) by mouth daily 90 tablet 0   • loratadine (CLARITIN) 10 mg tablet Take 10 mg by mouth as needed      • losartan (COZAAR) 50 mg tablet Take 1 tablet (50 mg total) by mouth daily 90 tablet 0   • metoprolol succinate (TOPROL-XL) 50 mg 24 hr tablet Take 1 tablet (50 mg total) by mouth daily 90 tablet 0   • omeprazole (PriLOSEC) 20 mg delayed release capsule Take 1 capsule (20 mg total) by mouth daily 90 capsule 1     No current facility-administered medications for this visit. Allergies   Allergen Reactions   • Amlodipine Other (See Comments)     Unknown reaction   • Hydralazine Other (See Comments)     Unknown reaction   • Penicillins Other (See Comments)     Unknown since childhood      Immunizations:     Immunization History   Administered Date(s) Administered   • COVID-19 MODERNA VACC 0.25 ML IM BOOSTER 11/23/2021   • COVID-19 MODERNA VACC 0.5 ML IM 02/19/2021, 03/18/2021   • INFLUENZA 10/24/2013   • Influenza Quadrivalent 3 years and older 10/02/2015, 10/25/2018   • Influenza Quadrivalent Preservative Free 3 years and older IM 10/16/2014   • Influenza Split High Dose Preservative Free IM 10/06/2016, 10/08/2017, 10/01/2019   • Influenza, high dose seasonal 0.7 mL 10/11/2022, 10/12/2023   • Pneumococcal Conjugate 13-Valent 12/18/2015   • Pneumococcal Polysaccharide PPV23 07/26/2013   • Tdap 09/29/2021   • influenza, trivalent, adjuvanted 10/09/2020, 10/11/2021      Health Maintenance: There are no preventive care reminders to display for this patient.       Topic Date Due   • COVID-19 Vaccine (4 - Moderna series) 01/18/2022      Medicare Screening Tests and Risk Assessments:     Leticia Rod is here for her Subsequent Wellness visit. Health Risk Assessment:   Patient rates overall health as good. Patient feels that their physical health rating is same. Patient is satisfied with their life. Eyesight was rated as same. Hearing was rated as same. Patient feels that their emotional and mental health rating is same. Patients states they are never, rarely angry. Patient states they are sometimes unusually tired/fatigued. Pain experienced in the last 7 days has been none. Patient states that she has experienced no weight loss or gain in last 6 months. Depression Screening:   PHQ-9 Score: 5      Fall Risk Screening: In the past year, patient has experienced: no history of falling in past year      Urinary Incontinence Screening:   Patient has not leaked urine accidently in the last six months. Home Safety:  Patient does not have trouble with stairs inside or outside of their home. Patient has working smoke alarms Home safety hazards include: none. Nutrition:   Current diet is Regular. Medications:   Patient is not currently taking any over-the-counter supplements. Patient is able to manage medications. Activities of Daily Living (ADLs)/Instrumental Activities of Daily Living (IADLs):   Walk and transfer into and out of bed and chair?: Yes  Dress and groom yourself?: Yes    Bathe or shower yourself?: Yes    Feed yourself?  Yes  Do your laundry/housekeeping?: Yes  Manage your money, pay your bills and track your expenses?: Yes  Make your own meals?: Yes    Do your own shopping?: Yes    Previous Hospitalizations:   Any hospitalizations or ED visits within the last 12 months?: No      Advance Care Planning:   Living will: No    Advanced directive: No      Cognitive Screening:   Provider or family/friend/caregiver concerned regarding cognition?: No    PREVENTIVE SCREENINGS      Cardiovascular Screening:    General: Screening Not Indicated and History Lipid Disorder      Diabetes Screening:     General: Screening Current      Colorectal Cancer Screening:     General: Screening Not Indicated      Cervical Cancer Screening:    General: Screening Not Indicated      Lung Cancer Screening:     General: Screening Not Indicated    Screening, Brief Intervention, and Referral to Treatment (SBIRT)    Screening  Typical number of drinks in a day: 0  Typical number of drinks in a week: 0  Interpretation: Low risk drinking behavior. Single Item Drug Screening:  How often have you used an illegal drug (including marijuana) or a prescription medication for non-medical reasons in the past year? never    Single Item Drug Screen Score: 0  Interpretation: Negative screen for possible drug use disorder    No results found. Physical Exam:     /70 (BP Location: Right arm, Patient Position: Sitting, Cuff Size: Standard)   Pulse 77   Temp 99.3 °F (37.4 °C) (Tympanic)   Resp 16   Ht 5' (1.524 m)   Wt 68.1 kg (150 lb 3.2 oz)   SpO2 96%   BMI 29.33 kg/m²     Physical Exam  Vitals and nursing note reviewed. Constitutional:       General: She is not in acute distress. Appearance: Normal appearance. She is well-developed. She is not ill-appearing, toxic-appearing or diaphoretic. HENT:      Head: Normocephalic and atraumatic. Right Ear: Tympanic membrane and external ear normal. There is no impacted cerumen. Left Ear: Tympanic membrane and external ear normal. There is no impacted cerumen. Nose: Nose normal.      Mouth/Throat:      Mouth: Mucous membranes are moist.      Pharynx: Oropharynx is clear. Eyes:      General: No scleral icterus. Right eye: No discharge. Left eye: No discharge. Extraocular Movements: Extraocular movements intact. Conjunctiva/sclera: Conjunctivae normal.      Pupils: Pupils are equal, round, and reactive to light. Cardiovascular:      Rate and Rhythm: Normal rate and regular rhythm.       Pulses: Normal pulses. Heart sounds: Normal heart sounds. No murmur heard. Pulmonary:      Effort: Pulmonary effort is normal. No respiratory distress. Breath sounds: Normal breath sounds. No wheezing, rhonchi or rales. Abdominal:      General: Abdomen is flat. Bowel sounds are normal. There is no distension. Palpations: Abdomen is soft. Tenderness: There is no abdominal tenderness. There is no right CVA tenderness or left CVA tenderness. Musculoskeletal:         General: No swelling or tenderness. Normal range of motion. Cervical back: Normal range of motion and neck supple. No rigidity. Right lower leg: No edema. Left lower leg: No edema. Lymphadenopathy:      Cervical: No cervical adenopathy. Skin:     General: Skin is warm and dry. Capillary Refill: Capillary refill takes 2 to 3 seconds. Coloration: Skin is not jaundiced or pale. Neurological:      General: No focal deficit present. Mental Status: She is alert and oriented to person, place, and time. Mental status is at baseline. Motor: No weakness.       Gait: Gait normal.   Psychiatric:         Mood and Affect: Mood normal.         Behavior: Behavior normal.          Malik Riley MD

## 2023-10-12 NOTE — ASSESSMENT & PLAN NOTE
Patient has not been taking citalopram so we will restart citalopram 20 mg daily and monitor  Advised to make a follow-up appointment with your psychiatrist

## 2023-10-12 NOTE — ASSESSMENT & PLAN NOTE
Currently in sinus rhythm. Has been followed by cardiologist.  Micheal Lagos current treatment. We will continue to monitor.

## 2023-10-16 DIAGNOSIS — K21.9 GASTROESOPHAGEAL REFLUX DISEASE WITHOUT ESOPHAGITIS: ICD-10-CM

## 2023-10-16 DIAGNOSIS — I48.0 PAROXYSMAL ATRIAL FIBRILLATION (HCC): Chronic | ICD-10-CM

## 2023-10-16 DIAGNOSIS — F32.1 CURRENT MODERATE EPISODE OF MAJOR DEPRESSIVE DISORDER WITHOUT PRIOR EPISODE (HCC): ICD-10-CM

## 2023-10-16 RX ORDER — CLOPIDOGREL BISULFATE 75 MG/1
75 TABLET ORAL DAILY
Qty: 90 TABLET | Refills: 0 | Status: SHIPPED | OUTPATIENT
Start: 2023-10-16

## 2023-10-16 RX ORDER — FAMOTIDINE 20 MG/1
20 TABLET, FILM COATED ORAL DAILY
Qty: 90 TABLET | Refills: 0 | Status: SHIPPED | OUTPATIENT
Start: 2023-10-16

## 2023-10-16 NOTE — TELEPHONE ENCOUNTER
Fax received from pharmacy for refill on  Clopidogrel 75 mg, routed to [facility]

## 2023-10-30 DIAGNOSIS — E03.9 ACQUIRED HYPOTHYROIDISM: ICD-10-CM

## 2023-10-30 RX ORDER — LEVOTHYROXINE SODIUM 88 UG/1
88 TABLET ORAL DAILY
Qty: 90 TABLET | Refills: 1 | Status: SHIPPED | OUTPATIENT
Start: 2023-10-30

## 2023-11-13 DIAGNOSIS — K21.9 GASTROESOPHAGEAL REFLUX DISEASE WITHOUT ESOPHAGITIS: ICD-10-CM

## 2023-11-13 RX ORDER — OMEPRAZOLE 20 MG/1
20 CAPSULE, DELAYED RELEASE ORAL DAILY
Qty: 90 CAPSULE | Refills: 1 | Status: SHIPPED | OUTPATIENT
Start: 2023-11-13

## 2023-11-29 DIAGNOSIS — E78.2 MIXED HYPERLIPIDEMIA: ICD-10-CM

## 2023-11-29 NOTE — TELEPHONE ENCOUNTER
Received an Rx fax sheet from the pharmacy requesting a refill on her atorvastatin.  Medication sent to provider for approval.

## 2023-11-30 RX ORDER — ATORVASTATIN CALCIUM 40 MG/1
40 TABLET, FILM COATED ORAL DAILY
Qty: 90 TABLET | Refills: 0 | Status: SHIPPED | OUTPATIENT
Start: 2023-11-30

## 2023-12-05 ENCOUNTER — TELEPHONE (OUTPATIENT)
Age: 88
End: 2023-12-05

## 2023-12-05 NOTE — TELEPHONE ENCOUNTER
Caller: Yesenia     Doctor/Office: Not spa     Call regarding :  Ortho     Call was transferred to: Ortho

## 2023-12-07 ENCOUNTER — HOSPITAL ENCOUNTER (OUTPATIENT)
Facility: HOSPITAL | Age: 88
Setting detail: OBSERVATION
Discharge: HOME | End: 2023-12-08
Attending: EMERGENCY MEDICINE | Admitting: INTERNAL MEDICINE
Payer: COMMERCIAL

## 2023-12-07 ENCOUNTER — APPOINTMENT (EMERGENCY)
Dept: RADIOLOGY | Facility: HOSPITAL | Age: 88
End: 2023-12-07
Payer: COMMERCIAL

## 2023-12-07 ENCOUNTER — APPOINTMENT (OUTPATIENT)
Dept: RADIOLOGY | Facility: HOSPITAL | Age: 88
End: 2023-12-07
Payer: COMMERCIAL

## 2023-12-07 ENCOUNTER — TELEPHONE (OUTPATIENT)
Dept: OBGYN CLINIC | Facility: CLINIC | Age: 88
End: 2023-12-07

## 2023-12-07 DIAGNOSIS — Z86.73 H/O: CVA (CEREBROVASCULAR ACCIDENT): ICD-10-CM

## 2023-12-07 DIAGNOSIS — G45.9 TIA (TRANSIENT ISCHEMIC ATTACK): Primary | ICD-10-CM

## 2023-12-07 DIAGNOSIS — M25.511 ACUTE PAIN OF RIGHT SHOULDER: ICD-10-CM

## 2023-12-07 LAB
2HR DELTA HS TROPONIN: 1 NG/L
4HR DELTA HS TROPONIN: 2 NG/L
ALBUMIN SERPL BCP-MCNC: 4.1 G/DL (ref 3.5–5)
ALP SERPL-CCNC: 74 U/L (ref 34–104)
ALT SERPL W P-5'-P-CCNC: 21 U/L (ref 7–52)
ANION GAP SERPL CALCULATED.3IONS-SCNC: 9 MMOL/L
APTT PPP: 28 SECONDS (ref 23–37)
AST SERPL W P-5'-P-CCNC: 18 U/L (ref 13–39)
BACTERIA UR QL AUTO: NORMAL /HPF
BASOPHILS # BLD AUTO: 0.05 THOUSANDS/ÂΜL (ref 0–0.1)
BASOPHILS NFR BLD AUTO: 1 % (ref 0–1)
BILIRUB SERPL-MCNC: 0.78 MG/DL (ref 0.2–1)
BILIRUB UR QL STRIP: NEGATIVE
BNP SERPL-MCNC: 138 PG/ML (ref 0–100)
BUN SERPL-MCNC: 11 MG/DL (ref 5–25)
CALCIUM SERPL-MCNC: 8.9 MG/DL (ref 8.4–10.2)
CARDIAC TROPONIN I PNL SERPL HS: 4 NG/L
CARDIAC TROPONIN I PNL SERPL HS: 5 NG/L
CARDIAC TROPONIN I PNL SERPL HS: 6 NG/L
CHLORIDE SERPL-SCNC: 106 MMOL/L (ref 96–108)
CLARITY UR: CLEAR
CO2 SERPL-SCNC: 25 MMOL/L (ref 21–32)
COLOR UR: YELLOW
CREAT SERPL-MCNC: 0.83 MG/DL (ref 0.6–1.3)
EOSINOPHIL # BLD AUTO: 0.19 THOUSAND/ÂΜL (ref 0–0.61)
EOSINOPHIL NFR BLD AUTO: 3 % (ref 0–6)
ERYTHROCYTE [DISTWIDTH] IN BLOOD BY AUTOMATED COUNT: 12.8 % (ref 11.6–15.1)
GFR SERPL CREATININE-BSD FRML MDRD: 64 ML/MIN/1.73SQ M
GLUCOSE SERPL-MCNC: 112 MG/DL (ref 65–140)
GLUCOSE UR STRIP-MCNC: NEGATIVE MG/DL
HCT VFR BLD AUTO: 40.4 % (ref 34.8–46.1)
HGB BLD-MCNC: 13.6 G/DL (ref 11.5–15.4)
HGB UR QL STRIP.AUTO: ABNORMAL
IMM GRANULOCYTES # BLD AUTO: 0.02 THOUSAND/UL (ref 0–0.2)
IMM GRANULOCYTES NFR BLD AUTO: 0 % (ref 0–2)
INR PPP: 1.06 (ref 0.84–1.19)
KETONES UR STRIP-MCNC: NEGATIVE MG/DL
LEUKOCYTE ESTERASE UR QL STRIP: NEGATIVE
LYMPHOCYTES # BLD AUTO: 1.13 THOUSANDS/ÂΜL (ref 0.6–4.47)
LYMPHOCYTES NFR BLD AUTO: 15 % (ref 14–44)
MCH RBC QN AUTO: 31.9 PG (ref 26.8–34.3)
MCHC RBC AUTO-ENTMCNC: 33.7 G/DL (ref 31.4–37.4)
MCV RBC AUTO: 95 FL (ref 82–98)
MONOCYTES # BLD AUTO: 0.39 THOUSAND/ÂΜL (ref 0.17–1.22)
MONOCYTES NFR BLD AUTO: 5 % (ref 4–12)
NEUTROPHILS # BLD AUTO: 5.58 THOUSANDS/ÂΜL (ref 1.85–7.62)
NEUTS SEG NFR BLD AUTO: 76 % (ref 43–75)
NITRITE UR QL STRIP: NEGATIVE
NON-SQ EPI CELLS URNS QL MICRO: NORMAL /HPF
NRBC BLD AUTO-RTO: 0 /100 WBCS
PH UR STRIP.AUTO: 8 [PH]
PLATELET # BLD AUTO: 266 THOUSANDS/UL (ref 149–390)
PMV BLD AUTO: 9.7 FL (ref 8.9–12.7)
POTASSIUM SERPL-SCNC: 3.8 MMOL/L (ref 3.5–5.3)
PROT SERPL-MCNC: 6.9 G/DL (ref 6.4–8.4)
PROT UR STRIP-MCNC: NEGATIVE MG/DL
PROTHROMBIN TIME: 14 SECONDS (ref 11.6–14.5)
RBC # BLD AUTO: 4.27 MILLION/UL (ref 3.81–5.12)
RBC #/AREA URNS AUTO: NORMAL /HPF
SODIUM SERPL-SCNC: 140 MMOL/L (ref 135–147)
SP GR UR STRIP.AUTO: 1.01 (ref 1–1.03)
TSH SERPL DL<=0.05 MIU/L-ACNC: 1.5 UIU/ML (ref 0.45–4.5)
UROBILINOGEN UR QL STRIP.AUTO: 0.2 E.U./DL
VIT B12 SERPL-MCNC: 188 PG/ML (ref 180–914)
WBC # BLD AUTO: 7.36 THOUSAND/UL (ref 4.31–10.16)
WBC #/AREA URNS AUTO: NORMAL /HPF

## 2023-12-07 PROCEDURE — 83880 ASSAY OF NATRIURETIC PEPTIDE: CPT | Performed by: EMERGENCY MEDICINE

## 2023-12-07 PROCEDURE — 84484 ASSAY OF TROPONIN QUANT: CPT | Performed by: INTERNAL MEDICINE

## 2023-12-07 PROCEDURE — 36415 COLL VENOUS BLD VENIPUNCTURE: CPT | Performed by: EMERGENCY MEDICINE

## 2023-12-07 PROCEDURE — 71045 X-RAY EXAM CHEST 1 VIEW: CPT

## 2023-12-07 PROCEDURE — 99284 EMERGENCY DEPT VISIT MOD MDM: CPT

## 2023-12-07 PROCEDURE — 85730 THROMBOPLASTIN TIME PARTIAL: CPT | Performed by: EMERGENCY MEDICINE

## 2023-12-07 PROCEDURE — 81001 URINALYSIS AUTO W/SCOPE: CPT | Performed by: EMERGENCY MEDICINE

## 2023-12-07 PROCEDURE — 99223 1ST HOSP IP/OBS HIGH 75: CPT | Performed by: INTERNAL MEDICINE

## 2023-12-07 PROCEDURE — 84443 ASSAY THYROID STIM HORMONE: CPT | Performed by: INTERNAL MEDICINE

## 2023-12-07 PROCEDURE — 70498 CT ANGIOGRAPHY NECK: CPT

## 2023-12-07 PROCEDURE — 85025 COMPLETE CBC W/AUTO DIFF WBC: CPT | Performed by: EMERGENCY MEDICINE

## 2023-12-07 PROCEDURE — 70496 CT ANGIOGRAPHY HEAD: CPT

## 2023-12-07 PROCEDURE — 99285 EMERGENCY DEPT VISIT HI MDM: CPT | Performed by: EMERGENCY MEDICINE

## 2023-12-07 PROCEDURE — 87086 URINE CULTURE/COLONY COUNT: CPT | Performed by: EMERGENCY MEDICINE

## 2023-12-07 PROCEDURE — 80053 COMPREHEN METABOLIC PANEL: CPT | Performed by: EMERGENCY MEDICINE

## 2023-12-07 PROCEDURE — 93005 ELECTROCARDIOGRAM TRACING: CPT

## 2023-12-07 PROCEDURE — 82607 VITAMIN B-12: CPT | Performed by: INTERNAL MEDICINE

## 2023-12-07 PROCEDURE — 85610 PROTHROMBIN TIME: CPT | Performed by: EMERGENCY MEDICINE

## 2023-12-07 PROCEDURE — G1004 CDSM NDSC: HCPCS

## 2023-12-07 PROCEDURE — 84484 ASSAY OF TROPONIN QUANT: CPT | Performed by: EMERGENCY MEDICINE

## 2023-12-07 PROCEDURE — 73030 X-RAY EXAM OF SHOULDER: CPT

## 2023-12-07 RX ORDER — ASPIRIN 81 MG/1
81 TABLET, CHEWABLE ORAL DAILY
Status: DISCONTINUED | OUTPATIENT
Start: 2023-12-08 | End: 2023-12-08 | Stop reason: HOSPADM

## 2023-12-07 RX ORDER — LIDOCAINE 50 MG/G
1 PATCH TOPICAL DAILY
Status: DISCONTINUED | OUTPATIENT
Start: 2023-12-08 | End: 2023-12-08 | Stop reason: HOSPADM

## 2023-12-07 RX ORDER — LOSARTAN POTASSIUM 50 MG/1
50 TABLET ORAL DAILY
Status: DISCONTINUED | OUTPATIENT
Start: 2023-12-08 | End: 2023-12-08 | Stop reason: HOSPADM

## 2023-12-07 RX ORDER — METHOCARBAMOL 500 MG/1
500 TABLET, FILM COATED ORAL EVERY 6 HOURS PRN
Status: DISCONTINUED | OUTPATIENT
Start: 2023-12-07 | End: 2023-12-08 | Stop reason: HOSPADM

## 2023-12-07 RX ORDER — MORPHINE SULFATE 4 MG/ML
4 INJECTION, SOLUTION INTRAMUSCULAR; INTRAVENOUS ONCE
Status: COMPLETED | OUTPATIENT
Start: 2023-12-07 | End: 2023-12-07

## 2023-12-07 RX ORDER — LEVOTHYROXINE SODIUM 88 UG/1
88 TABLET ORAL DAILY
Status: DISCONTINUED | OUTPATIENT
Start: 2023-12-08 | End: 2023-12-08 | Stop reason: HOSPADM

## 2023-12-07 RX ORDER — FAMOTIDINE 20 MG/1
20 TABLET, FILM COATED ORAL DAILY
Status: DISCONTINUED | OUTPATIENT
Start: 2023-12-08 | End: 2023-12-08 | Stop reason: HOSPADM

## 2023-12-07 RX ORDER — METOPROLOL TARTRATE 1 MG/ML
5 INJECTION, SOLUTION INTRAVENOUS ONCE
Status: DISCONTINUED | OUTPATIENT
Start: 2023-12-07 | End: 2023-12-07

## 2023-12-07 RX ORDER — ONDANSETRON 2 MG/ML
4 INJECTION INTRAMUSCULAR; INTRAVENOUS EVERY 6 HOURS PRN
Status: DISCONTINUED | OUTPATIENT
Start: 2023-12-07 | End: 2023-12-08 | Stop reason: HOSPADM

## 2023-12-07 RX ORDER — ACETAMINOPHEN 325 MG/1
975 TABLET ORAL EVERY 6 HOURS SCHEDULED
Status: DISCONTINUED | OUTPATIENT
Start: 2023-12-07 | End: 2023-12-08 | Stop reason: HOSPADM

## 2023-12-07 RX ORDER — METOPROLOL TARTRATE 1 MG/ML
5 INJECTION, SOLUTION INTRAVENOUS ONCE
Status: COMPLETED | OUTPATIENT
Start: 2023-12-07 | End: 2023-12-07

## 2023-12-07 RX ORDER — LEVETIRACETAM 500 MG/1
500 TABLET ORAL EVERY 12 HOURS SCHEDULED
Status: DISCONTINUED | OUTPATIENT
Start: 2023-12-07 | End: 2023-12-08 | Stop reason: HOSPADM

## 2023-12-07 RX ORDER — FLUTICASONE PROPIONATE 50 MCG
1 SPRAY, SUSPENSION (ML) NASAL DAILY
Status: DISCONTINUED | OUTPATIENT
Start: 2023-12-08 | End: 2023-12-08 | Stop reason: HOSPADM

## 2023-12-07 RX ORDER — ATORVASTATIN CALCIUM 40 MG/1
40 TABLET, FILM COATED ORAL EVERY EVENING
Status: DISCONTINUED | OUTPATIENT
Start: 2023-12-07 | End: 2023-12-08 | Stop reason: HOSPADM

## 2023-12-07 RX ORDER — CLOPIDOGREL BISULFATE 75 MG/1
75 TABLET ORAL DAILY
Status: DISCONTINUED | OUTPATIENT
Start: 2023-12-08 | End: 2023-12-08 | Stop reason: HOSPADM

## 2023-12-07 RX ORDER — ASPIRIN 325 MG
325 TABLET ORAL ONCE
Status: COMPLETED | OUTPATIENT
Start: 2023-12-07 | End: 2023-12-07

## 2023-12-07 RX ORDER — ENOXAPARIN SODIUM 100 MG/ML
40 INJECTION SUBCUTANEOUS DAILY
Status: DISCONTINUED | OUTPATIENT
Start: 2023-12-08 | End: 2023-12-08 | Stop reason: HOSPADM

## 2023-12-07 RX ORDER — PANTOPRAZOLE SODIUM 20 MG/1
20 TABLET, DELAYED RELEASE ORAL
Status: DISCONTINUED | OUTPATIENT
Start: 2023-12-08 | End: 2023-12-08 | Stop reason: HOSPADM

## 2023-12-07 RX ORDER — METOPROLOL SUCCINATE 50 MG/1
50 TABLET, EXTENDED RELEASE ORAL DAILY
Status: DISCONTINUED | OUTPATIENT
Start: 2023-12-08 | End: 2023-12-08 | Stop reason: HOSPADM

## 2023-12-07 RX ADMIN — MORPHINE SULFATE 4 MG: 4 INJECTION INTRAVENOUS at 14:36

## 2023-12-07 RX ADMIN — ACETAMINOPHEN 975 MG: 325 TABLET ORAL at 17:53

## 2023-12-07 RX ADMIN — LEVETIRACETAM 500 MG: 500 TABLET, FILM COATED ORAL at 22:19

## 2023-12-07 RX ADMIN — METOPROLOL TARTRATE 5 MG: 5 INJECTION INTRAVENOUS at 13:45

## 2023-12-07 RX ADMIN — ASPIRIN 325 MG: 325 TABLET ORAL at 13:27

## 2023-12-07 RX ADMIN — ATORVASTATIN CALCIUM 40 MG: 40 TABLET, FILM COATED ORAL at 17:53

## 2023-12-07 RX ADMIN — IOHEXOL 85 ML: 350 INJECTION, SOLUTION INTRAVENOUS at 11:35

## 2023-12-07 NOTE — ASSESSMENT & PLAN NOTE
Blood pressure was significantly elevated in the ED and needed IV Lopressor  Likely secondary to pain and anxiety  Continue Toprol-XL 50 mg p.o. daily and losartan 50 mg p.o. daily

## 2023-12-07 NOTE — ASSESSMENT & PLAN NOTE
Patient presented with multiple complaints including right arm weakness, right shoulder pain with some speech issues  Patient has known history of stroke  CTA of the head and neck without any acute intracranial abnormality with mild chronic microangiopathy. No large vessel occlusion, aneurysm or high-grade stenosis  Will also get TSH and B12 level as patient also reported some memory issues and fatigue and weakness  MRI of the brain was ordered  Patient will be started on aspirin 81 mg p.o. daily. Patient received aspirin 325 mg in the ED  Continue Plavix 75 g p.o. daily and Lipitor 40 mg p.o. daily  PT/OT evaluation and treatment  Will also check hemoglobin A1c and fasting lipid panel  2D echo with bubble study  Patient also noted to have significant elevation of the blood pressure in the ED.

## 2023-12-07 NOTE — PLAN OF CARE
Problem: PAIN - ADULT  Goal: Verbalizes/displays adequate comfort level or baseline comfort level  Description: Interventions:  - Encourage patient to monitor pain and request assistance  - Assess pain using appropriate pain scale  - Administer analgesics based on type and severity of pain and evaluate response  - Implement non-pharmacological measures as appropriate and evaluate response  - Consider cultural and social influences on pain and pain management  - Notify physician/advanced practitioner if interventions unsuccessful or patient reports new pain  Outcome: Progressing     Problem: SAFETY ADULT  Goal: Patient will remain free of falls  Description: INTERVENTIONS:  - Educate patient/family on patient safety including physical limitations  - Instruct patient to call for assistance with activity   - Consult OT/PT to assist with strengthening/mobility   - Keep Call bell within reach  - Keep bed low and locked with side rails adjusted as appropriate  - Keep care items and personal belongings within reach  - Initiate and maintain comfort rounds  - Make Fall Risk Sign visible to staff  - Offer Toileting every 2 Hours, in advance of need  - Initiate/Maintain Bed / Chair alarm  - Obtain necessary fall risk management equipment:   - Apply yellow socks and bracelet for high fall risk patients  - Consider moving patient to room near nurses station  Outcome: Progressing  Goal: Maintain or return to baseline ADL function  Description: INTERVENTIONS:  -  Assess patient's ability to carry out ADLs; assess patient's baseline for ADL function and identify physical deficits which impact ability to perform ADLs (bathing, care of mouth/teeth, toileting, grooming, dressing, etc.)  - Assess/evaluate cause of self-care deficits   - Assess range of motion  - Assess patient's mobility; develop plan if impaired  - Assess patient's need for assistive devices and provide as appropriate  - Encourage maximum independence but intervene and supervise when necessary  - Involve family in performance of ADLs  - Assess for home care needs following discharge   - Consider OT consult to assist with ADL evaluation and planning for discharge  - Provide patient education as appropriate  Outcome: Progressing  Goal: Maintains/Returns to pre admission functional level  Description: INTERVENTIONS:  - Perform AM-PAC 6 Click Basic Mobility/ Daily Activity assessment daily.  - Set and communicate daily mobility goal to care team and patient/family/caregiver.    - Collaborate with rehabilitation services on mobility goals if consulted    Problem: DISCHARGE PLANNING  Goal: Discharge to home or other facility with appropriate resources  Description: INTERVENTIONS:  - Identify barriers to discharge w/patient and caregiver  - Arrange for needed discharge resources and transportation as appropriate  - Identify discharge learning needs (meds, wound care, etc.)  - Arrange for interpretive services to assist at discharge as needed  - Refer to Case Management Department for coordinating discharge planning if the patient needs post-hospital services based on physician/advanced practitioner order or complex needs related to functional status, cognitive ability, or social support system  Outcome: Progressing     - Out of bed for toileting  - Record patient progress and toleration of activity level   Outcome: Progressing

## 2023-12-07 NOTE — ASSESSMENT & PLAN NOTE
Patient reported right shoulder pain since yesterday morning  Patient has history of arthritis and has been doing some [employer] decorations and lifting things  Follow-up right shoulder x-ray  Patient will be treated with scheduled Tylenol 975 mg p.o. every 8 hours and Lidoderm patch

## 2023-12-07 NOTE — H&P
[address]   H&P  Name: Miguelangel Floyd 82 y.o. female I MRN: [id number]  Unit/Bed#: [address] [place] [address] Date of Admission: 12/7/2023   Date of Service: 12/7/2023 I Hospital Day: 0      Assessment/Plan   * Stroke-like symptoms  Assessment & Plan  Patient presented with multiple complaints including right arm weakness, right shoulder pain with some speech issues  Patient has known history of stroke  CTA of the head and neck without any acute intracranial abnormality with mild chronic microangiopathy. No large vessel occlusion, aneurysm or high-grade stenosis  Will also get TSH and B12 level as patient also reported some memory issues and fatigue and weakness  MRI of the brain was ordered  Patient will be started on aspirin 81 mg p.o. daily. Patient received aspirin 325 mg in the ED  Continue Plavix 75 g p.o. daily and Lipitor 40 mg p.o. daily  PT/OT evaluation and treatment  Will also check hemoglobin A1c and fasting lipid panel  2D echo with bubble study  Patient also noted to have significant elevation of the blood pressure in the ED.     Right shoulder pain  Assessment & Plan  Patient reported right shoulder pain since yesterday morning  Patient has history of arthritis and has been doing some [employer] decorations and lifting things  Follow-up right shoulder x-ray  Patient will be treated with scheduled Tylenol 975 mg p.o. every 8 hours and Lidoderm patch    Esophageal dysmotility  Assessment & Plan  History of pharyngeal esophageal dysphagia and esophageal dysmotility  Continue PPI    Paroxysmal atrial fibrillation (HCC)  Assessment & Plan  Continue Toprol-XL 50 mg p.o. daily  Currently in sinus rhythm    Acquired hypothyroidism  Assessment & Plan  Continue levothyroxine   check TSH      HTN (hypertension), benign  Assessment & Plan  Blood pressure was significantly elevated in the ED and needed IV Lopressor  Likely secondary to pain and anxiety  Continue Toprol-XL 50 mg p.o. daily and losartan 50 mg p.o. daily    VTE Pharmacologic Prophylaxis: VTE Score: 4 Moderate Risk (Score 3-4) - Pharmacological DVT Prophylaxis Ordered: enoxaparin (Lovenox). Code Status: Level 1 - Full Code     Anticipated Length of Stay: Patient will be admitted on an observation basis with an anticipated length of stay of less than 2 midnights secondary to strokelike symptoms, right shoulder pain. Total Time Spent on Date of Encounter in care of patient: 50 mins. This time was spent on one or more of the following: performing physical exam; counseling and coordination of care; obtaining or reviewing history; documenting in the medical record; reviewing/ordering tests, medications or procedures; communicating with other healthcare professionals and discussing with patient's family/caregivers. Chief Complaint: Shoulder pain, fatigue with weakness, right upper extremity weakness with weird sensation in the mouth    History of Present Illness:  Day Quigley is a 82 y.o. female with a PMH of stroke, hypothyroidism, anxiety, depression, dyslipidemia, GERD and esophageal dysmotility, PAF, seizure disorder who presents with right shoulder pain since yesterday morning. Patient reports right shoulder pain especially with movement since yesterday morning. Patient has been doing some [employer] decorations at home and have been lifting things. Patient has known history of arthritis. Upon further questioning patient states overall she is fatigue and weakness and have memory issues which have been going on but recently worse. Patient also reports some right upper extremity weakness and was unclear whether it is related to the shoulder pain. Patient also reports that her mouth feels weird and tongue feels thick but does not have any slurred speech or difficulty swallowing. Patient denies any double vision. Patient does feel occasionally off balance.   In the ED patient had significant elevation of blood pressure and needed IV Lopressor. Labs were unremarkable CT of the head and neck was unremarkable    Review of Systems:  Review of Systems   Constitutional:  Positive for activity change and fatigue. Negative for appetite change, chills, diaphoresis, fever and unexpected weight change. HENT:  Negative for congestion, ear discharge, ear pain, facial swelling, hearing loss, mouth sores, nosebleeds, postnasal drip, rhinorrhea, sinus pressure, sneezing, sore throat, tinnitus, trouble swallowing and voice change. Eyes:  Negative for photophobia, discharge, redness and visual disturbance. Respiratory:  Negative for cough, chest tightness, shortness of breath, wheezing and stridor. Cardiovascular:  Negative for chest pain, palpitations and leg swelling. Gastrointestinal:  Negative for abdominal distention, abdominal pain, anal bleeding, blood in stool, constipation, diarrhea, nausea and vomiting. Endocrine: Negative for polydipsia, polyphagia and polyuria. Genitourinary:  Negative for decreased urine volume, difficulty urinating, dysuria, flank pain, hematuria, menstrual problem, pelvic pain, urgency, vaginal bleeding and vaginal discharge. Musculoskeletal:  Negative for arthralgias, back pain and neck stiffness. Right shoulder pain   Skin:  Negative for pallor and rash. Neurological:  Positive for dizziness. Negative for seizures, facial asymmetry, speech difficulty, light-headedness, numbness and headaches. Feeling off balance   Hematological:  Negative for adenopathy. Psychiatric/Behavioral:  Negative for agitation and confusion.         Past Medical and Surgical History:   Past Medical History:   Diagnosis Date    Acute CVA (cerebrovascular accident) ([address]) 06/18/2016    Adult hypothyroidism 09/12/2016    Anxiety 06/10/2019    Arthritis     Asthma     Colon polyp     Depression     Disease of thyroid gland     Dyslipidemia 06/10/2019    Esophageal dysmotility 06/10/2019    Essential hypertension 09/12/2016    GERD (gastroesophageal reflux disease)     Hyperlipidemia     Hypertension     Hypothyroidism     Obesity     Paroxysmal atrial fibrillation ([address]) 09/12/2016    Seizure disorder ([address]) 08/24/2018    Seizures ([address])     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  Authorizing Provider   acetaminophen (TYLENOL) 325 mg tablet Take 2 tablets (650 mg total) by mouth every 6 (six) hours as needed for mild pain, headaches or fever 2/10/20  Yes SACHI Naidu   atorvastatin (LIPITOR) 40 mg tablet Take 1 tablet (40 mg total) by mouth daily 11/30/23  Yes Ginny Vicente MD   clopidogrel (PLAVIX) 75 mg tablet Take 1 tablet (75 mg total) by mouth daily 10/16/23  Yes Ginny Vicente MD   famotidine (PEPCID) 20 mg tablet Take 1 tablet (20 mg total) by mouth daily 10/16/23  Yes Ginny Vicente MD   fluticasone (FLONASE) 50 mcg/act nasal spray 1 spray into each nostril daily   Yes Historical Provider, MD   levETIRAcetam (Roweepra) 500 mg tablet Take 1 tablet (500 mg total) by mouth every 12 (twelve) hours 3/8/23  Yes Ginny Vicente MD   levothyroxine 88 mcg tablet Take 1 tablet (88 mcg total) by mouth daily 10/30/23  Yes Ginny Vicente MD   losartan (COZAAR) 50 mg tablet Take 1 tablet (50 mg total) by mouth daily 4/12/23  Yes Ginny Vicente MD   metoprolol succinate (TOPROL-XL) 50 mg 24 hr tablet Take 1 tablet (50 mg total) by mouth daily 6/20/23  Yes Ginny Vicente MD   omeprazole (PriLOSEC) 20 mg delayed release capsule Take 1 capsule (20 mg total) by mouth daily 11/13/23  Yes Shayy Love Jenn Butterfield MD   citalopram (CeleXA) 20 mg tablet Take 1 tablet (20 mg total) by mouth daily  Patient not taking: Reported on 12/7/2023 10/12/23   Miguel Angel Mart MD   loratadine (CLARITIN) 10 mg tablet Take 10 mg by mouth as needed   Patient not taking: Reported on 12/7/2023    Historical Provider, MD     I have reviewed home medications using recent Epic encounter. Allergies: Allergies   Allergen Reactions    Amlodipine Other (See Comments)     Unknown reaction    Hydralazine Other (See Comments)     Unknown reaction    Penicillins Other (See Comments)     Unknown since childhood       Social History:  Marital Status: /Civil Union  Patient Pre-hospital Living Situation: With other family member: Son  Patient Pre-hospital Level of Mobility: walks  Patient Pre-hospital Diet Restrictions: None  Substance Use History:   Social History     Substance and Sexual Activity   Alcohol Use Never    Alcohol/week: 0.0 standard drinks of alcohol    Comment: 0     Social History     Tobacco Use   Smoking Status Never   Smokeless Tobacco Never     Social History     Substance and Sexual Activity   Drug Use No       Family History:  Family History   Problem Relation Age of Onset    Kidney disease Mother     Heart disease Mother     Cancer Father         colon    Heart disease Father     Colon cancer Father     Heart disease Sister     Diabetes Son     Multiple sclerosis Son     No Known Problems Son     Heart disease Maternal Grandmother        Physical Exam:     Vitals:   Blood Pressure: 102/80 (12/07/23 1511)  Pulse: 69 (12/07/23 1511)  Temperature: 98.5 °F (36.9 °C) (12/07/23 1511)  Temp Source: Oral (12/07/23 1511)  Respirations: 20 (12/07/23 1511)  Height: 5' (152.4 cm) (12/07/23 1511)  Weight - Scale: 63.5 kg (140 lb) (12/07/23 1036)  SpO2: 96 % (12/07/23 1511)    Physical Exam  Constitutional:       Appearance: Normal appearance. HENT:      Head: Normocephalic and atraumatic.       Nose: Nose normal. Mouth/Throat:      Mouth: Mucous membranes are moist.      Pharynx: Oropharynx is clear. Eyes:      Extraocular Movements: Extraocular movements intact. Pupils: Pupils are equal, round, and reactive to light. Cardiovascular:      Rate and Rhythm: Normal rate and regular rhythm. Pulmonary:      Effort: Pulmonary effort is normal.      Breath sounds: Normal breath sounds. Abdominal:      General: Bowel sounds are normal. There is no distension. Palpations: Abdomen is soft. Tenderness: There is no abdominal tenderness. Musculoskeletal:         General: No swelling. Cervical back: Normal range of motion and neck supple. Comments: Right shoulder limited mobility especially with extension with some palpable tenderness   Skin:     General: Skin is warm and dry. Neurological:      General: No focal deficit present. Mental Status: She is alert.       Comments: Cranial nerves II through XII grossly normal  Motor strength is 5/5 in all extremities  Sensation normal to light touch in all extremities          Additional Data:     Lab Results:  Results from last 7 days   Lab Units 12/07/23  1052   WBC Thousand/uL 7.36   HEMOGLOBIN g/dL 13.6   HEMATOCRIT % 40.4   PLATELETS Thousands/uL 266   NEUTROS PCT % 76*   LYMPHS PCT % 15   MONOS PCT % 5   EOS PCT % 3     Results from last 7 days   Lab Units 12/07/23  1052   SODIUM mmol/L 140   POTASSIUM mmol/L 3.8   CHLORIDE mmol/L 106   CO2 mmol/L 25   BUN mg/dL 11   CREATININE mg/dL 0.83   ANION GAP mmol/L 9   CALCIUM mg/dL 8.9   ALBUMIN g/dL 4.1   TOTAL BILIRUBIN mg/dL 0.78   ALK PHOS U/L 74   ALT U/L 21   AST U/L 18   GLUCOSE RANDOM mg/dL 112     Results from last 7 days   Lab Units 12/07/23  1052   INR  1.06                   Lines/Drains:  Invasive Devices       Peripheral Intravenous Line  Duration             Peripheral IV 12/07/23 Left Antecubital <1 day                        Imaging: Personally reviewed the following imaging: CT head and xray(s)  CTA head and neck w wo contrast   Final Result by Aristeo Sotelo MD (12/07 1206)      No acute intracranial abnormality. Mild chronic microangiopathy. Negative CTA head and neck for large vessel occlusion, dissection, aneurysm, or high-grade stenosis. Additional chronic/incidental findings as detailed above. Workstation performed: [id number]         XR shoulder 2+ views RIGHT    (Results Pending)   XR chest 1 view    (Results Pending)   MRI Inpatient Order    (Results Pending)       EKG and Other Studies Reviewed on Admission:   EKG: NSR. HR 63 without any ST-T changes. ** Please Note: This note has been constructed using a voice recognition system.  **

## 2023-12-07 NOTE — ED PROVIDER NOTES
History  Chief Complaint   Patient presents with    Breast Pain    Knee Pain             Shoulder Pain    Anxiety     29-year-old female presents with EMS with right-sided weakness. She is able to move all of her extremities however she feels that the right leg is a little weaker as well as the right arm. She is also complaining of some right shoulder pain she states when she moves her arm up and it is very uncomfortable. Patient states that she has had this right-sided weakness intermittently throughout the past week and a half. No fevers chills nausea vomiting diarrhea no falls. Patient does have a history of CVA in the past.  No other new complaints she is awake and alert joking around with staff. Prior to Admission Medications   Prescriptions Last Dose Informant Patient Reported?  Taking?   acetaminophen (TYLENOL) 325 mg tablet  Self No No   Sig: Take 2 tablets (650 mg total) by mouth every 6 (six) hours as needed for mild pain, headaches or fever   atorvastatin (LIPITOR) 40 mg tablet   No No   Sig: Take 1 tablet (40 mg total) by mouth daily   citalopram (CeleXA) 20 mg tablet   No No   Sig: Take 1 tablet (20 mg total) by mouth daily   clopidogrel (PLAVIX) 75 mg tablet   No No   Sig: Take 1 tablet (75 mg total) by mouth daily   famotidine (PEPCID) 20 mg tablet   No No   Sig: Take 1 tablet (20 mg total) by mouth daily   fluticasone (FLONASE) 50 mcg/act nasal spray  Self Yes No   Si spray into each nostril daily   levETIRAcetam (Roweepra) 500 mg tablet   No No   Sig: Take 1 tablet (500 mg total) by mouth every 12 (twelve) hours   levothyroxine 88 mcg tablet   No No   Sig: Take 1 tablet (88 mcg total) by mouth daily   loratadine (CLARITIN) 10 mg tablet  Self Yes No   Sig: Take 10 mg by mouth as needed    losartan (COZAAR) 50 mg tablet   No No   Sig: Take 1 tablet (50 mg total) by mouth daily   metoprolol succinate (TOPROL-XL) 50 mg 24 hr tablet   No No   Sig: Take 1 tablet (50 mg total) by mouth daily omeprazole (PriLOSEC) 20 mg delayed release capsule   No No   Sig: Take 1 capsule (20 mg total) by mouth daily      Facility-Administered Medications: None       Past Medical History:   Diagnosis Date    Acute CVA (cerebrovascular accident) ([address]) 06/18/2016    Adult hypothyroidism 09/12/2016    Anxiety 06/10/2019    Arthritis     Asthma     Colon polyp     Depression     Disease of thyroid gland     Dyslipidemia 06/10/2019    Esophageal dysmotility 06/10/2019    Essential hypertension 09/12/2016    GERD (gastroesophageal reflux disease)     Hyperlipidemia     Hypertension     Hypothyroidism     Obesity     Paroxysmal atrial fibrillation ([address]) 09/12/2016    Seizure disorder ([address]) 08/24/2018    Seizures ([address])     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    Stroke ([address]) 06/2016    Subarachnoid hemorrhage ([address]) 09/13/2016    Subdural hematoma ([address]) 09/13/2016       Past Surgical History:   Procedure Laterality Date    APPENDECTOMY      BACK SURGERY  01/2016    CARPAL TUNNEL RELEASE Right     COLONOSCOPY      TUBAL LIGATION         Family History   Problem Relation Age of Onset    Kidney disease Mother     Heart disease Mother     Cancer Father         colon    Heart disease Father     Colon cancer Father     Heart disease Sister     Diabetes Son     Multiple sclerosis Son     No Known Problems Son     Heart disease Maternal Grandmother      I have reviewed and agree with the history as documented.     E-Cigarette/Vaping    E-Cigarette Use Never User      E-Cigarette/Vaping Substances    Nicotine No     THC No     CBD No     Flavoring No     Other No     Unknown No      Social History     Tobacco Use    Smoking status: Never    Smokeless tobacco: Never   Vaping Use    Vaping Use: Never used   Substance Use Topics    Alcohol use: Never     Alcohol/week: 0.0 standard drinks of alcohol     Comment: 0    Drug use: No       Review of Systems   Constitutional:  Negative for activity change, chills, diaphoresis and fever. HENT:  Negative for congestion, ear pain, nosebleeds, sore throat, trouble swallowing and voice change. Eyes:  Negative for pain, discharge and redness. Respiratory:  Negative for apnea, cough, choking, shortness of breath, wheezing and stridor. Cardiovascular:  Negative for chest pain and palpitations. Gastrointestinal:  Negative for abdominal distention, abdominal pain, constipation, diarrhea, nausea and vomiting. Endocrine: Negative for polydipsia. Genitourinary:  Negative for difficulty urinating, dysuria, flank pain, frequency, hematuria and urgency. Musculoskeletal:  Positive for arthralgias. Negative for back pain, gait problem, joint swelling, myalgias, neck pain and neck stiffness. Skin:  Negative for pallor and rash. Neurological:  Positive for weakness. Negative for dizziness, tremors, syncope, speech difficulty, numbness and headaches. Hematological:  Negative for adenopathy. Psychiatric/Behavioral:  Negative for confusion, hallucinations, self-injury and suicidal ideas. The patient is not nervous/anxious. Physical Exam  Physical Exam  Vitals and nursing note reviewed. Constitutional:       General: She is not in acute distress. Appearance: She is well-developed. She is not diaphoretic. HENT:      Head: Normocephalic and atraumatic. Right Ear: External ear normal.      Left Ear: External ear normal.      Nose: Nose normal.   Eyes:      Conjunctiva/sclera: Conjunctivae normal.      Pupils: Pupils are equal, round, and reactive to light. Cardiovascular:      Rate and Rhythm: Normal rate and regular rhythm. Heart sounds: Normal heart sounds. Pulmonary:      Effort: Pulmonary effort is normal.      Breath sounds: Normal breath sounds. Abdominal:      General: Bowel sounds are normal.      Palpations: Abdomen is soft. Tenderness: There is abdominal tenderness.       Comments: Diffuse tenderness Musculoskeletal:         General: Tenderness present. Normal range of motion. Cervical back: Normal range of motion and neck supple. Comments: Patient has some tenderness in her right shoulder. Sling has been applied by EMS. No known injury   Skin:     General: Skin is warm and dry. Neurological:      Mental Status: She is alert and oriented to person, place, and time. Deep Tendon Reflexes: Reflexes are normal and symmetric. Comments: Patient does have some weakness in her right arm and right leg. She states this is been intermittent over the last week and a half.          Vital Signs  ED Triage Vitals [12/07/23 1036]   Temperature Pulse Respirations Blood Pressure SpO2   98.1 °F (36.7 °C) 76 20 (!) 216/94 98 %      Temp Source Heart Rate Source Patient Position - Orthostatic VS BP Location FiO2 (%)   Temporal Monitor Lying Left arm --      Pain Score       --           Vitals:    12/07/23 1036 12/07/23 1117 12/07/23 1200   BP: (!) 216/94 (!) 183/93 (!) 183/103   Pulse: 76 66 66   Patient Position - Orthostatic VS: Lying           Visual Acuity      ED Medications  Medications   aspirin tablet 325 mg (has no administration in time range)   iohexol (OMNIPAQUE) 350 MG/ML injection (MULTI-DOSE) 85 mL (85 mL Intravenous Given 12/7/23 1135)       Diagnostic Studies  Results Reviewed       Procedure Component Value Units Date/Time    UA (URINE) with reflex to Scope [[id number]] Collected: 12/07/23 1230    Lab Status: No result Specimen: Urine, Clean Catch     HS Troponin I 4hr [[id number]]     Lab Status: No result Specimen: Blood     Protime-INR [[id number]]  (Normal) Collected: 12/07/23 1052    Lab Status: Final result Specimen: Blood from Arm, Left Updated: 12/07/23 1132     Protime 14.0 seconds      INR 1.06    APTT [[id number]]  (Normal) Collected: 12/07/23 1052    Lab Status: Final result Specimen: Blood from Arm, Left Updated: 12/07/23 1132     PTT 28 seconds     HS Troponin 0hr (reflex protocol) [[id number]]  (Normal) Collected: 12/07/23 1052    Lab Status: Final result Specimen: Blood from Arm, Left Updated: 12/07/23 1125     hs TnI 0hr 4 ng/L     HS Troponin I 2hr [[id number]]     Lab Status: No result Specimen: Blood     B-Type Natriuretic Peptide(BNP) [[id number]]  (Abnormal) Collected: 12/07/23 1052    Lab Status: Final result Specimen: Blood from Arm, Left Updated: 12/07/23 1124      pg/mL     Comprehensive metabolic panel [[id number]] Collected: 12/07/23 1052    Lab Status: Final result Specimen: Blood from Arm, Left Updated: 12/07/23 1120     Sodium 140 mmol/L      Potassium 3.8 mmol/L      Chloride 106 mmol/L      CO2 25 mmol/L      ANION GAP 9 mmol/L      BUN 11 mg/dL      Creatinine 0.83 mg/dL      Glucose 112 mg/dL      Calcium 8.9 mg/dL      AST 18 U/L      ALT 21 U/L      Alkaline Phosphatase 74 U/L      Total Protein 6.9 g/dL      Albumin 4.1 g/dL      Total Bilirubin 0.78 mg/dL      eGFR 64 ml/min/1.73sq m     Narrative:      [place] guidelines for Chronic Kidney Disease (CKD):     Stage 1 with normal or high GFR (GFR > 90 mL/min/1.73 square meters)    Stage 2 Mild CKD (GFR = 60-89 mL/min/1.73 square meters)    Stage 3A Moderate CKD (GFR = 45-59 mL/min/1.73 square meters)    Stage 3B Moderate CKD (GFR = 30-44 mL/min/1.73 square meters)    Stage 4 Severe CKD (GFR = 15-29 mL/min/1.73 square meters)    Stage 5 End Stage CKD (GFR <15 mL/min/1.73 square meters)  Note: GFR calculation is accurate only with a steady state creatinine    CBC and differential [[id number]]  (Abnormal) Collected: 12/07/23 1052    Lab Status: Final result Specimen: Blood from Arm, Left Updated: 12/07/23 1102     WBC 7.36 Thousand/uL      RBC 4.27 Million/uL      Hemoglobin 13.6 g/dL      Hematocrit 40.4 %      MCV 95 fL      MCH 31.9 pg      MCHC 33.7 g/dL      RDW 12.8 %      MPV 9.7 fL      Platelets 834 Thousands/uL      nRBC 0 /100 WBCs      Neutrophils Relative 76 %      Immat GRANS % 0 %      Lymphocytes Relative 15 %      Monocytes Relative 5 %      Eosinophils Relative 3 %      Basophils Relative 1 %      Neutrophils Absolute 5.58 Thousands/µL      Immature Grans Absolute 0.02 Thousand/uL      Lymphocytes Absolute 1.13 Thousands/µL      Monocytes Absolute 0.39 Thousand/µL      Eosinophils Absolute 0.19 Thousand/µL      Basophils Absolute 0.05 Thousands/µL     Urine culture [[id number]]     Lab Status: No result Specimen: Urine, Clean Catch                    CTA head and neck w wo contrast   Final Result by Yajaira Taylor MD (12/07 1206)      No acute intracranial abnormality. Mild chronic microangiopathy. Negative CTA head and neck for large vessel occlusion, dissection, aneurysm, or high-grade stenosis. Additional chronic/incidental findings as detailed above. Workstation performed: [id number]         XR shoulder 2+ views RIGHT    (Results Pending)   XR chest 1 view    (Results Pending)              Procedures  Procedures         ED Course                  Stroke Assessment       Row Name 12/07/23 1022             NIH Stroke Scale    Interval Baseline      Level of Consciousness (1a.) 0      LOC Questions (1b.) 0      LOC Commands (1c.) 0      Best Gaze (2.) 0      Visual (3.) 0      Facial Palsy (4.) 0      Motor Arm, Left (5a.) 0      Motor Arm, Right (5b.) 1      Motor Leg, Left (6a.) 0      Motor Leg, Right (6b.) 1      Limb Ataxia (7.) 0      Sensory (8.) 0      Best Language (9.) 0      Dysarthria (10.) 0      Extinction and Inattention (11.) (Formerly Neglect) 0      Total 2                    Flowsheet Row Most Recent Value   Thrombolytic Decision Options    Thrombolytic Decision Patient not a candidate. Patient is not a candidate options Unclear time of onset outside appropriate time window. SBIRT 22yo+      Flowsheet Row Most Recent Value   Initial Alcohol Screen: US AUDIT-C     1.  How often do you have a drink containing alcohol? 0 Filed at: 12/07/2023 1039   2. How many drinks containing alcohol do you have on a typical day you are drinking? 0 Filed at: 12/07/2023 1039   3b. FEMALE Any Age, or MALE 65+: How often do you have 4 or more drinks on one occassion? 0 Filed at: 12/07/2023 1039   Audit-C Score 0 Filed at: 12/07/2023 1039   FRED: How many times in the past year have you. .. Used an illegal drug or used a prescription medication for non-medical reasons? Never Filed at: 12/07/2023 1039                      Medical Decision Making  Amount and/or Complexity of Data Reviewed  Labs: ordered. Radiology: ordered. Risk  OTC drugs. Prescription drug management. Decision regarding hospitalization. Disposition  Final diagnoses:   TIA (transient ischemic attack)     Time reflects when diagnosis was documented in both MDM as applicable and the Disposition within this note       Time User Action Codes Description Comment    12/7/2023 12:37 PM Hakeem Salomon [G45.9] TIA (transient ischemic attack)           ED Disposition       ED Disposition   Admit    Condition   Stable    Date/Time   Thu Dec 7, 2023 1232    Comment   Case was discussed with Dr. Peace Montemayor and the patient's admission status was agreed to be Admission Status: observation status to the service of Dr. Peace Montemayor . Follow-up Information    None         Patient's Medications   Discharge Prescriptions    No medications on file       No discharge procedures on file.     PDMP Review         Value Time User    PDMP Reviewed  Yes 5/8/2023 10:49 AM Timmy Seals MD            ED Provider  Electronically Signed by             Eddi Sprague DO  12/07/23 1233

## 2023-12-07 NOTE — TELEPHONE ENCOUNTER
I called patient about her upcoming office visit to schedule her with appropriate provider. Patient stated to me over the phone that she was in a lot of right shoulder pain and pain was going down right leg and into lower back. Patient stated to me that she was unable to get out of bed with the amount of pain she was in and felt like she was going to pass out. I advised she should call 911. She stated the door to her apartment complex and her apartment door were both locked and no one would be able to get it. I told the patient, I will call for an ambulance for her as I did not want her to try and unlock the doors herself. A call was made to do a welfare check by the local police department.

## 2023-12-08 ENCOUNTER — APPOINTMENT (OUTPATIENT)
Dept: RADIOLOGY | Facility: HOSPITAL | Age: 88
End: 2023-12-08
Payer: COMMERCIAL

## 2023-12-08 ENCOUNTER — APPOINTMENT (OUTPATIENT)
Dept: NON INVASIVE DIAGNOSTICS | Facility: HOSPITAL | Age: 88
End: 2023-12-08
Payer: COMMERCIAL

## 2023-12-08 LAB
AORTIC ROOT: 2.9 CM
ATRIAL RATE: 63 BPM
AV LVOT PEAK GRADIENT: 2 MMHG
AV PEAK GRADIENT: 6 MMHG
BACTERIA UR CULT: NORMAL
CHOLEST SERPL-MCNC: 149 MG/DL
DOP CALC LVOT AREA: 3.14 CM2
DOP CALC LVOT DIAMETER: 2 CM
E WAVE DECELERATION TIME: 195 MS
E/A RATIO: 0.83
EST. AVERAGE GLUCOSE BLD GHB EST-MCNC: 131 MG/DL
FRACTIONAL SHORTENING: 32 (ref 28–44)
HBA1C MFR BLD: 6.2 %
HDLC SERPL-MCNC: 38 MG/DL
INTERVENTRICULAR SEPTUM IN DIASTOLE (PARASTERNAL SHORT AXIS VIEW): 1 CM
INTERVENTRICULAR SEPTUM: 1 CM (ref 0.6–1.1)
LAAS-AP4: 16.1 CM2
LDLC SERPL CALC-MCNC: 87 MG/DL (ref 0–100)
LEFT ATRIUM AREA SYSTOLE SINGLE PLANE A4C: 12 CM2
LEFT ATRIUM SIZE: 3.1 CM
LEFT INTERNAL DIMENSION IN SYSTOLE: 2.6 CM (ref 2.1–4)
LEFT VENTRICULAR INTERNAL DIMENSION IN DIASTOLE: 3.8 CM (ref 3.5–6)
LEFT VENTRICULAR POSTERIOR WALL IN END DIASTOLE: 1 CM
LEFT VENTRICULAR STROKE VOLUME: 37 ML
LVSV (TEICH): 37 ML
MV E'TISSUE VEL-LAT: 8 CM/S
MV E'TISSUE VEL-SEP: 6 CM/S
MV PEAK A VEL: 0.98 M/S
MV PEAK E VEL: 81 CM/S
MV STENOSIS PRESSURE HALF TIME: 57 MS
MV VALVE AREA P 1/2 METHOD: 3.86
P AXIS: 53 DEGREES
PR INTERVAL: 176 MS
QRS AXIS: 39 DEGREES
QRSD INTERVAL: 86 MS
QT INTERVAL: 402 MS
QTC INTERVAL: 411 MS
RIGHT ATRIUM AREA SYSTOLE A4C: 10.7 CM2
RIGHT VENTRICLE ID DIMENSION: 2.8 CM
SL CV LV EF: 55
SL CV PED ECHO LEFT VENTRICLE DIASTOLIC VOLUME (MOD BIPLANE) 2D: 62 ML
SL CV PED ECHO LEFT VENTRICLE SYSTOLIC VOLUME (MOD BIPLANE) 2D: 25 ML
T WAVE AXIS: 18 DEGREES
TRICUSPID ANNULAR PLANE SYSTOLIC EXCURSION: 2.3 CM
TRIGL SERPL-MCNC: 120 MG/DL
VENTRICULAR RATE: 63 BPM

## 2023-12-08 PROCEDURE — 92610 EVALUATE SWALLOWING FUNCTION: CPT

## 2023-12-08 PROCEDURE — 97166 OT EVAL MOD COMPLEX 45 MIN: CPT

## 2023-12-08 PROCEDURE — 80061 LIPID PANEL: CPT | Performed by: INTERNAL MEDICINE

## 2023-12-08 PROCEDURE — 83036 HEMOGLOBIN GLYCOSYLATED A1C: CPT | Performed by: INTERNAL MEDICINE

## 2023-12-08 PROCEDURE — 99204 OFFICE O/P NEW MOD 45 MIN: CPT | Performed by: PHYSICIAN ASSISTANT

## 2023-12-08 PROCEDURE — 93306 TTE W/DOPPLER COMPLETE: CPT

## 2023-12-08 PROCEDURE — 97163 PT EVAL HIGH COMPLEX 45 MIN: CPT

## 2023-12-08 PROCEDURE — 99239 HOSP IP/OBS DSCHRG MGMT >30: CPT | Performed by: INTERNAL MEDICINE

## 2023-12-08 PROCEDURE — 97530 THERAPEUTIC ACTIVITIES: CPT

## 2023-12-08 PROCEDURE — 70551 MRI BRAIN STEM W/O DYE: CPT

## 2023-12-08 PROCEDURE — 93306 TTE W/DOPPLER COMPLETE: CPT | Performed by: INTERNAL MEDICINE

## 2023-12-08 RX ORDER — LIDOCAINE 50 MG/G
1 PATCH TOPICAL DAILY
Qty: 14 PATCH | Refills: 0 | Status: SHIPPED | OUTPATIENT
Start: 2023-12-09

## 2023-12-08 RX ORDER — SENNOSIDES 8.6 MG
650 CAPSULE ORAL EVERY 8 HOURS PRN
Qty: 30 TABLET | Refills: 0 | Status: SHIPPED | OUTPATIENT
Start: 2023-12-08

## 2023-12-08 RX ORDER — CYANOCOBALAMIN 1000 UG/ML
1000 INJECTION, SOLUTION INTRAMUSCULAR; SUBCUTANEOUS
Status: DISCONTINUED | OUTPATIENT
Start: 2023-12-08 | End: 2023-12-08 | Stop reason: HOSPADM

## 2023-12-08 RX ADMIN — ACETAMINOPHEN 975 MG: 325 TABLET ORAL at 12:26

## 2023-12-08 RX ADMIN — ENOXAPARIN SODIUM 40 MG: 40 INJECTION SUBCUTANEOUS at 08:40

## 2023-12-08 RX ADMIN — FAMOTIDINE 20 MG: 20 TABLET ORAL at 08:40

## 2023-12-08 RX ADMIN — ACETAMINOPHEN 975 MG: 325 TABLET ORAL at 00:16

## 2023-12-08 RX ADMIN — CLOPIDOGREL BISULFATE 75 MG: 75 TABLET ORAL at 08:40

## 2023-12-08 RX ADMIN — ASPIRIN 81 MG CHEWABLE TABLET 81 MG: 81 TABLET CHEWABLE at 08:40

## 2023-12-08 RX ADMIN — LOSARTAN POTASSIUM 50 MG: 50 TABLET, FILM COATED ORAL at 08:40

## 2023-12-08 RX ADMIN — FLUTICASONE PROPIONATE 1 SPRAY: 50 SPRAY, METERED NASAL at 08:40

## 2023-12-08 RX ADMIN — LIDOCAINE 5% 1 PATCH: 700 PATCH TOPICAL at 08:40

## 2023-12-08 RX ADMIN — LEVOTHYROXINE SODIUM 88 MCG: 88 TABLET ORAL at 08:39

## 2023-12-08 RX ADMIN — LEVETIRACETAM 500 MG: 500 TABLET, FILM COATED ORAL at 08:40

## 2023-12-08 RX ADMIN — CYANOCOBALAMIN 1000 MCG: 1000 INJECTION, SOLUTION INTRAMUSCULAR; SUBCUTANEOUS at 09:51

## 2023-12-08 RX ADMIN — METOPROLOL SUCCINATE 50 MG: 50 TABLET, EXTENDED RELEASE ORAL at 08:40

## 2023-12-08 RX ADMIN — ACETAMINOPHEN 975 MG: 325 TABLET ORAL at 06:13

## 2023-12-08 RX ADMIN — PANTOPRAZOLE SODIUM 20 MG: 20 TABLET, DELAYED RELEASE ORAL at 06:13

## 2023-12-08 NOTE — OCCUPATIONAL THERAPY NOTE
Occupational Therapy Cancellation     Patient Name: Chuck Tinoco  [id number] Date: 12/8/2023  Problem List  Principal Problem:    Stroke-like symptoms  Active Problems:    HTN (hypertension), benign    Acquired hypothyroidism    Paroxysmal atrial fibrillation (HCC)    Esophageal dysmotility    Right shoulder pain    Past Medical History  Past Medical History:   Diagnosis Date    Acute CVA (cerebrovascular accident) ([address]) 06/18/2016    Adult hypothyroidism 09/12/2016    Anxiety 06/10/2019    Arthritis     Asthma     Colon polyp     Depression     Disease of thyroid gland     Dyslipidemia 06/10/2019    Esophageal dysmotility 06/10/2019    Essential hypertension 09/12/2016    GERD (gastroesophageal reflux disease)     Hyperlipidemia     Hypertension     Hypothyroidism     Obesity     Paroxysmal atrial fibrillation ([address]) 09/12/2016    Seizure disorder ([address]) 08/24/2018    Seizures ([address])     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 Pt off floor at MRI. Will cancel and continue to follow upon return as appropriate / schedule allows.      Heather Sherman OTR/MARCOS  [id number]  [id number]

## 2023-12-08 NOTE — ASSESSMENT & PLAN NOTE
Pt reports worsening right shoulder pain, worse with movement, specifically lifting motions. Notes she has been putting up [place] decorations recently which she feels may have aggravated it  Pt received IV morphine upon admission with improvement. Reports relief with lidocaine patch  XR right shoulder revealing no acute interosseous abnormalities.  Mild degenerative changes of the glenohumeral and acromioclavicular joints  Continue morphine and methocarbamol  Continue lidocaine patch

## 2023-12-08 NOTE — CONSULTS
Consultation - Earline Flores 82 y.o. female MRN: [id number]  Unit/Bed#: 913 [facility] 415-01 Encounter: [id number]          Physician Requesting Consult: Aranza Walls MD    Reason for Consult / Principal Problem: right shoulder pain    HPI:   Joel Rivas is a 82y.o. year old female notes several months of intermittent shoulder pain which has gotten worse over the last couple weeks. She thinks she aggravated this recently with trying to hand up [place] lights. She notes she called [address] [place] [address] orthopedics to set up an appointment but was told they did not have any provider available who dealt with shoulders and thus advised her to go to the ED. Due to some speech issues as well as a history of CVA in the past the patient was admitted for stroke workup. She notes her speech issues have since improved. She notes ongoing pain about the anterior and lateral shoulder, which is worse with overhead movements and better with rest. She notes weakness of the shoulder and often has to help the arm overhead with her left hand. She denies any recent fall or injury prior to the onset of her symptoms. She notes good sensation of the right upper extremity. Inpatient consult to Orthopedic Surgery  Consult performed by: Carly Hernandez PA-C  Consult ordered by: Aranza Walls MD          Review of Systems   Constitutional: Negative. Negative for chills and fever. HENT: Negative. Negative for ear pain and sore throat. Eyes: Negative. Negative for pain and redness. Respiratory: Negative. Negative for shortness of breath and wheezing. Cardiovascular:  Negative for chest pain and palpitations. Gastrointestinal: Negative. Negative for abdominal pain and blood in stool. Endocrine: Negative. Negative for polydipsia and polyuria. Genitourinary: Negative. Negative for difficulty urinating and dysuria. Musculoskeletal:         As noted in HPI   Skin: Negative.   Negative for pallor and rash.   Neurological: Negative. Negative for dizziness and numbness. Hematological: Negative. Negative for adenopathy. Does not bruise/bleed easily. Psychiatric/Behavioral: Negative. Negative for confusion and suicidal ideas.         Historical Information   Past Medical History:   Diagnosis Date    Acute CVA (cerebrovascular accident) ([address]) 06/18/2016    Adult hypothyroidism 09/12/2016    Anxiety 06/10/2019    Arthritis     Asthma     Colon polyp     Depression     Disease of thyroid gland     Dyslipidemia 06/10/2019    Esophageal dysmotility 06/10/2019    Essential hypertension 09/12/2016    GERD (gastroesophageal reflux disease)     Hyperlipidemia     Hypertension     Hypothyroidism     Obesity     Paroxysmal atrial fibrillation ([address]) 09/12/2016    Seizure disorder ([address]) 08/24/2018    Seizures ([address])     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    Stroke ([address]) 06/2016    Subarachnoid hemorrhage ([address]) 09/13/2016    Subdural hematoma ([address]) 09/13/2016     Past Surgical History:   Procedure Laterality Date    APPENDECTOMY      BACK SURGERY  01/2016    CARPAL TUNNEL RELEASE Right     COLONOSCOPY      TUBAL LIGATION       Social History   Social History     Substance and Sexual Activity   Alcohol Use Never    Alcohol/week: 0.0 standard drinks of alcohol    Comment: 0     Social History     Substance and Sexual Activity   Drug Use No     Social History     Tobacco Use   Smoking Status Never   Smokeless Tobacco Never     Family History:   Family History   Problem Relation Age of Onset    Kidney disease Mother     Heart disease Mother     Cancer Father         colon    Heart disease Father     Colon cancer Father     Heart disease Sister     Diabetes Son     Multiple sclerosis Son     No Known Problems Son     Heart disease Maternal Grandmother        Meds/Allergies   all current active meds have been reviewed and current meds:   Current Facility-Administered Medications   Medication Dose Route Frequency    acetaminophen (TYLENOL) tablet 975 mg  975 mg Oral Q6H [address]    aspirin chewable tablet 81 mg  81 mg Oral Daily    atorvastatin (LIPITOR) tablet 40 mg  40 mg Oral QPM    clopidogrel (PLAVIX) tablet 75 mg  75 mg Oral Daily    cyanocobalamin injection 1,000 mcg  1,000 mcg Intramuscular Q30 Days    enoxaparin (LOVENOX) subcutaneous injection 40 mg  40 mg Subcutaneous Daily    famotidine (PEPCID) tablet 20 mg  20 mg Oral Daily    fluticasone (FLONASE) 50 mcg/act nasal spray 1 spray  1 spray Nasal Daily    levETIRAcetam (KEPPRA) tablet 500 mg  500 mg Oral Q12H [address]    levothyroxine tablet 88 mcg  88 mcg Oral Daily    lidocaine (LIDODERM) 5 % patch 1 patch  1 patch Topical Daily    losartan (COZAAR) tablet 50 mg  50 mg Oral Daily    methocarbamol (ROBAXIN) tablet 500 mg  500 mg Oral Q6H PRN    metoprolol succinate (TOPROL-XL) 24 hr tablet 50 mg  50 mg Oral Daily    ondansetron (ZOFRAN) injection 4 mg  4 mg Intravenous Q6H PRN    pantoprazole (PROTONIX) EC tablet 20 mg  20 mg Oral Early Morning     Allergies   Allergen Reactions    Amlodipine Other (See Comments)     Unknown reaction    Hydralazine Other (See Comments)     Unknown reaction    Penicillins Other (See Comments)     Unknown since childhood       Objective   Vitals: Blood pressure 123/60, pulse 66, temperature (!) 97.2 °F (36.2 °C), resp. rate 18, height 5' (1.524 m), weight 63.5 kg (140 lb), SpO2 96 %, not currently breastfeeding. ,Body mass index is 27.34 kg/m². Invasive Devices       Peripheral Intravenous Line  Duration             Peripheral IV 12/07/23 Left Antecubital 1 day                    Physical Exam  Constitutional:       General: She is not in acute distress. Appearance: She is well-developed. HENT:      Head: Normocephalic and atraumatic. Eyes:      General: No scleral icterus. Conjunctiva/sclera: Conjunctivae normal.   Neck:      Vascular: No JVD.    Cardiovascular:      Rate and Rhythm: Normal rate. Pulmonary:      Effort: Pulmonary effort is normal. No respiratory distress. Musculoskeletal:      Comments: As per HPI   Skin:     General: Skin is warm. Neurological:      Mental Status: She is alert and oriented to person, place, and time. Coordination: Coordination normal.          Right Shoulder Exam     Tenderness   Right shoulder tenderness location: anterior glenohumeral joint. Range of Motion   External rotation:  70   Forward flexion:  140 (lying down)   Internal rotation 90 degrees:  40     Muscle Strength   Abduction: 4/5   Internal rotation: 5/5   External rotation: 5/5     Other   Erythema: absent  Sensation: normal  Pulse: present            Lab Results: I have personally reviewed pertinent lab results. CBC:   Lab Results   Component Value Date    WBC 7.36 12/07/2023    HGB 13.6 12/07/2023    HCT 40.4 12/07/2023    MCV 95 12/07/2023     12/07/2023    ADJUSTEDWBC 6.00 09/12/2016    RBC 4.27 12/07/2023    MCH 31.9 12/07/2023    MCHC 33.7 12/07/2023    RDW 12.8 12/07/2023    MPV 9.7 12/07/2023    NRBC 0 12/07/2023     CMP:   Lab Results   Component Value Date     12/07/2023    CO2 25 12/07/2023    BUN 11 12/07/2023    CREATININE 0.83 12/07/2023    CALCIUM 8.9 12/07/2023    AST 18 12/07/2023    ALT 21 12/07/2023    ALKPHOS 74 12/07/2023    EGFR 64 12/07/2023     PT/INR:   Lab Results   Component Value Date    INR 1.06 12/07/2023       Imaging Studies: I have personally reviewed pertinent reports. and I have personally reviewed pertinent films in PACS  Xrays right shoulder: Mild arthritic changes glenohumeral joint . No acute osseous abnormality. No high riding humeral head. Assessment:  Left shoulder arthritis and rotator cuff tendonitis. Plan:  The patient does have some arthritis of her glenohumeral joint, and her pain could be do to an arthritic flare from putting up [place] lights recently.  She does also have weakness of the shoulder and thus I can not definitively rule out a rotator cuff tear. I advise outpatient Fu with Dr. Lisa Hallman to set up PT and possible steroid injection. If she fails to improve we discussed possible MRI in the future to rule out rotator cuff tear. He may take OTC medications as needed for discomfort in the meantime. She may be discharged home from an orthopedic perspective and will again FU outpatient. She clearly has no signs of infection of her shoulder today.        Code Status: Level 1 - Full Code  Advance Directive and Living Will:      Power of :    POLST:

## 2023-12-08 NOTE — CASE MANAGEMENT
Case Management Assessment & Discharge Planning Note    Patient name Roxanna Cortes  Location [address] 415/4 [place] 12-* MRN [id number]  : 1935 Date 2023       Current Admission Date: 2023  Current Admission Diagnosis:Stroke-like symptoms   Patient Active Problem List    Diagnosis Date Noted    Right shoulder pain 2023    Recurrent depression ([address]) 2023    Gastroesophageal reflux disease without esophagitis 10/11/2022    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 episode ([address]) 10/11/2022    Seasonal allergic rhinitis due to pollen 2022    Mild intermittent asthma without complication     Mixed hyperlipidemia 2022    Stroke-like symptoms 2020    H/O: CVA (cerebrovascular accident) 2020    Stenosis of left middle cerebral artery 2019    Esophageal dysmotility 06/10/2019    Seizure disorder ([address]) 2018    HTN (hypertension), benign 2016    Acquired hypothyroidism 2016    Paroxysmal atrial fibrillation ([address]) 2016      LOS (days): 0  Geometric Mean LOS (GMLOS) (days):   Days to GMLOS:     OBJECTIVE:            Current admission status: Observation  Referral Reason: Stroke    Preferred Pharmacy:   [address] [place] [address], [address] [place] [address] ([address])  [facility] ([address])  [facility]  Phone: [phone] Fax: [phone]    Primary Care Provider: Glen Dennison MD    Primary Insurance: Mariana Gan [facility]  Secondary Insurance:     ASSESSMENT:  Syl Proxies    There are no active Health Care Proxies on file.         Obs Notice Signed: 23    Readmission Root Cause  30 Day Readmission: No    Patient Information  Admitted from[de-identified] Home  Mental Status: Alert  During Assessment patient was accompanied by: Not accompanied during assessment  Assessment information provided by[de-identified] Patient  Primary Caregiver: Self  Support Systems: [address] [place] [address] Road of Residence: [address] do you live in?: [address] entry access options. Select all that apply.: Stairs  Number of steps to enter home.: 3  Do the steps have railings?: Yes  Type of Current Residence: Apartment  Floor Level: 1  Upon entering residence, is there a bedroom on the main floor (no further steps)?: Yes  Upon entering residence, is there a bathroom on the main floor (no further steps)?: Yes  Living Arrangements: Lives Alone    Activities of Daily Living Prior to Admission  Functional Status: Independent  Completes ADLs independently?: Yes  Ambulates independently?: Yes  Does patient use assisted devices?: No  Does patient currently own DME?: Yes  What DME does the patient currently own?: Bedside Commode, Shower Chair, Straight Cane, Walker  Does the patient have a history of Short-Term Rehab?: No  Does patient have a history of HHC?: No  Does patient currently have [address]?: No     Patient Information Continued  Income Source: Pension/long-term  Does patient have prescription coverage?: Yes  Does patient receive dialysis treatments?: No     Means of Transportation  Means of Transport to [facility][de-identified] [address] [facility] [address] Stability: Low Risk  (12/8/2023)    Housing Stability Vital Sign     Unable to Pay for Housing in the Last Year: No     Number of [address] in the Last Year: 1     Unstable Housing in the Last Year: No   Food Insecurity: Unknown (12/8/2023)    Hunger Vital Sign     Worried About Running Out of Food in the Last Year: Never true     [address] in the Last Year: Not on file   Transportation Needs: No Transportation Needs (12/8/2023)    PRAPARE - Transportation     Lack of Transportation (Medical): No     Lack of Transportation (Non-Medical):  No   Utilities: Not At Risk (12/8/2023)    [facility] Utilities     Threatened with loss of utilities: No       DISCHARGE DETAILS:    Discharge planning discussed with[de-identified] Patient  Freedom of Choice: Yes  Comments - Freedom of Choice: Return home  CM contacted family/caregiver?: No- see comments (Patient stated she will update son Barrie Beth herself)  Were Treatment Team discharge recommendations reviewed with patient/caregiver?: Yes  Did patient/caregiver verbalize understanding of patient care needs?: Yes  Were patient/caregiver advised of the risks associated with not following Treatment Team discharge recommendations?: Yes    Contacts  Patient Contacts: Sandy Valiente  Relationship to Patient[de-identified] [address]         Is the patient interested in [facility] at discharge?: No    DME Referral Provided  Referral made for DME?: No    Other Referral/Resources/Interventions Provided:  Interventions: HHA  Referral Comments: CM met with patient at bedside,  introduced self and role, complete assessment and discharge planning. Patient stated she lives alone in a 1 level apt and has 3 stairs with railing to enter and has been independent with adls and iadls and has been driving herself to appts. She has a son that lives in [place] who has his own problems and does not like to bother him . Patient's sister lives in [place] ([place]). Discussed discharge planning and therapy recs for outpatient therapy. Patient agreeable for going to outpatient rehab. Patient  voiced concerns about requiring help in home for a short period of time whille she works with therapy for her shoulder. CM provided patient with list of private duty caregivers. Patient confirmed that her son Barrie Beth kaylen provide transportation at discharge. CM did inform patient that if son is not able to make it then to reach out to CM and CM will request for transportation. No other CM needs indicated at this time. CM to follow until discharge.      Treatment Team Recommendation: Home (Home with outpatient rehab)  Discharge Destination Plan[de-identified] Home (Home with outpatient rehab)  Transport at Discharge : Family

## 2023-12-08 NOTE — PHYSICAL THERAPY NOTE
PHYSICAL THERAPY EVALUATION/TREATMENT     12/08/23 0935   PT Last Visit   PT Visit Date 12/08/23   Note Type   Note type Evaluation   Pain Assessment   Pain Assessment Tool 0-10   Pain Score 5   Pain Location/Orientation Orientation: Right;Location: Shoulder   Restrictions/Precautions   Other Precautions Pain; Fall Risk   Home Living   Type of [facility] One level  (3 [address])   Home Equipment Cane;Walker   Prior Function   Level of [place] Independent with ADLs; Independent with functional mobility  (Pt ambulates without a device)   Lives With Alone   Receives Help From Family;Friend(s)   Falls in the last 6 months 0   Vocational Retired   General   Additional Pertinent History Pt admitted with R shoulder pain and weakness. Pt also c/o mild R hemibody weakness over the past week or so. MRI brain pending, ortho consult for R shoulder pain noted. Cognition   Overall Cognitive Status WFL   Orientation Level Oriented X4   Following Commands Follows all commands and directions without difficulty   Subjective   Subjective "I think I will be able to go home today"   RLE Assessment   RLE Assessment   (ROM WFL, MMT 5/5)   LLE Assessment   LLE Assessment   (ROM WFL, MMT 5/5)   Bed Mobility   Supine to Sit 7  Independent   Sit to Supine 7  Independent   Transfers   Sit to Stand 7  Independent   Stand to Sit 7  Independent   Stand pivot 7  Independent   Ambulation/Elevation   Gait pattern   (initially wide DANILO, slow, antalgic but improving with practice)   Gait Assistance 5  Supervision   Assistive Device   (none)   Distance 100 feet   Balance   Static Sitting Good   Static Standing Fair +   Ambulatory Fair +   Activity Tolerance   Activity Tolerance Patient tolerated treatment well;Patient limited by fatigue   Assessment   Prognosis Good   Problem List Decreased strength; Impaired balance;Decreased mobility;Pain   Assessment Patient is an 82y.o. year old female seen for Physical Therapy evaluation. Patient admitted with Stroke-like symptoms. Comorbidities affecting patient's physical performance include: seizure disorder, CVA, depression. Personal factors affecting patient at time of initial evaluation include: stairs to enter home and depression. Prior to admission, patient was independent with functional mobility without assistive device and independent with ADLS. Please find objective findings from Physical Therapy assessment regarding body systems outlined above with impairments and limitations including impaired balance, gait deviations, pain, and decreased activity tolerance. The Barthel Index was used as a functional outcome tool presenting with a score of Barthel Index Score: 75 today indicating moderate limitations of functional mobility and ADLS. Patient's clinical presentation is currently evolving as seen in patient's presentation of changing level of pain, increased fall risk, new onset of impairment of functional mobility, and new onset of weakness. Pt would benefit from continued Physical Therapy treatment to address deficits as defined above and maximize level of functional mobility. As demonstrated by objective findings, the assigned level of complexity for this evaluation is high. The patient's -[facility] Basic Mobility Inpatient Short Form Raw Score is 23. A Raw score of greater than 16 suggests the patient may benefit from discharge to home. Goals   Patient Goals "I want to be independent"   STG Expiration Date 12/15/23   Short Term Goal #1 independent ambulation indoor level surfaces with a steady gait 200 feet, independent up and down 3 steps so pt can enter and exit her apartment   LTG Expiration Date 12/22/23   Long Term Goal #1 independent ambulation outdoor level surfaces 500 feet with a steady gait   Plan   Treatment/Interventions Functional transfer training;LE strengthening/ROM; Elevations; Therapeutic exercise; Endurance training;Gait training;Bed mobility; Equipment eval/education;Patient/family training;ADL retraining   PT Frequency Other (Comment)  (5x/week)   Discharge Recommendation   Rehab Resource Intensity Level, PT III (Minimum Resource Intensity) for R shoulder pain/weakness   AM-PAC Basic Mobility Inpatient   Turning in Flat Bed Without Bedrails 4   Lying on Back to Sitting on Edge of Flat Bed Without Bedrails 4   Moving Bed to Chair 4   Standing Up From Chair Using Arms 4   Walk in Room 4   Climb 3-5 Stairs With Railing 3   Basic Mobility Inpatient Raw Score 23   Basic Mobility Standardized Score 50.88   Highest Level Of Mobility   JH-HLM Goal 7: Walk 25 feet or more   JH-HLM Achieved 8: Walk 250 feet ot more   Modified Lamb Scale   Modified Lamb Scale 2   Additional Treatment Session   Start Time 9810   End Time 0935   End of Consult   Patient Position at End of Consult All needs within reach; Bedside chair   Licensure   [place] License Number  [employer] PT [id number]

## 2023-12-08 NOTE — PLAN OF CARE
Problem: PAIN - ADULT  Goal: Verbalizes/displays adequate comfort level or baseline comfort level  Description: Interventions:  - Encourage patient to monitor pain and request assistance  - Assess pain using appropriate pain scale  - Administer analgesics based on type and severity of pain and evaluate response  - Implement non-pharmacological measures as appropriate and evaluate response  - Consider cultural and social influences on pain and pain management  - Notify physician/advanced practitioner if interventions unsuccessful or patient reports new pain  Outcome: Progressing     Problem: SAFETY ADULT  Goal: Patient will remain free of falls  Description: INTERVENTIONS:  - Educate patient/family on patient safety including physical limitations  - Instruct patient to call for assistance with activity   - Consult OT/PT to assist with strengthening/mobility   - Keep Call bell within reach  - Keep bed low and locked with side rails adjusted as appropriate  - Keep care items and personal belongings within reach  - Initiate and maintain comfort rounds  - Make Fall Risk Sign visible to staff  - Offer Toileting every 2 Hours, in advance of need  - Initiate/Maintain Bed / Chair alarm  - Obtain necessary fall risk management equipment:   - Apply yellow socks and bracelet for high fall risk patients  - Consider moving patient to room near nurses station  Outcome: Progressing  Goal: Maintain or return to baseline ADL function  Description: INTERVENTIONS:  -  Assess patient's ability to carry out ADLs; assess patient's baseline for ADL function and identify physical deficits which impact ability to perform ADLs (bathing, care of mouth/teeth, toileting, grooming, dressing, etc.)  - Assess/evaluate cause of self-care deficits   - Assess range of motion  - Assess patient's mobility; develop plan if impaired  - Assess patient's need for assistive devices and provide as appropriate  - Encourage maximum independence but intervene and supervise when necessary  - Involve family in performance of ADLs  - Assess for home care needs following discharge   - Consider OT consult to assist with ADL evaluation and planning for discharge  - Provide patient education as appropriate  Outcome: Progressing  Goal: Maintains/Returns to pre admission functional level  Description: INTERVENTIONS:  - Perform AM-PAC 6 Click Basic Mobility/ Daily Activity assessment daily.  - Set and communicate daily mobility goal to care team and patient/family/caregiver.    - Collaborate with rehabilitation services on mobility goals if consulted  - Out of bed for toileting  - Record patient progress and toleration of activity level   Outcome: Progressing     Problem: DISCHARGE PLANNING  Goal: Discharge to home or other facility with appropriate resources  Description: INTERVENTIONS:  - Identify barriers to discharge w/patient and caregiver  - Arrange for needed discharge resources and transportation as appropriate  - Identify discharge learning needs (meds, wound care, etc.)  - Arrange for interpretive services to assist at discharge as needed  - Refer to Case Management Department for coordinating discharge planning if the patient needs post-hospital services based on physician/advanced practitioner order or complex needs related to functional status, cognitive ability, or social support system  Outcome: Progressing     Problem: NEUROSENSORY - ADULT  Goal: Achieves stable or improved neurological status  Description: INTERVENTIONS  - Monitor and report changes in neurological status  - Monitor vital signs such as temperature, blood pressure, glucose, and any other labs ordered   - Initiate measures to prevent increased intracranial pressure  - Monitor for seizure activity and implement precautions if appropriate      Outcome: Progressing  Goal: Achieves maximal functionality and self care  Description: INTERVENTIONS  - Monitor swallowing and airway patency with patient fatigue and changes in neurological status  - Encourage and assist patient to increase activity and self care.    - Encourage visually impaired, hearing impaired and aphasic patients to use assistive/communication devices  Outcome: Progressing

## 2023-12-08 NOTE — UTILIZATION REVIEW
Initial Clinical Review    Admission: Date/Time/Statement:  12/7/23 1237 observation   Admission Orders (From admission, onward)       Ordered        12/07/23 1237  Place in Observation  Once                          Orders Placed This Encounter   Procedures    Place in Observation     Standing Status:   Standing     Number of Occurrences:   1     Order Specific Question:   Level of Care     Answer:   Level 2 Stepdown / HOT [14]     ED Arrival Information       Expected   -    Arrival   12/7/2023 10:08    Acuity   Urgent              Means of arrival   Ambulance    Escorted by   [facility]    Admission type   Emergency              Arrival complaint   Rt Sided Weakness             Chief Complaint   Patient presents with    Breast Pain    Knee Pain             Shoulder Pain    Anxiety       Initial Presentation: 82 y.o. female from home to ED via ems admitted to observation due to stroke like symptoms/Right shoulder pain. PMH of stroke, hypothyroidism, anxiety, depression, dyslipidemia, GERD and esophageal dysmotility, PAF, seizure disorder. Presented due to right sided weakness of right arm and leg starting intermittently the last week and half prior to arrival.  On day of arrival right shoulder pain. On exam: diffuse abdominal tenderness. Tenderness right shoulder. Ems applied sling. Some weakness in right arm and leg. Hypertensive. . NIH 2.   Imaging no acute findings. In the ED not a tpa candidate. Given asa 325 mg, Metoprolol 5 mg IV and Morphine. Plan is check TSH, B12 level. MRI brain, echo . Continue asa 81 mg daily, Plavix and Lipitor. PT/OT. Check Hgb A1c.  Echo. Pain control with scheduled tylenol and Lidoderm patch. Neuro checks. Consult Neurology      Date: 12/8/23    Day 2:  has continued pain of right shoulder. Has decreased strength and impaired balance. Mobility is decreased. On exam:  RUE strength 2/5 due to pain. Continue pain control, neuro checks. MRI brain no ischemia. Orthopedics consulted. Neurology consult cancelled. ED Triage Vitals   Temperature Pulse Respirations Blood Pressure SpO2   12/07/23 1036 12/07/23 1036 12/07/23 1036 12/07/23 1036 12/07/23 1036   98.1 °F (36.7 °C) 76 20 (!) 216/94 98 %      Temp Source Heart Rate Source Patient Position - Orthostatic VS BP Location FiO2 (%)   12/07/23 1036 12/07/23 1036 12/07/23 1036 12/07/23 1036 --   Temporal Monitor Lying Left arm       Pain Score       12/07/23 1436       7          Wt Readings from Last 1 Encounters:   12/08/23 63.5 kg (140 lb)     Additional Vital Signs:   12/08/23 07:47:21 -- 66 -- 123/60 81 96 % -- --   12/08/23 0545 -- 64 -- 119/59 79 95 % -- --   12/08/23 01:11:20 -- 65 18 111/43 Abnormal  66 94 % -- --   12/08/23 0015 -- 63 -- 101/35 Abnormal  57 Abnormal  99 % -- --   12/07/23 2315 -- 63 -- 140/54 83 92 % -- --   12/07/23 22:14:26 97.2 °F (36.2 °C) Abnormal  76 20 130/85 100 95 % -- --   12/07/23 2115 -- 62 -- 130/59 83 92 % -- --   12/07/23 2015 -- 64 -- 130/60 83 91 % -- --   12/07/23 18:11:17 -- 70 -- 127/56 80 95 % -- --   12/07/23 15:11:35 98.5 °F (36.9 °C) 69 20 102/80 87 96 % None (Room air) Lying   12/07/23 1436 -- 61 20 190/80 Abnormal  -- 98 % None (Room air) Lying   12/07/23 1402 -- -- -- 214/82 Abnormal  -- -- -- --   12/07/23 1333 97.9 °F (36.6 °C) 68 20 236/103 Abnormal  -- 99 % None (Room air) Sitting   12/07/23 1200 -- 66 18 183/103 Abnormal  134 98 %       Pertinent Labs/Diagnostic Test Results:   MRI brain wo contrast   Final Result by Adela Malin MD (12/08 7969)      No acute ischemia. Mild microangiopathic change. Workstation performed: [id number]         XR shoulder 2+ views RIGHT   Final Result by Cheli Jarvis MD (12/08 0126)      No acute osseous abnormality.       Workstation performed: [id number]         XR chest 1 view   Final Result by Cheli Jarvis MD (16/15 1583)      No acute cardiopulmonary disease. Workstation performed: [id number]         CTA head and neck w wo contrast   Final Result by Silvina Wills MD (12/07 1206)      No acute intracranial abnormality. Mild chronic microangiopathy. Negative CTA head and neck for large vessel occlusion, dissection, aneurysm, or high-grade stenosis. Additional chronic/incidental findings as detailed above.                   Workstation performed: [id number]             Results from last 7 days   Lab Units 12/07/23  1052   WBC Thousand/uL 7.36   HEMOGLOBIN g/dL 13.6   HEMATOCRIT % 40.4   PLATELETS Thousands/uL 266   NEUTROS ABS Thousands/µL 5.58     Results from last 7 days   Lab Units 12/07/23  1052   SODIUM mmol/L 140   POTASSIUM mmol/L 3.8   CHLORIDE mmol/L 106   CO2 mmol/L 25   ANION GAP mmol/L 9   BUN mg/dL 11   CREATININE mg/dL 0.83   EGFR ml/min/1.73sq m 64   CALCIUM mg/dL 8.9     Results from last 7 days   Lab Units 12/07/23  1052   AST U/L 18   ALT U/L 21   ALK PHOS U/L 74   TOTAL PROTEIN g/dL 6.9   ALBUMIN g/dL 4.1   TOTAL BILIRUBIN mg/dL 0.78     Results from last 7 days   Lab Units 12/07/23  1052   GLUCOSE RANDOM mg/dL 112     Results from last 7 days   Lab Units 12/07/23  1837 12/07/23  1422 12/07/23  1052   HS TNI 0HR ng/L  --   --  4   HS TNI 2HR ng/L  --  5  --    HSTNI D2 ng/L  --  1  --    HS TNI 4HR ng/L 6  --   --    HSTNI D4 ng/L 2  --   --      Results from last 7 days   Lab Units 12/07/23  1052   PROTIME seconds 14.0   INR  1.06   PTT seconds 28     Results from last 7 days   Lab Units 12/07/23  1052   TSH 3RD GENERATON uIU/mL 1.502     Results from last 7 days   Lab Units 12/07/23  1052   BNP pg/mL 138*     Results from last 7 days   Lab Units 12/07/23  1230   CLARITY UA  Clear   COLOR UA  Yellow   SPEC GRAV UA  1.010   PH UA  8.0   GLUCOSE UA mg/dl Negative   KETONES UA mg/dl Negative   BLOOD UA  Trace-Intact*   PROTEIN UA mg/dl Negative   NITRITE UA  Negative   BILIRUBIN UA  Negative UROBILINOGEN UA E.U./dl 0.2   LEUKOCYTES UA  Negative   WBC UA /hpf None Seen   RBC UA /hpf 0-1   BACTERIA UA /hpf None Seen   EPITHELIAL CELLS WET PREP /hpf None Seen         ED Treatment:   Medication Administration from 12/07/2023 1008 to 12/07/2023 1504         Date/Time Order Dose Route Action Comments     12/07/2023 1327 EST aspirin tablet 325 mg 325 mg Oral Given --     12/07/2023 1345 EST metoprolol (LOPRESSOR) injection 5 mg 5 mg Intravenous Given --     12/07/2023 1436 EST morphine injection 4 mg 4 mg Intravenous Given --          Past Medical History:   Diagnosis Date    Acute CVA (cerebrovascular accident) ([address]) 06/18/2016    Adult hypothyroidism 09/12/2016    Anxiety 06/10/2019    Arthritis     Asthma     Colon polyp     Depression     Disease of thyroid gland     Dyslipidemia 06/10/2019    Esophageal dysmotility 06/10/2019    Essential hypertension 09/12/2016    GERD (gastroesophageal reflux disease)     Hyperlipidemia     Hypertension     Hypothyroidism     Obesity     Paroxysmal atrial fibrillation ([address]) 09/12/2016    Seizure disorder ([address]) 08/24/2018    Seizures ([address])     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 female  Admission Orders:  Scheduled Medications:  acetaminophen, 975 mg, Oral, Q6H [facility]  aspirin, 81 mg, Oral, Daily  atorvastatin, 40 mg, Oral, QPM  clopidogrel, 75 mg, Oral, Daily  cyanocobalamin, 1,000 mcg, Intramuscular, Q30 Days  enoxaparin, 40 mg, Subcutaneous, Daily  famotidine, 20 mg, Oral, Daily  fluticasone, 1 spray, Nasal, Daily  levETIRAcetam, 500 mg, Oral, Q12H [facility]  levothyroxine, 88 mcg, Oral, Daily  lidocaine, 1 patch, Topical, Daily  losartan, 50 mg, Oral, Daily  metoprolol succinate, 50 mg, Oral, Daily  pantoprazole, 20 mg, Oral, Early Morning      Continuous IV Infusions: none      PRN Meds: not used. methocarbamol, 500 mg, Oral, Q6H PRN  ondansetron, 4 mg, Intravenous, Q6H PRN    Orthostatic BP  Neuro checks Every 1 hour x 4 hours, then every 2 hours x 8 hours, then every 4 hours x 72 hours     IP CONSULT TO NEUROLOGY    Network Utilization Review Department  ATTENTION: Please call with any questions or concerns to [phone] and carefully listen to the prompts so that you are directed to the right person. All voicemails are confidential.   For Discharge needs, contact Care Management DC Support Team at [phone] opt. [phone]  Send all requests for admission clinical reviews, approved or denied determinations and any other requests to dedicated fax number below belonging to the campus where the patient is receiving treatment.  List of dedicated fax numbers for the Facilities:  [place] DENIALS (Administrative/Medical Necessity) [phone]   DISCHARGE SUPPORT TEAM (NETWORK) [address] (Maternity/NICU/Pediatrics) [phone]   [address] [place] [address] Drive [address] [facility] 1000 [facility] [phone]   1505 [facility] [address] [facility] [address] [address] [facility] [address] 1300 [facility] [address] [place] [address] [phone]

## 2023-12-08 NOTE — PLAN OF CARE
Problem: OCCUPATIONAL THERAPY ADULT  Goal: Performs self-care activities at highest level of function for planned discharge setting. See evaluation for individualized goals. Description: Treatment Interventions: Compensatory technique education, Continued evaluation, Energy conservation, Activityengagement, Equipment evaluation/education, Patient/family training  Equipment Recommended: Shower/Tub chair with back ($)       See flowsheet documentation for full assessment, interventions and recommendations. 12/8/2023 1017 by Sarah Schaefer OT  Note: Limitation: Decreased endurance, Decreased high-level ADLs     Assessment: Pt is an 83yo female admitted to [facility] on stroke pathway 12/7/23 w/ R shoulder pain and ongoing memory issues. Pt reports living alone in apt w/ 2 GAVIN and I w/ ADL/ IADL w/  out use of AD, + drive. Upon eval, pt alert and generally oriented. Pt required mod I sit <> stand, S functional mobility w/ out use of AD, mod I UBD, S grooming standing at sink. Pt reports R hand dominance and demonstrated limited R shoulder AROM AG due to pain. Pt is completing ADLs below baseline level of I and would benefit from OT In acute care to maximize functional independence. Recommend level III rehab resource intensity when medically stable for discharge. Will continue to follow 1-2X week in acute care. Rehab Resource Intensity Level, OT: III (Minimum Resource Intensity)       12/8/2023 1017 by Sarah Schaefer OT  Note: Limitation: Decreased endurance, Decreased high-level ADLs     Assessment: Pt is an 83yo female admitted to [facility] on stroke pathway 12/7/23 w/ R shoulder pain and ongoing memory issues. Pt reports living alone in apt w/ 2 GAVIN and I w/ ADL/ IADL w/  out use of AD, + drive. Upon eval, pt alert and generally oriented. Pt required mod I sit <> stand, S functional mobility w/ out use of AD, mod I UBD, S grooming standing at sink.  Pt reports R hand dominance and demonstrated limited R shoulder AROM AG due to pain. Pt is completing ADLs below baseline level of I and would benefit from OT In acute care to maximize functional independence. Recommend level III rehab resource intensity when medically stable for discharge. Will continue to follow 1-2X week in acute care.      Rehab Resource Intensity Level, OT: III (Minimum Resource Intensity)

## 2023-12-08 NOTE — ASSESSMENT & PLAN NOTE
Pt BP elevated in the 236/103 upon arrival to ED  Given metoprolol IV in ED  Likely secondary to pain  BP stable  Continue metoprolol

## 2023-12-08 NOTE — PLAN OF CARE
Problem: PAIN - ADULT  Goal: Verbalizes/displays adequate comfort level or baseline comfort level  Description: Interventions:  - Encourage patient to monitor pain and request assistance  - Assess pain using appropriate pain scale  - Administer analgesics based on type and severity of pain and evaluate response  - Implement non-pharmacological measures as appropriate and evaluate response  - Consider cultural and social influences on pain and pain management  - Notify physician/advanced practitioner if interventions unsuccessful or patient reports new pain  Outcome: Progressing     Problem: SAFETY ADULT  Goal: Patient will remain free of falls  Description: INTERVENTIONS:  - Educate patient/family on patient safety including physical limitations  - Instruct patient to call for assistance with activity   - Consult OT/PT to assist with strengthening/mobility   - Keep Call bell within reach  - Keep bed low and locked with side rails adjusted as appropriate  - Keep care items and personal belongings within reach  - Initiate and maintain comfort rounds  - Make Fall Risk Sign visible to staff  - Offer Toileting every 2 Hours, in advance of need  - Initiate/Maintain Bed / Chair alarm  - Obtain necessary fall risk management equipment:   - Apply yellow socks and bracelet for high fall risk patients  - Consider moving patient to room near nurses station  Outcome: Progressing  Goal: Maintain or return to baseline ADL function  Description: INTERVENTIONS:  -  Assess patient's ability to carry out ADLs; assess patient's baseline for ADL function and identify physical deficits which impact ability to perform ADLs (bathing, care of mouth/teeth, toileting, grooming, dressing, etc.)  - Assess/evaluate cause of self-care deficits   - Assess range of motion  - Assess patient's mobility; develop plan if impaired  - Assess patient's need for assistive devices and provide as appropriate  - Encourage maximum independence but intervene and supervise when necessary  - Involve family in performance of ADLs  - Assess for home care needs following discharge   - Consider OT consult to assist with ADL evaluation and planning for discharge  - Provide patient education as appropriate  Outcome: Progressing  Goal: Maintains/Returns to pre admission functional level  Description: INTERVENTIONS:  - Perform AM-PAC 6 Click Basic Mobility/ Daily Activity assessment daily.  - Set and communicate daily mobility goal to care team and patient/family/caregiver.    - Collaborate with rehabilitation services on mobility goals if consulted  - Perform Range of Motion 3 times a day  - Ambulate patient 3 times a day  - Out of bed to chair 3 times a day   - Out of bed for meals 3 times a day  - Out of bed for toileting  - Record patient progress and toleration of activity level   Outcome: Progressing     Problem: DISCHARGE PLANNING  Goal: Discharge to home or other facility with appropriate resources  Description: INTERVENTIONS:  - Identify barriers to discharge w/patient and caregiver  - Arrange for needed discharge resources and transportation as appropriate  - Identify discharge learning needs (meds, wound care, etc.)  - Arrange for interpretive services to assist at discharge as needed  - Refer to Case Management Department for coordinating discharge planning if the patient needs post-hospital services based on physician/advanced practitioner order or complex needs related to functional status, cognitive ability, or social support system  Outcome: Progressing     Problem: NEUROSENSORY - ADULT  Goal: Achieves stable or improved neurological status  Description: INTERVENTIONS  - Monitor and report changes in neurological status  - Monitor vital signs such as temperature, blood pressure, glucose, and any other labs ordered   - Initiate measures to prevent increased intracranial pressure  - Monitor for seizure activity and implement precautions if appropriate      Outcome: Progressing  Goal: Achieves maximal functionality and self care  Description: INTERVENTIONS  - Monitor swallowing and airway patency with patient fatigue and changes in neurological status  - Encourage and assist patient to increase activity and self care.    - Encourage visually impaired, hearing impaired and aphasic patients to use assistive/communication devices  Outcome: Progressing

## 2023-12-08 NOTE — OCCUPATIONAL THERAPY NOTE
Occupational Therapy Evaluation     Patient Name: Lilian Acuna  WPGRE'W Date: 12/8/2023  Problem List  Principal Problem:    Stroke-like symptoms  Active Problems:    HTN (hypertension), benign    Acquired hypothyroidism    Paroxysmal atrial fibrillation (HCC)    Esophageal dysmotility    Right shoulder pain    Past Medical History  Past Medical History:   Diagnosis Date    Acute CVA (cerebrovascular accident) ([address]) 06/18/2016    Adult hypothyroidism 09/12/2016    Anxiety 06/10/2019    Arthritis     Asthma     Colon polyp     Depression     Disease of thyroid gland     Dyslipidemia 06/10/2019    Esophageal dysmotility 06/10/2019    Essential hypertension 09/12/2016    GERD (gastroesophageal reflux disease)     Hyperlipidemia     Hypertension     Hypothyroidism     Obesity     Paroxysmal atrial fibrillation ([address]) 09/12/2016    Seizure disorder ([address]) 08/24/2018    Seizures ([address])     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    Stroke ([address]) 06/2016    Subarachnoid hemorrhage ([address]) 09/13/2016    Subdural hematoma ([address]) 09/13/2016     Past Surgical History  Past Surgical History:   Procedure Laterality Date    APPENDECTOMY      BACK SURGERY  01/2016    CARPAL TUNNEL RELEASE Right     COLONOSCOPY      TUBAL LIGATION          12/08/23 0947   OT Last Visit   OT Visit Date 12/08/23  (Friday)   Note Type   Note type Evaluation   Pain Assessment   Pain Assessment Tool 0-10   Pain Score No Pain   Restrictions/Precautions   Weight Bearing Precautions Per Order No   Other Precautions Fall Risk   Home Living   Type of Home Apartment  (2 GAVIN)   Home Layout One level;Performs ADLs on one level; Able to live on main level with bedroom/bathroom   Bathroom Shower/Tub Tub/shower unit   Bathroom Toilet Standard   Bathroom Equipment Grab bars in shower   [address]   (not using AD or DME)   Additional Comments Pt reports living alone in apt w/ 2 GAVIN   Prior Function   Level of [place] Independent with ADLs; Independent with functional mobility; Independent with IADLS  (+ drive)   Lives With (S)  Alone   Receives Help From Family;Friend(s)  (supportive elderly friends in [address]. Family)   IADLs Independent with driving; Independent with meal prep; Independent with medication management   Falls in the last 6 months 0   Vocational Retired   Comments Pt reports I w/ ADLs at baseline w/ out use of AD   Lifestyle   Autonomy Pt reports I w/ ADLs w/ out use of AD   Reciprocal Relationships Pt reports living alone   Service to Others Pt reports retired and worked for a [employer] in [address] [facility] Pt reports enjoynig painting, drawing   General   Additional Pertinent History Pt admitted on stroke pathway w/ R shoulder pain and ongoing / worsening memory issues. MRI impression: No acute ischemia. Mild microangiopathic change   Family/Caregiver Present No   Additional General Comments Significant PMH Impacting her occupational performance includes CVA, seizure, anxiety/depression, HTN, R carpal tunnel release, back surgery (2016), arthritis. Personal and environmental factors supporting performance includes independent at baseline, first floor set- up; barriers include advanced age, lives alone, difficulty completing IADLs. Subjective   Subjective "I have been using my left hand"   ADL   Where Assessed Standing at sink   Eating Assistance 6  Modified independent   Eating Deficit Setup   Grooming Assistance 5  Supervision/Setup   Grooming Deficit Setup; Increased time to complete;Verbal cueing  (standing at sink to brush her teeth and comb hair.  Stabilized / supported R UE w/ L)   UB Bathing Assistance Unable to assess  (anticipate mod I for + time due to R UE pain based on fxal obs skills, clinical judgement)   LB Bathing Assistance Unable to assess  (anticipate S based on fxal obs skills, clinical judgement) UB Dressing Assistance 6  Modified independent   UB Dressing Deficit Setup; Increased time to complete  (+ time due to R UE deficits, shoulder pain)   LB Dressing Assistance Unable to assess  (PTAlfie reports pt don underwear w/ out assistance during toileting. Anticipate mod I for + time based on fxal obs skills, clinical judgement)   Toileting Assistance  Unable to assess  (denied need to void, utlized bathroom prior to therapist arrival w/ Alfie MANUEL. PT reports pt managed hygiene w/ out assistance)   Bed Mobility   Supine to Sit Unable to assess   Sit to Supine Unable to assess   Additional Comments Pt seated OOB in chair upon arrival and post eval w/ needs met, call bell in reach   Transfers   Sit to Stand 6  Modified independent   Additional items Assist x 1; Increased time required   Stand to Sit 6  Modified independent   Additional items Assist x 1; Increased time required   Functional Mobility   Functional Mobility 5  Supervision   Additional Comments household distances w/ S   Additional items   (no AD)   Balance   Static Sitting Normal   Static Standing Fair +   Ambulatory Fair   Activity Tolerance   Activity Tolerance Patient limited by pain   Medical Staff Made Aware spoke w/ Alfie MANUEL and Pam Becerra   Nurse Made Aware spoke w/ RNMele and Gee ISBELL   RUE Assessment   RUE Assessment X  (R shoulder xray impression: No acute fracture or dislocation. Mild degenerative changes of the glenohumeral and acromioclavicular joints)   RUE Strength   RUE Overall Strength Deficits;Due to pain   R Shoulder Flexion 2+/5   R Elbow Flexion 3-/5   R Elbow Extension 3-/5   LUE Assessment   LUE Assessment WFL   LUE Strength   LUE Overall Strength Within Functional Limits - able to perform ADL tasks with strength   Hand Function   Gross Motor Coordination Functional   Fine Motor Coordination Impaired   Hand Function Comments R hand dominance.  Limited / impaired due to R shoulder / UE pain   Sensation   Light Touch No apparent deficits  (responded to light touch)   Vision-Basic Assessment   Current Vision Wears glasses only for reading   Vision - Complex Assessment   Additional Comments denies visual deficits / symptoms   Perception   Inattention/Neglect Appears intact   Motor Planning Appears intact   Perseveration Not present   Cognition   Overall Cognitive Status WFL  (pt reports memory deficits. Able to participate in conversation, recall family members and where they live; work history)   Arousal/Participation Alert; Cooperative   Attention Within functional limits   Orientation Level Oriented X4   Memory Within functional limits  (pt reports difficulty learning to use cell phone that her son got for her)   Following Commands Follows multistep commands without difficulty   Comments Identified pt by full name and birthdate. Alert, oriented and able to participate in conversation. Benefits from redirection to sustain / shift attention. Tangential and loquacious   Assessment   Limitation Decreased endurance;Decreased high-level ADLs   Assessment Pt is an 83yo female admitted to [facility] on stroke pathway 12/7/23 w/ R shoulder pain and ongoing memory issues. Pt reports living alone in apt w/ 2 GAVIN and I w/ ADL/ IADL w/  out use of AD, + drive. Upon eval, pt alert and generally oriented. Pt required mod I sit <> stand, S functional mobility w/ out use of AD, mod I UBD, S grooming standing at sink. Pt reports R hand dominance and demonstrated limited R shoulder AROM AG due to pain. Pt is completing ADLs below baseline level of I and would benefit from OT In acute care to maximize functional independence. Recommend level III rehab resource intensity when medically stable for discharge. Will continue to follow 1-2X week in acute care. Goals   Patient Goals Pt stated that she would like to return home to baseline level of I, continue driving   Plan   Treatment Interventions Compensatory technique education;Continued evaluation; Energy conservation; Activityengagement;Equipment evaluation/education;Patient/family training   Goal Expiration Date 12/15/23   OT Treatment Day 0  (Friday 12/8/23)   OT Frequency 1-2x/wk   Discharge Recommendation   Rehab Resource Intensity Level, OT III (Minimum Resource Intensity)   Equipment Recommended Shower/Tub chair with back ($)   AM-PAC Daily Activity Inpatient   Lower Body Dressing 4   Bathing 3   Toileting 4   Upper Body Dressing 4   Grooming 4   Eating 4   Daily Activity Raw Score 23   Daily Activity Standardized Score (Calc for Raw Score >=11) 51.12   AM-[facility] Applied Cognition Inpatient   Following a Speech/Presentation 4   Understanding Ordinary Conversation 4   Taking Medications 4   Remembering Where Things Are Placed or Put Away 4   Remembering List of 4-5 Errands 3   Taking Care of Complicated Tasks 4   Applied Cognition Raw Score 23   Applied Cognition Standardized Score 53.08   Barthel Index   Feeding 10   Bathing 0   Grooming Score 5   Dressing Score 5   Bladder Score 10   Bowels Score 10   Toilet Use Score 10   Transfers (Bed/Chair) Score 10   Mobility (Level Surface) Score 10   Stairs Score 5   Barthel Index Score 75   Modified [place] Scale   Modified [place] Scale 2   End of Consult   Education Provided Yes   Patient Position at End of Consult Bedside chair; All needs within reach   Nurse Communication Nurse aware of consult   Licensure   [address] [place] [address] Number  Da Backer, OTR/L [id number]       The patient's raw score on the AM-PAC Daily Activity Inpatient Short Form is 23. A raw score of greater than or equal to 19 suggests the patient may benefit from discharge to home. Please refer to the recommendation of the Occupational Therapist for safe discharge planning.     Pt goals to be met by 12/15/23 to max I w/ ADLs and improve engagement to return home alone to baseline level of I includes:    -Pt will demonstrate good attention and understanding EC tech to max I w/ ADLs    -Pt will complete UBD/LBD w/ mod I for + time w/ out rest break or sign /symptoms of fatigue    -Pt will demonstrate good attention and understanding compensatory strategies as needed to max functional use of R UE to return home alone    -Pt will consistently engage in functional mobility household distances w/ mod I    -Pt will demonstrated good attention and understanding R UE ROM  / therex as appropriate to max I and improve functional use of R UE.      Brain Neema, OTR/L  [id number]  [id number]

## 2023-12-08 NOTE — PLAN OF CARE
Problem: PHYSICAL THERAPY ADULT  Goal: Performs mobility at highest level of function for planned discharge setting. See evaluation for individualized goals. Description: Treatment/Interventions: Functional transfer training, LE strengthening/ROM, Elevations, Therapeutic exercise, Endurance training, Gait training, Bed mobility, Equipment eval/education, Patient/family training, ADL retraining          See flowsheet documentation for full assessment, interventions and recommendations. Note: Prognosis: Good  Problem List: Decreased strength, Impaired balance, Decreased mobility, Pain  Assessment: Patient is an 82y.o. year old female seen for Physical Therapy evaluation. Patient admitted with Stroke-like symptoms. Comorbidities affecting patient's physical performance include: seizure disorder, CVA, depression. Personal factors affecting patient at time of initial evaluation include: stairs to enter home and depression. Prior to admission, patient was independent with functional mobility without assistive device and independent with ADLS. Please find objective findings from Physical Therapy assessment regarding body systems outlined above with impairments and limitations including impaired balance, gait deviations, pain, and decreased activity tolerance. The Barthel Index was used as a functional outcome tool presenting with a score of Barthel Index Score: 75 today indicating moderate limitations of functional mobility and ADLS. Patient's clinical presentation is currently evolving as seen in patient's presentation of changing level of pain, increased fall risk, new onset of impairment of functional mobility, and new onset of weakness. Pt would benefit from continued Physical Therapy treatment to address deficits as defined above and maximize level of functional mobility. As demonstrated by objective findings, the assigned level of complexity for this evaluation is high. The patient's AM-PAC Basic Mobility Inpatient Short Form Raw Score is 23. A Raw score of greater than 16 suggests the patient may benefit from discharge to home. Rehab Resource Intensity Level, PT: III (Minimum Resource Intensity)    See flowsheet documentation for full assessment.

## 2023-12-08 NOTE — ASSESSMENT & PLAN NOTE
Pt with history of CVA in 2016 presenting for intermittent right sided weakness for the past week and a half. States she has right sided shoulder pain and knee pain that is affecting her ADLs and gait. Also reports that her tongue have been feeling thick making it harder to speak with sporadic numbness and tingling in the extremities. CT with no acute intracranial abnormality  MRI brain revealing no ischemia.  Mild microangiopathic change  Pending 2D ECHO with bubble study  Lipid profile reviewed: , , HDL 38, LDL 87  Hgb A1c 6.2, unchanged from 2020  Pt received 324 mg aspiring in ED  Continue statin, aspirin 81 mg daily  PT/OT/speech evaluation

## 2023-12-09 VITALS
OXYGEN SATURATION: 93 % | WEIGHT: 140 LBS | HEIGHT: 60 IN | SYSTOLIC BLOOD PRESSURE: 112 MMHG | RESPIRATION RATE: 16 BRPM | DIASTOLIC BLOOD PRESSURE: 78 MMHG | BODY MASS INDEX: 27.48 KG/M2 | HEART RATE: 90 BPM | TEMPERATURE: 97.4 F

## 2023-12-09 NOTE — DISCHARGE SUMMARY
[address] [facility]  Discharge- Tamara Hernandez 1935, 82 y.o. female MRN: [id number]  Unit/Bed#: [address] [place] [address] Encounter: [id number]  Primary Care Provider: Alejandro Bowman MD   Date and time admitted to hospital: 12/7/2023 10:29 AM    * Stroke-like symptoms  Assessment & Plan  Patient presented with multiple complaints including right arm weakness, right shoulder pain with some speech issues  Patient has known history of stroke  CTA of the head and neck without any acute intracranial abnormality with mild chronic microangiopathy. No large vessel occlusion, aneurysm or high-grade stenosis  TSH level was normal and B12 level was low  MRI of the brain -no acute ischemia  Patient was on aspirin 81 mg p.o. daily which can be discontinued. Patient received aspirin 325 mg in the ED  Continue Plavix 75 g p.o. daily and Lipitor 40 mg p.o. daily  Patient was doing well with physical therapy  LDL 87 and hemoglobin A1c 6.2  2D echo with bubble study-EF 55% without any ASD or PFO  Patient also noted to have significant elevation of the blood pressure in the ED which has since improved  Patient to be discharged on B12 supplementation with an outpatient follow-up with PCP    Right shoulder pain  Assessment & Plan  Patient reported right shoulder pain since yesterday morning  Patient has history of arthritis and has been doing some [place] decorations and lifting things  Right shoulder x-ray was unremarkable  Patient will be treated with scheduled Tylenol 975 mg p.o. every 8 hours and Lidoderm patch with improved symptoms  Suspected arthritis. Patient was seen by orthopedics per patient request-continue Lidoderm patch and scheduled Tylenol with physical therapy. Patient will need outpatient follow-up with orthopedics.   MRI of the right shoulder if persistent symptoms with possible steroid injection    Esophageal dysmotility  Assessment & Plan  History of pharyngeal esophageal dysphagia and esophageal dysmotility  Continue PPI    Paroxysmal atrial fibrillation (HCC)  Assessment & Plan  Continue Toprol-XL 50 mg p.o. daily  Currently in sinus rhythm    Acquired hypothyroidism  Assessment & Plan  Continue levothyroxine  TSH level was normal      HTN (hypertension), benign  Assessment & Plan  Blood pressure was significantly elevated in the ED and needed IV Lopressor  Likely secondary to pain and anxiety  Continue Toprol-XL 50 mg p.o. daily and losartan 50 mg p.o. daily        Medical Problems       Resolved Problems  Date Reviewed: 12/8/2023   None       Discharging Physician / Practitioner: Jack Hawley MD  PCP: Precious Rowland MD  Admission Date:   Admission Orders (From admission, onward)       Ordered        12/07/23 1237  Place in Observation  Once                          Discharge Date: 12/08/23    Consultations During Hospital Stay:  Orthopedics      Significant Findings / Test Results:   CT of the head and neck-no acute intracranial abnormality with mild chronic microangiopathy  MRI of the brain was negative for acute ischemia  2D echo with bubble study showed EF of 12% without any PFO         Outpatient Tests Requested: Follow with PCP and orthopedics    Complications: None    Reason for Admission: Right shoulder pain    Hospital Course:   Chuck Tinoco is a 82 y.o. female patient with past medical history of stroke, hypothyroidism, anxiety, depression, dyslipidemia, GERD, esophageal dysmotility, PAF, seizure disorder who originally presented to the hospital on 12/7/2023 due to right shoulder pain. Upon further questioning patient also reported she might be slightly weak in the right arm and had some speech issues and might be feeling off balance and fatigued and tired. CAT scan of the brain was unremarkable in the ED and shoulder x-ray was negative. Patient was treated symptomatically for shoulder pain and patient also had MRI of the brain which was negative.   Patient continued remained stable and was doing well with physical therapy. Patient was also seen by orthopedics who agreed with conservative treatment with an outpatient follow-up for MRI/steroid injection as needed      Please see above list of diagnoses and related plan for additional information. Condition at Discharge: stable    Discharge Day Visit / Exam:   Subjective: Patient is feeling better. Improved pain in the right shoulder but still having some difficulty with movement. Denies any headache or dizziness. Patient did walk in the hallway with the help of physical therapy and did well with  Vitals: Blood Pressure: 106/73 (12/08/23 1926)  Pulse: 70 (12/08/23 1251)  Temperature: (!) 97.2 °F (36.2 °C) (12/07/23 2214)  Temp Source: Oral (12/07/23 1511)  Respirations: 18 (12/08/23 1926)  Height: 5' (152.4 cm) (12/08/23 1251)  Weight - Scale: 63.5 kg (140 lb) (12/08/23 1251)  SpO2: 96 % (12/08/23 0747)  Exam:   Physical Exam  Constitutional:       Appearance: Normal appearance. HENT:      Head: Normocephalic and atraumatic. Nose: Nose normal.      Mouth/Throat:      Mouth: Mucous membranes are moist.      Pharynx: Oropharynx is clear. Eyes:      Extraocular Movements: Extraocular movements intact. Pupils: Pupils are equal, round, and reactive to light. Cardiovascular:      Rate and Rhythm: Normal rate and regular rhythm. Pulmonary:      Effort: Pulmonary effort is normal.      Breath sounds: Normal breath sounds. Abdominal:      General: Bowel sounds are normal. There is no distension. Palpations: Abdomen is soft. Tenderness: There is no abdominal tenderness. Musculoskeletal:         General: No swelling. Cervical back: Normal range of motion and neck supple. Comments: Right shoulder pain with extension   Skin:     General: Skin is warm and dry. Neurological:      General: No focal deficit present. Mental Status: She is alert.          Discharge instructions/Information to patient and family:   See after visit summary for information provided to patient and family. Provisions for Follow-Up Care:  See after visit summary for information related to follow-up care and any pertinent home health orders. Mobility at time of Discharge:   Basic Mobility Inpatient Raw Score: 23  JH-HLM Goal: 7: Walk 25 feet or more  JH-HLM Achieved: 8: Walk 250 feet ot more  HLM Goal achieved. Continue to encourage appropriate mobility. Disposition:   Home    Planned Readmission: No     Discharge Statement:  I spent 35 minutes discharging the patient. This time was spent on the day of discharge. I had direct contact with the patient on the day of discharge. Greater than 50% of the total time was spent examining patient, answering all patient questions, arranging and discussing plan of care with patient as well as directly providing post-discharge instructions. Additional time then spent on discharge activities. Discharge Medications:  See after visit summary for reconciled discharge medications provided to patient and/or family.       **Please Note: This note may have been constructed using a voice recognition system**

## 2023-12-09 NOTE — ASSESSMENT & PLAN NOTE
Patient reported right shoulder pain since yesterday morning  Patient has history of arthritis and has been doing some [place] decorations and lifting things  Right shoulder x-ray was unremarkable  Patient will be treated with scheduled Tylenol 975 mg p.o. every 8 hours and Lidoderm patch with improved symptoms  Suspected arthritis. Patient was seen by orthopedics per patient request-continue Lidoderm patch and scheduled Tylenol with physical therapy. Patient will need outpatient follow-up with orthopedics.   MRI of the right shoulder if persistent symptoms with possible steroid injection

## 2023-12-09 NOTE — ASSESSMENT & PLAN NOTE
Patient presented with multiple complaints including right arm weakness, right shoulder pain with some speech issues  Patient has known history of stroke  CTA of the head and neck without any acute intracranial abnormality with mild chronic microangiopathy. No large vessel occlusion, aneurysm or high-grade stenosis  TSH level was normal and B12 level was low  MRI of the brain -no acute ischemia  Patient was on aspirin 81 mg p.o. daily which can be discontinued.   Patient received aspirin 325 mg in the ED  Continue Plavix 75 g p.o. daily and Lipitor 40 mg p.o. daily  Patient was doing well with physical therapy  LDL 87 and hemoglobin A1c 6.2  2D echo with bubble study-EF 55% without any ASD or PFO  Patient also noted to have significant elevation of the blood pressure in the ED which has since improved  Patient to be discharged on B12 supplementation with an outpatient follow-up with PCP

## 2023-12-11 ENCOUNTER — TRANSITIONAL CARE MANAGEMENT (OUTPATIENT)
Dept: FAMILY MEDICINE CLINIC | Facility: CLINIC | Age: 88
End: 2023-12-11

## 2023-12-12 ENCOUNTER — OFFICE VISIT (OUTPATIENT)
Dept: OBGYN CLINIC | Facility: CLINIC | Age: 88
End: 2023-12-12
Payer: COMMERCIAL

## 2023-12-12 VITALS
WEIGHT: 140 LBS | DIASTOLIC BLOOD PRESSURE: 103 MMHG | HEIGHT: 60 IN | BODY MASS INDEX: 27.48 KG/M2 | SYSTOLIC BLOOD PRESSURE: 185 MMHG | HEART RATE: 84 BPM

## 2023-12-12 DIAGNOSIS — I10 ESSENTIAL HYPERTENSION: ICD-10-CM

## 2023-12-12 DIAGNOSIS — M75.81 ROTATOR CUFF TENDONITIS, RIGHT: Primary | ICD-10-CM

## 2023-12-12 PROCEDURE — 20610 DRAIN/INJ JOINT/BURSA W/O US: CPT | Performed by: ORTHOPAEDIC SURGERY

## 2023-12-12 PROCEDURE — 99214 OFFICE O/P EST MOD 30 MIN: CPT | Performed by: ORTHOPAEDIC SURGERY

## 2023-12-12 RX ORDER — METOPROLOL SUCCINATE 50 MG/1
50 TABLET, EXTENDED RELEASE ORAL DAILY
Qty: 90 TABLET | Refills: 0 | Status: SHIPPED | OUTPATIENT
Start: 2023-12-12

## 2023-12-12 RX ADMIN — LIDOCAINE HYDROCHLORIDE 4 ML: 10 INJECTION, SOLUTION INFILTRATION; PERINEURAL at 13:45

## 2023-12-12 RX ADMIN — TRIAMCINOLONE ACETONIDE 40 MG: 40 INJECTION, SUSPENSION INTRA-ARTICULAR; INTRAMUSCULAR at 13:45

## 2023-12-12 NOTE — PROGRESS NOTES
Assessment/Plan:  1. Rotator cuff tendonitis, right  Large joint arthrocentesis: R subacromial bursa    lidocaine (XYLOCAINE) 1 % injection 4 mL    triamcinolone acetonide (KENALOG-40) 40 mg/mL injection 40 mg        82year-old female presents for evaluation of her right shoulder. The patent appears to have at least some tendonitis of her rotator cuff, though given her weakness and limitations in ROM, she may have a tear. Given her age, activity level and overall goals we will start with conservative treatment for this. We discussed corticosteroid injection as well as physical therapy. She consented to and tolerated right subacromial corticosteroid injection today. I also provided her with a prescription for physical therapy today. She may ice and take OTC medications as needed for discomfort. She will FU in 8 weeks for repeat evaluation. Large joint arthrocentesis: R subacromial bursa  Universal Protocol:  Risks and benefits: risks, benefits and alternatives were discussed  Consent given by: patient  Time out: Immediately prior to procedure a "time out" was called to verify the correct patient, procedure, equipment, support staff and site/side marked as required. Timeout called at: 12/12/2023 2:07 PM.  Site marked: the operative site was marked  Supporting Documentation  Indications: pain   Procedure Details  Location: shoulder - R subacromial bursa  Preparation: Patient was prepped and draped in the usual sterile fashion  Needle size: 22 G  Ultrasound guidance: no  Approach: posterior  Medications administered: 4 mL lidocaine 1 %; 40 mg triamcinolone acetonide 40 mg/mL    Patient tolerance: patient tolerated the procedure well with no immediate complications  Dressing:  Sterile dressing applied            Subjective:   Zuleima Phillips is a 82 y.o. female who presents today for follow-up of her right shoulder. The patient was seen in consultation while admitted for possible stroke work up.  She again notes several months of shoulder pain, which got worse over the last 2-3 weeks after she was lifting tubs of floridalma decorations and hanging floridal[place] lights. She notes pain diffusely about the shoulder as well as limitations in ROM of the shoulder and weakness. She notes good sensation of the right upper extremity. Review of Systems   Constitutional: Negative. Negative for chills and fever. HENT: Negative. Negative for ear pain and sore throat. Eyes: Negative. Negative for pain and redness. Respiratory: Negative. Negative for shortness of breath and wheezing. Cardiovascular:  Negative for chest pain and palpitations. Gastrointestinal: Negative. Negative for abdominal pain and blood in stool. Endocrine: Negative. Negative for polydipsia and polyuria. Genitourinary: Negative. Negative for difficulty urinating and dysuria. Musculoskeletal:         As noted in HPI   Skin: Negative. Negative for pallor and rash. Neurological: Negative. Negative for dizziness and numbness. Hematological: Negative. Negative for adenopathy. Does not bruise/bleed easily. Psychiatric/Behavioral: Negative. Negative for confusion and suicidal ideas.           Past Medical History:   Diagnosis Date   • Acute CVA (cerebrovascular accident) ([address]) 06/18/2016   • Adult hypothyroidism 09/12/2016   • Anxiety 06/10/2019   • Arthritis    • Asthma    • Colon polyp    • Depression    • Disease of thyroid gland    • Dyslipidemia 06/10/2019   • Esophageal dysmotility 06/10/2019   • Essential hypertension 09/12/2016   • GERD (gastroesophageal reflux disease)    • Hyperlipidemia    • Hypertension    • Hypothyroidism    • Obesity    • Paroxysmal atrial fibrillation ([address]) 09/12/2016   • Seizure disorder ([address]) 08/24/2018   • Seizures ([address])    • 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   • 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   • Stroke ([address]) 06/2016   • Subarachnoid hemorrhage ([address]) 09/13/2016   • Subdural hematoma ([address]) 09/13/2016       Past Surgical History:   Procedure Laterality Date   • APPENDECTOMY     • BACK SURGERY  01/2016   • CARPAL TUNNEL RELEASE Right    • COLONOSCOPY     • TUBAL LIGATION         Family History   Problem Relation Age of Onset   • Kidney disease Mother    • Heart disease Mother    • Cancer Father         colon   • Heart disease Father    • Colon cancer Father    • Heart disease Sister    • Diabetes Son    • Multiple sclerosis Son    • No Known Problems Son    • Heart disease Maternal Grandmother        Social History     Occupational History   • Not on file   Tobacco Use   • Smoking status: Never   • Smokeless tobacco: Never   Vaping Use   • Vaping status: Never Used   Substance and Sexual Activity   • Alcohol use: Never     Alcohol/week: 0.0 standard drinks of alcohol     Comment: 0   • Drug use: No   • Sexual activity: Not Currently     Partners: Male     Birth control/protection: Post-menopausal         Current Outpatient Medications:   •  acetaminophen (TYLENOL) 325 mg tablet, Take 2 tablets (650 mg total) by mouth every 6 (six) hours as needed for mild pain, headaches or fever, Disp: 30 tablet, Rfl: 0  •  acetaminophen (TYLENOL) 650 mg CR tablet, Take 1 tablet (650 mg total) by mouth every 8 (eight) hours as needed for mild pain, Disp: 30 tablet, Rfl: 0  •  atorvastatin (LIPITOR) 40 mg tablet, Take 1 tablet (40 mg total) by mouth daily, Disp: 90 tablet, Rfl: 0  •  clopidogrel (PLAVIX) 75 mg tablet, Take 1 tablet (75 mg total) by mouth daily, Disp: 90 tablet, Rfl: 0  •  famotidine (PEPCID) 20 mg tablet, Take 1 tablet (20 mg total) by mouth daily, Disp: 90 tablet, Rfl: 0  •  fluticasone (FLONASE) 50 mcg/act nasal spray, 1 spray into each nostril daily, Disp: , Rfl:   •  levETIRAcetam (Roweepra) 500 mg tablet, Take 1 tablet (500 mg total) by mouth every 12 (twelve) hours, Disp: 60 tablet, Rfl: 2  •  levothyroxine 88 mcg tablet, Take 1 tablet (88 mcg total) by mouth daily, Disp: 90 tablet, Rfl: 1  •  lidocaine (LIDODERM) 5 %, Apply 1 patch topically over 12 hours daily Remove & Discard patch within 12 hours or as directed by MD Do not start before December 9, 2023., Disp: 14 patch, Rfl: 0  •  losartan (COZAAR) 50 mg tablet, Take 1 tablet (50 mg total) by mouth daily, Disp: 90 tablet, Rfl: 0  •  omeprazole (PriLOSEC) 20 mg delayed release capsule, Take 1 capsule (20 mg total) by mouth daily, Disp: 90 capsule, Rfl: 1  •  metoprolol succinate (TOPROL-XL) 50 mg 24 hr tablet, Take 1 tablet (50 mg total) by mouth daily, Disp: 90 tablet, Rfl: 0  No current facility-administered medications for this visit. Allergies   Allergen Reactions   • Amlodipine Other (See Comments)     Unknown reaction   • Hydralazine Other (See Comments)     Unknown reaction   • Penicillins Other (See Comments)     Unknown since childhood       Objective:  Vitals:    12/12/23 1344   BP: (!) 185/103   Pulse: 84     Pain Score:   8      Right Shoulder Exam     Tenderness   The patient is experiencing no tenderness. Range of Motion   External rotation:  60   Forward flexion:  90   Internal rotation 0 degrees:  L5     Muscle Strength   Abduction: 4/5   Internal rotation: 5/5   External rotation: 5/5     Tests   Drop arm: positive    Other   Erythema: absent  Sensation: normal  Pulse: present            Physical Exam  Constitutional:       General: She is not in acute distress. Appearance: She is well-developed. HENT:      Head: Normocephalic and atraumatic. Eyes:      General: No scleral icterus. Conjunctiva/sclera: Conjunctivae normal.   Neck:      Vascular: No JVD. Cardiovascular:      Rate and Rhythm: Normal rate. Pulmonary:      Effort: Pulmonary effort is normal. No respiratory distress. Musculoskeletal:      Comments: As per HPI   Skin:     General: Skin is warm. Neurological:      Mental Status: She is alert and oriented to person, place, and time.       Coordination: Coordination normal.         I have personally reviewed pertinent films in PACS and my interpretation is as follows:  Xrays right shoulder: Mild glenohumeral joint arthritis, no acute osseous abnormality. This document was created using speech voice recognition software. Grammatical errors, random word insertions, pronoun errors, and incomplete sentences are an occasional consequence of this system due to software limitations, ambient noise, and hardware issues. Any formal questions or concerns about content, text, or information contained within the body of this dictation should be directly addressed to the provider for clarification.

## 2023-12-12 NOTE — TELEPHONE ENCOUNTER
Received an Rx fax sheet from the pharmacy for a refill on metoprolol.  Medication sent to provider for approval.

## 2023-12-13 RX ORDER — TRIAMCINOLONE ACETONIDE 40 MG/ML
40 INJECTION, SUSPENSION INTRA-ARTICULAR; INTRAMUSCULAR
Status: COMPLETED | OUTPATIENT
Start: 2023-12-12 | End: 2023-12-12

## 2023-12-13 RX ORDER — LIDOCAINE HYDROCHLORIDE 10 MG/ML
4 INJECTION, SOLUTION INFILTRATION; PERINEURAL
Status: COMPLETED | OUTPATIENT
Start: 2023-12-12 | End: 2023-12-12

## 2023-12-19 ENCOUNTER — OFFICE VISIT (OUTPATIENT)
Dept: FAMILY MEDICINE CLINIC | Facility: CLINIC | Age: 88
End: 2023-12-19
Payer: COMMERCIAL

## 2023-12-19 VITALS
HEART RATE: 75 BPM | WEIGHT: 148.2 LBS | SYSTOLIC BLOOD PRESSURE: 130 MMHG | TEMPERATURE: 97.4 F | RESPIRATION RATE: 18 BRPM | DIASTOLIC BLOOD PRESSURE: 70 MMHG | HEIGHT: 60 IN | BODY MASS INDEX: 29.09 KG/M2 | OXYGEN SATURATION: 97 %

## 2023-12-19 DIAGNOSIS — G40.909 SEIZURE DISORDER (HCC): ICD-10-CM

## 2023-12-19 DIAGNOSIS — I10 HTN (HYPERTENSION), BENIGN: Primary | ICD-10-CM

## 2023-12-19 DIAGNOSIS — K21.9 GASTROESOPHAGEAL REFLUX DISEASE WITHOUT ESOPHAGITIS: ICD-10-CM

## 2023-12-19 DIAGNOSIS — I48.0 PAROXYSMAL ATRIAL FIBRILLATION (HCC): Chronic | ICD-10-CM

## 2023-12-19 DIAGNOSIS — E78.2 MIXED HYPERLIPIDEMIA: ICD-10-CM

## 2023-12-19 DIAGNOSIS — J30.1 SEASONAL ALLERGIC RHINITIS DUE TO POLLEN: ICD-10-CM

## 2023-12-19 PROCEDURE — 99495 TRANSJ CARE MGMT MOD F2F 14D: CPT

## 2023-12-19 RX ORDER — LORATADINE 10 MG/1
10 TABLET ORAL DAILY
COMMUNITY

## 2023-12-19 RX ORDER — LEVETIRACETAM 500 MG/1
500 TABLET ORAL DAILY
COMMUNITY

## 2023-12-19 NOTE — ASSESSMENT & PLAN NOTE
Under control.  Continue current medication .  We will reevaluate at next office visit.  Has been followed by cardiologist.

## 2023-12-19 NOTE — PROGRESS NOTES
Assessment & Plan     1. HTN (hypertension), benign  Assessment & Plan:  Under control.    Continue current medication.    We will re-evaluate at next office visit.        2. Mixed hyperlipidemia  Assessment & Plan:  Under control.    Continue current medication.    We will re-evaluate at next office visit.        3. Paroxysmal atrial fibrillation (HCC)  Assessment & Plan:  Under control.  Continue current medication .  We will reevaluate at next office visit.  Has been followed by cardiologist.        4. Seizure disorder (HCC)  Assessment & Plan:  Under control.    Continue current medication.    We will re-evaluate at next office visit.        5. Seasonal allergic rhinitis due to pollen  Assessment & Plan:  Under control.    Continue current medication.    We will re-evaluate at next office visit.        6. Gastroesophageal reflux disease without esophagitis  Assessment & Plan:  Under control.    Continue current medication.    We will re-evaluate at next office visit.             Subjective     Transitional Care Management Review:   Yesenia Tillman is a 88 y.o. female here for TCM follow up.     During the TCM phone call patient stated:  TCM Call     Date and time call was made  12/11/2023  9:56 AM    Hospital care reviewed  Records reviewed    Patient was hospitialized at  [facility]    Date of Admission  12/07/23    Date of discharge  12/08/23    Diagnosis  Stroke like symptoms    Disposition  Home    Current Symptoms  --  pain on left side going down leg. has an appointment with an orthopedic specalist      TCM Call     Post hospital issues  None    Should patient be enrolled in anticoag monitoring?  No    Scheduled for follow up?  Yes    I have advised the patient to call PCP with any new or worsening symptoms  Yuridia Longo MA        Patient here for transitional care management as she was hospitalized at [facility] on December 7, 2023 and discharged on December 8, 2023 with  possible TIA.  All the records from the hospital reviewed.  Patient had a right shoulder pain and right arm weakness but that improved.  Patient currently feeling okay.  Denies any chest pain, shortness of breath, cough, abdominal pain, nausea, vomiting, fever or chills.  No lightheadedness or dizziness.  No tingling numbness or weakness.  No bowel or bladder problem.  No other new problem.  After discharge patient was seen by orthopedic and got cortisone shot in the right shoulder and pain improving      Review of Systems   Constitutional:  Positive for fatigue. Negative for chills and fever.   HENT:  Negative for congestion, ear pain, rhinorrhea, sneezing and sore throat.    Eyes:  Negative for pain, discharge, redness, itching and visual disturbance.   Respiratory:  Negative for cough, chest tightness and shortness of breath.    Cardiovascular:  Negative for chest pain, palpitations and leg swelling.   Gastrointestinal:  Negative for abdominal pain, blood in stool, diarrhea, nausea and vomiting.   Endocrine: Negative for cold intolerance and heat intolerance.   Genitourinary:  Negative for dysuria, frequency, hematuria and urgency.   Musculoskeletal:  Positive for arthralgias (right shoulder). Negative for back pain and myalgias.   Skin:  Negative for color change and rash.   Neurological:  Negative for dizziness, weakness, light-headedness, numbness and headaches.   Hematological:  Does not bruise/bleed easily.   Psychiatric/Behavioral:  Negative for agitation and behavioral problems.        Objective     /70 (BP Location: Left arm, Patient Position: Sitting, Cuff Size: Standard)   Pulse 75   Temp (!) 97.4 °F (36.3 °C) (Tympanic)   Resp 18   Ht 5' (1.524 m)   Wt 67.2 kg (148 lb 3.2 oz)   SpO2 97%   BMI 28.94 kg/m²      Physical Exam  Vitals and nursing note reviewed.   Constitutional:       General: She is not in acute distress.     Appearance: Normal appearance. She is well-developed. She is not  ill-appearing, toxic-appearing or diaphoretic.   HENT:      Head: Normocephalic and atraumatic.      Right Ear: Tympanic membrane and external ear normal. There is no impacted cerumen.      Left Ear: Tympanic membrane and external ear normal. There is no impacted cerumen.      Nose: Nose normal.      Mouth/Throat:      Mouth: Mucous membranes are moist.      Pharynx: Oropharynx is clear.   Eyes:      General: No scleral icterus.        Right eye: No discharge.         Left eye: No discharge.      Extraocular Movements: Extraocular movements intact.      Conjunctiva/sclera: Conjunctivae normal.      Pupils: Pupils are equal, round, and reactive to light.   Cardiovascular:      Rate and Rhythm: Normal rate and regular rhythm.      Pulses: Normal pulses.      Heart sounds: Normal heart sounds. No murmur heard.  Pulmonary:      Effort: Pulmonary effort is normal. No respiratory distress.      Breath sounds: Normal breath sounds. No wheezing, rhonchi or rales.   Abdominal:      General: Abdomen is flat. Bowel sounds are normal. There is no distension.      Palpations: Abdomen is soft.      Tenderness: There is no abdominal tenderness. There is no right CVA tenderness or left CVA tenderness.   Musculoskeletal:         General: No swelling or tenderness.      Cervical back: Normal range of motion and neck supple. No rigidity.      Right lower leg: No edema.      Left lower leg: No edema.      Comments: Limited range of motion right shoulder   Lymphadenopathy:      Cervical: No cervical adenopathy.   Skin:     General: Skin is warm and dry.      Capillary Refill: Capillary refill takes 2 to 3 seconds.      Coloration: Skin is not jaundiced or pale.   Neurological:      General: No focal deficit present.      Mental Status: She is alert and oriented to person, place, and time. Mental status is at baseline.      Motor: No weakness.      Gait: Gait normal.   Psychiatric:         Mood and Affect: Mood normal.         Behavior:  Behavior normal.       Medications have been reviewed by provider in current encounter    Liam Zepeda MD

## 2024-01-25 DIAGNOSIS — K21.9 GASTROESOPHAGEAL REFLUX DISEASE WITHOUT ESOPHAGITIS: ICD-10-CM

## 2024-01-25 DIAGNOSIS — I48.0 PAROXYSMAL ATRIAL FIBRILLATION (HCC): Chronic | ICD-10-CM

## 2024-01-25 RX ORDER — FAMOTIDINE 20 MG/1
20 TABLET, FILM COATED ORAL DAILY
Qty: 90 TABLET | Refills: 0 | Status: SHIPPED | OUTPATIENT
Start: 2024-01-25

## 2024-01-25 RX ORDER — CLOPIDOGREL BISULFATE 75 MG/1
75 TABLET ORAL DAILY
Qty: 90 TABLET | Refills: 0 | Status: SHIPPED | OUTPATIENT
Start: 2024-01-25

## 2024-01-25 NOTE — TELEPHONE ENCOUNTER
Received an Rx fax sheet from the pharmacy for a refill Famotidine. Medication was sent to provider for approval.

## 2024-03-05 ENCOUNTER — OFFICE VISIT (OUTPATIENT)
Dept: OBGYN CLINIC | Facility: CLINIC | Age: 89
End: 2024-03-05
Payer: COMMERCIAL

## 2024-03-05 VITALS
DIASTOLIC BLOOD PRESSURE: 84 MMHG | BODY MASS INDEX: 29.06 KG/M2 | HEIGHT: 60 IN | WEIGHT: 148 LBS | HEART RATE: 76 BPM | SYSTOLIC BLOOD PRESSURE: 172 MMHG

## 2024-03-05 DIAGNOSIS — G89.29 CHRONIC RIGHT SHOULDER PAIN: ICD-10-CM

## 2024-03-05 DIAGNOSIS — M25.511 CHRONIC RIGHT SHOULDER PAIN: ICD-10-CM

## 2024-03-05 DIAGNOSIS — M75.81 ROTATOR CUFF TENDONITIS, RIGHT: Primary | ICD-10-CM

## 2024-03-05 PROCEDURE — 20610 DRAIN/INJ JOINT/BURSA W/O US: CPT | Performed by: ORTHOPAEDIC SURGERY

## 2024-03-05 PROCEDURE — 99213 OFFICE O/P EST LOW 20 MIN: CPT | Performed by: ORTHOPAEDIC SURGERY

## 2024-03-05 RX ADMIN — BUPIVACAINE HYDROCHLORIDE 3.5 ML: 5 INJECTION, SOLUTION PERINEURAL at 13:45

## 2024-03-05 RX ADMIN — TRIAMCINOLONE ACETONIDE 40 MG: 40 INJECTION, SUSPENSION INTRA-ARTICULAR; INTRAMUSCULAR at 13:45

## 2024-03-05 NOTE — PROGRESS NOTES
Assessment/Plan:  1. Rotator cuff tendonitis, right  Ambulatory Referral to Physical Therapy    Large joint arthrocentesis: R subacromial bursa    bupivacaine (MARCAINE) 0.5 % injection 3.5 mL    triamcinolone acetonide (KENALOG-40) 40 mg/mL injection 40 mg      2. Chronic right shoulder pain  Large joint arthrocentesis: R subacromial bursa    bupivacaine (MARCAINE) 0.5 % injection 3.5 mL    triamcinolone acetonide (KENALOG-40) 40 mg/mL injection 40 mg        Yesenia is a pleasant 88-year-old female who presents for follow up evaluation of chronic right shoulder pain secondary to rotator cuff tendinitis.  She previously had an injection which lasted about 3 months.  Her last 1 was 12/12/2023.  We discussed treatment options going forward.  We discussed getting MRIs versus physical therapy.  She does not want to do physical therapy at this time.  We discussed doing home exercises.  She would like to try another injection at this point.  We discussed the risks of doing multiple injections for a long period of time.  Risk and benefits of repeat corticosteroid injections were discussed today.  Patient consented to and underwent the below procedure.  Postinjection protocol advised.    Large joint arthrocentesis: R subacromial bursa  Universal Protocol:  Consent: Verbal consent obtained.  Risks and benefits: risks, benefits and alternatives were discussed  Consent given by: patient  Timeout called at: 3/5/2024 2:06 PM.  Patient understanding: patient states understanding of the procedure being performed  Patient identity confirmed: verbally with patient  Supporting Documentation  Indications: pain and diagnostic evaluation   Procedure Details  Location: shoulder - R subacromial bursa  Needle size: 22 G  Ultrasound guidance: no  Medications administered: 3.5 mL bupivacaine 0.5 %; 40 mg triamcinolone acetonide 40 mg/mL    Patient tolerance: patient tolerated the procedure well with no immediate complications  Dressing:   Sterile dressing applied              Subjective:   Yesenia Tillman is a 88 y.o. female who presents evaluation of chronic right shoulder secondary to rotator cuff tendinitis.  At her last visit 12/12/2023 patient was administered a right subacromial corticosteroid injection with benefit.  She was referred to physical therapy but has not gone.  Patient would like to repeat corticosteroid injections today.      Review of Systems   Constitutional:  Negative for chills and fever.   HENT:  Negative for ear pain and sore throat.    Eyes:  Negative for pain and visual disturbance.   Respiratory:  Negative for cough and shortness of breath.    Cardiovascular:  Negative for chest pain and palpitations.   Gastrointestinal:  Negative for abdominal pain and vomiting.   Genitourinary:  Negative for dysuria and hematuria.   Musculoskeletal:  Negative for arthralgias and back pain.   Skin:  Negative for color change and rash.   Neurological:  Negative for seizures and syncope.   All other systems reviewed and are negative.        Past Medical History:   Diagnosis Date   • Acute CVA (cerebrovascular accident) ([facility]) 06/18/2016   • Adult hypothyroidism 09/12/2016   • Anxiety 06/10/2019   • Arthritis    • Asthma    • Colon polyp    • Depression    • Disease of thyroid gland    • Dyslipidemia 06/10/2019   • Esophageal dysmotility 06/10/2019   • Essential hypertension 09/12/2016   • GERD (gastroesophageal reflux disease)    • Hyperlipidemia    • Hypertension    • Hypothyroidism    • Obesity    • Paroxysmal atrial fibrillation ([facility]) 09/12/2016   • Seizure disorder ([facility]) 08/24/2018   • Seizures ([facility])    • 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   • 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   • Stroke ([facility]) 06/2016   • Subarachnoid hemorrhage ([facility]) 09/13/2016   • Subdural hematoma ([facility]) 09/13/2016       Past Surgical History:   Procedure Laterality Date   • APPENDECTOMY     • BACK SURGERY  01/2016   • CARPAL TUNNEL RELEASE  Right    • COLONOSCOPY     • TUBAL LIGATION         Family History   Problem Relation Age of Onset   • Kidney disease Mother    • Heart disease Mother    • Cancer Father         colon   • Heart disease Father    • Colon cancer Father    • Heart disease Sister    • Diabetes Son    • Multiple sclerosis Son    • No Known Problems Son    • Heart disease Maternal Grandmother        Social History     Occupational History   • Not on file   Tobacco Use   • Smoking status: Never   • Smokeless tobacco: Never   Vaping Use   • Vaping status: Never Used   Substance and Sexual Activity   • Alcohol use: Never     Alcohol/week: 0.0 standard drinks of alcohol     Comment: 0   • Drug use: No   • Sexual activity: Not Currently     Partners: Male     Birth control/protection: Post-menopausal         Current Outpatient Medications:   •  acetaminophen (TYLENOL) 325 mg tablet, Take 2 tablets (650 mg total) by mouth every 6 (six) hours as needed for mild pain, headaches or fever, Disp: 30 tablet, Rfl: 0  •  acetaminophen (TYLENOL) 650 mg CR tablet, Take 1 tablet (650 mg total) by mouth every 8 (eight) hours as needed for mild pain, Disp: 30 tablet, Rfl: 0  •  atorvastatin (LIPITOR) 40 mg tablet, Take 1 tablet (40 mg total) by mouth daily, Disp: 90 tablet, Rfl: 0  •  clopidogrel (PLAVIX) 75 mg tablet, Take 1 tablet (75 mg total) by mouth daily, Disp: 90 tablet, Rfl: 0  •  famotidine (PEPCID) 20 mg tablet, Take 1 tablet (20 mg total) by mouth daily, Disp: 90 tablet, Rfl: 0  •  fluticasone (FLONASE) 50 mcg/act nasal spray, 1 spray into each nostril daily, Disp: , Rfl:   •  levETIRAcetam (KEPPRA) 500 mg tablet, Take 500 mg by mouth daily, Disp: , Rfl:   •  levothyroxine 88 mcg tablet, Take 1 tablet (88 mcg total) by mouth daily, Disp: 90 tablet, Rfl: 1  •  lidocaine (LIDODERM) 5 %, Apply 1 patch topically over 12 hours daily Remove & Discard patch within 12 hours or as directed by MD Do not start before December 9, 2023., Disp: 14 patch, Rfl:  0  •  loratadine (CLARITIN) 10 mg tablet, Take 10 mg by mouth daily As needed, Disp: , Rfl:   •  losartan (COZAAR) 50 mg tablet, Take 1 tablet (50 mg total) by mouth daily, Disp: 90 tablet, Rfl: 0  •  metoprolol succinate (TOPROL-XL) 50 mg 24 hr tablet, Take 1 tablet (50 mg total) by mouth daily, Disp: 90 tablet, Rfl: 0  •  omeprazole (PriLOSEC) 20 mg delayed release capsule, Take 1 capsule (20 mg total) by mouth daily, Disp: 90 capsule, Rfl: 1    Allergies   Allergen Reactions   • Amlodipine Other (See Comments)     Unknown reaction   • Hydralazine Other (See Comments)     Unknown reaction   • Penicillins Other (See Comments)     Unknown since childhood       Objective:  Vitals:    03/05/24 1342   BP: (!) 172/84   Pulse: 76       Right Shoulder Exam     Tenderness   The patient is experiencing tenderness in the biceps tendon.    Range of Motion   External rotation:  60   Forward flexion:  90   Internal rotation 0 degrees:  L5     Muscle Strength   Abduction: 4/5   Internal rotation: 5/5   External rotation: 4/5   Supraspinatus: 4/5   Subscapularis: 5/5   Biceps: 5/5     Tests   Drop arm: negative    Other   Erythema: absent  Sensation: normal  Pulse: present    Comments:    Skin is warm and dry to touch with no signs of erythema, ecchymosis, or infection  no soft tissue swelling or effusion noted   palpable crepitus with passive motion  Demonstrates normal elbow, wrist, and finger motion  2+  distal radial pulse with brisk capillary refill to the fingers  Radial, median, ulnar and motor  and sensory distribution intact  Sensation to light touch intact distally              Physical Exam  Vitals and nursing note reviewed.   HENT:      Head: Normocephalic.   Eyes:      Extraocular Movements: Extraocular movements intact.   Cardiovascular:      Rate and Rhythm: Normal rate.      Pulses: Normal pulses.   Pulmonary:      Effort: Pulmonary effort is normal.   Musculoskeletal:         General: Normal range of motion.       Cervical back: Normal range of motion.      Comments: See ortho exam   Skin:     General: Skin is warm and dry.   Neurological:      General: No focal deficit present.      Mental Status: She is alert.   Psychiatric:         Behavior: Behavior normal.         I have personally reviewed pertinent films in PACS and my interpretation is as follows:  No new images obtained today      This document was created using speech voice recognition software.   Grammatical errors, random word insertions, pronoun errors, and incomplete sentences are an occasional consequence of this system due to software limitations, ambient noise, and hardware issues.   Any formal questions or concerns about content, text, or information contained within the body of this dictation should be directly addressed to the provider for clarification.

## 2024-03-07 ENCOUNTER — OFFICE VISIT (OUTPATIENT)
Dept: FAMILY MEDICINE CLINIC | Facility: CLINIC | Age: 89
End: 2024-03-07
Payer: COMMERCIAL

## 2024-03-07 VITALS
HEART RATE: 74 BPM | TEMPERATURE: 97.6 F | BODY MASS INDEX: 29.53 KG/M2 | OXYGEN SATURATION: 96 % | HEIGHT: 60 IN | WEIGHT: 150.4 LBS | SYSTOLIC BLOOD PRESSURE: 146 MMHG | RESPIRATION RATE: 16 BRPM | DIASTOLIC BLOOD PRESSURE: 82 MMHG

## 2024-03-07 DIAGNOSIS — G40.909 SEIZURE DISORDER (HCC): ICD-10-CM

## 2024-03-07 DIAGNOSIS — F33.9 RECURRENT DEPRESSION (HCC): ICD-10-CM

## 2024-03-07 DIAGNOSIS — E03.9 ACQUIRED HYPOTHYROIDISM: ICD-10-CM

## 2024-03-07 DIAGNOSIS — E78.2 MIXED HYPERLIPIDEMIA: ICD-10-CM

## 2024-03-07 DIAGNOSIS — I48.0 PAROXYSMAL ATRIAL FIBRILLATION (HCC): Chronic | ICD-10-CM

## 2024-03-07 DIAGNOSIS — J45.20 MILD INTERMITTENT ASTHMA WITHOUT COMPLICATION: ICD-10-CM

## 2024-03-07 DIAGNOSIS — F32.1 CURRENT MODERATE EPISODE OF MAJOR DEPRESSIVE DISORDER WITHOUT PRIOR EPISODE (HCC): ICD-10-CM

## 2024-03-07 DIAGNOSIS — I10 HTN (HYPERTENSION), BENIGN: Primary | ICD-10-CM

## 2024-03-07 PROCEDURE — 99214 OFFICE O/P EST MOD 30 MIN: CPT

## 2024-03-07 RX ORDER — BUPIVACAINE HYDROCHLORIDE 5 MG/ML
3.5 INJECTION, SOLUTION PERINEURAL
Status: COMPLETED | OUTPATIENT
Start: 2024-03-05 | End: 2024-03-05

## 2024-03-07 RX ORDER — VENLAFAXINE HYDROCHLORIDE 37.5 MG/1
37.5 CAPSULE, EXTENDED RELEASE ORAL
Qty: 30 CAPSULE | Refills: 5 | Status: SHIPPED | OUTPATIENT
Start: 2024-03-07

## 2024-03-07 RX ORDER — TRIAMCINOLONE ACETONIDE 40 MG/ML
40 INJECTION, SUSPENSION INTRA-ARTICULAR; INTRAMUSCULAR
Status: COMPLETED | OUTPATIENT
Start: 2024-03-05 | End: 2024-03-05

## 2024-03-07 NOTE — ASSESSMENT & PLAN NOTE
Patient stopped taking antidepressant.  Patient now agreed to take Effexor.  We will also refer her to psych services.  We will continue to monitor

## 2024-03-07 NOTE — PROGRESS NOTES
Assessment/Plan:         Problem List Items Addressed This Visit     HTN (hypertension), benign - Primary     Under control.  Continue losartan and metoprolol.  We will continue to monitor         Acquired hypothyroidism     Under control.  TSH in therapeutic range.  Continue same dose of levothyroxine.  We will continue to monitor         Paroxysmal atrial fibrillation (HCC) (Chronic)     Under control.  Continue current medication including anticoagulation medication to prevent stroke.  We will reevaluate at next office visit.  Has been followed by cardiologist.           Seizure disorder (HCC)     Under control.    Continue current medication.    We will re-evaluate at next office visit.           Mild intermittent asthma without complication     Under control.    Continue current medication.    We will re-evaluate at next office visit.           Mixed hyperlipidemia     Under control.  Continue atorvastatin.  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  Patient now agreed to take Effexor.  We will also refer her to psych services.  We will continue to monitor         Relevant Medications    venlafaxine (EFFEXOR-XR) 37.5 mg 24 hr capsule    Other Relevant Orders    Ambulatory referral to Psych Services    Recurrent depression (HCC)     Patient stopped taking antidepressant.  Patient now agreed to take Effexor.  We will also refer her to psych services.  We will continue to monitor         Relevant Medications    venlafaxine (EFFEXOR-XR) 37.5 mg 24 hr capsule    Other Relevant Orders    Ambulatory referral to Psych Services         Subjective:      Patient ID: Yesenia Tillman is a 88 y.o. female.    Patient here for review of chronic medical problems and  the labs and imaging if it is applicable.  Currently has no specific complaints other than mentioned in the review of systems  Denies chest pain,  SOB, cough, abdominal pain, nausea, vomiting, fever, chills, lightheadedness, dizziness,headache, tingling or numbness.No bowel or bladder problem.  Patient does not have any motivation sad all the time no crying no suicidal ideation        The following portions of the patient's history were reviewed and updated as appropriate:   Past Medical History:  She has a past medical history of Acute CVA (cerebrovascular accident) ([facility]) (06/18/2016), Adult hypothyroidism (09/12/2016), Anxiety (06/10/2019), Arthritis, Asthma, Colon polyp, Depression, Disease of thyroid gland, Dyslipidemia (06/10/2019), Esophageal dysmotility (06/10/2019), Essential hypertension (09/12/2016), GERD (gastroesophageal reflux disease), Hyperlipidemia, Hypertension, Hypothyroidism, Obesity, Paroxysmal atrial fibrillation ([facility]) (09/12/2016), Seizure disorder ([facility]) (08/24/2018), Seizures ([facility]), 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), 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), Stroke ([facility]) (06/2016), Subarachnoid hemorrhage ([facility]) (09/13/2016), and Subdural hematoma ([facility]) (09/13/2016).,  _______________________________________________________________________  Medical Problems:  does not have any pertinent problems on file.,  _______________________________________________________________________  Past Surgical History:   has a past surgical history that includes Back surgery (01/2016); Carpal tunnel release (Right); Appendectomy; Colonoscopy; and Tubal ligation.,  _______________________________________________________________________  Family History:  family history includes Cancer in her father; Colon cancer in her father; Diabetes in her son; Heart disease in her father, maternal grandmother, mother, and sister; Kidney disease in her mother; Multiple sclerosis in her son; No Known Problems in her son.,  _______________________________________________________________________  Social History:   reports that she has never smoked. She  has never used smokeless tobacco. She reports that she does not drink alcohol and does not use drugs.,  _______________________________________________________________________  Allergies:  is allergic to amlodipine, hydralazine, and penicillins..  _______________________________________________________________________  Current Outpatient Medications   Medication Sig Dispense Refill   • acetaminophen (TYLENOL) 325 mg tablet Take 2 tablets (650 mg total) by mouth every 6 (six) hours as needed for mild pain, headaches or fever 30 tablet 0   • acetaminophen (TYLENOL) 650 mg CR tablet Take 1 tablet (650 mg total) by mouth every 8 (eight) hours as needed for mild pain 30 tablet 0   • atorvastatin (LIPITOR) 40 mg tablet Take 1 tablet (40 mg total) by mouth daily 90 tablet 0   • clopidogrel (PLAVIX) 75 mg tablet Take 1 tablet (75 mg total) by mouth daily 90 tablet 0   • famotidine (PEPCID) 20 mg tablet Take 1 tablet (20 mg total) by mouth daily 90 tablet 0   • fluticasone (FLONASE) 50 mcg/act nasal spray 1 spray into each nostril daily     • levETIRAcetam (KEPPRA) 500 mg tablet Take 500 mg by mouth daily     • levothyroxine 88 mcg tablet Take 1 tablet (88 mcg total) by mouth daily 90 tablet 1   • lidocaine (LIDODERM) 5 % Apply 1 patch topically over 12 hours daily Remove & Discard patch within 12 hours or as directed by MD Do not start before December 9, 2023. 14 patch 0   • loratadine (CLARITIN) 10 mg tablet Take 10 mg by mouth daily As needed     • losartan (COZAAR) 50 mg tablet Take 1 tablet (50 mg total) by mouth daily 90 tablet 0   • metoprolol succinate (TOPROL-XL) 50 mg 24 hr tablet Take 1 tablet (50 mg total) by mouth daily 90 tablet 0   • omeprazole (PriLOSEC) 20 mg delayed release capsule Take 1 capsule (20 mg total) by mouth daily 90 capsule 1   • venlafaxine (EFFEXOR-XR) 37.5 mg 24 hr capsule Take 1 capsule (37.5 mg total) by mouth daily with breakfast 30 capsule 5     No current facility-administered  medications for this visit.     _______________________________________________________________________  Review of Systems   Constitutional:  Positive for fatigue. Negative for chills and fever.   HENT:  Negative for congestion, ear pain, rhinorrhea, sneezing and sore throat.    Eyes:  Negative for pain, discharge, redness, itching and visual disturbance.   Respiratory:  Negative for cough, chest tightness and shortness of breath.    Cardiovascular:  Negative for chest pain, palpitations and leg swelling.   Gastrointestinal:  Negative for abdominal pain, blood in stool, diarrhea, nausea and vomiting.   Endocrine: Negative for cold intolerance and heat intolerance.   Genitourinary:  Negative for dysuria, frequency, hematuria and urgency.   Musculoskeletal:  Positive for arthralgias (right shoulder). Negative for back pain and myalgias.   Skin:  Negative for color change and rash.   Neurological:  Negative for dizziness, weakness, light-headedness, numbness and headaches.   Hematological:  Does not bruise/bleed easily.   Psychiatric/Behavioral:  Positive for dysphoric mood. Negative for agitation and behavioral problems.          Objective:  Vitals:    03/07/24 1443   BP: 146/82   BP Location: Left arm   Patient Position: Sitting   Cuff Size: Standard   Pulse: 74   Resp: 16   Temp: 97.6 °F (36.4 °C)   TempSrc: Tympanic   SpO2: 96%   Weight: 68.2 kg (150 lb 6.4 oz)   Height: 5' (1.524 m)     Body mass index is 29.37 kg/m².     Physical Exam  Vitals and nursing note reviewed.   Constitutional:       General: She is not in acute distress.     Appearance: Normal appearance. She is not ill-appearing, toxic-appearing or diaphoretic.   HENT:      Head: Normocephalic and atraumatic.      Nose: Nose normal. No congestion.      Mouth/Throat:      Mouth: Mucous membranes are moist.   Eyes:      General: No scleral icterus.        Right eye: No discharge.         Left eye: No discharge.      Extraocular Movements: Extraocular  movements intact.      Conjunctiva/sclera: Conjunctivae normal.      Pupils: Pupils are equal, round, and reactive to light.   Cardiovascular:      Rate and Rhythm: Normal rate and regular rhythm.      Pulses: Normal pulses.      Heart sounds: Normal heart sounds. No murmur heard.     No gallop.   Pulmonary:      Effort: Pulmonary effort is normal. No respiratory distress.      Breath sounds: Normal breath sounds. No wheezing, rhonchi or rales.   Abdominal:      General: Abdomen is flat. Bowel sounds are normal. There is no distension.      Palpations: Abdomen is soft.      Tenderness: There is no abdominal tenderness. There is no guarding.   Musculoskeletal:         General: No swelling or tenderness. Normal range of motion.      Cervical back: Normal range of motion and neck supple. No rigidity.      Right lower leg: No edema.      Left lower leg: No edema.   Lymphadenopathy:      Cervical: No cervical adenopathy.   Skin:     General: Skin is warm.      Capillary Refill: Capillary refill takes 2 to 3 seconds.      Coloration: Skin is not jaundiced.      Findings: No bruising or rash.   Neurological:      General: No focal deficit present.      Mental Status: She is alert and oriented to person, place, and time. Mental status is at baseline.      Gait: Gait normal.   Psychiatric:         Mood and Affect: Mood normal.         Behavior: Behavior normal.

## 2024-03-08 ENCOUNTER — TELEPHONE (OUTPATIENT)
Dept: PSYCHIATRY | Facility: CLINIC | Age: 89
End: 2024-03-08

## 2024-03-08 NOTE — TELEPHONE ENCOUNTER
"Behavioral Health Outpatient Intake Questions    Referred By   : Dr Liam Zepeda    Please advise interviewee that they need to answer all questions truthfully to allow for best care, and any misrepresentations of information may affect their ability to be seen at this clinic   => Was this discussed? Yes     If Minor Child (under age 18)    Who is/are the legal guardian(s) of the child?     Is there a custody agreement?      If \"YES\"- Custody orders must be obtained prior to scheduling the first appointment  In addition, Consent to Treatment must be signed by all legal guardians prior to scheduling the first appointment    If \"NO\"- Consent to Treatment must be signed by all legal guardians prior to scheduling the first appointment    Behavioral Health Outpatient Intake History -     Presenting Problem (in patient's own words): pt diagnosed with depression; lost her  almost a year ago; pt is on her own now and wants to figure out what to do with the rest of the time she has on earth; pt tries to control everything herself; pt does journal writing of her thoughts    Are there any communication barriers for this patient?     No                                               If yes, please describe barriers:   If there is a unique situation, please refer to Miguel Singh/Charisse Harper for final determination.    Are you taking any psychiatric medications? Yes     If \"YES\" -What are they Effexor XR     If \"YES\" -Who prescribes? Liam Zepeda    Has the Patient previously received outpatient Talk Therapy or Medication Management from [facility]  No        If \"YES\"- When, Where and with Whom?         If \"NO\" -Has Patient received these services elsewhere?       If \"YES\" -When, Where, and with Whom? Pt has seen a therapist a long time ago    Has the Patient abused alcohol or other substances in the last 6 months ? No  No concerns of substance abuse are reported.     If \"YES\" -What substance, How much, How often?     If " "illegal substance: Refer to [facility] (for ROBERTO) or [facility].   If Alcohol in excess of 10 drinks per week:  Refer to [facility] (for ROBERTO) or [facility]    Legal History-     Is this treatment court ordered? No   If \"yes \"send to :  Talk Therapy : Send to Miguel Harper for final determination   Med Management: Send to Dr Villegas for final determination     Has the Patient been convicted of a felony?  No   If \"Yes\" send to -When, What?  Talk Therapy : Send to Miguel Haprer for final determination   Med Management: Send to Dr Villegas for final determination     ACCEPTED as a patient Yes  If \"Yes\" Appointment Date: with Galina Fajardo  Wednesdays @ 12:00: March 20 & April 3    Referred Elsewhere? No  If “Yes” - (Where? Ex: [facility], [facility], [facility], [facility], etc.)       Name of Insurance Co: [facility]  Insurance ID# [id number]  Insurance Phone #   If ins is primary or secondary? primary  If patient is a minor, parents information such as Name, D.O.B of guarantor.    RTE for appts was ran as of 3/7/2024.  "

## 2024-03-11 DIAGNOSIS — I10 ESSENTIAL HYPERTENSION: ICD-10-CM

## 2024-03-11 DIAGNOSIS — E78.2 MIXED HYPERLIPIDEMIA: ICD-10-CM

## 2024-03-11 RX ORDER — METOPROLOL SUCCINATE 50 MG/1
50 TABLET, EXTENDED RELEASE ORAL DAILY
Qty: 90 TABLET | Refills: 0 | Status: SHIPPED | OUTPATIENT
Start: 2024-03-11

## 2024-03-11 RX ORDER — ATORVASTATIN CALCIUM 40 MG/1
40 TABLET, FILM COATED ORAL DAILY
Qty: 90 TABLET | Refills: 0 | Status: SHIPPED | OUTPATIENT
Start: 2024-03-11

## 2024-03-11 NOTE — TELEPHONE ENCOUNTER
Received an Rx fax sheet from [facility] , requesting refill on atorvastatin and metoprolol. Medication sent to provider.

## 2024-03-12 DIAGNOSIS — G40.909 SEIZURE DISORDER (HCC): Primary | ICD-10-CM

## 2024-03-12 NOTE — TELEPHONE ENCOUNTER
Received an Rx fax sheet from the pharmacy requesting a refill on levetiracetam. Medication sent to provider for approval.

## 2024-03-13 RX ORDER — LEVETIRACETAM 500 MG/1
500 TABLET ORAL DAILY
Qty: 90 TABLET | Refills: 0 | Status: SHIPPED | OUTPATIENT
Start: 2024-03-13

## 2024-03-20 ENCOUNTER — SOCIAL WORK (OUTPATIENT)
Dept: BEHAVIORAL/MENTAL HEALTH CLINIC | Facility: CLINIC | Age: 89
End: 2024-03-20
Payer: COMMERCIAL

## 2024-03-20 DIAGNOSIS — F32.1 CURRENT MODERATE EPISODE OF MAJOR DEPRESSIVE DISORDER WITHOUT PRIOR EPISODE (HCC): Primary | ICD-10-CM

## 2024-03-20 PROCEDURE — 90791 PSYCH DIAGNOSTIC EVALUATION: CPT | Performed by: SOCIAL WORKER
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
## 2024-03-21 DIAGNOSIS — Z86.73 HISTORY OF MULTIPLE STROKES: ICD-10-CM

## 2024-03-21 DIAGNOSIS — G40.909 SEIZURE DISORDER (HCC): ICD-10-CM

## 2024-03-21 DIAGNOSIS — R29.90 STROKE-LIKE SYMPTOMS: Primary | ICD-10-CM

## 2024-03-21 NOTE — PROGRESS NOTES
As per doctor ok to place Neurological OT for patient cognitive difficulties and history of strokes. Referral was placed.

## 2024-03-29 ENCOUNTER — EVALUATION (OUTPATIENT)
Facility: CLINIC | Age: 89
End: 2024-03-29
Payer: COMMERCIAL

## 2024-03-29 DIAGNOSIS — G40.909 SEIZURE DISORDER (HCC): ICD-10-CM

## 2024-03-29 DIAGNOSIS — I69.319 COGNITIVE DEFICIT AS LATE EFFECT OF CEREBROVASCULAR ACCIDENT (CVA): ICD-10-CM

## 2024-03-29 DIAGNOSIS — Z86.73 HISTORY OF MULTIPLE STROKES: Primary | ICD-10-CM

## 2024-03-29 PROCEDURE — 97166 OT EVAL MOD COMPLEX 45 MIN: CPT

## 2024-03-29 NOTE — PROGRESS NOTES
"OCCUPATIONAL THERAPY INITIAL EVALUATION    Today's Date: 3/29/2024  Patient Name: Yesenia Tillman  : 1935  MRN: [id number]  Referring Provider: Liam Zepeda MD  Dx: History of multiple strokes [Z86.73]    SKILLED ANALYSIS:  Pt is a 88 year old female presenting to OP OT s/p history of multiple CVA's and memory deficits.  Pt currently requires assistance with money management and reports deficits with her memory.  Pt is demonstrating deficits in the following domains: EF skills, sustained attention, divided attention, language skills, working memory, insight to deficits, and abstraction.  Deficits are noted based on the following assessments: MoCA v8.1, TM parts A&B, and clinical observation.  Recommend OP OT 2x/wk for 8-12 weeks with focus on aforementioned deficits to maximize functional recovery s/p CVA and improve QOL.  Findings and recommendations discussed with pt, and they are in agreement.    Educated pt on charges of insurance, acknowledge understanding, and are in agreement.    PLEASE COMPLETE CMT NEXT SESSION    PLAN OF CARE START: 3/29/24  PLAN OF CARE END: 24  FREQUENCY: 2x/week  PRECAUTIONS standard    Subjective    Mechanism of Injury as per medical record of most recent hospitalization (2023)  \"Yesenia Tillman is a 88 y.o. female patient with past medical history of stroke, hypothyroidism, anxiety, depression, dyslipidemia, GERD, esophageal dysmotility, PAF, seizure disorder who originally presented to the hospital on 2023 due to right shoulder pain.  Upon further questioning patient also reported she might be slightly weak in the right arm and had some speech issues and might be feeling off balance and fatigued and tired.  CAT scan of the brain was unremarkable in the ED and shoulder x-ray was negative.  Patient was treated symptomatically for shoulder pain and patient also had MRI of the brain which was negative.  Patient continued remained stable and was doing well with " "physical therapy.  Patient was also seen by orthopedics who agreed with conservative treatment with an outpatient follow-up for MRI/steroid injection as needed\"    Occupational Profile    \"Pt lives by herself. She lives in a first floor apartment. She has 3 sons, one has MS, one has , the youngest helps as needed. Pt reports she has deficits with her memory about a year ago. Pt reports her   a year ago, however she barely remembers it. Pt son is managing her money. Pt can make her own meals. Pt is managing her own medication with a pill box. Pt is currently driving. Pt enjoys artwork, and uses many of her coloring books.\"    PATIENT GOAL: \"To slow progression of memory loss\"    HISTORY OF PRESENT ILLNESS:   Pt was recently referred to outpatient OT from her PCP due to changes in her memory. She has a history of multiple CVA's, and reports her memory has been much worse since her  passed away ~1 year ago. She recently had a TIA in 2023, however pt had limited recall of this event.     PMH:   Past Medical History:   Diagnosis Date    Acute CVA (cerebrovascular accident) (HCC) 2016    Adult hypothyroidism 2016    Anxiety 06/10/2019    Arthritis     Asthma     Colon polyp     Depression     Disease of thyroid gland     Dyslipidemia 06/10/2019    Esophageal dysmotility 06/10/2019    Essential hypertension 2016    GERD (gastroesophageal reflux disease)     Hyperlipidemia     Hypertension     Hypothyroidism     Obesity     Paroxysmal atrial fibrillation (HCC) 2016    Seizure disorder ([facility]) 2018    Seizures ([facility])     Stenosis of left carotid artery 2019    Stenosis of left middle cerebral artery 2019    Stroke ([facility]) 2016    Subarachnoid hemorrhage ([facility]) 2016    Subdural hematoma ([facility]) 2016       Past Surgical Hx:   Past Surgical History:   Procedure Laterality Date    APPENDECTOMY      BACK SURGERY  2016    CARPAL TUNNEL RELEASE " Right     COLONOSCOPY      TUBAL LIGATION          Pain: None    Objective    Functional Cognition:  Highest level of education: 1 Semester of college    [place] Cognitive Assessment Version 8.1 (MoCA V8.1)  Visuospatial/executive functioning:  3/5  Naming:  3/3  Memory: 1st trial:  , 2nd trial:    Attention/concentration: 2/2  List of letters: 1  Serial Seven Subtraction:  3/3 w/ 0 errors  Language/sentence repetition:  0/2  Language Fluency:  0/1  Abstract/Correlational Thinkin2  Delayed Recall:    Orientation:     Memory Index Score: 14/15  MoCA V1 8.1 Raw Score:  22/30, MIS:  14/15, indicative of mild neurocognitive impairments.    MoCA Scoring        Normal: 26+         Mild Cognitive Impairment: 18-25          Moderate Cognitive Impairment: 10-17         Severe Cognitive Impairment: <10    Trail making Part A and Part B:   Part A: 28.36 independently   Part B: 1 minute 55 seconds with 3 mistakes and 1 VCs for recall of instructions, problem solving  Indicating deficits: Part A > 63 seconds and Part B > 140 seconds      GOALS:   Short Term Goals:  Pt will demonstrate improved functional cognition as evidenced by improving score on the MoCA to 24/30 to improve performance during ADLs and IADLs  Pt will demonstrate improved divided attention, completing TM part B WNL and no more than 1 vc.   Pt will demo G recall of 85% of Verbal information utilizing memory strategy of choice for improved STM/delayed memory     Long Term Goals: 8-12 weeks  Pt will demonstrate improved functional cognition as evidenced by improving score on the MoCA to 26/30 to improve performance during ADLs and IADLs  Pt will demonstrate improved divided attention, completing TM part B WNL and no cues.  Pt will demo G carryover of use of internal/external memory strategy aides for improved recall of daily events, improved executive functioning with 90% accuracy   Pt will demo increased insight into deficits for overall  increased safety and self awareness with   ADL/IADL tasks and to encourage use of compensatory strategies    OTHER PLANNED THERAPY INTERVENTIONS:   Supine, seated, and in stance neuro re-ed  Tricep AG  NMES/FES  FMC/prehension  Timed Trials  Manual tx  Hand to target  Sensory re-ed  Seated functional reach: crossing midline  Supine place and hold  WBearing strategies   Closed chain activities  Open chain activities  Internal and external memory aides  Multimatrix for saccades/ visual clutter/attention  Hypersensitivity strategies education  Multi-modal environment  Sustained/alternating/divided attention  Tracking tube  Oculomotor control:  saccades, con/divergence  Conv./div. Dynamic tasks  Work stations with timed transitions  Temporal Awareness  Memory and mental manipulation  Auditory processing with immediate recall  Memory retention with immediate and delayed recall  Edu on cog/vision apps      Objective Measurement Tracker:    IE (3/29/24) 1st Re-eval:  2nd Re-eval: 3rd Re-eval:    MoCA 22/30       TM Test A 28.36 independently        TM Test B 1 minute 55 seconds with 3 mistakes and 1 VCs for recall of instructions       CMT Complete next session

## 2024-04-01 ENCOUNTER — APPOINTMENT (OUTPATIENT)
Facility: CLINIC | Age: 89
End: 2024-04-01
Payer: COMMERCIAL

## 2024-04-02 ENCOUNTER — APPOINTMENT (OUTPATIENT)
Facility: CLINIC | Age: 89
End: 2024-04-02
Payer: COMMERCIAL

## 2024-04-04 ENCOUNTER — SOCIAL WORK (OUTPATIENT)
Dept: BEHAVIORAL/MENTAL HEALTH CLINIC | Facility: CLINIC | Age: 89
End: 2024-04-04

## 2024-04-04 ENCOUNTER — TELEPHONE (OUTPATIENT)
Dept: PSYCHIATRY | Facility: CLINIC | Age: 89
End: 2024-04-04

## 2024-04-04 DIAGNOSIS — Z91.199 NO-SHOW FOR APPOINTMENT: Primary | ICD-10-CM

## 2024-04-04 NOTE — PSYCH
No Call. No Show. No Charge    Yesenia Tillman no showed 04/04/24 appointment , staff called and attempted to leave a message to reschedule appointment but was unable to.  Spoke with front staff who spoke with Yesenia yesterday, reporting that Yesenia called confused about an appointment and was told that it was cancelled via automated system which Yesenia was also confused about. This writer noted that there is a communication consent in Yesenia's chart to speak with her son, Chucky, who was called and spoken to.  Chucky shared that his mother was looking forward to the appointment, but was confused about the date and time.  He also noted that his brother who has MS is in the hospital on a ventilator, which is exacerbating his mother's confusion.  He noted that she has not seen a neurologist since having baseline testing several years ago and asked what he should do since he is increasingly worried about his mother's cognitive function.  He was encouraged to attend her next appt with her primary care physician and discuss next steps with him. Also gave Chucky Yesenia's next appt date and time with this provider.  Chucky also shared that he recently got his mother a cell phone, she is having a hard time figuring out how to use it, and gave this writer the number.  This number was tried, but went to voicemail, which was not set up so a message could not be left.    Treatment Plan not completed within required time limits due to: Yesenia Tillman no show appointment on 04/04/24

## 2024-04-04 NOTE — TELEPHONE ENCOUNTER
Received call from patient that she was sitting in the wrong office for a period of time.  Patient wasn't rescheduled but writer confirmed her next appointment with patient on 4/17/2024 at 12 pm.  Patient stated she was writing appointment down.  Patient apologizes.  We verified the location of office and what it looked like and patient states she remembered it and where located.

## 2024-04-09 ENCOUNTER — OFFICE VISIT (OUTPATIENT)
Facility: CLINIC | Age: 89
End: 2024-04-09
Payer: COMMERCIAL

## 2024-04-09 DIAGNOSIS — G40.909 SEIZURE DISORDER (HCC): ICD-10-CM

## 2024-04-09 DIAGNOSIS — Z86.73 HISTORY OF MULTIPLE STROKES: Primary | ICD-10-CM

## 2024-04-09 PROCEDURE — 97530 THERAPEUTIC ACTIVITIES: CPT

## 2024-04-09 NOTE — PROGRESS NOTES
Daily Note     Today's date: 2024  Patient name: Yesenia Tillman  : 1935  MRN: [id number]  Referring provider: Liam Zepeda MD  Dx:   Encounter Diagnoses   Name Primary?    History of multiple strokes Yes    Seizure disorder (HCC)        Start Time: 1415  Stop Time: 1500  Total time in clinic (min): 45 minutes    PLAN OF CARE START: 3/29/24  PLAN OF CARE END: 24  FREQUENCY: 2x/week  PRECAUTIONS standard    Subjective: Pt reports she is not doing the best as her son  recently.    Objective: See treatment below.    TA:     Contextual Memory Test:  Immediate: 10/20, 0 confabulations  Delayed: , 1 confabulations    Performed coding exercises with first letter/last letter worksheet to address sustained attention, working memory    Performed Sheng's Closet exercise to address organization, planning, EF skills. Pt did well with concrete clues, however had more difficulty with deduction and EF skills during this.    Performed scattergories exercise with 2 letters, completed with min cues from the therapist for word finding. Focus on language skills, attention.    Assessment: Tolerated treatment well. Difficulty with EF portions of exercises today. Pt demonstrating in session impaired EF skills, organization, planning, recall, working memory. Pt would benefit from continued OT services to address these areas.      Plan: Continued skilled OT per POC.    Goals added 24:   Pt will improved immediate delayed recall as evidenced on the CMT, increasing to 12 immediate, 6 delayed

## 2024-04-12 ENCOUNTER — TELEPHONE (OUTPATIENT)
Facility: CLINIC | Age: 89
End: 2024-04-12

## 2024-04-12 NOTE — TELEPHONE ENCOUNTER
"pt very confused and \"out of sorts\" due to attending her son's  yesterday  Asked to cx today to katerina.      Pt states she may not remember her appointment for Tuesday, offered to send reminder text but pt unsure if she has a phone.     Consulted primary OT and reported that confusion is not abnormal for pt.     Will call back later to check in with pt  "

## 2024-04-16 ENCOUNTER — APPOINTMENT (OUTPATIENT)
Facility: CLINIC | Age: 89
End: 2024-04-16
Payer: COMMERCIAL

## 2024-04-16 DIAGNOSIS — J06.9 UPPER RESPIRATORY TRACT INFECTION, UNSPECIFIED TYPE: Primary | ICD-10-CM

## 2024-04-16 RX ORDER — AZITHROMYCIN 250 MG/1
TABLET, FILM COATED ORAL DAILY
Qty: 6 TABLET | Refills: 0 | Status: SHIPPED | OUTPATIENT
Start: 2024-04-16 | End: 2024-04-20

## 2024-04-16 RX ORDER — DEXTROMETHORPHAN HYDROBROMIDE AND PROMETHAZINE HYDROCHLORIDE 15; 6.25 MG/5ML; MG/5ML
5 SYRUP ORAL 4 TIMES DAILY PRN
Qty: 120 ML | Refills: 0 | Status: SHIPPED | OUTPATIENT
Start: 2024-04-16

## 2024-04-16 NOTE — PROGRESS NOTES
Discussed with patient with her symptoms patient should go to emergency room or urgent care but patient refused we will going to prescribe this medication but again addressed if symptoms not improving or getting worse get to the emergency room

## 2024-04-16 NOTE — ASSESSMENT & PLAN NOTE
Discussed with patient about importance of going to the emergency room or urgent care to rule out pneumonia or any other cause of her symptoms.  But patient declined.  Patient understood well about the consequences.  But to treat her will prescribe Z-Angel and cough medicine and see if that helps her and again addressed that if she does not improve she should go to the emergency room

## 2024-04-17 ENCOUNTER — TELEPHONE (OUTPATIENT)
Dept: PSYCHIATRY | Facility: CLINIC | Age: 89
End: 2024-04-17

## 2024-04-17 NOTE — TELEPHONE ENCOUNTER
Patient is calling regarding cancelling an appointment.    Date/Time: 4/17/2024 @ 12    Reason: Patient is very sick. She can't do virtual.  She will try to call back to reschedule.  Patient seemed a little confused and really sick out of breath.    Patient was rescheduled: YES [] NO [x]  If yes, when was Patient reschedule for:     Patient requesting call back to reschedule: YES [] NO [x]

## 2024-04-23 ENCOUNTER — APPOINTMENT (OUTPATIENT)
Facility: CLINIC | Age: 89
End: 2024-04-23
Payer: COMMERCIAL

## 2024-04-26 ENCOUNTER — APPOINTMENT (OUTPATIENT)
Facility: CLINIC | Age: 89
End: 2024-04-26
Payer: COMMERCIAL

## 2024-04-30 ENCOUNTER — OFFICE VISIT (OUTPATIENT)
Facility: CLINIC | Age: 89
End: 2024-04-30
Payer: COMMERCIAL

## 2024-04-30 DIAGNOSIS — G40.909 SEIZURE DISORDER (HCC): ICD-10-CM

## 2024-04-30 DIAGNOSIS — Z86.73 HISTORY OF MULTIPLE STROKES: Primary | ICD-10-CM

## 2024-04-30 DIAGNOSIS — K21.9 GASTROESOPHAGEAL REFLUX DISEASE WITHOUT ESOPHAGITIS: ICD-10-CM

## 2024-04-30 DIAGNOSIS — E03.9 ACQUIRED HYPOTHYROIDISM: ICD-10-CM

## 2024-04-30 PROCEDURE — 97530 THERAPEUTIC ACTIVITIES: CPT

## 2024-04-30 NOTE — PROGRESS NOTES
"Occupational Therapy Progress Note    Today's date: 2024  Patient name: Yesenia Tillman  : 1935  MRN: [id number]  Referring provider: Liam Zepeda MD  Dx:   Encounter Diagnoses   Name Primary?    History of multiple strokes Yes    Seizure disorder (HCC)          Start Time: 1333  Stop Time: 1415  Total time in clinic (min): 42 minutes    PLAN OF CARE START: 3/29/24  PLAN OF CARE END: 24  FREQUENCY: 2x/week  PRECAUTIONS standard; Pt confused with clinic lay out therefore would benefit from being walking in from [facility] and back to [facility].    Subjective: Pt reports she has been having a lot of difficulty with thinking and memory due to the recent passings of there  and son in a 2 month span. \"I can't remember, I'm forgetting everything.\" \"I don't even remember what I don't remember.\"  Pt informed that a re-evaluation will be done next visit to give her some time to prepare due to having been through a lot the last few weeks and just restarting therapy.     Pt asked therapist numerous times why she was here. Pt reported she was extra sad and more confused than she has been today compared to the last few days.      Objective: See treatment below.    TA all seated at BITS:  -Pt completed BITS Complex Array Sequence task (letters only). Pt completed 26 hits in 1 minutes and 8 seconds with 2.6 second reaction time and 83.87% accuracy. Focused on attention and recall.   -Pt completed BITS Complex Array Sequence task (letters only). Pt asked to name people's names that begin with prompted letter. Pt completed 26 hits in 3 minutes and 5 seconds with 7.12 second reaction time and 89.66% accuracy. Focused on attention and recall. Pt required min assist for recall.   -Pt completed BITS memory task with 3 word sequences. Pt completed 22 successful trials in 5 minutes with 95.65% accuracy. Focused on attention, working memory, and recall.   -Pt completed BITS memory task with 4 word sequences. Pt " completed 20 successful trials in 5 minutes with 86.96% accuracy. Focused on attention, working memory, and recall.       Assessment: Tolerated treatment fair. Difficulty with attention and recall today due to mourning the loss of her  and son. Pt demonstrating in session impaired EF skills, organization, planning, recall, working memory. Pt would benefit from continued OT services to address these areas. Pt benefited from encouragement and reinforcement of instructions.       Plan: Continued skilled OT per POC.    Goals added 4/9/24:   Pt will improved immediate delayed recall as evidenced on the CMT, increasing to 12 immediate, 6 delayed

## 2024-04-30 NOTE — TELEPHONE ENCOUNTER
Received an Rx fax sheet from [facility] requesting a refill on Omeprazole. Medication was sent to refill line.

## 2024-05-01 RX ORDER — OMEPRAZOLE 20 MG/1
20 CAPSULE, DELAYED RELEASE ORAL DAILY
Qty: 90 CAPSULE | Refills: 1 | Status: SHIPPED | OUTPATIENT
Start: 2024-05-01

## 2024-05-01 RX ORDER — LEVOTHYROXINE SODIUM 88 UG/1
88 TABLET ORAL DAILY
Qty: 90 TABLET | Refills: 1 | Status: SHIPPED | OUTPATIENT
Start: 2024-05-01

## 2024-05-03 ENCOUNTER — EVALUATION (OUTPATIENT)
Facility: CLINIC | Age: 89
End: 2024-05-03
Payer: COMMERCIAL

## 2024-05-03 DIAGNOSIS — I48.0 PAROXYSMAL ATRIAL FIBRILLATION (HCC): Chronic | ICD-10-CM

## 2024-05-03 DIAGNOSIS — G40.909 SEIZURE DISORDER (HCC): ICD-10-CM

## 2024-05-03 DIAGNOSIS — Z86.73 HISTORY OF MULTIPLE STROKES: Primary | ICD-10-CM

## 2024-05-03 PROCEDURE — 97530 THERAPEUTIC ACTIVITIES: CPT

## 2024-05-03 NOTE — TELEPHONE ENCOUNTER
Received an Rx fax sheet from [facility] pharmacy requesting a refill on Clopidogrel. Medication sent to provider for approval.

## 2024-05-03 NOTE — PROGRESS NOTES
"OCCUPATIONAL THERAPY RE- EVALUATION    Today's Date: 5/3/2024  Patient Name: Yseenia Tillman  : 1935  MRN: [id number]  Referring Provider: Liam Zepeda MD  Dx: History of multiple strokes [Z86.73]    SKILLED ANALYSIS:  Pt presents to re-evaluation on 5/3 status post multiple CVA. Pt has not been coming to therapy due to having her son pass away recently. Pt reports she is feeling more depressed since the passing away of her family member. Pt denies thoughts of harming herself. Pt reports impairments in memory including STM and some LTM. Pt completed following assessments: MOCA, CMT, TM A and B today. Post assessments, pt demonstrating impairments in memory, visual memory. Pt may benefit from doing RBANs assessment. Discussed factors that affect cognition.  Pt achieved 1 STGs. Pt would benefit from continued OT services focusing on impairments for 8-12 more weeks. Pt educated on progress/therapy process and pt in understanding and agreement of recommendations. Recommending to continue HEP and discussed older adult meal program and discussed cognitive group     Notified MD regarding pt feeling more depressed since her son passed.   Educated pt on charges of insurance, acknowledge understanding, and are in agreement.    PLEASE COMPLETE RBANS next session     PLAN OF CARE START: 3/29/24  PLAN OF CARE END: 24  FREQUENCY: 2x/week  PRECAUTIONS standard    Subjective    Mechanism of Injury as per medical record of most recent hospitalization (2023)  \"Yesenia Tillman is a 88 y.o. female patient with past medical history of stroke, hypothyroidism, anxiety, depression, dyslipidemia, GERD, esophageal dysmotility, PAF, seizure disorder who originally presented to the hospital on 2023 due to right shoulder pain.  Upon further questioning patient also reported she might be slightly weak in the right arm and had some speech issues and might be feeling off balance and fatigued and tired.  CAT scan of the " "brain was unremarkable in the ED and shoulder x-ray was negative.  Patient was treated symptomatically for shoulder pain and patient also had MRI of the brain which was negative.  Patient continued remained stable and was doing well with physical therapy.  Patient was also seen by orthopedics who agreed with conservative treatment with an outpatient follow-up for MRI/steroid injection as needed\"    Occupational Profile    \"Pt lives by herself. She lives in a first floor apartment. She has 3 sons, one has MS, one has , the youngest helps as needed. Pt reports she has deficits with her memory about a year ago. Pt reports her   a year ago, however she barely remembers it. Pt son is managing her money. Pt can make her own meals. Pt is managing her own medication with a pill box. Pt is currently driving. Pt enjoys artwork, and uses many of her coloring books.\"    PATIENT GOAL: \"To slow progression of memory loss\"    HISTORY OF PRESENT ILLNESS:   Pt was recently referred to outpatient OT from her PCP due to changes in her memory. She has a history of multiple CVA's, and reports her memory has been much worse since her  passed away ~1 year ago. She recently had a TIA in 2023, however pt had limited recall of this event.     PMH:   Past Medical History:   Diagnosis Date    Acute CVA (cerebrovascular accident) (HCC) 2016    Adult hypothyroidism 2016    Anxiety 06/10/2019    Arthritis     Asthma     Colon polyp     Depression     Disease of thyroid gland     Dyslipidemia 06/10/2019    Esophageal dysmotility 06/10/2019    Essential hypertension 2016    GERD (gastroesophageal reflux disease)     Hyperlipidemia     Hypertension     Hypothyroidism     Obesity     Paroxysmal atrial fibrillation (HCC) 2016    Seizure disorder (HCC) 2018    Se[facility]ures ([facility])     Stenosis of left carotid artery 2019    Stenosis of left middle cerebral artery 2019    Stroke ([facility]) " 2016    Subarachnoid hemorrhage (HCC) 2016    Subdural hematoma (HCC) 2016       Past Surgical Hx:   Past Surgical History:   Procedure Laterality Date    APPENDECTOMY      BACK SURGERY  2016    CARPAL TUNNEL RELEASE Right     COLONOSCOPY      TUBAL LIGATION          Pain: None    Objective    Functional Cognition:  Highest level of education: 1 Semester of college    [place] Cognitive Assessment Version 8.2 (MoCA V8.2)  Visuospatial/executive functioning:  3/5  Naming:  3/3  Memory: 1st trial:  , 2nd trial:    Attention/concentration:   List of letters:   Serial Seven Subtraction:  3/3 w/ 0 errors  Language/sentence repetition:    Language Fluency:    Abstract/Correlational Thinkin/2  Delayed Recall:  3/5  Orientation:     Memory Index Score: 12/15  MoCA V1 8.1 Raw Score:  23/30, MIS:  12/15, indicative of mild neurocognitive impairments.    MoCA Scoring        Normal: 26+         Mild Cognitive Impairment: 18-25          Moderate Cognitive Impairment: 10-17         Severe Cognitive Impairment: <10    Trail making Part A and Part B:   Part A: 33 seconds (previously 28.36 independently )  Part B: 1 minute 26.62 seconds  (previous 1 minute 55 seconds with 3 mistakes and 1 VCs for recall of instructions, problem solving)   Indicating deficits: Part A > 63 seconds and Part B > 140 seconds      Contextual Memory Test:  Immediate: , 0 confabulations  Delayed: , 0 confabulations      GOALS:   Short Term Goals:  Pt will demonstrate improved functional cognition as evidenced by improving score on the MoCA to 24/30 to improve performance during ADLs and IADLs  Pt will demonstrate improved divided attention, completing TM part B WNL and no more than 1 vc.  AHCIEVED   Pt will demo G recall of 85% of Verbal information utilizing memory strategy of choice for improved STM/delayed memory     Long Term Goals: 8-12 weeks  Pt will demonstrate improved functional cognition as  evidenced by improving score on the MoCA to 26/30 to improve performance during ADLs and IADLs  Pt will demonstrate improved divided attention, completing TM part B WNL and no cues.  Pt will demo G carryover of use of internal/external memory strategy aides for improved recall of daily events, improved executive functioning with 90% accuracy   Pt will demo increased insight into deficits for overall increased safety and self awareness with   ADL/IADL tasks and to encourage use of compensatory strategies    OTHER PLANNED THERAPY INTERVENTIONS:   Supine, seated, and in stance neuro re-ed  Tricep AG  NMES/FES  FMC/prehension  Timed Trials  Manual tx  Hand to target  Sensory re-ed  Seated functional reach: crossing midline  Supine place and hold  WBearing strategies   Closed chain activities  Open chain activities  Internal and external memory aides  Multimatrix for saccades/ visual clutter/attention  Hypersensitivity strategies education  Multi-modal environment  Sustained/alternating/divided attention  Tracking tube  Oculomotor control:  saccades, con/divergence  Conv./div. Dynamic tasks  Work stations with timed transitions  Temporal Awareness  Memory and mental manipulation  Auditory processing with immediate recall  Memory retention with immediate and delayed recall  Edu on cog/vision apps      Objective Measurement Tracker:    IE (3/29/24) 1st Re-eval:  2nd Re-eval: 3rd Re-eval:    MoCA 22/30       TM Test A 28.36 independently        TM Test B 1 minute 55 seconds with 3 mistakes and 1 VCs for recall of instructions       CMT Complete next session

## 2024-05-06 RX ORDER — CLOPIDOGREL BISULFATE 75 MG/1
75 TABLET ORAL DAILY
Qty: 90 TABLET | Refills: 0 | Status: SHIPPED | OUTPATIENT
Start: 2024-05-06

## 2024-05-07 ENCOUNTER — OFFICE VISIT (OUTPATIENT)
Facility: CLINIC | Age: 89
End: 2024-05-07
Payer: COMMERCIAL

## 2024-05-07 DIAGNOSIS — G40.909 SEIZURE DISORDER (HCC): ICD-10-CM

## 2024-05-07 DIAGNOSIS — Z86.73 HISTORY OF MULTIPLE STROKES: Primary | ICD-10-CM

## 2024-05-07 PROCEDURE — 96125 COGNITIVE TEST BY HC PRO: CPT

## 2024-05-07 NOTE — PROGRESS NOTES
Occupational Therapy Progress Note    Today's date: 2024  Patient name: Yesenia Tillman  : 1935  MRN: [id number]  Referring provider: Liam Zepeda MD  Dx:   Encounter Diagnoses   Name Primary?    History of multiple strokes Yes    Seizure disorder (HCC)        Start Time: 1330  Stop Time: 1410  Total time in clinic (min): 40 minutes    PLAN OF CARE START: 3/29/24  PLAN OF CARE END: 24  FREQUENCY: 2x/week  PRECAUTIONS standard; Pt confused with clinic lay out therefore would benefit from being walking in from [facility] [facility] and back to [facility].    Subjective: Pt reported no significant change in status since previous session.     Objective: See treatment below.      Assessments  The Repeatable Battery for the Assessment of Neuropsychological Status (RBANS) is a brief, individually-administered assessment which measures attention, language, visuospatial/constructional abilities, and immediate & delayed memory. The RBANS is intended for use with adolescents to adults, ages 12 to 89 years. The following results were obtained during the administration of the assessment.    Form: B    Cognitive Domain/Subtest: Index Score: Percentile Rank: Classification: IE: Status:   IMMEDIATE MEMORY 109  AVERAGE 109         1. List Learning ()          2. Story Memory ()           VISUOSPATIAL/  CONSTRUCTIONAL 89  LOW AVERAGE 89         3. Figure Copy ()          4. Line Orientation ()           LANGUAGE 103  AVERAGE 103         5. Picture Naming (10/10)          6. Semantic Fluency ()           ATTENTION 100  AVERAGE 100         7. Digit Span ()          8. Coding ()           DELAYED MEMORY 101  AVERAGE 101         9. List Recall (7/10)          10. List Recognition ()          11. Story Recall ()          12. Figure Recall ()           Sum of Index Scores:  502  502    Total Score:  100      Percentile: 50%ile      Classification: AVERAGE          “IE” indicates the scores  from the initial evaluation (5/7/24). Form: B    TIME SPENT  90 min for administration, documentation, interpretation, scoring and POC development RBANS      Assessment: Tolerated treatment fair. Difficulty with attention and recall today due to mourning the loss of her  and son. Pt demonstrating in session impaired EF skills, organization, planning, recall, working memory. Pt would benefit from continued OT services to address these areas. Pt benefited from encouragement and reinforcement of instructions.       Plan: Continued skilled OT per POC.    Goals added 4/9/24:   Pt will improved immediate delayed recall as evidenced on the CMT, increasing to 12 immediate, 6 delayed      GOALS ADDED 5/7/24:   SHORT TERM GOALS:   Memory  Pt will increase delayed recall being able to recall 8 items on RBANS in STM.  Pt will increase delayed recall being able to recall 8 items on RBANs in LTM        Attention     Pt will maintain attention to task for 10 minutes in semi modal environment for baseline performance,  improved role performance and to improve learning and to simulate return to IADLs and complete cooking/cleaning tasks.    Pt will demo ability to participate in dual tasking/divided attention task with 50% accuracy in multimodal environment to simulate return to life roles          LONG TERM GOALS  Memory  Pt will increase delayed recall being able to recall 9 items on RBANS in STM.  Pt will increase delayed recall being able to recall 9 items on RBANs in LTM        Attention     Pt will maintain attention to task for 20 minutes in semi modal environment for baseline performance,  improved role performance and to improve learning and to simulate return to IADLs and complete cooking/cleaning tasks.    Pt will demo ability to participate in dual tasking/divided attention task with 75% accuracy in multimodal environment to simulate return to life roles

## 2024-05-10 ENCOUNTER — OFFICE VISIT (OUTPATIENT)
Facility: CLINIC | Age: 89
End: 2024-05-10
Payer: COMMERCIAL

## 2024-05-10 DIAGNOSIS — Z86.73 HISTORY OF MULTIPLE STROKES: Primary | ICD-10-CM

## 2024-05-10 DIAGNOSIS — G40.909 SEIZURE DISORDER (HCC): ICD-10-CM

## 2024-05-10 PROCEDURE — 97530 THERAPEUTIC ACTIVITIES: CPT

## 2024-05-10 NOTE — PROGRESS NOTES
"Occupational Therapy Progress Note    Today's date: 5/10/2024  Patient name: Yesenia Tillman  : 1935  MRN: [id number]  Referring provider: Liam Zepeda MD  Dx:   Encounter Diagnoses   Name Primary?    History of multiple strokes Yes    Seizure disorder (HCC)          Start Time: 1230  Stop Time: 1314  Total time in clinic (min): 44 minutes      PLAN OF CARE START: 3/29/24  PLAN OF CARE END: 24  FREQUENCY: 2x/week  PRECAUTIONS standard; Pt confused with clinic lay out therefore would benefit from being walked in from [facility] and back to [facility] at the end of the session.    Subjective: Pt reported no significant change in status since previous session.     Objective: See treatment below.    TA:   -completed double spot with visual prompt. Pt asked to name peoples names that begin with letter on prompt to improve recall. Focused on attention, recall, and   skills.  -completed 120 number board (even: green; odd: pink) to improve divided attention. Pt required min cues for recall of which numbers were even/odd. \"The colors threw me off. I was fine with the numbers, but I couldn't remember the colors.\"  -completed spot the difference x3 to improve attention.   -completed tile matching game x3 with category prompts. Focused on attention and recall.       Assessment: Tolerated treatment well. Pt demonstrating in session impaired EF skills, organization, planning, recall, working memory. Pt would benefit from continued OT services to address these areas. Pt benefited from encouragement and reinforcement of instructions.       Plan: Continued skilled OT per POC.    Goals added 24:   Pt will improved immediate delayed recall as evidenced on the CMT, increasing to 12 immediate, 6 delayed      GOALS ADDED 24:   SHORT TERM GOALS:   Memory  Pt will increase delayed recall being able to recall 8 items on RBANS in STM.  Pt will increase delayed recall being able to recall 8 items on RBANs in LTM      "   Attention     Pt will maintain attention to task for 10 minutes in semi modal environment for baseline performance,  improved role performance and to improve learning and to simulate return to IADLs and complete cooking/cleaning tasks.    Pt will demo ability to participate in dual tasking/divided attention task with 50% accuracy in multimodal environment to simulate return to life roles          LONG TERM GOALS  Memory  Pt will increase delayed recall being able to recall 9 items on RBANS in STM.  Pt will increase delayed recall being able to recall 9 items on RBANs in LTM        Attention     Pt will maintain attention to task for 20 minutes in semi modal environment for baseline performance,  improved role performance and to improve learning and to simulate return to IADLs and complete cooking/cleaning tasks.    Pt will demo ability to participate in dual tasking/divided attention task with 75% accuracy in multimodal environment to simulate return to life roles

## 2024-05-14 ENCOUNTER — OFFICE VISIT (OUTPATIENT)
Facility: CLINIC | Age: 89
End: 2024-05-14
Payer: COMMERCIAL

## 2024-05-14 DIAGNOSIS — G40.909 SEIZURE DISORDER (HCC): ICD-10-CM

## 2024-05-14 DIAGNOSIS — Z86.73 HISTORY OF MULTIPLE STROKES: Primary | ICD-10-CM

## 2024-05-14 PROCEDURE — 97112 NEUROMUSCULAR REEDUCATION: CPT

## 2024-05-14 NOTE — PROGRESS NOTES
"Occupational Therapy Progress Note    Today's date: 2024  Patient name: Yesenia Tillman  : 1935  MRN: [id number]  Referring provider: Liam Zepeda MD  Dx:   Encounter Diagnoses   Name Primary?    History of multiple strokes Yes    Seizure disorder (HCC)            Start Time: 1017  Stop Time: 1100  Total time in clinic (min): 43 minutes      PLAN OF CARE START: 3/29/24  PLAN OF CARE END: 24  FREQUENCY: 2x/week  PRECAUTIONS standard; Pt confused with clinic lay out therefore would benefit from being walked in from [facility] and back to [facility] at the end of the session.    Subjective: Pt asked for grief counseling/therapy services, \"I know I'm depressed.\" \"I'm tired today, I'm very tired today, my eyes aren't that clear.\"    Objective: See treatment below.    NMR (in standing):   -Pt completed BITS Complex Array Sequence task (letters only). Pt completed 26 hits in 59 seconds with 2.27 second reaction time and 100% accuracy. Focused on attention and recall.   -Pt completed BITS Complex Array Sequence task (letters only). Pt asked to name foods that begin with prompted letter. Pt completed 26 hits in 4 minutes and 6 seconds with 9.47 second reaction time and 76.47% accuracy. Focused on attention and recall.   -Pt completed BITS sequence task (TM B style) with upper and lower case letters without central fixation. Pt completed 30 hits in 1 minute and 53 seconds with 3.78 second reaction time with 83.33% accuracy. Focused on divided attention,  skills, and recall.   -Pt completed BITS sequence task (TM B style) with numbers and letters without central fixation. Pt completed 26 hits in 2 minute and 1 seconds with 4.66 second reaction time with 74.20% accuracy. Focused on divided attention,  skills, and recall.   -Pt completed BITS memory task with 4 word sequences. Pt completed 26 successful trials in 4 minutes with 100% accuracy. Focused on  skills, working memory, and recall.   -Pt completed BITS " memory task with 5 word sequences. Pt completed 24 successful trials in 4 minutes with 96% accuracy. Focused on  skills, working memory, and recall.   -pt attempted BITS memory task with 5 fixed quantity however she demo max difficulty and 0 successful trials therefore task terminated.   -Pt completed BITS memory task with 4 word sequences (fixed quantity). Pt completed 16 successful trials in 4 minutes with 94.12% accuracy. Focused on  skills, working memory, and recall.         Assessment: Tolerated treatment well. Pt demonstrating in session impaired EF skills, organization, planning, recall, working memory. Pt benefits from additional instructions. Pt would benefit from continued OT services to address these areas. Pt benefited from encouragement and reinforcement of instructions.       Plan: Continued skilled OT per POC.    Goals added 4/9/24:   Pt will improved immediate delayed recall as evidenced on the CMT, increasing to 12 immediate, 6 delayed      GOALS ADDED 5/7/24:   SHORT TERM GOALS:   Memory  Pt will increase delayed recall being able to recall 8 items on RBANS in STM.  Pt will increase delayed recall being able to recall 8 items on RBANs in LTM        Attention     Pt will maintain attention to task for 10 minutes in semi modal environment for baseline performance,  improved role performance and to improve learning and to simulate return to IADLs and complete cooking/cleaning tasks.    Pt will demo ability to participate in dual tasking/divided attention task with 50% accuracy in multimodal environment to simulate return to life roles          LONG TERM GOALS  Memory  Pt will increase delayed recall being able to recall 9 items on RBANS in STM.  Pt will increase delayed recall being able to recall 9 items on RBANs in LTM        Attention     Pt will maintain attention to task for 20 minutes in semi modal environment for baseline performance,  improved role performance and to improve learning and  to simulate return to IADLs and complete cooking/cleaning tasks.    Pt will demo ability to participate in dual tasking/divided attention task with 75% accuracy in multimodal environment to simulate return to life roles

## 2024-05-15 ENCOUNTER — SOCIAL WORK (OUTPATIENT)
Dept: BEHAVIORAL/MENTAL HEALTH CLINIC | Facility: CLINIC | Age: 89
End: 2024-05-15
Payer: COMMERCIAL

## 2024-05-15 DIAGNOSIS — R41.3 MEMORY CHANGES: ICD-10-CM

## 2024-05-15 DIAGNOSIS — F32.1 CURRENT MODERATE EPISODE OF MAJOR DEPRESSIVE DISORDER WITHOUT PRIOR EPISODE (HCC): Primary | ICD-10-CM

## 2024-05-15 PROCEDURE — 90837 PSYTX W PT 60 MINUTES: CPT | Performed by: SOCIAL WORKER

## 2024-05-15 NOTE — BH TREATMENT PLAN
"Outpatient Behavioral Health Psychotherapy Treatment Plan    Yesenia Tillman  1935     Date of Initial Psychotherapy Assessment:4/4/24  Date of Current Treatment Plan: 05/15/24  Treatment Plan Target Date: 11/15/24  Treatment Plan Expiration Date: 11/15/24    Diagnosis:   1. 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 episode (HCC)            Area(s) of Need: Depression, grief, lack of social support    Long Term Goal 1 (in the client's own words): \"Let go of the guilt about my sons, I want to be able to believe that I did the best I could.\"    Stage of Change: Preparation    Target Date for completion: 11/15/24     Anticipated therapeutic modalities: Bereavement, CBT, Supportive     People identified to complete this goal: Yesenia (client), and Galina (therapist)      Objective 1: (identify the means of measuring success in meeting the objective): Yesenia will be educated on the grieving process and identify what she is feeling in the moment and practice self compassion.      Objective 2: (identify the means of measuring success in meeting the objective): Yesenia will learn to speak to and believe positive self talk about herself as a mother.        Long Term Goal 2 (in the client's own words): \"I want to find a balance between being too much and then not enough.\"    Stage of Change: Preparation    Target Date for completion: 11/15/24     Anticipated therapeutic modalities: CBT, Psychodynamic     People identified to complete this goal: Yesenia (client) and Galina (therapist)      Objective 1: (identify the means of measuring success in meeting the objective): Yesenia will identify, challenge and change irrational thoughts that lead to low self worth.      Objective 2: (identify the means of measuring success in meeting the objective): Yesenia will practice managing impulsive behaviors of over-sharing by learning  and practicing 3 skills to slow down her impulsiveness.      I am currently " "under the care of a [facility] psychiatric provider: no    My [facility] psychiatric provider is: n/a    I am currently taking psychiatric medications: Yes, as prescribed    I feel that I will be ready for discharge from mental health care when I reach the following (measurable goal/objective): \"When I make the changes I want.\"    For children and adults who have a legal guardian:   Has there been any change to custody orders and/or guardianship status? NA. If yes, attach updated documentation.    I have created my Crisis Plan and have been offered a copy of this plan    Behavioral Health Treatment Plan  Luke: Diagnosis and Treatment Plan explained to Yesenia Tillman acknowledges an understanding of their diagnosis. Yesenia Tillman agrees to this treatment plan.    I have been offered a copy of this Treatment Plan. yes        "

## 2024-05-15 NOTE — PSYCH
"Behavioral Health Psychotherapy Progress Note    Psychotherapy Provided: Individual Psychotherapy     1. 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 Memory changes  Ambulatory Referral to Neurology          Goals addressed in session: Goal 1 and Goal 2     DATA: Yesenia reported that her son  and she was distraught.  She reported that letha is having thoughts of guilt over not doing her job as a mother.  These thoughts were explored and challenged while giving Yesenia space to share her feelings.  Yesenia shared that she is anxious to go out, fearing that she will cry and be asked what is wrong, without a way to not overshare which she would liek to work on.  A phrase was written down for her to kaye that we role played. Yesenia's treatmetn plan was completed, but her safety plan was not due to time constraints.  We also discussed going to see a neurologist as well as having Yesenia see a med provider in the office.  Orders were placed.  During this session, this clinician used the following therapeutic modalities: Bereavement Therapy, Solution-Focused Therapy, and Supportive Psychotherapy    Substance Abuse was not addressed during this session. If the client is diagnosed with a co-occurring substance use disorder, please indicate any changes in the frequency or amount of use: n/a. Stage of change for addressing substance use diagnoses: No substance use/Not applicable    ASSESSMENT:  Yesenia Tillman presents with a Anxious and Depressed mood.     her affect is Normal range and intensity and Tearful, which is congruent, with her mood and the content of the session. The client has made progress on their goals.     Yesenia Tillman presents with a none risk of suicide, none risk of self-harm, and none risk of harm to others.    For any risk assessment that surpasses a \"low\" rating, a safety plan must be developed.    A safety plan was indicated: no  If yes, describe in " detail n/a    PLAN: Between sessions, Yesenia Tillman will practice telling others that she needs a moment when others ask her why she is crying and then take a moment to allow herself to feel and regrup before re-entering the conversation. At the next session, the therapist will use Bereavement Therapy, Client-centered Therapy, and Supportive Psychotherapy to address grief.    Behavioral Health Treatment Plan and Discharge Planning: Yesenia Tillman is aware of and agrees to continue to work on their treatment plan. They have identified and are working toward their discharge goals. yes    Visit start and stop times:    05/15/24  Start Time: 1203  Stop Time: 1258  Total Visit Time: 55 minutes

## 2024-05-17 ENCOUNTER — OFFICE VISIT (OUTPATIENT)
Facility: CLINIC | Age: 89
End: 2024-05-17
Payer: COMMERCIAL

## 2024-05-17 DIAGNOSIS — G40.909 SEIZURE DISORDER (HCC): ICD-10-CM

## 2024-05-17 DIAGNOSIS — Z86.73 HISTORY OF MULTIPLE STROKES: Primary | ICD-10-CM

## 2024-05-17 PROCEDURE — 97530 THERAPEUTIC ACTIVITIES: CPT

## 2024-05-17 NOTE — PROGRESS NOTES
"Occupational Therapy Progress Note    Today's date: 2024  Patient name: Yesenia Tillman  : 1935  MRN: [id number]  Referring provider: Liam Zepeda MD  Dx:   Encounter Diagnoses   Name Primary?    History of multiple strokes Yes    Seizure disorder (HCC)      Start Time: 1146  Stop Time: 1228  Total time in clinic (min): 42 minutes    PLAN OF CARE START: 3/29/24  PLAN OF CARE END: 24  FREQUENCY: 2x/week  PRECAUTIONS standard; Pt confused with clinic lay out therefore would benefit from being walked in from [facility] and back to [facility] at the end of the session.    Subjective: Pt asked for grief counseling/therapy services, \"I know I'm depressed.\" \"I'm tired today, I'm very tired today, my eyes aren't that clear.\"    Objective: See treatment below.    TA:   Performed scattergories exercise with the letters \"S\" and \"T\" to address language skills, sustained attention. Required cues from therapist for about 25% of the words.   Performed wooden sodoku puzzle with easier level puzzle to address EF skills, problem solving, deduction. Pt required min cues for set up and instruction and completed mostly independently. Pt was provided with an addition 3 sodoku puzzles to bring home with the answer key for carryover.    Assessment: Tolerated treatment well. Pt demonstrating in session impaired EF skills, organization, planning, recall, working memory. Pt benefits from additional instructions. Pt would benefit from continued OT services to address these areas. Pt benefited from encouragement and reinforcement of instructions.       Plan: Continued skilled OT per POC.    Goals added 24:   Pt will improved immediate delayed recall as evidenced on the CMT, increasing to 12 immediate, 6 delayed      GOALS ADDED 24:   SHORT TERM GOALS:   Memory  Pt will increase delayed recall being able to recall 8 items on RBANS in STM.  Pt will increase delayed recall being able to recall 8 items on RBANs in LTM      "   Attention     Pt will maintain attention to task for 10 minutes in semi modal environment for baseline performance,  improved role performance and to improve learning and to simulate return to IADLs and complete cooking/cleaning tasks.    Pt will demo ability to participate in dual tasking/divided attention task with 50% accuracy in multimodal environment to simulate return to life roles          LONG TERM GOALS  Memory  Pt will increase delayed recall being able to recall 9 items on RBANS in STM.  Pt will increase delayed recall being able to recall 9 items on RBANs in LTM        Attention     Pt will maintain attention to task for 20 minutes in semi modal environment for baseline performance,  improved role performance and to improve learning and to simulate return to IADLs and complete cooking/cleaning tasks.    Pt will demo ability to participate in dual tasking/divided attention task with 75% accuracy in multimodal environment to simulate return to life roles

## 2024-05-21 ENCOUNTER — OFFICE VISIT (OUTPATIENT)
Facility: CLINIC | Age: 89
End: 2024-05-21
Payer: COMMERCIAL

## 2024-05-21 DIAGNOSIS — G40.909 SEIZURE DISORDER (HCC): ICD-10-CM

## 2024-05-21 DIAGNOSIS — Z86.73 HISTORY OF MULTIPLE STROKES: Primary | ICD-10-CM

## 2024-05-21 PROCEDURE — 97530 THERAPEUTIC ACTIVITIES: CPT

## 2024-05-21 NOTE — PROGRESS NOTES
Occupational Therapy Progress Note    Today's date: 2024  Patient name: Yesenia Tillman  : 1935  MRN: [id number]  Referring provider: Liam Zepeda MD  Dx:   Encounter Diagnoses   Name Primary?    History of multiple strokes Yes    Seizure disorder (HCC)        Start Time: 1330  Stop Time: 1410  Total time in clinic (min): 40 minutes    PLAN OF CARE START: 3/29/24  PLAN OF CARE END: 24  FREQUENCY: 2x/week  PRECAUTIONS standard; Pt confused with clinic layout therefore would benefit from being walked in from [facility] and back to [facility] at the end of the session.    Subjective: Pt reported no significant change in status since previous session.     Objective: See treatment below.    TA:   -Pt completed BITS memory task with 5 word sequences. Pt completed 5 successful trials in 2 minutes with 50% accuracy. Focused on  skills, working memory, and recall.   -Pt completed BITS Complex Array Sequence task (letters only). Pt asked to name animals that begin with prompted letter. Pt completed 26 hits in 3 minutes and 18 seconds with 7.63 second reaction time and 86.67% accuracy. Focused on attention and recall.   -Pt completed BITS sequence task (TM B style) without central fixation with upper and lower case letters. Pt completed 40 hits in 2 minute and 37 seconds with 3.92 second reaction time with 86.96% accuracy. Focused on divided attention,  skills, and recall.   -Pt completed BITS sequence task (TM A style) without central fixation with capital letters only. Pt completed 26 hits in 2 minute and 56 seconds with 6.78 second reaction time with 83.87% accuracy. Focused on divided attention,  skills, and recall.   -completed pickles to penguins x2 to improve EF skills and abstraction.   -completed spot it x25 to improve attention.      Assessment: Tolerated treatment well. Pt demonstrating in session impaired EF skills, organization, planning, recall, working memory. Pt benefits from additional  instructions. Pt would benefit from continued OT services to address these areas. Pt benefited from encouragement and reinforcement of instructions.       Plan: Continued skilled OT per POC.    Goals added 4/9/24:   Pt will improved immediate delayed recall as evidenced on the CMT, increasing to 12 immediate, 6 delayed      GOALS ADDED 5/7/24:   SHORT TERM GOALS:   Memory  Pt will increase delayed recall being able to recall 8 items on RBANS in STM.  Pt will increase delayed recall being able to recall 8 items on RBANs in LTM        Attention     Pt will maintain attention to task for 10 minutes in semi modal environment for baseline performance,  improved role performance and to improve learning and to simulate return to IADLs and complete cooking/cleaning tasks.    Pt will demo ability to participate in dual tasking/divided attention task with 50% accuracy in multimodal environment to simulate return to life roles          LONG TERM GOALS  Memory  Pt will increase delayed recall being able to recall 9 items on RBANS in STM.  Pt will increase delayed recall being able to recall 9 items on RBANs in LTM        Attention     Pt will maintain attention to task for 20 minutes in semi modal environment for baseline performance,  improved role performance and to improve learning and to simulate return to IADLs and complete cooking/cleaning tasks.    Pt will demo ability to participate in dual tasking/divided attention task with 75% accuracy in multimodal environment to simulate return to life roles

## 2024-05-29 ENCOUNTER — SOCIAL WORK (OUTPATIENT)
Dept: BEHAVIORAL/MENTAL HEALTH CLINIC | Facility: CLINIC | Age: 89
End: 2024-05-29
Payer: COMMERCIAL

## 2024-05-29 DIAGNOSIS — F32.1 CURRENT MODERATE EPISODE OF MAJOR DEPRESSIVE DISORDER WITHOUT PRIOR EPISODE (HCC): Primary | ICD-10-CM

## 2024-05-29 DIAGNOSIS — R41.3 MEMORY CHANGES: ICD-10-CM

## 2024-05-29 PROCEDURE — 90834 PSYTX W PT 45 MINUTES: CPT | Performed by: SOCIAL WORKER

## 2024-05-29 NOTE — BH CRISIS PLAN
Client Name: Yesenia Tillman       Client YOB: 1935    [place] Safety Plan      Creation Date: 5/29/24 Update Date: 5/29/24   Created By: Galina Holland LCSW Last Updated By: Galina Holland LCSW      Step 1: Warning Signs:   Warning Signs   Cry more   Feel out of control            Step 2: Internal Coping Strategies:   Internal Coping Strategies   Read   Write   [place], talk to God like he's my friend            Step 3: People and social settings that provide distraction:   Name Contact Information   Son in phone   Eli in phone book    Places   [facility]           Step 4: People whom I can ask for help during a crisis:      Name Contact Information    Son in phone      Step 5: Professionals or agencies I can contact during a crisis:      Clinican/Agency Name Phone Emergency Contact    Galina Holland LCSW [phone]       [facility] Emergency Department Emergency Department Phone Emergency Department Address    [facility] [phone]         Crisis Phone Numbers:   Suicide Prevention Lifeline: Call or Text  062 Crisis Text Line: Text HOME to 835-917   Please note: Some [facility] do not have a separate number for Child/Adolescent specific crisis. If your county is not listed under Child/Adolescent, please call the adult number for your county      Adult Crisis Numbers: Child/Adolescent Crisis Numbers   [facility]: [phone] [facility]: [phone]   [facility]: [phone] [facility]: [phone]   [facility]: [phone] [place]: [phone]   [facility]: [phone] [place]: [phone]   [place]: [phone]   [facility]: [phone]   [facility]: [phone]   [facility]: [phone] (daytime) [phone] (after hours, weekends, holidays)      Step 6: Making the environment safer (plan for lethal means safety):   Patient did not identify any lethal  methods: Yes     Optional: What is most important to me and worth living for?      Tiny Safety Plan. Radha Jean and Chucky Foote. Used with permission of the authors.

## 2024-05-29 NOTE — PSYCH
"Behavioral Health Psychotherapy Progress Note    Psychotherapy Provided: Individual Psychotherapy     1. 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 Memory changes            Goals addressed in session: Goal 1     DATA: Yesenia arrived late after getting lost and going to the wrong building. Yesenia was very upset about this.  Discussed that she was referred to neurology and how we can help avoid this going forward by giving her two cards with the address and name on it, one to keep in her car and the other to keep in her purse.  Discussed the idea of acceptance with regads to moving through changes. Yesenia shared about her loss of her son as well as not being able to move through the loss of her hsuband, and how that has also affected her memory.  Also feelings of lonliness.  Normalized her feelings for her.  Discussed what she can do for herself which would include going to the meals offered by the [facility] for [facility]. Yesenia's crisis plan was completed and she needed a good deal of refocusing.     During this session, this clinician used the following therapeutic modalities: Bereavement Therapy, Client-centered Therapy, Solution-Focused Therapy, and Supportive Psychotherapy    Substance Abuse was not addressed during this session. If the client is diagnosed with a co-occurring substance use disorder, please indicate any changes in the frequency or amount of use: n/a. Stage of change for addressing substance use diagnoses: No substance use/Not applicable    ASSESSMENT:  Yesenia Tillman presents with a Angry, Anxious, and sad  mood.     her affect is Normal range and intensity and Tearful, which is congruent, with her mood and the content of the session. The client has made progress on their goals.     Yesenia Tillman presents with a none risk of suicide, none risk of self-harm, and none risk of harm to others.    For any risk assessment that surpasses a \"low\" " rating, a safety plan must be developed.    A safety plan was indicated: no  If yes, describe in detail n/a    PLAN: Between sessions, Yesenai Tillman will go to at least one meal at the hospital [facility]. At the next session, the therapist will use Bereavement Therapy and Client-centered Therapy to address depression/grief.    Behavioral Health Treatment Plan and Discharge Planning: Yeesnia Tillman is aware of and agrees to continue to work on their treatment plan. They have identified and are working toward their discharge goals. yes    Visit start and stop times:    5/29/24  Start Time: 1209  Stop Time: 1300  Total Visit Time: 51 minutes

## 2024-06-07 DIAGNOSIS — I10 ESSENTIAL HYPERTENSION: ICD-10-CM

## 2024-06-07 RX ORDER — METOPROLOL SUCCINATE 50 MG/1
50 TABLET, EXTENDED RELEASE ORAL DAILY
Qty: 90 TABLET | Refills: 0 | Status: SHIPPED | OUTPATIENT
Start: 2024-06-07

## 2024-06-12 ENCOUNTER — TELEPHONE (OUTPATIENT)
Dept: PSYCHIATRY | Facility: CLINIC | Age: 89
End: 2024-06-12

## 2024-06-12 ENCOUNTER — SOCIAL WORK (OUTPATIENT)
Dept: BEHAVIORAL/MENTAL HEALTH CLINIC | Facility: CLINIC | Age: 89
End: 2024-06-12
Payer: COMMERCIAL

## 2024-06-12 DIAGNOSIS — F32.1 CURRENT MODERATE EPISODE OF MAJOR DEPRESSIVE DISORDER WITHOUT PRIOR EPISODE (HCC): Primary | ICD-10-CM

## 2024-06-12 PROCEDURE — 90834 PSYTX W PT 45 MINUTES: CPT | Performed by: SOCIAL WORKER

## 2024-06-12 NOTE — TELEPHONE ENCOUNTER
Provider had schedule change.  Patient aware and is starting new time slot of Monday at 12:00 pm every 2nd  & 4th of month starting 7/8/2024.  Patient was also rescheduled 6/26 appointment with Galina Holland LCSW to 7/1/2024 at 12:00 pm.  Recurred appointments out and hard copy of schedule updated.

## 2024-06-13 ENCOUNTER — TELEPHONE (OUTPATIENT)
Dept: INTERNAL MEDICINE CLINIC | Facility: CLINIC | Age: 89
End: 2024-06-13

## 2024-06-13 DIAGNOSIS — Z86.73 HISTORY OF STROKE: Primary | ICD-10-CM

## 2024-06-13 DIAGNOSIS — Z86.73 HISTORY OF MULTIPLE STROKES: ICD-10-CM

## 2024-06-13 NOTE — TELEPHONE ENCOUNTER
----- Message from Liam Zepeda MD sent at 6/13/2024  8:19 AM EDT -----  Regarding: FW: Concerns  Okay to order OT  ----- Message -----  From: Galina Fajardo LCSW  Sent: 6/12/2024   1:01 PM EDT  To: Liam Zepeda MD; Joanne Sheppard, MARIO  Subject: Concerns                                         Hi.  I am seeing Yesenia for psychotherapy.  I noticed that she is no longer going to OT which she needs.  I understand that she has two no-shows, but she may need some help with remembering her appts and I am hoping that services can begin again with calls to remind her of appts (we are doing this in my office).  Her forgetfulness is creating a great deal of anxiety in her.  She has an appt with Jeet Avalos PA-C in this office in August.    Also, I put in a referral for neurology and am hoping that this can be followed up with as well.    Please let me know if OT can begin again.    Thank you,  Galina

## 2024-06-14 ENCOUNTER — EVALUATION (OUTPATIENT)
Facility: CLINIC | Age: 89
End: 2024-06-14
Payer: COMMERCIAL

## 2024-06-14 DIAGNOSIS — G40.909 SEIZURE DISORDER (HCC): ICD-10-CM

## 2024-06-14 DIAGNOSIS — Z86.73 HISTORY OF MULTIPLE STROKES: Primary | ICD-10-CM

## 2024-06-14 PROCEDURE — 97168 OT RE-EVAL EST PLAN CARE: CPT

## 2024-06-14 NOTE — PROGRESS NOTES
"OCCUPATIONAL THERAPY RE- EVALUATION    Today's Date: 2024  Patient Name: Yesenia Tillman  : 1935  MRN: [id number]  Referring Provider: Liam Zepeda MD  Dx: History of multiple strokes [Z86.73]    SKILLED ANALYSIS:  Pt presents to re-evaluation on 2024 status post multiple CVAs and decline in thinking and memory. As per interview, pt reports that since her  and son have passed within a year of each other, her attention, thinking and memory has been significantly impacted. Pt reports she has been trying to get rid of things in her home to declutter and to give items to people that may need it  Pt notes low motivation \"this isnt me, I dont know who I am. This is not the me I'm used to being.\" \"I want to feel like a whole person again.\"  Pt has recently started seeing a  to discuss depression symptoms. Pt completed following assessments: contextual memory test and Trail Making A and B today. Post assessments, pt demonstrating improvements in immediate visual memory and sustained attention. Pt continues to demonstrate impairments in delayed visual memory and divided attention. Pt continues to progress towards STGs/LTGs. Pt would benefit from continued OT services focusing on impairments for 8-12 more weeks. OT to focus on ways to improve attention and memory including relaxation and stress management strategies. Pt educated on progress/therapy process and pt in understanding and agreement of recommendations. Recommending to continue HEP         PLAN OF CARE START: 24  PLAN OF CARE END: 24  FREQUENCY: 2x/week  PRECAUTIONS standard    Subjective    Mechanism of Injury as per medical record of most recent hospitalization (2023)  \"Yesenia Tillman is a 88 y.o. female patient with past medical history of stroke, hypothyroidism, anxiety, depression, dyslipidemia, GERD, esophageal dysmotility, PAF, seizure disorder who originally presented to the hospital on 2023 due " "to right shoulder pain.  Upon further questioning patient also reported she might be slightly weak in the right arm and had some speech issues and might be feeling off balance and fatigued and tired.  CAT scan of the brain was unremarkable in the ED and shoulder x-ray was negative.  Patient was treated symptomatically for shoulder pain and patient also had MRI of the brain which was negative.  Patient continued remained stable and was doing well with physical therapy.  Patient was also seen by orthopedics who agreed with conservative treatment with an outpatient follow-up for MRI/steroid injection as needed\"    Occupational Profile    \"Pt lives by herself. She lives in a first floor apartment. She has 3 sons, one has MS, one has , the youngest helps as needed. Pt reports she has deficits with her memory about a year ago. Pt reports her   a year ago, however she barely remembers it. Pt son is managing her money. Pt can make her own meals. Pt is managing her own medication with a pill box. Pt is currently driving. Pt enjoys artwork, and uses many of her coloring books.\"    PATIENT GOAL: \"To slow progression of memory loss\"    HISTORY OF PRESENT ILLNESS:   Pt was recently referred to outpatient OT from her PCP due to changes in her memory. She has a history of multiple CVA's, and reports her memory has been much worse since her  passed away ~1 year ago. She recently had a TIA in 2023, however pt had limited recall of this event.     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    Stroke ([facility]) 06/2016    Subarachnoid hemorrhage (HCC) 09/13/2016    Subdural hematoma (HCC) 09/13/2016       Past Surgical Hx:   Past Surgical History:   Procedure Laterality Date    APPENDECTOMY      BACK SURGERY  01/2016    CARPAL TUNNEL RELEASE Right     COLONOSCOPY      TUBAL LIGATION          Pain: None    Objective    Functional Cognition:  Highest level of education: 1 Semester of college    Trail making Part A and Part B:   Part A: 31.23 seconds (previously: 33 seconds) IMPROVED   Part B: 1 minute and 28.9 seconds; with 3 verbal cues for sequencing  (previously: 1 minute 26.62 seconds) DECLINED   Indicating deficits: Part A > 63 seconds and Part B > 140 seconds      Contextual Memory Test:  Immediate: 10/20 (previously: 8/20, 0 confabulations) IMPROVED   Delayed: 6/20 with 0 confabulations (previously: 6/20, 0 confabulations) MAINTAINED       GOALS:   Short Term Goals:  Pt will demonstrate improved functional cognition as evidenced by improving score on the MoCA to 24/30 to improve performance during ADLs and IADLs  Pt will demonstrate improved divided attention, completing TM part B WNL and no more than 1 vc.  AHCIEVED   Pt will demo G recall of 85% of Verbal information utilizing memory strategy of choice for improved STM/delayed memory     Long Term Goals: 8-12 weeks  Pt will demonstrate improved functional cognition as evidenced by improving score on the MoCA to 26/30 to improve performance during ADLs and IADLs  Pt will demonstrate improved divided attention, completing TM part B WNL and no cues.  Pt will demo G carryover of use of internal/external memory strategy aides for improved recall of daily events, improved executive functioning with 90% accuracy   Pt will demo increased insight into  deficits for overall increased safety and self awareness with   ADL/IADL tasks and to encourage use of compensatory strategies ACHIEVED        GOALS ADDED 5/7/24:   SHORT TERM GOALS:   Memory  Pt will increase delayed recall being able to recall 8 items on RBANS in STM.  Pt will increase delayed recall being able to recall 8 items on RBANs in LTM        Attention     Pt will maintain attention to task for 10 minutes in semi modal environment for baseline performance,  improved role performance and to improve learning and to simulate return to IADLs and complete cooking/cleaning tasks.    Pt will demo ability to participate in dual tasking/divided attention task with 50% accuracy in multimodal environment to simulate return to life roles          LONG TERM GOALS  Memory  Pt will increase delayed recall being able to recall 9 items on RBANS in STM.  Pt will increase delayed recall being able to recall 9 items on RBANs in LTM        Attention     Pt will maintain attention to task for 20 minutes in semi modal environment for baseline performance,  improved role performance and to improve learning and to simulate return to IADLs and complete cooking/cleaning tasks.    Pt will demo ability to participate in dual tasking/divided attention task with 75% accuracy in multimodal environment to simulate return to life roles            OTHER PLANNED THERAPY INTERVENTIONS:   Internal and external memory aides  Multimatrix for saccades/ visual clutter/attention  Hypersensitivity strategies education  Multi-modal environment  Sustained/alternating/divided attention  Tracking tube  Oculomotor control:  saccades, con/divergence  Conv./div. Dynamic tasks  Work stations with timed transitions  Temporal Awareness  Memory and mental manipulation  Auditory processing with immediate recall  Memory retention with immediate and delayed recall  Edu on cog/vision apps      Objective Measurement Tracker:    IE (3/29/24) 1st Re-eval:   (5/3/24)  2nd Re-eval:  (6/14/24) 3rd Re-eval:    MoCA 22/30 23/30  DC     RBANS  502      TM Test A 28.36 independently  33 seconds  31.23 seconds      TM Test B 1 minute 55 seconds with 3 mistakes and 1 VCs for recall of instructions 1 minute 26.62 seconds  1 minute and 28.9 seconds; with 3 verbal cues for sequencing      CMT Complete next session Immediate:   8/20  Delayed: 6/20 Immediate: 10/20   Delayed: 6/20 with 0 confabulations

## 2024-06-18 ENCOUNTER — OFFICE VISIT (OUTPATIENT)
Facility: CLINIC | Age: 89
End: 2024-06-18
Payer: COMMERCIAL

## 2024-06-18 DIAGNOSIS — G40.909 SEIZURE DISORDER (HCC): ICD-10-CM

## 2024-06-18 DIAGNOSIS — Z86.73 HISTORY OF MULTIPLE STROKES: Primary | ICD-10-CM

## 2024-06-18 PROCEDURE — 97530 THERAPEUTIC ACTIVITIES: CPT

## 2024-06-18 NOTE — PROGRESS NOTES
Occupational Therapy Progress Note    Today's date: 2024  Patient name: Yesenia Tillman  : 1935  MRN: [id number]  Referring provider: Liam Zepeda MD  Dx:   Encounter Diagnoses   Name Primary?    History of multiple strokes Yes    Seizure disorder (HCC)          Start Time: 1330  Stop Time: 1414  Total time in clinic (min): 44 minutes        PLAN OF CARE START: 24  PLAN OF CARE END: 24  FREQUENCY: 2x/week  PRECAUTIONS standard    Subjective: Pt reported she forgot about last session and did not think to check the calendar she was given; she almost forgot to check the calendar for today's session. Pt reports she forgot what kind of therapy she is here for.    Objective: See treatment below.    TA:   -completed ipsy dig in to improve attention. Pt then given 1 minute to memorize 6 figures then find in bin; pt found 3/6 figures (correct color and figure).   -completed letter grid at table top; pt asked to utilize bananagrams to make out words she found in grid. Focused on EF skills and attention. Pt found 17 words.     Assessment: Tolerated treatment well. Pt demonstrating in session impaired EF skills, organization, planning, recall, working memory. Pt benefits from additional instructions. Pt would benefit from continued OT services to address these areas. Pt benefited from encouragement and reinforcement of instructions.       Plan: Continued skilled OT per POC.    Goals added 24:   Pt will improved immediate delayed recall as evidenced on the CMT, increasing to 12 immediate, 6 delayed      GOALS ADDED 24:   SHORT TERM GOALS:   Memory  Pt will increase delayed recall being able to recall 8 items on RBANS in STM.  Pt will increase delayed recall being able to recall 8 items on RBANs in LTM        Attention     Pt will maintain attention to task for 10 minutes in semi modal environment for baseline performance,  improved role performance and to improve learning and to simulate return  to IADLs and complete cooking/cleaning tasks.    Pt will demo ability to participate in dual tasking/divided attention task with 50% accuracy in multimodal environment to simulate return to life roles          LONG TERM GOALS  Memory  Pt will increase delayed recall being able to recall 9 items on RBANS in STM.  Pt will increase delayed recall being able to recall 9 items on RBANs in LTM        Attention     Pt will maintain attention to task for 20 minutes in semi modal environment for baseline performance,  improved role performance and to improve learning and to simulate return to IADLs and complete cooking/cleaning tasks.    Pt will demo ability to participate in dual tasking/divided attention task with 75% accuracy in multimodal environment to simulate return to life roles

## 2024-06-20 ENCOUNTER — OFFICE VISIT (OUTPATIENT)
Dept: OBGYN CLINIC | Facility: CLINIC | Age: 89
End: 2024-06-20
Payer: COMMERCIAL

## 2024-06-20 VITALS
DIASTOLIC BLOOD PRESSURE: 51 MMHG | SYSTOLIC BLOOD PRESSURE: 146 MMHG | HEIGHT: 60 IN | HEART RATE: 72 BPM | WEIGHT: 150 LBS | BODY MASS INDEX: 29.45 KG/M2

## 2024-06-20 DIAGNOSIS — E78.2 MIXED HYPERLIPIDEMIA: ICD-10-CM

## 2024-06-20 DIAGNOSIS — M75.81 ROTATOR CUFF TENDONITIS, RIGHT: Primary | ICD-10-CM

## 2024-06-20 DIAGNOSIS — G40.909 SEIZURE DISORDER (HCC): ICD-10-CM

## 2024-06-20 PROCEDURE — 20610 DRAIN/INJ JOINT/BURSA W/O US: CPT | Performed by: ORTHOPAEDIC SURGERY

## 2024-06-20 PROCEDURE — 99213 OFFICE O/P EST LOW 20 MIN: CPT | Performed by: ORTHOPAEDIC SURGERY

## 2024-06-20 RX ORDER — ATORVASTATIN CALCIUM 40 MG/1
40 TABLET, FILM COATED ORAL DAILY
Qty: 90 TABLET | Refills: 1 | Status: SHIPPED | OUTPATIENT
Start: 2024-06-20

## 2024-06-20 RX ORDER — LEVETIRACETAM 500 MG/1
500 TABLET ORAL DAILY
Qty: 90 TABLET | Refills: 1 | Status: SHIPPED | OUTPATIENT
Start: 2024-06-20

## 2024-06-20 RX ADMIN — ROPIVACAINE HYDROCHLORIDE 10 ML: 5 INJECTION, SOLUTION EPIDURAL; INFILTRATION; PERINEURAL at 12:15

## 2024-06-20 RX ADMIN — TRIAMCINOLONE ACETONIDE 40 MG: 40 INJECTION, SUSPENSION INTRA-ARTICULAR; INTRAMUSCULAR at 12:15

## 2024-06-20 NOTE — TELEPHONE ENCOUNTER
Reason for call:   [x] Refill   [] Prior Auth  [] Other:     Office:   [x] PCP/Provider -  Liam Zepeda MD   [] Specialty/Provider -               Pharmacy:   [id number] - [address] ( [address])        Does the patient have enough for 3 days?   [] Yes   [x] No - Send as HP to POD

## 2024-06-20 NOTE — PROGRESS NOTES
"Orthopedic Sports Medicine New Patient Visit     Assesment:   88 y.o. female with right rotator cuff tendinitis    Plan:    Upon examination today, the patient has well-maintained shoulder strength. She does have pain with ranging the shoulder. The patient is not interested in considering surgical intervention. As such, with well-maintained strength in the setting of chronic, atraumatic shoulder pain, I recommended beginning a course of formal PT. We also discussed providing a steroid injection into the shoulder today for immediate pain relief. This may help her more fully participate in PT as well. Her most recent right subacromial CSI was on 3/5/24 by Dr. Dunbar. After reviewing the benefits, risks, and alternatives to a steroid injection, she consented for and toleratLarge joint arthrocentesis: R subacromial bursa  Universal Protocol:  Consent: Verbal consent obtained.  Risks and benefits: risks, benefits and alternatives were discussed  Consent given by: patient  Time out: Immediately prior to procedure a \"time out\" was called to verify the correct patient, procedure, equipment, support staff and site/side marked as required.  Patient understanding: patient states understanding of the procedure being performed  Patient identity confirmed: verbally with patient  Supporting Documentation  Indications: pain   Procedure Details  Location: shoulder - R subacromial bursa  Needle size: 22 G  Ultrasound guidance: no  Approach: posterolateral  Medications administered: 10 mL ropivacaine 0.5 %; 40 mg triamcinolone acetonide 40 mg/mL    Patient tolerance: patient tolerated the procedure well with no immediate complications  Dressing:  Sterile dressing applied        ed the procedure well without any acute complications. Post-injection instructions were provided. We discussed that the minimum time between injections is 3 months. The patient can also use ice/heat and Tylenol as needed for pain. We can plan to follow up " with Yesenia as needed.        History of Present Illness:    The patient is a 88 y.o., right hand dominant, female, presenting for initial evaluation of chronic, atraumatic right shoulder pain localized to the lateral and anterior aspects. 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 The patient notes pain with overhead reaching/lifting, sleeping, and some ADLs. She denies weakness, neck pain, and numbness/tingling. Yesenia was evaluated for this by Dr. Dunbar in 12/23 and 3/24 where she was diagnosed with rotator cuff tendinitis and treated with subacromial steroid injections. The patient did get about 3 months worth of relief from injections and is hoping for a repeat injection today. At the time of her visit with Dr. Dunbar, the patient was not interested in attending PT. The patient uses Tylenol as needed for pain.     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      Shoulder Surgical History:  None    Past Medical, Social and Family History:  Past Medical History:   Diagnosis Date    Acute CVA (cerebrovascular accident) (HCC) 06/18/2016    Adult hypothyroidism 09/12/2016    Anxiety 06/10/2019    Arthritis     Asthma     Colon polyp     Depression     Disease of thyroid gland     Dyslipidemia 06/10/2019    Esophageal dysmotility 06/10/2019    Essential hypertension 09/12/2016    GERD (gastroesophageal reflux disease)     Hyperlipidemia     Hypertension     Hypothyroidism     Obesity     Paroxysmal atrial fibrillation (HCC) 09/12/2016    Seizure disorder ([facility]) 08/24/2018    Seizures ([facility])     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    Stroke ([facility]) 06/2016    Subarachnoid hemorrhage (HCC) 09/13/2016    Subdural hematoma (HCC) 09/13/2016     Past Surgical History:   Procedure Laterality Date     APPENDECTOMY      BACK SURGERY  01/2016    CARPAL TUNNEL RELEASE Right     COLONOSCOPY      TUBAL LIGATION       Allergies   Allergen Reactions    Amlodipine Other (See Comments)     Unknown reaction    Hydralazine Other (See Comments)     Unknown reaction    Penicillins Other (See Comments)     Unknown since childhood     Current Outpatient Medications on File Prior to Visit   Medication Sig Dispense Refill    acetaminophen (TYLENOL) 650 mg CR tablet Take 1 tablet (650 mg total) by mouth every 8 (eight) hours as needed for mild pain 30 tablet 0    clopidogrel (PLAVIX) 75 mg tablet Take 1 tablet (75 mg total) by mouth daily 90 tablet 0    famotidine (PEPCID) 20 mg tablet Take 1 tablet (20 mg total) by mouth daily 90 tablet 0    fluticasone (FLONASE) 50 mcg/act nasal spray 1 spray into each nostril daily      levothyroxine 88 mcg tablet Take 1 tablet (88 mcg total) by mouth daily 90 tablet 1    loratadine (CLARITIN) 10 mg tablet Take 10 mg by mouth daily As needed      losartan (COZAAR) 50 mg tablet Take 1 tablet (50 mg total) by mouth daily 90 tablet 0    metoprolol succinate (TOPROL-XL) 50 mg 24 hr tablet Take 1 tablet (50 mg total) by mouth daily 90 tablet 0    omeprazole (PriLOSEC) 20 mg delayed release capsule Take 1 capsule (20 mg total) by mouth daily 90 capsule 1    promethazine-dextromethorphan (PHENERGAN-DM) 6.25-15 mg/5 mL oral syrup Take 5 mL by mouth 4 (four) times a day as needed for cough 120 mL 0    venlafaxine (EFFEXOR-XR) 37.5 mg 24 hr capsule Take 1 capsule (37.5 mg total) by mouth daily with breakfast 30 capsule 5    acetaminophen (TYLENOL) 325 mg tablet Take 2 tablets (650 mg total) by mouth every 6 (six) hours as needed for mild pain, headaches or fever 30 tablet 0    lidocaine (LIDODERM) 5 % Apply 1 patch topically over 12 hours daily Remove & Discard patch within 12 hours or as directed by MD Do not start before December 9, 2023. 14 patch 0    [DISCONTINUED] atorvastatin (LIPITOR) 40 mg tablet  Take 1 tablet (40 mg total) by mouth daily 90 tablet 0    [DISCONTINUED] levETIRAcetam (KEPPRA) 500 mg tablet Take 1 tablet (500 mg total) by mouth daily 90 tablet 0     No current facility-administered medications on file prior to visit.     Social History     Socioeconomic History    Marital status:      Spouse name: Not on file    Number of children: Not on file    Years of education: Not on file    Highest education level: Not on file   Occupational History    Not on file   Tobacco Use    Smoking status: Never    Smokeless tobacco: Never   Vaping Use    Vaping status: Never Used   Substance and Sexual Activity    Alcohol use: Never     Alcohol/week: 0.0 standard drinks of alcohol     Comment: 0    Drug use: No    Sexual activity: Not Currently     Partners: Male     Birth control/protection: Post-menopausal   Other Topics Concern    Not on file   Social History Narrative    Not on file     Social Determinants of Health     Financial Resource Strain: Low Risk  (10/12/2023)    Overall Financial Resource Strain (CARDIA)     Difficulty of Paying Living Expenses: Not hard at all   Food Insecurity: Unknown (12/8/2023)    Hunger Vital Sign     Worried About Running Out of Food in the Last Year: Never true     Ran Out of Food in the Last Year: Not on file   Transportation Needs: No Transportation Needs (12/8/2023)    PRAPARE - Transportation     Lack of Transportation (Medical): No     Lack of Transportation (Non-Medical): No   Physical Activity: Not on file   Stress: Not on file   Social Connections: Not on file   Intimate Partner Violence: Not on file   Housing Stability: Low Risk  (12/8/2023)    Housing Stability Vital Sign     Unable to Pay for Housing in the Last Year: No     Number of Times Moved in the Last Year: 1     Homeless in the Last Year: No         I have reviewed the past medical, surgical, social and family history, medications and allergies as documented in the EMR.    Review of systems: SANDOVAL araujo  negative other than that noted in the HPI.  Constitutional: Negative for fatigue and fever.   HENT: Negative for sore throat.    Respiratory: Negative for shortness of breath.    Cardiovascular: Negative for chest pain.   Gastrointestinal: Negative for abdominal pain.   Endocrine: Negative for cold intolerance and heat intolerance.   Genitourinary: Negative for flank pain.   Musculoskeletal: Negative for back pain.   Skin: Negative for rash.   Allergic/Immunologic: Negative for immunocompromised state.   Neurological: Negative for dizziness.   Psychiatric/Behavioral: Negative for agitation.      Physical Exam:    Blood pressure 146/51, pulse 72, height 5' (1.524 m), weight 68 kg (150 lb), not currently breastfeeding.    General/Constitutional: NAD, well developed, well nourished  HENT: Normocephalic, atraumatic  CV: Intact distal pulses, regular rate  Resp: No respiratory distress or labored breathing  Abdomen: soft, nondistended, non tender   Lymphatic: No lymphadenopathy palpated  Neuro: Alert and Oriented x 3, no focal deficits  Psych: Normal mood, normal affect  Skin: Warm, dry, no rashes, no erythema      Shoulder Exam:      Inspection: No ecchymosis, edema, or deformity. No visualized wounds or skin lesions   Palpation: Mild AC joint and bicipital groove tenderness  Active Motion:       FF: 120 actively with pain, 140 passively with pain        ER: 20 actively with pain, 45 passively with pain         IR: right hip with pain  Strength: 4+/5 empty can with pain, 5/5 ER,  5/5 IR   Sensory - SILT in the Radial / Ulnar / Median / Axillary nerve distributions  Motor - AIN / PIN / Radial / Ulnar / Median / Axillary motor nerves in tact  Palpable Radial pulse  Cap refill <2secs in all digits    5/5 Belly Press        Imaging    I reviewed and interpreted X-rays of the right shoulder from 12/7/23, which show no acute osseous abnormalities. Mild AC and GH joint degenerative changes. Humerus is well-centered on the  glenoid.

## 2024-06-21 RX ORDER — TRIAMCINOLONE ACETONIDE 40 MG/ML
40 INJECTION, SUSPENSION INTRA-ARTICULAR; INTRAMUSCULAR
Status: COMPLETED | OUTPATIENT
Start: 2024-06-20 | End: 2024-06-20

## 2024-06-21 RX ORDER — ROPIVACAINE HYDROCHLORIDE 5 MG/ML
10 INJECTION, SOLUTION EPIDURAL; INFILTRATION; PERINEURAL
Status: COMPLETED | OUTPATIENT
Start: 2024-06-20 | End: 2024-06-20

## 2024-06-25 ENCOUNTER — OFFICE VISIT (OUTPATIENT)
Facility: CLINIC | Age: 89
End: 2024-06-25
Payer: COMMERCIAL

## 2024-06-25 DIAGNOSIS — G40.909 SEIZURE DISORDER (HCC): ICD-10-CM

## 2024-06-25 DIAGNOSIS — Z86.73 HISTORY OF MULTIPLE STROKES: Primary | ICD-10-CM

## 2024-06-25 PROCEDURE — 97530 THERAPEUTIC ACTIVITIES: CPT

## 2024-06-25 NOTE — PROGRESS NOTES
Occupational Therapy Progress Note    Today's date: 2024  Patient name: Yesenia Tillman  : 1935  MRN: [id number]  Referring provider: Liam Zepeda MD  Dx:   Encounter Diagnoses   Name Primary?    History of multiple strokes Yes    Seizure disorder (HCC)            Start Time: 1445  Stop Time: 1530  Total time in clinic (min): 45 minutes        PLAN OF CARE START: 24  PLAN OF CARE END: 24  FREQUENCY: 2x/week  PRECAUTIONS standard    Subjective: Pt arrived to session 15 minutes early worried if she was in the right place.     Objective: See treatment below.    All tasks completed in multimodal environment:   TA:   -completed spot the difference x2 to improve attention.   -completed 120 number board with serial subtraction (by 4) with category prompts. Focused on attention, recall, and following mult-istep direction.     -pt educated on cognitive group on  and she reported interest. Pt educated on where her appointments for the rest of the week and she noted understanding.         Assessment: Tolerated treatment well. Pt demonstrating in session impaired EF skills, organization, planning, recall, working memory. Pt benefits from additional instructions. Pt would benefit from continued OT services to address these areas. Pt benefited from encouragement and reinforcement of instructions.       Plan: Continued skilled OT per POC.    Goals added 24:   Pt will improved immediate delayed recall as evidenced on the CMT, increasing to 12 immediate, 6 delayed      GOALS ADDED 24:   SHORT TERM GOALS:   Memory  Pt will increase delayed recall being able to recall 8 items on RBANS in STM.  Pt will increase delayed recall being able to recall 8 items on RBANs in LTM        Attention     Pt will maintain attention to task for 10 minutes in semi modal environment for baseline performance,  improved role performance and to improve learning and to simulate return to IADLs and complete  cooking/cleaning tasks.    Pt will demo ability to participate in dual tasking/divided attention task with 50% accuracy in multimodal environment to simulate return to life roles          LONG TERM GOALS  Memory  Pt will increase delayed recall being able to recall 9 items on RBANS in STM.  Pt will increase delayed recall being able to recall 9 items on RBANs in LTM        Attention     Pt will maintain attention to task for 20 minutes in semi modal environment for baseline performance,  improved role performance and to improve learning and to simulate return to IADLs and complete cooking/cleaning tasks.    Pt will demo ability to participate in dual tasking/divided attention task with 75% accuracy in multimodal environment to simulate return to life roles

## 2024-06-26 ENCOUNTER — EVALUATION (OUTPATIENT)
Dept: PHYSICAL THERAPY | Facility: CLINIC | Age: 89
End: 2024-06-26
Payer: COMMERCIAL

## 2024-06-26 DIAGNOSIS — M75.81 ROTATOR CUFF TENDONITIS, RIGHT: Primary | ICD-10-CM

## 2024-06-26 PROCEDURE — 97161 PT EVAL LOW COMPLEX 20 MIN: CPT

## 2024-06-26 PROCEDURE — 97530 THERAPEUTIC ACTIVITIES: CPT

## 2024-06-26 NOTE — PROGRESS NOTES
PT Evaluation   Today's date: 2024  Patient name: Yesenia Tillman  : 1935  MRN: [id number]  Referring provider: Keke Pollard PA-C  Dx:   Encounter Diagnosis     ICD-10-CM    1. Rotator cuff tendonitis, right  M75.81 Ambulatory referral to Physical Therapy          Assessment    Yesenia Tillman is a pleasant 88 y.o. female who presents with a referring diagnosis of Rt RTC tendonitis. The patient's greatest concern is pain with shoulder movements. The primary movement system diagnosis is shoulder hypomobility with nociceptive and nociplastic pain resulting in pathoanatomical symptoms of impaired active shoulder ROM, impaired OH mobility, impaired shoulder strength, poor functional mobility, poor posture, and poor postural strength. The aforementioned impairments have limited the patient's ability to perform ADLs and recreational activities. Differential diagnosis for this patient's presentation includes RTC tendonitis verus tear. No further referral is neccessary at this time based upon examination results. Positive prognostic indicators include motivation to improve and positive attitude. Negative prognostic indicators include chronicity of symptoms.     Impairments: abnormal muscle firing, abnormal muscle tone, abnormal or restricted ROM, Impaired physical strength, lacks appropriate HEP, pain with function, weight-bearing intolerance, poor posture, and poor body mechanics    Symptom Irritability: moderate    Problem List:  - impaired active ROM   - poor postural strength     Concordant Sign:  - reaching overhead    Patient education performed this session included: home exercise program, plan of care, expected tissue healing time, prognosis and diagnosis, heat, and ice    Patient verbalized good understanding of POC.    Please contact me if you have any questions or recommendations. Thank you for the referral and the opportunity to share in Yesenia Tillman's care.       Plan    Patient would  benefit from: Skilled PT  Planned modality interventions: biofeedback, cryotherapy, TENS, and thermotherapy (hot pack)  Planned therapy interventions: abdominal trunk stabilization, activity modification, behavior modification, body mechanics training, functional ROM exercises, graded exercise, HEP, joint mobilization, manual therapy, Logan taping, motor coordination training, neuromuscular re-education, patient education, postural training, strengthening, stretching, therapeutic activities, and therapeutic exercises    Frequency: 2x per week  Duration in weeks: 6 weeks    Plan of Care beginning date: 6/26/2024  Plan of Care expiration date: 6 weeks - 8/12/2024  Treatment plan discussed with: patient      Goals    Short Term Goals (3 weeks):    Patient will be independent with basic HEP.  Patient will report >50% reduction in pain.  Patient will demonstrate >1/3 improvement in MMT grade as applicable  Patient will be able to sleep secondary to pain  Patient will be able to reach a shoulder height shelf with pain <3/10    Long Term Goals (6 weeks):  Patient will be independent in a comprehensive home exercise program  Patient FOTO score will improve by 10 points  Patient will self-report >75% improvement in function  Patient will be able to paint without shoulder pain >3/10  Patient will be able to perform functional ADLs (ie. brushing/washing her hair)      History    History of Present Illness  Mechanism of injury: Patient reports a chronic history of shoulder pain on Rt. She states that the pain recently worsened in the last few weeks. She states that she has pain with everything, specifically overhead activities and ADLs such as brushing her hair and washing her head.  She saw the orthopedic on 6/20/24 and received an injection which didn't provide much relief beyond 4-5 days. 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 She states that she is able to sleep, but she can only  tolerate laying on her Rt side for short periods of time. She is Rt handed.    Prior level of function: independent, driving, painting    Progression: worsening    Previous treatment: injection treatment  Current treatment: physical therapy    Patient goals for therapy: decrease pain, increase motion, increase strength, independence with ADLs, and return to leisure activities    Pain   6/26/2024    Current 2/10    Best 2/10    Worst 10/10     Location: anterior shoulder  Description: dull aching  Aggravating factors:  overhead activities, brushing hair  Relieving factors: heat and medications      Physical Examination    Red Flag Screening  (+): age >50 years old     Postural Assessment  Posture in Sitting: poor  Posture in Standing: poor  Postural Correction: has no consistent effect   Manual or self correction? self correction    Sensation  Light touch: intact bilaterally    Cervical Spine ROM  WNL and pain-free in all directions    Thoracic Spine ROM   6/26/2024    Extension Mod limit    Rt Rotation Mod limit    Lt Rotation Mod limit     UE AROM   6/26/2024    LEFT RIGHT     Shoulder Flexion 160 160* (* at 90)    Shoulder Abduction 145 120* (* at 60)    Shoulder ER to ear to ear*    Shoulder IR L5 to gluteal fold*     UE MMT   6/26/2024    LEFT RIGHT     Shoulder Flexion 4/5 3/5*    Shoulder Abduction 4/5 3/5*    Shoulder Extension 4/5 3/5    Shoulder ER  4/5 3/5*    Shoulder IR  4/5 3/5*       Elbow Flexion 4/5 4/5    Elbow Extension 4/5 4/5     (*) = reproduction of patient's reported pain    Palpation  (+) TTP of anterior shoulder along biceps tendon    Special Tests Performed  (+): painful arc (impingement)  (-): drop arm (RTC pathology)    Functional Assessment  Unable to brush/wash her hair  Unable to reach an overhead shelf             Insurance:  AMA/CMS Eval/ Re-eval Auth #/ Referral # Total units  Start date  Expiration date Extension  Visit limitation?  PT only or  PT+OT? Co-Insurance   CMS (misc [facility]  [facility]) 6/26/2024 No auth needed       $40 copay                                                     POC Start Date POC Expiration Date Signed POC?   6/26/2024 6 weeks - 8/7/2024       Date               Units:  Used               Authed:  Remaining                  Precautions:   Past Medical History:   Diagnosis Date    Acute CVA (cerebrovascular accident) ([facility]) 06/18/2016    Adult hypothyroidism 09/12/2016    Anxiety 06/10/2019    Arthritis     Asthma     Colon polyp     Depression     Disease of thyroid gland     Dyslipidemia 06/10/2019    Esophageal dysmotility 06/10/2019    Essential hypertension 09/12/2016    GERD (gastroesophageal reflux disease)     Hyperlipidemia     Hypertension     Hypothyroidism     Obesity     Paroxysmal atrial fibrillation ([facility]) 09/12/2016    Seizure disorder ([facility]) 08/24/2018    Seizures ([facility])     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    Stroke ([facility]) 06/2016    Subarachnoid hemorrhage ([facility]) 09/13/2016    Subdural hematoma ([facility]) 09/13/2016       Date 6/26/2024        Visit Number IE        Manual         GHJ mobs         Scapular mobs         STM                           TherEx         PROM         AAROM         Rows and extension                                                      Neuro Re-Ed         Shoulder iso                                                                        TherAct         Patient education         Posture education                                    Gait Training                                    Modalities

## 2024-06-28 ENCOUNTER — APPOINTMENT (OUTPATIENT)
Facility: CLINIC | Age: 89
End: 2024-06-28
Payer: COMMERCIAL

## 2024-06-28 ENCOUNTER — OFFICE VISIT (OUTPATIENT)
Facility: CLINIC | Age: 89
End: 2024-06-28
Payer: COMMERCIAL

## 2024-06-28 DIAGNOSIS — G40.909 SEIZURE DISORDER (HCC): ICD-10-CM

## 2024-06-28 DIAGNOSIS — Z86.73 HISTORY OF MULTIPLE STROKES: Primary | ICD-10-CM

## 2024-06-28 PROCEDURE — 97530 THERAPEUTIC ACTIVITIES: CPT | Performed by: OCCUPATIONAL THERAPIST

## 2024-06-28 PROCEDURE — 97110 THERAPEUTIC EXERCISES: CPT | Performed by: OCCUPATIONAL THERAPIST

## 2024-06-28 NOTE — PROGRESS NOTES
"Occupational Therapy Progress Note    Today's date: 2024  Patient name: Yesenia Tillman  : 1935  MRN: [id number]  Referring provider: Liam Zepeda MD  Dx:   Encounter Diagnoses   Name Primary?    History of multiple strokes Yes    Seizure disorder (HCC)        Start Time: 1145  Stop Time: 1313  Total time in clinic (min): 88 minutes        PLAN OF CARE START: 24  PLAN OF CARE END: 24  FREQUENCY: 2x/week  PRECAUTIONS standard, seizures    Subjective: \"When am I going to work on my shoulder?\"    Objective: See treatment below.  Pt participated in cog group focusing on improving/maintaining functional cognition with incorporation of social participation to address overall wellbeing.     TE:  1 round x3 minutes of each of the following dual tasks:    STS with alphabetical naming  Rows with theraband with word search (2 rounds)  Step ups with serial addition by 4    TA:  -Go in reverse to separate the sentence worksheet to address EF.  -Group taboo activity to address deductive reasoning  -“Brain Games” modified crossword puzzle #150 to address sustained attention and EF.       Assessment: Tolerated group treatment well. Pt demonstrating difficulty with EF skills today. Pt benefits from additional instructions. Pt would benefit from continued OT services to address these areas. Pt benefited from encouragement and reinforcement of instructions.       Plan: Continued skilled OT per POC.    Goals added 24:   Pt will improved immediate delayed recall as evidenced on the CMT, increasing to 12 immediate, 6 delayed      GOALS ADDED 24:   SHORT TERM GOALS:   Memory  Pt will increase delayed recall being able to recall 8 items on RBANS in STM.  Pt will increase delayed recall being able to recall 8 items on RBANs in LTM        Attention     Pt will maintain attention to task for 10 minutes in semi modal environment for baseline performance,  improved role performance and to improve learning and " to simulate return to IADLs and complete cooking/cleaning tasks.    Pt will demo ability to participate in dual tasking/divided attention task with 50% accuracy in multimodal environment to simulate return to life roles          LONG TERM GOALS  Memory  Pt will increase delayed recall being able to recall 9 items on RBANS in STM.  Pt will increase delayed recall being able to recall 9 items on RBANs in LTM        Attention     Pt will maintain attention to task for 20 minutes in semi modal environment for baseline performance,  improved role performance and to improve learning and to simulate return to IADLs and complete cooking/cleaning tasks.    Pt will demo ability to participate in dual tasking/divided attention task with 75% accuracy in multimodal environment to simulate return to life roles

## 2024-07-01 ENCOUNTER — SOCIAL WORK (OUTPATIENT)
Dept: BEHAVIORAL/MENTAL HEALTH CLINIC | Facility: CLINIC | Age: 89
End: 2024-07-01
Payer: COMMERCIAL

## 2024-07-01 ENCOUNTER — OFFICE VISIT (OUTPATIENT)
Dept: PHYSICAL THERAPY | Facility: CLINIC | Age: 89
End: 2024-07-01
Payer: COMMERCIAL

## 2024-07-01 DIAGNOSIS — R41.3 MEMORY CHANGES: ICD-10-CM

## 2024-07-01 DIAGNOSIS — F32.1 CURRENT MODERATE EPISODE OF MAJOR DEPRESSIVE DISORDER WITHOUT PRIOR EPISODE (HCC): Primary | ICD-10-CM

## 2024-07-01 DIAGNOSIS — M75.81 ROTATOR CUFF TENDONITIS, RIGHT: Primary | ICD-10-CM

## 2024-07-01 PROCEDURE — 97110 THERAPEUTIC EXERCISES: CPT

## 2024-07-01 PROCEDURE — 90837 PSYTX W PT 60 MINUTES: CPT | Performed by: SOCIAL WORKER

## 2024-07-01 PROCEDURE — 97112 NEUROMUSCULAR REEDUCATION: CPT

## 2024-07-01 NOTE — PROGRESS NOTES
Daily Note     Today's date: 2024  Patient name: Yesenia Tillman  : 1935  MRN: [id number]  Referring provider: Keke Pollard PA-C  Dx:   Encounter Diagnosis     ICD-10-CM    1. Rotator cuff tendonitis, right  M75.81                      Subjective: Patient reports significant improvement in her arm since IE.       Objective: See treatment diary below      Assessment: Tolerated treatment well. Introduced gradual mobility activities for the shoulder as well as introduced isometric loading to the shoulder. Did not introduce RTC isometric loading today to assess for response. Patient overall reports decreased pain ad does not feel she will need to be here long. Patient did require an extended rest between supine and standing activities secodnary to significant lightheadedness. Patient believes this might be related to her BP medication and was advised to contact her cardiologist or PCP to discuss her concerns. Patient demonstrated fatigue post treatment and would benefit from continued PT to address functional mobility deficits and return to PLOF.      Plan: Continue per plan of care.        Insurance:  AMA/CMS Eval/ Re-eval Auth #/ Referral # Total units  Start date  Expiration date Extension  Visit limitation?  PT only or  PT+OT? Co-Insurance   CMS (misc BC mcr) 2024 No auth needed       $40 copay                                                     POC Start Date POC Expiration Date Signed POC?   2024 6 weeks - 2024       Date               Units:  Used               Authed:  Remaining                  Precautions:   Past Medical History:   Diagnosis Date    Acute CVA (cerebrovascular accident) ([facility]) 2016    Adult hypothyroidism 2016    Anxiety 06/10/2019    Arthritis     Asthma     Colon polyp     Depression     Disease of thyroid gland     Dyslipidemia 06/10/2019    Esophageal dysmotility 06/10/2019    Essential hypertension 2016    GERD (gastroesophageal reflux disease)      Hyperlipidemia     Hypertension     Hypothyroidism     Obesity     Paroxysmal atrial fibrillation ([facility]) 09/12/2016    Seizure disorder ([facility]) 08/24/2018    Seizures ([facility])     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    Stroke ([facility]) 06/2016    Subarachnoid hemorrhage ([facility]) 09/13/2016    Subdural hematoma ([facility]) 09/13/2016       Date 6/26/2024 7/1/24       Visit Number IE 2       Manual         GHJ mobs         Scapular mobs         STM                           TherEx         PROM  PROM all directions x 5 min    W/ oscillation distraction 3 x 30 sec       AAROM  Supine hands clasped x20       Rows and extension                                                      Neuro Re-Ed         Shoulder iso  Deltoid isos (flex, abd, ext) 10 x 5 sec                                                                      TherAct         Patient education  HEP and POC x 5 min       Posture education                                    Gait Training                                    Modalities

## 2024-07-01 NOTE — PSYCH
Behavioral Health Psychotherapy Progress Note    Psychotherapy Provided: Individual Psychotherapy     1. 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 Memory changes            Goals addressed in session: Goal 2     DATA: Yesenia appeared more confused this session but as we began talking she seemed to improve.  Yesenia noted that she has been very tearful, crying a lot but does not allow herself to get it all out.  She shared that she misses her son, feels as though she does not have a purpose and is lonely.  She discussed the possibility of visiting someone in another building near hers but dennison snot want to make a comittment.  She had little insight into this.  Yesenia noted that she likes to feel needed but at the same time does not want to get close to others.  Discussed what she feels comfortable doing which is going to [employer] and talking with her  neighbor when she is not traveling.  Printed up a calendar of the month's [facility] appts for Yesenia and reminded her to attend her PT and OT appts which can help her confusion/forgetfulness.  During this session, this clinician used the following therapeutic modalities: Bereavement Therapy, Client-centered Therapy, and Supportive Psychotherapy    Substance Abuse was not addressed during this session. If the client is diagnosed with a co-occurring substance use disorder, please indicate any changes in the frequency or amount of use: n/a. Stage of change for addressing substance use diagnoses: No substance use/Not applicable    ASSESSMENT:  Yesenia Tillman presents with a Anxious and Depressed mood.     her affect is Normal range and intensity and Tearful, which is congruent, with her mood and the content of the session. The client has made progress on their goals.     Yesenia Tillman presents with a none risk of suicide, none risk of self-harm, and none risk of harm to others.    For any risk assessment that  "surpasses a \"low\" rating, a safety plan must be developed.    A safety plan was indicated: no  If yes, describe in detail n/a    PLAN: Between sessions, Yesenia Tillman will cntinue to go to [employer] even when she doesn't feel like going. At the next session, the therapist will use Bereavement Therapy, Client-centered Therapy, and Supportive Psychotherapy to address sadness/anxiety/grief.    Behavioral Health Treatment Plan and Discharge Planning: Yesenia Tillman is aware of and agrees to continue to work on their treatment plan. They have identified and are working toward their discharge goals. yes    Visit start and stop times:    07/01/24  Start Time: 1300  Stop Time: 1355  Total Visit Time: 55 minutes  "

## 2024-07-02 ENCOUNTER — OFFICE VISIT (OUTPATIENT)
Facility: CLINIC | Age: 89
End: 2024-07-02
Payer: COMMERCIAL

## 2024-07-02 DIAGNOSIS — Z86.73 HISTORY OF MULTIPLE STROKES: Primary | ICD-10-CM

## 2024-07-02 DIAGNOSIS — G40.909 SEIZURE DISORDER (HCC): ICD-10-CM

## 2024-07-02 PROCEDURE — 97530 THERAPEUTIC ACTIVITIES: CPT

## 2024-07-02 NOTE — PROGRESS NOTES
"Occupational Therapy Progress Note    Today's date: 2024  Patient name: Yesenia Tillman  : 1935  MRN: [id number]  Referring provider: Liam Zepeda MD  Dx:   Encounter Diagnoses   Name Primary?    History of multiple strokes Yes    Seizure disorder (HCC)              Start Time: 1415  Stop Time: 1500  Total time in clinic (min): 45 minutes        PLAN OF CARE START: 24  PLAN OF CARE END: 24  FREQUENCY: 2x/week  PRECAUTIONS standard    Subjective: Pt reported, \"I have no memory of [the group] at all, that's not good.\"     Objective: See treatment below.    All tasks completed in semimodal environment:   TA:   -completed letter placement: word transformation with min assistance for more difficult problems. Pt demo difficulty with understanding directions of task. Focused on attention and EF skills.   -completed decoding task with letters; pt completed 75% of task with min assist. Pt given remainder of task for HEP. Focused on divided attention.       Assessment: Tolerated treatment well. Pt demonstrating in session impaired EF skills, organization, planning, recall, working memory. Pt benefits from additional instructions. Pt would benefit from continued OT services to address these areas. Pt benefited from encouragement and reinforcement of instructions.       Plan: Continued skilled OT per POC.    Goals added 24:   Pt will improved immediate delayed recall as evidenced on the CMT, increasing to 12 immediate, 6 delayed      GOALS ADDED 24:   SHORT TERM GOALS:   Memory  Pt will increase delayed recall being able to recall 8 items on RBANS in STM.  Pt will increase delayed recall being able to recall 8 items on RBANs in LTM        Attention     Pt will maintain attention to task for 10 minutes in semi modal environment for baseline performance,  improved role performance and to improve learning and to simulate return to IADLs and complete cooking/cleaning tasks.    Pt will demo ability " to participate in dual tasking/divided attention task with 50% accuracy in multimodal environment to simulate return to life roles          LONG TERM GOALS  Memory  Pt will increase delayed recall being able to recall 9 items on RBANS in STM.  Pt will increase delayed recall being able to recall 9 items on RBANs in LTM        Attention     Pt will maintain attention to task for 20 minutes in semi modal environment for baseline performance,  improved role performance and to improve learning and to simulate return to IADLs and complete cooking/cleaning tasks.    Pt will demo ability to participate in dual tasking/divided attention task with 75% accuracy in multimodal environment to simulate return to life roles

## 2024-07-05 ENCOUNTER — OFFICE VISIT (OUTPATIENT)
Facility: CLINIC | Age: 89
End: 2024-07-05
Payer: COMMERCIAL

## 2024-07-05 DIAGNOSIS — G40.909 SEIZURE DISORDER (HCC): ICD-10-CM

## 2024-07-05 DIAGNOSIS — Z86.73 HISTORY OF MULTIPLE STROKES: Primary | ICD-10-CM

## 2024-07-05 PROCEDURE — 97110 THERAPEUTIC EXERCISES: CPT | Performed by: OCCUPATIONAL THERAPIST

## 2024-07-05 PROCEDURE — 97530 THERAPEUTIC ACTIVITIES: CPT | Performed by: OCCUPATIONAL THERAPIST

## 2024-07-05 NOTE — PROGRESS NOTES
Occupational Therapy Progress Note    Today's date: 2024  Patient name: Yesenia Tillman  : 1935  MRN: [id number]  Referring provider: Liam Zepeda MD  Dx:   Encounter Diagnoses   Name Primary?    History of multiple strokes Yes    Seizure disorder (HCC)                             PLAN OF CARE START: 24  PLAN OF CARE END: 24  FREQUENCY: 2x/week  PRECAUTIONS standard    Subjective: pt reports that she doesn't recall group session last Friday, but states she would like to continue with group sessions to make new friends    Objective: See treatment below.  TE:  -Nustep level 1 x16 minutes with dual tasks (serial addition and subtraction, alphabetical and reverse alphabetical naming) to promote neuroplasticity     TA:  -Letter cancellation worksheet x2 rounds. Initially locating 1 letter, then locating 2 letters at the same time. Addressed sustained and divided attention.  -Discussion regarding pt's improvements since starting occupational therapy, benefits of skilled OT vs activities she self-selects at home. Pt states she would like to decrease to 1x/wk OT 2/2 financial burden, but wants to continue with group sessions.  -Word rebuses worksheet completed to address attention.   -30 piece jigsaw puzzle focusing on  skills.     Assessment: Tolerated treatment well. Pt demonstrating in session impaired EF skills, recall. Pt benefits from additional instructions. Pt would benefit from continued OT services to address cognitive function for life roles.     Plan: Continued skilled OT per POC.    Goals added 24:   Pt will improved immediate delayed recall as evidenced on the CMT, increasing to 12 immediate, 6 delayed      GOALS ADDED 24:   SHORT TERM GOALS:   Memory  Pt will increase delayed recall being able to recall 8 items on RBANS in STM.  Pt will increase delayed recall being able to recall 8 items on RBANs in LTM        Attention     Pt will maintain attention to task for 10 minutes  in semi modal environment for baseline performance,  improved role performance and to improve learning and to simulate return to IADLs and complete cooking/cleaning tasks.    Pt will demo ability to participate in dual tasking/divided attention task with 50% accuracy in multimodal environment to simulate return to life roles          LONG TERM GOALS  Memory  Pt will increase delayed recall being able to recall 9 items on RBANS in STM.  Pt will increase delayed recall being able to recall 9 items on RBANs in LTM        Attention     Pt will maintain attention to task for 20 minutes in semi modal environment for baseline performance,  improved role performance and to improve learning and to simulate return to IADLs and complete cooking/cleaning tasks.    Pt will demo ability to participate in dual tasking/divided attention task with 75% accuracy in multimodal environment to simulate return to life roles

## 2024-07-08 ENCOUNTER — SOCIAL WORK (OUTPATIENT)
Dept: BEHAVIORAL/MENTAL HEALTH CLINIC | Facility: CLINIC | Age: 89
End: 2024-07-08
Payer: COMMERCIAL

## 2024-07-08 DIAGNOSIS — F32.1 CURRENT MODERATE EPISODE OF MAJOR DEPRESSIVE DISORDER WITHOUT PRIOR EPISODE (HCC): Primary | ICD-10-CM

## 2024-07-08 PROCEDURE — 90834 PSYTX W PT 45 MINUTES: CPT | Performed by: SOCIAL WORKER

## 2024-07-08 NOTE — PSYCH
Behavioral Health Psychotherapy Progress Note    Psychotherapy Provided: Individual Psychotherapy     1. 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 episode (HCC)            Goals addressed in session: Goal 2     DATA: Yesenia attended session in person and on time, noting that the calendar she was given has been very helpful.  She shared that she does not want to go to OT any longer stating it is not helpful and this was explored and Yesenia was challenged on this as something that would not have immediate effects.  Yesenia shared that she feels as though she has wasted her life and no longer knows who she is, exploring this and assisting Yesenia in staying focused on topic which she was aware of and caught herself several times.  Yesenia was able to make connections between her  and her father and herself and her mother while looking towards the future.  Yesenia was not able to shed insight into her resistances to take steps towards making a new life for herself, and this writer assisted Yesenia in giving herself guido since she just lost another son and is feeling sad and lonely, with taking steps creating feelings of overwhelm while empathizing with her want to be around others.  Discussed taking a step towards making connections by choosing either going to the meal at [facility] or visiting the [facility], having given her the information for both.  During this session, this clinician used the following therapeutic modalities: Bereavement Therapy, Client-centered Therapy, Solution-Focused Therapy, and Supportive Psychotherapy    Substance Abuse was not addressed during this session. If the client is diagnosed with a co-occurring substance use disorder, please indicate any changes in the frequency or amount of use: n/a. Stage of change for addressing substance use diagnoses: No substance use/Not applicable    ASSESSMENT:  Yesenia Tillman presents with a Anxious and  "Depressed mood.     her affect is Normal range and intensity and Tearful, which is congruent, with her mood and the content of the session. The client has made progress on their goals.    Yesenia was on time for session this week and felt good about coming, though she had difficulty remembering her words and recalling events and what she had done in the past few days.   Yesenia Tillman presents with a none risk of suicide, none risk of self-harm, and none risk of harm to others.    For any risk assessment that surpasses a \"low\" rating, a safety plan must be developed.    A safety plan was indicated: no  If yes, describe in detail n/a    PLAN: Between sessions, Yesenia Tillman will continue to attend OT and go to either a meal at [facility] or visit the [facility]. At the next session, the therapist will use Client-centered Therapy, Solution-Focused Therapy, and Supportive Psychotherapy to address depression/anxiety.    Behavioral Health Treatment Plan and Discharge Planning: Yesenia Tillman is aware of and agrees to continue to work on their treatment plan. They have identified and are working toward their discharge goals. yes    Visit start and stop times:    07/08/24  Start Time: 1200  Stop Time: 1251  Total Visit Time: 51 minutes  "

## 2024-07-09 ENCOUNTER — OFFICE VISIT (OUTPATIENT)
Facility: CLINIC | Age: 89
End: 2024-07-09
Payer: COMMERCIAL

## 2024-07-09 DIAGNOSIS — G40.909 SEIZURE DISORDER (HCC): ICD-10-CM

## 2024-07-09 DIAGNOSIS — Z86.73 HISTORY OF MULTIPLE STROKES: Primary | ICD-10-CM

## 2024-07-09 PROCEDURE — 97530 THERAPEUTIC ACTIVITIES: CPT

## 2024-07-09 NOTE — PROGRESS NOTES
"Occupational Therapy Progress Note    Today's date: 2024  Patient name: Yesenia Tillman  : 1935  MRN: [id number]  Referring provider: Liam Zepeda MD  Dx:   Encounter Diagnoses   Name Primary?    History of multiple strokes Yes    Seizure disorder (HCC)      Start Time: 1330  Stop Time: 1415  Total time in clinic (min): 45 minutes    PLAN OF CARE START: 24  PLAN OF CARE END: 24  FREQUENCY: 2x/week  PRECAUTIONS standard    Subjective: \"I didn't know why I was here\"    Objective: See treatment below.    TA:  -Completed card sorting exercise - with pt sorting by suit and stating desserts for each even card and countries for each face card. Activity focused on divided attention, recall of instruction. Required min cues from the therapist for recall of instructions.   -Completed 4x IQ stars puzzles to address  skills, problem solving. Required mod cues from therapist initially, progressed to min cues.   -Started decoding crossword puzzle in session today to address attention, problem solving, EF. Pt was provided with all vowels, and was asked to complete remaining letters. Terminated due to time constraints, pt instructed to complete as HEP.     Assessment: Tolerated treatment well. Pt demonstrating in session impaired EF skills, recall. Pt benefits from additional instructions. Pt would benefit from continued OT services to address cognitive function for life roles.     Plan: Continued skilled OT per POC.    Goals added 24:   Pt will improved immediate delayed recall as evidenced on the CMT, increasing to 12 immediate, 6 delayed      GOALS ADDED 24:   SHORT TERM GOALS:   Memory  Pt will increase delayed recall being able to recall 8 items on RBANS in STM.  Pt will increase delayed recall being able to recall 8 items on RBANs in LTM        Attention     Pt will maintain attention to task for 10 minutes in semi modal environment for baseline performance,  improved role performance and " to improve learning and to simulate return to IADLs and complete cooking/cleaning tasks.    Pt will demo ability to participate in dual tasking/divided attention task with 50% accuracy in multimodal environment to simulate return to life roles          LONG TERM GOALS  Memory  Pt will increase delayed recall being able to recall 9 items on RBANS in STM.  Pt will increase delayed recall being able to recall 9 items on RBANs in LTM        Attention     Pt will maintain attention to task for 20 minutes in semi modal environment for baseline performance,  improved role performance and to improve learning and to simulate return to IADLs and complete cooking/cleaning tasks.    Pt will demo ability to participate in dual tasking/divided attention task with 75% accuracy in multimodal environment to simulate return to life roles

## 2024-07-12 ENCOUNTER — OFFICE VISIT (OUTPATIENT)
Facility: CLINIC | Age: 89
End: 2024-07-12
Payer: COMMERCIAL

## 2024-07-12 DIAGNOSIS — Z86.73 HISTORY OF MULTIPLE STROKES: Primary | ICD-10-CM

## 2024-07-12 DIAGNOSIS — G40.909 SEIZURE DISORDER (HCC): ICD-10-CM

## 2024-07-12 PROCEDURE — 97530 THERAPEUTIC ACTIVITIES: CPT | Performed by: OCCUPATIONAL THERAPIST

## 2024-07-12 NOTE — PROGRESS NOTES
Occupational Therapy Progress Note    Today's date: 2024  Patient name: Yesenia Tillman  : 1935  MRN: [id number]  Referring provider: Liam Zepeda MD  Dx:   Encounter Diagnoses   Name Primary?    History of multiple strokes Yes    Seizure disorder (HCC)                             PLAN OF CARE START: 24  PLAN OF CARE END: 24  FREQUENCY: 2x/week  PRECAUTIONS standard    Subjective: pt reports that she doesn't recall group session last Friday, but states she would like to continue with group sessions to make new friends    Objective: See treatment below.  Pt participated in group session to address cognitive function with incorporation of socialization for overall wellbeing.  TE:  -Relay with partner- speed walking alternating with STS from chair. Completed serial 3 addition/subtraction while walking, and alphabetical category naming during STS.    TA:  -Pt education on sleep, what happens in the brain during sleep, and it's effects on brain health. Discussed recommendations for total nightly sleep, when to speak with a physician about sleep disturbances.  -Connections focusing on abstract thinking.  -30 piece jigsaw puzzle completed as a group to address  skills.   -Moose to address EF.   -Embedded words worksheet focusing on divided attention.     Assessment: Tolerated treatment well. Pt demonstrating in session impaired EF skills, recall. Pt benefits from additional instructions. Pt would benefit from continued OT services to address cognitive function for life roles.     Plan: Continued skilled OT per POC.    Goals added 24:   Pt will improved immediate delayed recall as evidenced on the CMT, increasing to 12 immediate, 6 delayed      GOALS ADDED 24:   SHORT TERM GOALS:   Memory  Pt will increase delayed recall being able to recall 8 items on RBANS in STM.  Pt will increase delayed recall being able to recall 8 items on RBANs in LTM        Attention     Pt will maintain attention  to task for 10 minutes in semi modal environment for baseline performance,  improved role performance and to improve learning and to simulate return to IADLs and complete cooking/cleaning tasks.    Pt will demo ability to participate in dual tasking/divided attention task with 50% accuracy in multimodal environment to simulate return to life roles          LONG TERM GOALS  Memory  Pt will increase delayed recall being able to recall 9 items on RBANS in STM.  Pt will increase delayed recall being able to recall 9 items on RBANs in LTM        Attention     Pt will maintain attention to task for 20 minutes in semi modal environment for baseline performance,  improved role performance and to improve learning and to simulate return to IADLs and complete cooking/cleaning tasks.    Pt will demo ability to participate in dual tasking/divided attention task with 75% accuracy in multimodal environment to simulate return to life roles

## 2024-07-16 ENCOUNTER — APPOINTMENT (OUTPATIENT)
Facility: CLINIC | Age: 89
End: 2024-07-16
Payer: COMMERCIAL

## 2024-07-16 ENCOUNTER — TELEPHONE (OUTPATIENT)
Age: 89
End: 2024-07-16

## 2024-07-19 ENCOUNTER — OFFICE VISIT (OUTPATIENT)
Facility: CLINIC | Age: 89
End: 2024-07-19
Payer: COMMERCIAL

## 2024-07-19 DIAGNOSIS — Z86.73 HISTORY OF MULTIPLE STROKES: Primary | ICD-10-CM

## 2024-07-19 DIAGNOSIS — G40.909 SEIZURE DISORDER (HCC): ICD-10-CM

## 2024-07-19 PROCEDURE — 97530 THERAPEUTIC ACTIVITIES: CPT | Performed by: OCCUPATIONAL THERAPIST

## 2024-07-19 NOTE — PROGRESS NOTES
"Occupational Therapy Progress Note    Today's date: 2024  Patient name: Yesenia Tillman  : 1935  MRN: [id number]  Referring provider: Liam Zepeda MD  Dx:   Encounter Diagnoses   Name Primary?    History of multiple strokes Yes    Seizure disorder (HCC)              Start Time: 1149  Stop Time: 1310  Total time in clinic (min): 81 minutes        PLAN OF CARE START: 24  PLAN OF CARE END: 24  FREQUENCY: 2x/week  PRECAUTIONS standard    Subjective: pt reports that she doesn't recall group session last Friday, but states she would like to continue with group sessions to make new friends    Objective: See treatment below.  Pt participated in cog group focusing on improving/maintaining functional cognition with incorporation of social participation to address overall wellbeing.   TE:  2.5 minutes x1 round of each of the following to increase HR for aerobic exercise as primer for tabletop cognitive activities:  -Arm bike  -Rows with black theraband  -Step ups on 8\" box  -Biceps curls with 7lb dowel  Dual tasks: serial 3 addition, alphabetical naming animals    TA:  -Group empty crossword completed to address EF.  -Decoding worksheets x2 completed to address sustained attention and EF.  -50 states puzzle with trivia completed as a group.    Assessment: Tolerated treatment well. Pt demonstrating fluctuating attention during dual tasks. Pt benefits from additional instructions. Pt would benefit from continued OT services to address cognitive function for life roles.     Plan: Continued skilled OT per POC.    Goals added 24:   Pt will improved immediate delayed recall as evidenced on the CMT, increasing to 12 immediate, 6 delayed      GOALS ADDED 24:   SHORT TERM GOALS:   Memory  Pt will increase delayed recall being able to recall 8 items on RBANS in STM.  Pt will increase delayed recall being able to recall 8 items on RBANs in LTM        Attention     Pt will maintain attention to task for " 10 minutes in semi modal environment for baseline performance,  improved role performance and to improve learning and to simulate return to IADLs and complete cooking/cleaning tasks.    Pt will demo ability to participate in dual tasking/divided attention task with 50% accuracy in multimodal environment to simulate return to life roles          LONG TERM GOALS  Memory  Pt will increase delayed recall being able to recall 9 items on RBANS in STM.  Pt will increase delayed recall being able to recall 9 items on RBANs in LTM        Attention     Pt will maintain attention to task for 20 minutes in semi modal environment for baseline performance,  improved role performance and to improve learning and to simulate return to IADLs and complete cooking/cleaning tasks.    Pt will demo ability to participate in dual tasking/divided attention task with 75% accuracy in multimodal environment to simulate return to life roles

## 2024-07-22 ENCOUNTER — SOCIAL WORK (OUTPATIENT)
Dept: BEHAVIORAL/MENTAL HEALTH CLINIC | Facility: CLINIC | Age: 89
End: 2024-07-22
Payer: COMMERCIAL

## 2024-07-22 DIAGNOSIS — F32.1 CURRENT MODERATE EPISODE OF MAJOR DEPRESSIVE DISORDER WITHOUT PRIOR EPISODE (HCC): Primary | ICD-10-CM

## 2024-07-22 DIAGNOSIS — R41.3 MEMORY CHANGES: ICD-10-CM

## 2024-07-22 PROCEDURE — 90834 PSYTX W PT 45 MINUTES: CPT | Performed by: SOCIAL WORKER

## 2024-07-22 NOTE — PSYCH
Behavioral Health Psychotherapy Progress Note    Psychotherapy Provided: Individual Psychotherapy     1. 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 Memory changes            Goals addressed in session: Goal 2     DATA: Yesenia attended session in person, on time, noting that she was proud of herself and has been keeping to her schedule.  Worked with Yesenia to identify places that she can go to be around others and to explore interests that made her happy in the past including art.  Gave information on [employer] in [facility] for her to call.  Discussed plan to go to seniors dinner at [facility] which Yesenia reported that she forgets to go.  Printed an updated calendar with upcoming appointments and added calling the art studio and attending the dinner at [facility] on days that she does not have something scheduled.  Yesenia noted that her daughter in law is coming to visit her and Yesenia was looking forward to this.  Yesenia was tearful at times when talking about the cahnges in her life including the loss of her sons and her  and noted that she wants to continue to have a full life and make the most of her time on earth which was celebrated with her.  During this session, this clinician used the following therapeutic modalities: Client-centered Therapy, Motivational Interviewing, Solution-Focused Therapy, and Supportive Psychotherapy    Substance Abuse was not addressed during this session. If the client is diagnosed with a co-occurring substance use disorder, please indicate any changes in the frequency or amount of use: n/a. Stage of change for addressing substance use diagnoses: No substance use/Not applicable    ASSESSMENT:  Yesenia Tillman presents with a Euthymic/ normal, Anxious, and sad  mood.     her affect is Normal range and intensity and Tearful, which is congruent, with her mood and the content of the session. The client has made progress  "on their goals.  Yesenia was somewhat resistant to attending OT but was open with discussing the potential benefits.     Yesenia Tillman presents with a none risk of suicide, none risk of self-harm, and none risk of harm to others.    For any risk assessment that surpasses a \"low\" rating, a safety plan must be developed.    A safety plan was indicated: no  If yes, describe in detail n/a    PLAN: Between sessions, Yesenia Tillman will continue to attend OT, follow calendar to assist with remembering events and trying new experiences. At the next session, the therapist will use Bereavement Therapy, Client-centered Therapy, and Supportive Psychotherapy to address depression/anxiety.    Behavioral Health Treatment Plan and Discharge Planning: Yesenia Tillman is aware of and agrees to continue to work on their treatment plan. They have identified and are working toward their discharge goals. yes    Visit start and stop times:    07/22/24  Start Time: 1202  Stop Time: 1252  Total Visit Time: 50 minutes  "

## 2024-07-23 ENCOUNTER — APPOINTMENT (OUTPATIENT)
Facility: CLINIC | Age: 89
End: 2024-07-23
Payer: COMMERCIAL

## 2024-07-26 ENCOUNTER — OFFICE VISIT (OUTPATIENT)
Facility: CLINIC | Age: 89
End: 2024-07-26
Payer: COMMERCIAL

## 2024-07-26 DIAGNOSIS — Z86.73 HISTORY OF MULTIPLE STROKES: Primary | ICD-10-CM

## 2024-07-26 DIAGNOSIS — G40.909 SEIZURE DISORDER (HCC): ICD-10-CM

## 2024-07-26 PROCEDURE — 97530 THERAPEUTIC ACTIVITIES: CPT | Performed by: OCCUPATIONAL THERAPIST

## 2024-07-26 NOTE — PROGRESS NOTES
Occupational Therapy Progress Note    Today's date: 2024  Patient name: Yesenia Tillman  : 1935  MRN: [id number]  Referring provider: Liam Zepeda MD  Dx:   Encounter Diagnoses   Name Primary?    History of multiple strokes Yes    Seizure disorder (HCC)                             PLAN OF CARE START: 24  PLAN OF CARE END: 24  FREQUENCY: 2x/week  PRECAUTIONS standard    Subjective: pt reports that she doesn't recall group session last Friday, but states she would like to continue with group sessions to make new friends    Objective: See treatment below.  Pt participated in cog group focusing on improving/maintaining functional cognition with incorporation of social participation to address overall wellbeing.   TE:  2 minutes x1 round of each of the following to increase HR for aerobic exercise as primer for tabletop cognitive activities:  TE:  -Side stepping on foam beam  -High knees holding small tidal tank  -STS  -Toe taps on 8” step  Dual tasks: naming from categories with each answer starting with the last letter of the previous answer    TA:  -Map reading worksheet focusing on sustained attention and EF.  -Letter sudoku focusing on EF. Required mod-max assist/cues.  -Detectives looking chart focusing on processing speed and attention.    Assessment: Tolerated treatment well. Pt had most difficulty understanding directions for sudoku today. Pt benefits from additional instructions. Pt would benefit from continued OT services to address cognitive function for life roles.     Plan: Continued skilled OT per POC.    Goals added 24:   Pt will improved immediate delayed recall as evidenced on the CMT, increasing to 12 immediate, 6 delayed      GOALS ADDED 24:   SHORT TERM GOALS:   Memory  Pt will increase delayed recall being able to recall 8 items on RBANS in STM.  Pt will increase delayed recall being able to recall 8 items on RBANs in LTM        Attention     Pt will maintain  attention to task for 10 minutes in semi modal environment for baseline performance,  improved role performance and to improve learning and to simulate return to IADLs and complete cooking/cleaning tasks.    Pt will demo ability to participate in dual tasking/divided attention task with 50% accuracy in multimodal environment to simulate return to life roles          LONG TERM GOALS  Memory  Pt will increase delayed recall being able to recall 9 items on RBANS in STM.  Pt will increase delayed recall being able to recall 9 items on RBANs in LTM        Attention     Pt will maintain attention to task for 20 minutes in semi modal environment for baseline performance,  improved role performance and to improve learning and to simulate return to IADLs and complete cooking/cleaning tasks.    Pt will demo ability to participate in dual tasking/divided attention task with 75% accuracy in multimodal environment to simulate return to life roles

## 2024-07-30 ENCOUNTER — APPOINTMENT (OUTPATIENT)
Facility: CLINIC | Age: 89
End: 2024-07-30
Payer: COMMERCIAL

## 2024-07-30 ENCOUNTER — TELEPHONE (OUTPATIENT)
Age: 89
End: 2024-07-30

## 2024-07-30 NOTE — TELEPHONE ENCOUNTER
Has not been seen since 3/7/2024 and we have been refilling medications regularly She needs to schedule a follow up for refills going forward.

## 2024-08-02 ENCOUNTER — OFFICE VISIT (OUTPATIENT)
Facility: CLINIC | Age: 89
End: 2024-08-02
Payer: COMMERCIAL

## 2024-08-02 DIAGNOSIS — G40.909 SEIZURE DISORDER (HCC): ICD-10-CM

## 2024-08-02 DIAGNOSIS — Z86.73 HISTORY OF MULTIPLE STROKES: Primary | ICD-10-CM

## 2024-08-02 PROCEDURE — 97530 THERAPEUTIC ACTIVITIES: CPT | Performed by: OCCUPATIONAL THERAPIST

## 2024-08-02 NOTE — PROGRESS NOTES
Occupational Therapy Progress Note    Today's date: 2024  Patient name: Yesenia Tillman  : 1935  MRN: [id number]  Referring provider: Liam Zepeda MD  Dx:   Encounter Diagnoses   Name Primary?    History of multiple strokes Yes    Seizure disorder (HCC)              Start Time: 1150              PLAN OF CARE START: 24  PLAN OF CARE END: 24  FREQUENCY: 2x/week  PRECAUTIONS standard    Subjective: pt reports no changes since last session    Objective: See treatment below.  Pt participated in cog group focusing on improving/maintaining functional cognition with incorporation of social participation to address overall wellbeing.   TE:  Pt completed the following exercises x2 minutes:  -Triceps extensions with red/black theraband  -Side stepping on foam beam   -Ball toss with partner while sidestepping (2 rounds)  -Magneciser  Dual tasks during exercises: serial addition x7 from 50, alphabetical nouns, reverse alphabetical nouns    TA:  Category bingo focusing on processing speed  Hangman focusing on EF  Connections focusing on abstract thinking/EF  Alphabetizing ~55 words as a group to address EF. Pt required clarification of directions.    Assessment: Tolerated treatment well. Good attention during dual tasks. Pt benefits from additional instructions. Pt would benefit from continued OT services to address cognitive function for life roles.     Plan: Continued skilled OT per POC.    Goals added 24:   Pt will improved immediate delayed recall as evidenced on the CMT, increasing to 12 immediate, 6 delayed      GOALS ADDED 24:   SHORT TERM GOALS:   Memory  Pt will increase delayed recall being able to recall 8 items on RBANS in STM.  Pt will increase delayed recall being able to recall 8 items on RBANs in LTM        Attention     Pt will maintain attention to task for 10 minutes in semi modal environment for baseline performance,  improved role performance and to improve learning and to  simulate return to IADLs and complete cooking/cleaning tasks.    Pt will demo ability to participate in dual tasking/divided attention task with 50% accuracy in multimodal environment to simulate return to life roles          LONG TERM GOALS  Memory  Pt will increase delayed recall being able to recall 9 items on RBANS in STM.  Pt will increase delayed recall being able to recall 9 items on RBANs in LTM        Attention     Pt will maintain attention to task for 20 minutes in semi modal environment for baseline performance,  improved role performance and to improve learning and to simulate return to IADLs and complete cooking/cleaning tasks.    Pt will demo ability to participate in dual tasking/divided attention task with 75% accuracy in multimodal environment to simulate return to life roles

## 2024-08-13 ENCOUNTER — OFFICE VISIT (OUTPATIENT)
Dept: PSYCHIATRY | Facility: CLINIC | Age: 89
End: 2024-08-13
Payer: COMMERCIAL

## 2024-08-13 DIAGNOSIS — F33.1 MODERATE EPISODE OF RECURRENT MAJOR DEPRESSIVE DISORDER (HCC): Primary | ICD-10-CM

## 2024-08-13 DIAGNOSIS — R41.3 MEMORY DIFFICULTY: ICD-10-CM

## 2024-08-13 PROCEDURE — 90792 PSYCH DIAG EVAL W/MED SRVCS: CPT | Performed by: PHYSICIAN ASSISTANT

## 2024-08-14 NOTE — PSYCH
"This note was not shared with the patient due to reasonable likelihood of causing patient harm     PSYCHIATRIC EVALUATION     [facility]    Name and Date of Birth:  Yesenia Tillman 88 y.o. 1935 MRN: [id number]    Insurance: Payor: [employer]  REP / Plan: [facility] REP / Product Type: Medicare HMO /      Date of Visit: August 14, 2024    Reason for visit:   Chief Complaint   Patient presents with    Medication Management    Establish Care       HPI     Yesenia Tillman is a shivam 88 y.o. female with a history of depression and cognitive disorder who presents today for psychiatric evaluation due to depressive symptoms, poor memory, and for psychiatric medication management. She is very confused as to why she is here, she states \"I already have a therapist, where's Galina?\" She missed her 2:30 PM appt time and had to be rescheduled to 5 PM. She said she had been waiting \"for hours\" in our waiting room but she only checked in about 30 min prior to her appt. She is poor historian and often answers with \"I don't know\" or something similar. She reports that she is currently depressed and \"I think I've had some depression before\". She states her recent mood is \"sad\", \"I'm crying all the time\", and \"I'm still grieving\" (lost middle son recently). She is unable to answer if she feels hopeless or worthless but does state \"I feel lost\". She states she has \"not a lot\" of monse but still has some. She states her energy level is \"pretty good for my age\". She states her appetite is \"okay\" but she doesn't have much interest in food/eats junk. She denies history of SIB, SI, HI, and suicide attempts. She states that she hasn't been hospitalized for mental health \"at least not that I'm aware\". She states she \"probably did\" try medications in the past but she doesn't remember what. She does see Galina Holland LCSW, for therapy. She states her one son \"must have been " "depressed\" as he  by suicide (gunshot). She feels responsible for not \"preventing\" this even though he never mentioned being depressed. She reports she doesn't have anxiety but then says \"I think it is\" because \"the reality of all of this is real now\". She then says \"not too terribly much\" in reference to feeling anxious or worried. She reports her sleep \"depends\" and that \"sometimes it's good and sometimes it's bad\". She reports that \"I could have been\" euphoric in the past and that \"maybe I've been told I'm bipolar I don't remember\". She states \"I don't get irritable too much\" but then states she's irritable and frustrated related to the memory difficulties. She denies any hx of abuse but to trauma she said \"I think so but I don't remember what they are\". She reports worsening memory issues for the last year but does not elaborate further. Pt said \"I don't remember\" in reference to sx of concentration, intrusive thoughts, or hx of eating disorders. She denies hx of AVH. She lives alone and has \"no one\" because \"I don't want to be a bother\". 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 She states \"I pretty much just watch TV\" but that she wants to start painting again. Some of her major stressors are her son  by suicide about 3 years ago (per Galina Holland's intake note), her   last year (per Galina's note), and there her middle son just recently  related to his MS. She states that art can make her feel better and talking to Galina. She denies history or current use of alcohol, tobacco/nicotine, cannabis, or illicit drugs. She denies access to firearms in the home. Her PMH includes hypothyroidism, a fib (per chart), HTN, HLD, strokes (per chart), and seizures (per chart). Pt has no idea what meds she takes and says \"if I had know what this was I could have brought them\".     She denies any suicidal ideation, intent or plan at present; denies any homicidal ideation, intent or plan at " present.    She denies any auditory hallucinations, denies any visual hallucinations, denies any delusions.    She denies any side effects from current psychiatric medications.    HPI ROS Appetite Changes and Sleep:     She reports fluctuating sleep pattern, adequate appetite, adequate energy level    Current Rating Scores:     None completed today.    Psychiatric Review Of Systems:    Sleep changes: no  Appetite changes: no  Weight changes: no  Energy/anergy: no  Interest/pleasure/anhedonia: no  Somatic symptoms: no  Anxiety/panic: no  Sue: no  Guilty/hopeless: no  Self injurious behavior/risky behavior: no  Suicidal ideation: no  Homicidal ideation: no  Auditory hallucinations: no  Visual hallucinations: no  Other hallucinations: no  Delusional thinking: no  Eating disorder history: no  Obsessive/compulsive symptoms: no    Review Of Systems:    Mood depression   Behavior appropriate, cooperative, and calm   Thought Content negative thoughts   General emotional problems and decreased functioning   Personality no change in personality   Other Psych Symptoms decreased memory   Constitutional as noted in HPI   ENT as noted in HPI   Cardiovascular as noted in HPI   Respiratory as noted in HPI   Gastrointestinal as noted in HPI   Genitourinary as noted in HPI   Musculoskeletal as noted in HPI   Integumentary as noted in HPI   Neurological as noted in HPI   Endocrine negative   Other Symptoms none, all other systems are negative       Past Psychiatric History:     Past Inpatient Psychiatric Treatment:   No history of past inpatient psychiatric admissions  Past Outpatient Psychiatric Treatment:    Was in outpatient psychiatric treatment in the past with a psychiatrist  Past Suicide Attempts: no  Past Violent Behavior: no  Past Psychiatric Medication Trials: patient does not remember    Traumatic History:     Abuse: no history of sexual abuse, no history of physical abuse, no history of emotional abuse, no history of  verbal abuse  Other Traumatic Events: none     Family Psychiatric History:     Family History   Problem Relation Age of Onset    Kidney disease Mother     Heart disease Mother     Cancer Father         colon    Heart disease Father     Colon cancer Father     Heart disease Sister     Diabetes Son     Multiple sclerosis Son     No Known Problems Son     Heart disease Maternal Grandmother        Social/Substance Use History:    Social History     Socioeconomic History    Marital status:      Spouse name: Not on file    Number of children: Not on file    Years of education: Not on file    Highest education level: Not on file   Occupational History    Not on file   Tobacco Use    Smoking status: Never    Smokeless tobacco: Never   Vaping Use    Vaping status: Never Used   Substance and Sexual Activity    Alcohol use: Never     Alcohol/week: 0.0 standard drinks of alcohol     Comment: 0    Drug use: No    Sexual activity: Not Currently     Partners: Male     Birth control/protection: Post-menopausal   Other Topics Concern    Not on file   Social History Narrative    Not on file     Social Determinants of Health     Financial Resource Strain: Low Risk  (10/12/2023)    Overall Financial Resource Strain (CARDIA)     Difficulty of Paying Living Expenses: Not hard at all   Food Insecurity: Unknown (12/8/2023)    Hunger Vital Sign     Worried About Running Out of Food in the Last Year: Never true     Ran Out of Food in the Last Year: Not on file   Transportation Needs: No Transportation Needs (12/8/2023)    PRAPARE - Transportation     Lack of Transportation (Medical): No     Lack of Transportation (Non-Medical): No   Physical Activity: Not on file   Stress: Not on file   Social Connections: Not on file   Intimate Partner Violence: Not on file   Housing Stability: Low Risk  (12/8/2023)    Housing Stability Vital Sign     Unable to Pay for Housing in the Last Year: No     Number of Times Moved in the Last Year: 1      Homeless in the Last Year: No       Past Medical History:    Past Medical History:   Diagnosis Date    Acute CVA (cerebrovascular accident) (Formerly [facility]) 06/18/2016    Adult hypothyroidism 09/12/2016    Anxiety 06/10/2019    Arthritis     Asthma     Colon polyp     Depression     Disease of thyroid gland     Dyslipidemia 06/10/2019    Esophageal dysmotility 06/10/2019    Essential hypertension 09/12/2016    GERD (gastroesophageal reflux disease)     Hyperlipidemia     Hypertension     Hypothyroidism     Obesity     Paroxysmal atrial fibrillation ([facility]) 09/12/2016    Seizure disorder ([facility]ly [facility]) 08/24/2018    Seizures ([facility]ly [facility])     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    Stroke ([facility]) 06/2016    Subarachnoid hemorrhage ([facility]) 09/13/2016    Subdural hematoma ([facility]ly [facility]) 09/13/2016        Past Surgical History:   Procedure Laterality Date    APPENDECTOMY      BACK SURGERY  01/2016    CARPAL TUNNEL RELEASE Right     COLONOSCOPY      TUBAL LIGATION       Allergies   Allergen Reactions    Amlodipine Other (See Comments)     Unknown reaction    Hydralazine Other (See Comments)     Unknown reaction    Penicillins Other (See Comments)     Unknown since childhood       History Review:    The following portions of the patient's history were reviewed and updated as appropriate: allergies, current medications, past family history, past medical history, past social history, past surgical history, and problem list.    OBJECTIVE:     Mental Status Evaluation:  Appearance:  dressed appropriately, adequate grooming, looks stated age   Behavior:  pleasant, cooperative, interacts appropriately with this writer   Speech:  normal rate, normal volume, normal pitch   Mood:  depressed   Affect:  tearful   Thought Process:  organized, logical, coherent   Associations: intact associations   Thought Content:  no overt delusions, no paranoia noted on exam   Perceptual Disturbances: no auditory hallucinations, no  "visual hallucinations   Risk Potential: Suicidal ideation - None  Homicidal ideation - None  Potential for aggression - No   Sensorium:  oriented to person, place, and time/date   Memory:  recent memory impaired, remote memory impaired   Consciousness:  alert and awake   Attention/Concentration: attention span and concentration are age appropriate   Insight:  age appropriate   Judgment: age appropriate   Gait/Station: normal gait/station   Motor Activity: no abnormal movements     Laboratory Results: I have personally reviewed all pertinent laboratory/tests results    Suicide/Homicide Risk Assessment:    Risk of Harm to Self:  The following ratings are based on assessment at the time of the interview  Based on today's assessment, Yesenia presents the following risk of harm to self: none    Risk of Harm to Others:  The following ratings are based on assessment at the time of the interview  Based on today's assessment, Yesenia presents the following risk of harm to others: none    The following interventions are recommended: no intervention changes needed    Assessment/Plan:     Pt is a very poor historian and often answered \"I don't know\" or \"I'm not sure\" or \"I can't remember\". As I do not have a baseline I cannot tell if this is worse than her previous visits with therapy or her PCP. I did send PCP a message with pt permission to try to get more clarity on the memory issues and timeline. Because of the sparse history and inability for pt to remember her medications, I cannot say how much of the memory issues are related to mood or suggest any medication changes. Pt was advised to bring all medications to her next visit so we can review these. She was unwilling to come back sooner than 1 month as she is \"not sure what I am doing here\". She will continue to work with Galina Holland LCSW, as well. We have discussed her safety plan and she agrees that if she experience unsafe thoughts that she will reach out to her " supports including this office, the suicide hotline, and emergency services if necessary. Yesenia is aware of non-emergent and emergent mental health resources. They are able to contract for their own safety at this time.    Will follow up in 1 months. Patient is aware to call the office if questions or concerns arise sooner.      Diagnoses and all orders for this visit:    Moderate episode of recurrent major depressive disorder (HCC)    Memory difficulty        Treatment Recommendations/Precautions:    Pt does not know her current medications so unable to make a med recommendation until I get a full list of medications    Medication management every 1 months  [facility]  Aware of 24 hour and weekend coverage for urgent situations accessed by calling [facility] main practice number  I am scheduling this patient out for greater than 3 months: No    Medications Risks/Benefits:      Risks, Benefits And Possible Side Effects Of Medications:    Risks, benefits, and possible side effects of medications explained to Yesenia and she verbalizes understanding and agreement for treatment.    Controlled Medication Discussion:     Not applicable    Treatment Plan:    Completed and signed during the session: Not applicable - Treatment Plan to be completed by [facility] therapist    Visit Time    Visit Start Time:  5:00 PM  Visit End Time:  6:00 PM  Total Visit Duration:  60 minutes    Jeet Avalos 08/14/24

## 2024-08-15 DIAGNOSIS — R41.3 MEMORY DIFFICULTY: Primary | ICD-10-CM

## 2024-08-16 ENCOUNTER — HOSPITAL ENCOUNTER (EMERGENCY)
Facility: HOSPITAL | Age: 89
Discharge: HOME | End: 2024-08-16
Attending: EMERGENCY MEDICINE
Payer: COMMERCIAL

## 2024-08-16 ENCOUNTER — OFFICE VISIT (OUTPATIENT)
Facility: CLINIC | Age: 89
End: 2024-08-16
Payer: COMMERCIAL

## 2024-08-16 VITALS
SYSTOLIC BLOOD PRESSURE: 190 MMHG | WEIGHT: 160.94 LBS | OXYGEN SATURATION: 98 % | BODY MASS INDEX: 31.43 KG/M2 | TEMPERATURE: 98.2 F | DIASTOLIC BLOOD PRESSURE: 79 MMHG | HEART RATE: 73 BPM | RESPIRATION RATE: 16 BRPM

## 2024-08-16 DIAGNOSIS — Z86.73 HISTORY OF MULTIPLE STROKES: Primary | ICD-10-CM

## 2024-08-16 DIAGNOSIS — G40.909 SEIZURE DISORDER (HCC): ICD-10-CM

## 2024-08-16 DIAGNOSIS — R41.3 MEMORY CHANGES: ICD-10-CM

## 2024-08-16 DIAGNOSIS — F32.A DEPRESSION: Primary | ICD-10-CM

## 2024-08-16 DIAGNOSIS — I10 HIGH BLOOD PRESSURE: ICD-10-CM

## 2024-08-16 LAB
ALBUMIN SERPL BCG-MCNC: 4.1 G/DL (ref 3.5–5)
ALP SERPL-CCNC: 73 U/L (ref 34–104)
ALT SERPL W P-5'-P-CCNC: 21 U/L (ref 7–52)
ANION GAP SERPL CALCULATED.3IONS-SCNC: 8 MMOL/L (ref 4–13)
AST SERPL W P-5'-P-CCNC: 20 U/L (ref 13–39)
BASOPHILS # BLD AUTO: 0.06 THOUSANDS/ÂΜL (ref 0–0.1)
BASOPHILS NFR BLD AUTO: 1 % (ref 0–1)
BILIRUB SERPL-MCNC: 0.62 MG/DL (ref 0.2–1)
BUN SERPL-MCNC: 9 MG/DL (ref 5–25)
CALCIUM SERPL-MCNC: 9 MG/DL (ref 8.4–10.2)
CHLORIDE SERPL-SCNC: 106 MMOL/L (ref 96–108)
CO2 SERPL-SCNC: 26 MMOL/L (ref 21–32)
CREAT SERPL-MCNC: 0.85 MG/DL (ref 0.6–1.3)
EOSINOPHIL # BLD AUTO: 0.18 THOUSAND/ÂΜL (ref 0–0.61)
EOSINOPHIL NFR BLD AUTO: 2 % (ref 0–6)
ERYTHROCYTE [DISTWIDTH] IN BLOOD BY AUTOMATED COUNT: 13 % (ref 11.6–15.1)
GFR SERPL CREATININE-BSD FRML MDRD: 62 ML/MIN/1.73SQ M
GLUCOSE SERPL-MCNC: 120 MG/DL (ref 65–140)
HCT VFR BLD AUTO: 41.5 % (ref 34.8–46.1)
HGB BLD-MCNC: 13.6 G/DL (ref 11.5–15.4)
IMM GRANULOCYTES # BLD AUTO: 0.03 THOUSAND/UL (ref 0–0.2)
IMM GRANULOCYTES NFR BLD AUTO: 0 % (ref 0–2)
LYMPHOCYTES # BLD AUTO: 1.79 THOUSANDS/ÂΜL (ref 0.6–4.47)
LYMPHOCYTES NFR BLD AUTO: 22 % (ref 14–44)
MCH RBC QN AUTO: 31.1 PG (ref 26.8–34.3)
MCHC RBC AUTO-ENTMCNC: 32.8 G/DL (ref 31.4–37.4)
MCV RBC AUTO: 95 FL (ref 82–98)
MONOCYTES # BLD AUTO: 0.54 THOUSAND/ÂΜL (ref 0.17–1.22)
MONOCYTES NFR BLD AUTO: 7 % (ref 4–12)
NEUTROPHILS # BLD AUTO: 5.67 THOUSANDS/ÂΜL (ref 1.85–7.62)
NEUTS SEG NFR BLD AUTO: 68 % (ref 43–75)
NRBC BLD AUTO-RTO: 0 /100 WBCS
PLATELET # BLD AUTO: 279 THOUSANDS/UL (ref 149–390)
PMV BLD AUTO: 9.5 FL (ref 8.9–12.7)
POTASSIUM SERPL-SCNC: 3.6 MMOL/L (ref 3.5–5.3)
PROT SERPL-MCNC: 6.9 G/DL (ref 6.4–8.4)
RBC # BLD AUTO: 4.38 MILLION/UL (ref 3.81–5.12)
SODIUM SERPL-SCNC: 140 MMOL/L (ref 135–147)
WBC # BLD AUTO: 8.27 THOUSAND/UL (ref 4.31–10.16)

## 2024-08-16 PROCEDURE — 99284 EMERGENCY DEPT VISIT MOD MDM: CPT

## 2024-08-16 PROCEDURE — 93005 ELECTROCARDIOGRAM TRACING: CPT

## 2024-08-16 PROCEDURE — 99284 EMERGENCY DEPT VISIT MOD MDM: CPT | Performed by: EMERGENCY MEDICINE

## 2024-08-16 PROCEDURE — 85025 COMPLETE CBC W/AUTO DIFF WBC: CPT | Performed by: EMERGENCY MEDICINE

## 2024-08-16 PROCEDURE — 97530 THERAPEUTIC ACTIVITIES: CPT | Performed by: OCCUPATIONAL THERAPIST

## 2024-08-16 PROCEDURE — 80053 COMPREHEN METABOLIC PANEL: CPT | Performed by: EMERGENCY MEDICINE

## 2024-08-16 PROCEDURE — 36415 COLL VENOUS BLD VENIPUNCTURE: CPT | Performed by: EMERGENCY MEDICINE

## 2024-08-16 RX ORDER — METOPROLOL SUCCINATE 50 MG/1
50 TABLET, EXTENDED RELEASE ORAL DAILY
Status: DISCONTINUED | OUTPATIENT
Start: 2024-08-16 | End: 2024-08-16 | Stop reason: HOSPADM

## 2024-08-16 RX ADMIN — METOPROLOL SUCCINATE 50 MG: 50 TABLET, EXTENDED RELEASE ORAL at 14:55

## 2024-08-16 NOTE — PROGRESS NOTES
"Occupational Therapy Progress Note    Today's date: 2024  Patient name: Yesenia Tillman  : 1935  MRN: [id number]  Referring provider: Liam Zepeda MD  Dx:   Encounter Diagnoses   Name Primary?    History of multiple strokes Yes    Seizure disorder (HCC)                Start Time: 1150  Stop Time: 1315  Total time in clinic (min): 85 minutes        PLAN OF CARE START: 24  PLAN OF CARE END: 24  FREQUENCY: 2x/week  PRECAUTIONS standard    Subjective/Objective: Pt arrived to session few minutes late and stated \"I just woke up!\" Pt engaged in warm up activity, although was laughing and kept repeating \"I can't do this I just woke up and I just am not awake yet\". After warm up concluded pt started to take deep breaths and calm her body, and as she did she started to feel dizzy. Pt sat on edge of mat and laid down. Therapist took blood pressure and initial reading 152/94, HR 68, O2 97%.  Pt tried to sit back up and became very dizzy again needing to lay back down. Pt stated she did not eat breakfast, granola bar and water provided and pt remained in reclined position. Pt became tearful and reported multiple psychosocial stressors. Emotional support provided and strategies discussed to improve her QOL and social engagement. Pt was receptive and appreciative. At end of session pt again sat up and became dizzy. BP rechecked and was 250/104. Pt informed of BP and need for emergency tx to prevent stroke/heart attack. Pt refused and stated she wanted to drive home. Another OT checked BP and reading was 238/90. Pt educated that she could not safely drive or go home alone with her BP this high. EMS called and pt left in their care.    Plan: Continued skilled OT per POC.    Goals added 24:   Pt will improved immediate delayed recall as evidenced on the CMT, increasing to 12 immediate, 6 delayed      GOALS ADDED 24:   SHORT TERM GOALS:   Memory  Pt will increase delayed recall being able to recall 8 " items on RBANS in STM.  Pt will increase delayed recall being able to recall 8 items on RBANs in LTM        Attention     Pt will maintain attention to task for 10 minutes in semi modal environment for baseline performance,  improved role performance and to improve learning and to simulate return to IADLs and complete cooking/cleaning tasks.    Pt will demo ability to participate in dual tasking/divided attention task with 50% accuracy in multimodal environment to simulate return to life roles    LONG TERM GOALS  Memory  Pt will increase delayed recall being able to recall 9 items on RBANS in STM.  Pt will increase delayed recall being able to recall 9 items on RBANs in LTM        Attention     Pt will maintain attention to task for 20 minutes in semi modal environment for baseline performance,  improved role performance and to improve learning and to simulate return to IADLs and complete cooking/cleaning tasks.    Pt will demo ability to participate in dual tasking/divided attention task with 75% accuracy in multimodal environment to simulate return to life roles

## 2024-08-16 NOTE — ED PROVIDER NOTES
"History  Chief Complaint   Patient presents with    Hypertension     Pt was at outpatient PT \"for memory\" due to hx of CVA, pt reports feeling dizzy but states this is not new and has been dizzy \"for years\" was noted hypertensive 200's/100's associated with feeling dizzy. Missed am medications including toprol due to rushing out of home. Pt lives alone drives self.     Depression     reports recent passing of family members (son) and some increased feeling of depression, looking for assistance in services for someone to talk to. Denies SI/HI no hx of any attempts or self harm. Pt tearful but cooperative.      Patient has severe short-term memory issues post CVA.  She is currently in neurological rehab for this.  Patient states she forgot to take her Toprol-XL this morning.  While she was at her physical therapy session she became upset thinking about the recent death of a family member.  Patient states she was upset and tearful and she hates that she is crying.  Blood pressure was reported at 230/90 and the patient was sent to the ED for evaluation.  She arrives awake and alert without complaints.  Denies any pain.  Blood pressure was 190/79 on arrival.        Prior to Admission Medications   Prescriptions Last Dose Informant Patient Reported? Taking?   acetaminophen (TYLENOL) 325 mg tablet  Self No No   Sig: Take 2 tablets (650 mg total) by mouth every 6 (six) hours as needed for mild pain, headaches or fever   acetaminophen (TYLENOL) 650 mg CR tablet   No No   Sig: Take 1 tablet (650 mg total) by mouth every 8 (eight) hours as needed for mild pain   atorvastatin (LIPITOR) 40 mg tablet   No No   Sig: Take 1 tablet (40 mg total) by mouth daily   clopidogrel (PLAVIX) 75 mg tablet   No No   Sig: Take 1 tablet (75 mg total) by mouth daily   famotidine (PEPCID) 20 mg tablet   No No   Sig: Take 1 tablet (20 mg total) by mouth daily   fluticasone (FLONASE) 50 mcg/act nasal spray  Self Yes No   Si spray into each " nostril daily   levETIRAcetam (KEPPRA) 500 mg tablet   No No   Sig: Take 1 tablet (500 mg total) by mouth daily   levothyroxine 88 mcg tablet   No No   Sig: Take 1 tablet (88 mcg total) by mouth daily   lidocaine (LIDODERM) 5 %   No No   Sig: Apply 1 patch topically over 12 hours daily Remove & Discard patch within 12 hours or as directed by MD Do not start before December 9, 2023.   loratadine (CLARITIN) 10 mg tablet  Self Yes No   Sig: Take 10 mg by mouth daily As needed   losartan (COZAAR) 50 mg tablet   No No   Sig: Take 1 tablet (50 mg total) by mouth daily   metoprolol succinate (TOPROL-XL) 50 mg 24 hr tablet   No No   Sig: Take 1 tablet (50 mg total) by mouth daily   omeprazole (PriLOSEC) 20 mg delayed release capsule   No No   Sig: Take 1 capsule (20 mg total) by mouth daily   promethazine-dextromethorphan (PHENERGAN-DM) 6.25-15 mg/5 mL oral syrup   No No   Sig: Take 5 mL by mouth 4 (four) times a day as needed for cough   venlafaxine (EFFEXOR-XR) 37.5 mg 24 hr capsule   No No   Sig: Take 1 capsule (37.5 mg total) by mouth daily with breakfast      Facility-Administered Medications: None       Past Medical History:   Diagnosis Date    Acute CVA (cerebrovascular accident) (HCC) 06/18/2016    Adult hypothyroidism 09/12/2016    Anxiety 06/10/2019    Arthritis     Asthma     Colon polyp     Depression     Disease of thyroid gland     Dyslipidemia 06/10/2019    Esophageal dysmotility 06/10/2019    Essential hypertension 09/12/2016    GERD (gastroesophageal reflux disease)     Hyperlipidemia     Hypertension     Hypothyroidism     Obesity     Paroxysmal atrial fibrillation (HCC) 09/12/2016    Seizure disorder ([facility]) 08/24/2018    Seizures (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) 06/2016    Subarachnoid hemorrhage (HCC) 09/13/2016    Subdural hematoma ([facility]) 09/13/2016       Past Surgical History:   Procedure Laterality Date     APPENDECTOMY      BACK SURGERY  01/2016    CARPAL TUNNEL RELEASE Right     COLONOSCOPY      TUBAL LIGATION         Family History   Problem Relation Age of Onset    Kidney disease Mother     Heart disease Mother     Cancer Father         colon    Heart disease Father     Colon cancer Father     Heart disease Sister     Diabetes Son     Multiple sclerosis Son     No Known Problems Son     Heart disease Maternal Grandmother      I have reviewed and agree with the history as documented.    E-Cigarette/Vaping    E-Cigarette Use Never User      E-Cigarette/Vaping Substances    Nicotine No     THC No     CBD No     Flavoring No     Other No     Unknown No      Social History     Tobacco Use    Smoking status: Never    Smokeless tobacco: Never   Vaping Use    Vaping status: Never Used   Substance Use Topics    Alcohol use: Never     Alcohol/week: 0.0 standard drinks of alcohol     Comment: 0    Drug use: No       Review of Systems   Constitutional:  Negative for chills and fever.   HENT:  Negative for congestion.    Eyes:  Negative for visual disturbance.   Respiratory:  Negative for cough and shortness of breath.    Cardiovascular:  Negative for chest pain.   Gastrointestinal:  Negative for abdominal pain and vomiting.   Genitourinary:  Negative for dysuria.   Musculoskeletal:  Negative for back pain.   Skin:  Negative for rash.   Neurological:  Positive for headaches. Negative for syncope, speech difficulty and weakness.   Psychiatric/Behavioral:  Positive for dysphoric mood.    All other systems reviewed and are negative.      Physical Exam  Physical Exam  Vitals and nursing note reviewed.   Constitutional:       Appearance: Normal appearance.   HENT:      Head: Normocephalic.      Right Ear: External ear normal.      Left Ear: External ear normal.      Nose: Nose normal.      Mouth/Throat:      Mouth: Mucous membranes are moist.   Eyes:      Conjunctiva/sclera: Conjunctivae normal.   Cardiovascular:      Rate and  Rhythm: Normal rate and regular rhythm.      Pulses: Normal pulses.   Pulmonary:      Effort: Pulmonary effort is normal.   Abdominal:      Palpations: Abdomen is soft.      Tenderness: There is no abdominal tenderness.   Musculoskeletal:         General: Normal range of motion.      Cervical back: Normal range of motion and neck supple.   Skin:     General: Skin is warm and dry.      Capillary Refill: Capillary refill takes less than 2 seconds.   Neurological:      General: No focal deficit present.      Mental Status: She is alert and oriented to person, place, and time.      Cranial Nerves: No cranial nerve deficit.      Sensory: No sensory deficit.      Motor: No weakness.   Psychiatric:         Behavior: Behavior normal.      Comments: Patient is tearful at times         Vital Signs  ED Triage Vitals [08/16/24 1426]   Temperature Pulse Respirations Blood Pressure SpO2   98.2 °F (36.8 °C) 67 18 (!) 190/79 99 %      Temp Source Heart Rate Source Patient Position - Orthostatic VS BP Location FiO2 (%)   Temporal Monitor Lying Left arm --      Pain Score       --           Vitals:    08/16/24 1426 08/16/24 1427   BP: (!) 190/79    Pulse: 67 73   Patient Position - Orthostatic VS: Lying Lying         Visual Acuity      ED Medications  Medications   metoprolol succinate (TOPROL-XL) 24 hr tablet 50 mg (50 mg Oral Given 8/16/24 1455)       Diagnostic Studies  Results Reviewed       Procedure Component Value Units Date/Time    CBC and differential [[id number]] Collected: 08/16/24 1515    Lab Status: In process Specimen: Blood from Arm, Right Updated: 08/16/24 1518    Comprehensive metabolic panel [[id number]] Collected: 08/16/24 1515    Lab Status: In process Specimen: Blood from Arm, Right Updated: 08/16/24 1518                   No orders to display              Procedures  ECG 12 Lead Documentation Only    Date/Time: 8/16/2024 3:07 PM    Performed by: Samm Naranjo MD  Authorized by: Samm Naranjo MD     Indications / Diagnosis:  Hypertension  ECG reviewed by me, the ED Provider: yes    Patient location:  ED  Interpretation:     Interpretation: normal    Rate:     ECG rate:  65    ECG rate assessment: normal    Rhythm:     Rhythm: sinus rhythm    Ectopy:     Ectopy: none    QRS:     QRS axis:  Normal    QRS intervals:  Normal  Conduction:     Conduction: normal    ST segments:     ST segments:  Normal  T waves:     T waves: normal             ED Course                                 SBIRT 20yo+      Flowsheet Row Most Recent Value   Initial Alcohol Screen: US AUDIT-C     1. How often do you have a drink containing alcohol? 0 Filed at: 08/16/2024 1430   3b. FEMALE Any Age, or MALE 65+: How often do you have 4 or more drinks on one occassion? 0 Filed at: 08/16/2024 1430   Audit-C Score 0 Filed at: 08/16/2024 1430   FRED: How many times in the past year have you...    Used an illegal drug or used a prescription medication for non-medical reasons? Never Filed at: 08/16/2024 1430                      Medical Decision Making  Amount and/or Complexity of Data Reviewed  Labs: ordered.    Risk  Prescription drug management.                 Disposition  Final diagnoses:   None     ED Disposition       None          Follow-up Information    None         Patient's Medications   Discharge Prescriptions    No medications on file       No discharge procedures on file.    PDMP Review         Value Time User    PDMP Reviewed  Yes 5/8/2023 10:49 AM Erica Zepeda MD            ED Provider  Electronically Signed by             Samm Naranjo MD  08/16/24 1084

## 2024-08-16 NOTE — ED NOTES
Pt is trying to leave pt is very confused and shaking stating she has to be somewhere. Informed staff to advise dr olson and/or sánchez       Susan Fitzgerald  08/16/24 9591

## 2024-08-18 LAB
ATRIAL RATE: 65 BPM
P AXIS: 44 DEGREES
PR INTERVAL: 170 MS
QRS AXIS: 49 DEGREES
QRSD INTERVAL: 88 MS
QT INTERVAL: 398 MS
QTC INTERVAL: 413 MS
T WAVE AXIS: 22 DEGREES
VENTRICULAR RATE: 65 BPM

## 2024-08-18 PROCEDURE — 93010 ELECTROCARDIOGRAM REPORT: CPT | Performed by: INTERNAL MEDICINE

## 2024-08-19 ENCOUNTER — TELEPHONE (OUTPATIENT)
Age: 89
End: 2024-08-19

## 2024-08-19 NOTE — TELEPHONE ENCOUNTER
Patient confirmed her appt on 8/27/24 and requested to be called the day before appt and  to be given directions to office.

## 2024-08-26 ENCOUNTER — TELEPHONE (OUTPATIENT)
Age: 89
End: 2024-08-26

## 2024-08-26 ENCOUNTER — SOCIAL WORK (OUTPATIENT)
Dept: BEHAVIORAL/MENTAL HEALTH CLINIC | Facility: CLINIC | Age: 89
End: 2024-08-26
Payer: COMMERCIAL

## 2024-08-26 DIAGNOSIS — R41.3 MEMORY CHANGES: ICD-10-CM

## 2024-08-26 DIAGNOSIS — F32.1 CURRENT MODERATE EPISODE OF MAJOR DEPRESSIVE DISORDER WITHOUT PRIOR EPISODE (HCC): Primary | ICD-10-CM

## 2024-08-26 PROCEDURE — 90837 PSYTX W PT 60 MINUTES: CPT | Performed by: SOCIAL WORKER

## 2024-08-26 NOTE — TELEPHONE ENCOUNTER
Galina would like to speak with someone about est this patient has a few questions if this would be the right fit for her. And do you provide transportation for patients.    Please call Galina back at [phone]

## 2024-08-26 NOTE — PSYCH
Behavioral Health Psychotherapy Progress Note    Psychotherapy Provided: Individual Psychotherapy     1. 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 Memory changes            Goals addressed in session: Goal 1 and Goal 2     DATA: Yesenia attended follow up session, in person and on time.  She noted that she was looking forward to the visit.  In checking her chart, it was noted that she was in the ER and Yesenia noted that she was there because she was not feeling well, and at first could not remember how she got there and then remembered that she was at OT and was taken by ambulance. She noted that her blood pressure was high and she felt dizzy and they discharged her. She noted that she had gotten confused when she came here to see someone else (which was a med provider), and we talked about her being referred again to neurology due to memory issues.  Together, we called the neurology appt and was told that she could not be seen there because she was over 65 and needed to be seen at [facility] which is in [place] and too far for Yesenia to get to unless they provided transportation.  [facility] was called but the person at the pod was not able to connect with anyone there and a message was left.  An AGUS was filled out by Yesenia to speak with [facility] on her behalf as well as her son.  Yesenia noted that she feels as though she is still in an ok place, memory wise, because things come to her eventually.  She shared that having a calendar is helping her and requested another be printed out for her, which it was.  Yesenia shared that she did not go to e senior meal at the hospital because she did not want to make the commitment and explained that she does not need to call ahead of time.  Yesenia shared that she continues to go to [employer] every Sunday and stays for their lunch every other week when they have it.  She also shared that her daughter in law came to [facility] and he  "had a very nice time.  Yesenia noted that she is lonely and encouraged her to go to the senior center or attend the senior meal at the hospital, as well as continue to attend the OT group. Yesenia was tearful as she talked about her son dying and lack of purpose or contacts with others.  Yesenia discussed thoughts and feelings about her other son not seeing her as often as she would like but not wanting to be a burden to him since he has a \"sickly wife\" and Yesenia noted that she does not feel like a burden but needs help navigating this new way of life. This writer noted that I would follow up with [facility] and we would continue to talk about this next session.  A calendar of her upcoming visits with [facility] was printed out for her as well.   During this session, this clinician used the following therapeutic modalities: Client-centered Therapy, Solution-Focused Therapy, and Supportive Psychotherapy    Substance Abuse was not addressed during this session. If the client is diagnosed with a co-occurring substance use disorder, please indicate any changes in the frequency or amount of use: n/a. Stage of change for addressing substance use diagnoses: No substance use/Not applicable    ASSESSMENT:  Yesenia Tillman presents with a Depressed mood.     her affect is Normal range and intensity, which is congruent, with her mood and the content of the session. The client has made progress on their goals.  Yesenia is coming to sessions and is talking about her current issues and obstacles.     Yesenia Tillman presents with a none risk of suicide, none risk of self-harm, and none risk of harm to others.    For any risk assessment that surpasses a \"low\" rating, a safety plan must be developed.    A safety plan was indicated: no  If yes, describe in detail n/a    PLAN: Between sessions, Yesenia Tillman will continue to attend [employer] and social, make an attempt to go to the senior meal at the hospital or visit the senior " [place]. At the next session, the therapist will use Client-centered Therapy, Solution-Focused Therapy, and Supportive Psychotherapy to address depression/memory issues/loss.    Behavioral Health Treatment Plan and Discharge Planning: Yesenia Tillman is aware of and agrees to continue to work on their treatment plan. They have identified and are working toward their discharge goals. yes    Visit start and stop times:    08/26/24  Start Time: 1200  Stop Time: 1300  Total Visit Time: 60 minutes

## 2024-08-27 ENCOUNTER — TELEPHONE (OUTPATIENT)
Age: 89
End: 2024-08-27

## 2024-08-27 NOTE — TELEPHONE ENCOUNTER
"Messageregina Mojica (phone # in teams links to [facility]) through Teams, stating MA was reaching out regarding a Pt she called about yesterday and to discuss what our office does to see if it is the \"right fit for her\".  Call rang a few times then ended, stating recipient is unavailable.    Message on teams:  \"Hello Galina, My name is Chris from [facility]. I have tried twice to call you today but could not get through. I was trying to return your call concerning a Pt and establishing care here, to explain what we do so you can make sure if this is \"right fit for her\".  Pt MRN [id number]     Please let me know when a good time to return your call would be and I would be happy to call you at that time, if available.\"     "

## 2024-08-27 NOTE — TELEPHONE ENCOUNTER
Attempted to call Galina back via Team, no answer and redirected to  but no VM set up, call ended automatically.     - [facility] is a specialty concerning Cognitive Care, we are not a PCP.  - We are typically scheduling in December at this time, so there is a wait period but Pt could be put on Wait-list if desired.   - Appt's are 1 hr in length, Pt/Family/Care Giver would speak to MA, SW, and Provider during visit.  - An assessment will be completed with Pt while Family/Care Partner is in a separate room speaking with provider.

## 2024-08-30 ENCOUNTER — EVALUATION (OUTPATIENT)
Facility: CLINIC | Age: 89
End: 2024-08-30
Payer: COMMERCIAL

## 2024-08-30 DIAGNOSIS — Z86.73 HISTORY OF MULTIPLE STROKES: Primary | ICD-10-CM

## 2024-08-30 DIAGNOSIS — G40.909 SEIZURE DISORDER (HCC): ICD-10-CM

## 2024-08-30 PROCEDURE — 97530 THERAPEUTIC ACTIVITIES: CPT | Performed by: OCCUPATIONAL THERAPIST

## 2024-08-30 NOTE — PROGRESS NOTES
"OCCUPATIONAL THERAPY RE-CERTIFICATION    Today's Date: 2024  Patient Name: Yesenia Tillman  : 1935  MRN: [id number]  Referring Provider: Liam Zepeda MD  Dx: History of multiple strokes [Z86.73]    SKILLED ANALYSIS:  Pt presents to re-evaluation on 2024 status post multiple CVAs and decline in thinking and memory. As per interview, pt reports that she feels her memory is getting worse. She knows she needs more social interaction, and wants to start going to a local shop that hosts arts and crafts activities, but hasn't started yet. Discussed goal setting and putting this on her calendar and she was receptive. Today pt completed the MoCA with overall score of 26/30, which indicates no neurocognitive impairments and is her best score to date. She lost 2 points for delayed recall, 1 point for language, and 1 point for executive functioning. Pt achieved 1 STG and 1 LTG today. Overall recommend continued OP OT services 1-2x/wk for 12 more weeks for pt to participate in cognitive group for social interaction as well as cognitive stimulation. Pt in agreement with recommendations and all questions answered.       Next re-eval complete RBANS, Trail Making A and B    PLAN OF CARE START: 24  PLAN OF CARE END: 24  FREQUENCY: 2x/week  PRECAUTIONS standard    Subjective    Mechanism of Injury as per medical record of most recent hospitalization (2023)  \"Yesenia Tillman is a 88 y.o. female patient with past medical history of stroke, hypothyroidism, anxiety, depression, dyslipidemia, GERD, esophageal dysmotility, PAF, seizure disorder who originally presented to the hospital on 2023 due to right shoulder pain.  Upon further questioning patient also reported she might be slightly weak in the right arm and had some speech issues and might be feeling off balance and fatigued and tired.  CAT scan of the brain was unremarkable in the ED and shoulder x-ray was negative.  Patient was treated " "symptomatically for shoulder pain and patient also had MRI of the brain which was negative.  Patient continued remained stable and was doing well with physical therapy.  Patient was also seen by orthopedics who agreed with conservative treatment with an outpatient follow-up for MRI/steroid injection as needed\"    Occupational Profile    \"Pt lives by herself. She lives in a first floor apartment. She has 3 sons, one has MS, one has , the youngest helps as needed. Pt reports she has deficits with her memory about a year ago. Pt reports her   a year ago, however she barely remembers it. Pt son is managing her money. Pt can make her own meals. Pt is managing her own medication with a pill box. Pt is currently driving. Pt enjoys artwork, and uses many of her coloring books.\"    PATIENT GOAL: \"To slow progression of memory loss\"    HISTORY OF PRESENT ILLNESS:   Pt was recently referred to outpatient OT from her PCP due to changes in her memory. She has a history of multiple CVA's, and reports her memory has been much worse since her  passed away ~1 year ago. She recently had a TIA in 2023, however pt had limited recall of this event.     PMH:   Past Medical History:   Diagnosis Date    Acute CVA (cerebrovascular accident) (HCC) 2016    Adult hypothyroidism 2016    Anxiety 06/10/2019    Arthritis     Asthma     Colon polyp     Depression     Disease of thyroid gland     Dyslipidemia 06/10/2019    Esophageal dysmotility 06/10/2019    Essential hypertension 2016    GERD (gastroesophageal reflux disease)     Hyperlipidemia     Hypertension     Hypothyroidism     Obesity     Paroxysmal atrial fibrillation (HCC) 2016    Seizure disorder ([facility]) 2018    Seizures ([facility])     Stenosis of left carotid artery 2019    Stenosis of left middle cerebral artery 2019    Stroke ([facility]) 2016    Subarachnoid hemorrhage ([facility]) 2016    Subdural hematoma ([facility]) " 2016       Past Surgical Hx:   Past Surgical History:   Procedure Laterality Date    APPENDECTOMY      BACK SURGERY  2016    CARPAL TUNNEL RELEASE Right     COLONOSCOPY      TUBAL LIGATION          Pain: None    Objective    Swengel Cognitive Assessment Version 8.3 (MoCA V8.3)  Visuospatial/executive functionin/5  Naming:  3/3  Memory: 1st trial:  /5, 2nd trial:    Attention/concentration: 2/2  List of letters:   Serial Seven Subtraction:  3/3 w/ 0 errors  Language/sentence repetition:  2/2  Language Fluency:  0/1 (n= 10)  Abstract/Correlational Thinkin/2  Delayed Recall:  3/5  Orientation:     Memory Index Score: 12/15  MoCA V1 8.1 Raw Score:  /30, MIS:  12/15, indicative of NO neurocognitive impairments.      MoCA Scoring        Normal: 26+         Mild Cognitive Impairment: 18-25          Moderate Cognitive Impairment: 10-17         Severe Cognitive Impairment: <10      Functional Cognition:  Highest level of education: 1 Semester of college    Trail making Part A and Part B:   Part A: 31.23 seconds (previously: 33 seconds) IMPROVED   Part B: 1 minute and 28.9 seconds; with 3 verbal cues for sequencing  (previously: 1 minute 26.62 seconds) DECLINED   Indicating deficits: Part A > 63 seconds and Part B > 140 seconds      Contextual Memory Test:  Immediate: 10/20 (previously: , 0 confabulations) IMPROVED   Delayed:  with 0 confabulations (previously: , 0 confabulations) MAINTAINED         Pt participated in cog group session to incorporate socialization while addressing cognitive skills for overall well being.     TE:  Pt completed the following exercises x2 minutes:  Sit to stands with foam  Side steps up and down on large box  Rows with red theraband   Dual tasks during exercises: counting by 6, rhyming     TA:  Synonym crossword puzzle to address attention, recall, EF skills and working memory    Vital signs assessed during seated portion of group: HR 82, O2 saturation  95%, /72. Pt could not remember if she took her BP medication this morning. Contacted PCP to notify of pt's BP and med management concerns.          GOALS:   Short Term Goals:  Pt will demonstrate improved functional cognition as evidenced by improving score on the MoCA to 24/30 to improve performance during ADLs and IADLs ACHIEVED 8/30  Pt will demonstrate improved divided attention, completing TM part B WNL and no more than 1 vc.  AHCIEVED   Pt will demo G recall of 85% of Verbal information utilizing memory strategy of choice for improved STM/delayed memory     Long Term Goals: 8-12 weeks  Pt will demonstrate improved functional cognition as evidenced by improving score on the MoCA to 26/30 to improve performance during ADLs and IADLs ACHIEVED 8/30  Pt will demonstrate improved divided attention, completing TM part B WNL and no cues.  Pt will demo G carryover of use of internal/external memory strategy aides for improved recall of daily events, improved executive functioning with 90% accuracy   Pt will demo increased insight into deficits for overall increased safety and self awareness with   ADL/IADL tasks and to encourage use of compensatory strategies ACHIEVED        GOALS ADDED 5/7/24:   SHORT TERM GOALS:   Memory  Pt will increase delayed recall being able to recall 8 items on RBANS in STM.  Pt will increase delayed recall being able to recall 8 items on RBANs in LTM        Attention     Pt will maintain attention to task for 10 minutes in semi modal environment for baseline performance,  improved role performance and to improve learning and to simulate return to IADLs and complete cooking/cleaning tasks.    Pt will demo ability to participate in dual tasking/divided attention task with 50% accuracy in multimodal environment to simulate return to life roles          LONG TERM GOALS  Memory  Pt will increase delayed recall being able to recall 9 items on RBANS in STM.  Pt will increase delayed recall  being able to recall 9 items on RBANs in LTM        Attention     Pt will maintain attention to task for 20 minutes in semi modal environment for baseline performance,  improved role performance and to improve learning and to simulate return to IADLs and complete cooking/cleaning tasks.    Pt will demo ability to participate in dual tasking/divided attention task with 75% accuracy in multimodal environment to simulate return to life roles          Objective Measurement Tracker:    IE (3/29/24) 1st Re-eval:   (5/3/24) 2nd Re-eval:  (6/14/24) 3rd Re-eval:  8/30/24    MoCA 22/30 23/30  DC 26/30    RBANS  502      TM Test A 28.36 independently  33 seconds  31.23 seconds      TM Test B 1 minute 55 seconds with 3 mistakes and 1 VCs for recall of instructions 1 minute 26.62 seconds  1 minute and 28.9 seconds; with 3 verbal cues for sequencing      CMT Complete next session Immediate:   8/20  Delayed: 6/20 Immediate: 10/20   Delayed: 6/20 with 0 confabulations

## 2024-09-03 ENCOUNTER — OFFICE VISIT (OUTPATIENT)
Age: 89
End: 2024-09-03
Payer: COMMERCIAL

## 2024-09-03 VITALS
BODY MASS INDEX: 30.23 KG/M2 | OXYGEN SATURATION: 95 % | HEIGHT: 60 IN | DIASTOLIC BLOOD PRESSURE: 80 MMHG | RESPIRATION RATE: 18 BRPM | SYSTOLIC BLOOD PRESSURE: 160 MMHG | HEART RATE: 85 BPM | WEIGHT: 154 LBS | TEMPERATURE: 98.9 F

## 2024-09-03 DIAGNOSIS — F33.9 RECURRENT DEPRESSION (HCC): ICD-10-CM

## 2024-09-03 DIAGNOSIS — I10 HTN (HYPERTENSION), BENIGN: Primary | ICD-10-CM

## 2024-09-03 DIAGNOSIS — E03.9 ACQUIRED HYPOTHYROIDISM: ICD-10-CM

## 2024-09-03 DIAGNOSIS — K21.9 GASTROESOPHAGEAL REFLUX DISEASE WITHOUT ESOPHAGITIS: ICD-10-CM

## 2024-09-03 DIAGNOSIS — E78.2 MIXED HYPERLIPIDEMIA: ICD-10-CM

## 2024-09-03 DIAGNOSIS — G40.909 SEIZURE DISORDER (HCC): ICD-10-CM

## 2024-09-03 PROBLEM — J06.9 UPPER RESPIRATORY TRACT INFECTION: Status: RESOLVED | Noted: 2024-04-16 | Resolved: 2024-09-03

## 2024-09-03 PROCEDURE — 1160F RVW MEDS BY RX/DR IN RCRD: CPT

## 2024-09-03 PROCEDURE — 99214 OFFICE O/P EST MOD 30 MIN: CPT

## 2024-09-03 PROCEDURE — 1159F MED LIST DOCD IN RCRD: CPT

## 2024-09-03 PROCEDURE — G2211 COMPLEX E/M VISIT ADD ON: HCPCS

## 2024-09-03 RX ORDER — LOSARTAN POTASSIUM 100 MG/1
100 TABLET ORAL DAILY
Qty: 90 TABLET | Refills: 0 | Status: SHIPPED | OUTPATIENT
Start: 2024-09-03

## 2024-09-03 NOTE — ASSESSMENT & PLAN NOTE
Not under good control.  Continue metoprolol.  Increase losartan 100 mg daily.  We will continue to monitor

## 2024-09-03 NOTE — PROGRESS NOTES
Assessment/Plan:         Problem List Items Addressed This Visit     HTN (hypertension), benign - Primary     Not under good control.  Continue metoprolol.  Increase losartan 100 mg daily.  We will continue to monitor         Relevant Medications    losartan (COZAAR) 100 MG tablet    Acquired hypothyroidism     Under control.  TSH in therapeutic range.  Continue same dose of levothyroxine.  We will continue to monitor         Seizure disorder (HCC)     Under control.    Continue current medication.    We will re-evaluate at next office visit.           Mixed hyperlipidemia     Under control.  Continue atorvastatin.  We will continue to monitor         Gastroesophageal reflux disease without esophagitis     Under control.    Continue current medication.    We will re-evaluate at next office visit.           Recurrent depression (HCC)     Continue follow-up with behavioral health.  Continue current medication.  We will continue to monitor              Subjective:      Patient ID: Yesenia Tillman is a 88 y.o. female.    Patient here for follow-up after emergency room visit.  Patient's blood pressure was elevated in the emergency room but no medication change.  Patient currently otherwise feeling okay still feels sad and has been followed by behavioral health.  Denies any chest pain, shortness of breath, abdominal pain, nausea, vomiting, fever or chills.  No lightheadedness or dizziness.  No headaches.  No tingling or muscle weakness.        The following portions of the patient's history were reviewed and updated as appropriate:   Past Medical History:  She has a past medical history of Acute CVA (cerebrovascular accident) (HCC) (06/18/2016), Adult hypothyroidism (09/12/2016), Anxiety (06/10/2019), Arthritis, Asthma, Colon polyp, Depression, Disease of thyroid gland, Dyslipidemia (06/10/2019), Esophageal dysmotility (06/10/2019), Essential hypertension (09/12/2016), GERD (gastroesophageal reflux disease),  Hyperlipidemia, Hypertension, Hypothyroidism, Obesity, Paroxysmal atrial fibrillation (HCC) (09/12/2016), Seizure disorder (HCC) (08/24/2018), Seizures (HCC), 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), 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), Stroke (HCC) (06/2016), Subarachnoid hemorrhage (HCC) (09/13/2016), and Subdural hematoma (HCC) (09/13/2016).,  _______________________________________________________________________  Medical Problems:  does not have any pertinent problems on file.,  _______________________________________________________________________  Past Surgical History:   has a past surgical history that includes Back surgery (01/2016); Carpal tunnel release (Right); Appendectomy; Colonoscopy; and Tubal ligation.,  _______________________________________________________________________  Family History:  family history includes Cancer in her father; Colon cancer in her father; Diabetes in her son; Heart disease in her father, maternal grandmother, mother, and sister; Kidney disease in her mother; Multiple sclerosis in her son; No Known Problems in her son.,  _______________________________________________________________________  Social History:   reports that she has never smoked. She has never used smokeless tobacco. She reports that she does not drink alcohol and does not use drugs.,  _______________________________________________________________________  Allergies:  is allergic to amlodipine, hydralazine, and penicillins..  _______________________________________________________________________  Current Outpatient Medications   Medication Sig Dispense Refill   • acetaminophen (TYLENOL) 325 mg tablet Take 2 tablets (650 mg total) by mouth every 6 (six) hours as needed for mild pain, headaches or fever 30 tablet 0   • atorvastatin (LIPITOR) 40 mg tablet Take 1 tablet (40 mg total) by mouth daily 90 tablet 1   • clopidogrel (PLAVIX) 75 mg tablet Take 1 tablet (75 mg total) by mouth  daily 90 tablet 0   • famotidine (PEPCID) 20 mg tablet Take 1 tablet (20 mg total) by mouth daily 90 tablet 0   • levETIRAcetam (KEPPRA) 500 mg tablet Take 1 tablet (500 mg total) by mouth daily 90 tablet 1   • levothyroxine 88 mcg tablet Take 1 tablet (88 mcg total) by mouth daily 90 tablet 1   • lidocaine (LIDODERM) 5 % Apply 1 patch topically over 12 hours daily Remove & Discard patch within 12 hours or as directed by MD Do not start before December 9, 2023. 14 patch 0   • losartan (COZAAR) 100 MG tablet Take 1 tablet (100 mg total) by mouth daily 90 tablet 0   • metoprolol succinate (TOPROL-XL) 50 mg 24 hr tablet Take 1 tablet (50 mg total) by mouth daily 90 tablet 0   • omeprazole (PriLOSEC) 20 mg delayed release capsule Take 1 capsule (20 mg total) by mouth daily 90 capsule 1   • venlafaxine (EFFEXOR-XR) 37.5 mg 24 hr capsule Take 1 capsule (37.5 mg total) by mouth daily with breakfast 30 capsule 5     No current facility-administered medications for this visit.     _______________________________________________________________________  Review of Systems   Constitutional:  Positive for fatigue. Negative for chills and fever.   HENT:  Negative for congestion, ear pain, rhinorrhea, sneezing and sore throat.    Eyes:  Negative for pain, discharge, redness, itching and visual disturbance.   Respiratory:  Negative for cough, chest tightness and shortness of breath.    Cardiovascular:  Negative for chest pain, palpitations and leg swelling.   Gastrointestinal:  Negative for abdominal pain, blood in stool, diarrhea, nausea and vomiting.   Endocrine: Negative for cold intolerance and heat intolerance.   Genitourinary:  Negative for dysuria, frequency, hematuria and urgency.   Musculoskeletal:  Positive for arthralgias (right shoulder). Negative for back pain and myalgias.   Skin:  Negative for color change and rash.   Neurological:  Negative for dizziness, weakness, light-headedness, numbness and headaches.    Hematological:  Does not bruise/bleed easily.   Psychiatric/Behavioral:  Positive for dysphoric mood. Negative for agitation and behavioral problems.          Objective:  Vitals:    09/03/24 1405 09/03/24 1434   BP: (!) 180/78 160/80   BP Location: Left arm Left arm   Patient Position: Sitting Sitting   Cuff Size: Standard Standard   Pulse: 85    Resp: 18    Temp: 98.9 °F (37.2 °C)    TempSrc: Temporal    SpO2: 95%    Weight: 69.9 kg (154 lb)    Height: 5' (1.524 m)      Body mass index is 30.08 kg/m².     Physical Exam  Vitals and nursing note reviewed.   Constitutional:       General: She is not in acute distress.     Appearance: Normal appearance. She is not ill-appearing, toxic-appearing or diaphoretic.   HENT:      Head: Normocephalic and atraumatic.      Nose: Nose normal. No congestion.      Mouth/Throat:      Mouth: Mucous membranes are moist.   Eyes:      General: No scleral icterus.        Right eye: No discharge.         Left eye: No discharge.      Extraocular Movements: Extraocular movements intact.      Conjunctiva/sclera: Conjunctivae normal.      Pupils: Pupils are equal, round, and reactive to light.   Cardiovascular:      Rate and Rhythm: Normal rate and regular rhythm.      Pulses: Normal pulses.      Heart sounds: Normal heart sounds. No murmur heard.     No gallop.   Pulmonary:      Effort: Pulmonary effort is normal. No respiratory distress.      Breath sounds: Normal breath sounds. No wheezing, rhonchi or rales.   Abdominal:      General: Abdomen is flat. Bowel sounds are normal. There is no distension.      Palpations: Abdomen is soft.      Tenderness: There is no abdominal tenderness. There is no guarding.   Musculoskeletal:         General: No swelling or tenderness. Normal range of motion.      Cervical back: Normal range of motion and neck supple. No rigidity.      Right lower leg: No edema.      Left lower leg: No edema.   Lymphadenopathy:      Cervical: No cervical adenopathy.    Skin:     General: Skin is warm.      Capillary Refill: Capillary refill takes 2 to 3 seconds.      Coloration: Skin is not jaundiced.      Findings: No bruising or rash.   Neurological:      General: No focal deficit present.      Mental Status: She is alert and oriented to person, place, and time. Mental status is at baseline.      Gait: Gait normal.   Psychiatric:         Mood and Affect: Mood normal.         Behavior: Behavior normal.

## 2024-09-03 NOTE — ASSESSMENT & PLAN NOTE
Continue follow-up with behavioral health.  Continue current medication.  We will continue to monitor

## 2024-09-05 ENCOUNTER — TELEPHONE (OUTPATIENT)
Age: 89
End: 2024-09-05

## 2024-09-09 ENCOUNTER — SOCIAL WORK (OUTPATIENT)
Dept: BEHAVIORAL/MENTAL HEALTH CLINIC | Facility: CLINIC | Age: 89
End: 2024-09-09
Payer: COMMERCIAL

## 2024-09-09 DIAGNOSIS — F32.1 CURRENT MODERATE EPISODE OF MAJOR DEPRESSIVE DISORDER WITHOUT PRIOR EPISODE (HCC): Primary | ICD-10-CM

## 2024-09-09 DIAGNOSIS — R41.3 MEMORY CHANGES: ICD-10-CM

## 2024-09-09 PROCEDURE — 90837 PSYTX W PT 60 MINUTES: CPT | Performed by: SOCIAL WORKER

## 2024-09-09 NOTE — PSYCH
"Behavioral Health Psychotherapy Progress Note    Psychotherapy Provided: Individual Psychotherapy     1. 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 Memory changes            Goals addressed in session: Goal 1 and Goal 2     DATA: Yesenia reported that she is doing \"ok\" and got to the right place today so is happy about that.  We discussed the information about [facility] for an evaluation and Yesenia stated that she did not want to go through with this, stating that she does not think anything will change.  Yesenia discussed feeling lonely and missing her sons, was tearful while talking.  She shared that she is lonely and discussed going out to the [facility] or to the [facility] for dinner, letha noted that she did not remember to do these things.  She stated letha wished there was something I could do to help this and it was brought to her attention that an appt could be made for [facility] but she had declined this.  Yesenia became very angry and began to yell at this writer stating that I had not offered her this option. This writer was able to provide compassion and empathy for her frustration and Yesenia was then able to calm and was apologetic.  This writer called [facility] and made two appts for Yesenia, with Yesenia present to give permission.  Yesenia was told by the MR that it was preferred if someone accompanied her but transportation could be arranged if not.  We discussed calling her son or Yesenia speaking with him or her daughter in law.  Yesenia gave this writer permission to call her son which would be done at a later date due to time constraints.  Following the appt, this writer spoke with Jeet Avalos PA-C, to share concerns as well as the plan to call her son and the appts made.  During this session, this clinician used the following therapeutic modalities: Engagement Strategies, Bereavement Therapy, Client-centered Therapy, " "Solution-Focused Therapy, and Supportive Psychotherapy    Substance Abuse was not addressed during this session. If the client is diagnosed with a co-occurring substance use disorder, please indicate any changes in the frequency or amount of use: n/a. Stage of change for addressing substance use diagnoses: No substance use/Not applicable    ASSESSMENT:  Yesenia Tillman presents with a Angry, Anxious, and Depressed mood.     her affect is Normal range and intensity and Tearful, which is congruent, with her mood and the content of the session. The client has made progress on their goals.  Yesenia is open to processing thoughts and feelings.     Yesenia Tillman presents with a none risk of suicide, none risk of self-harm, and none risk of harm to others.    For any risk assessment that surpasses a \"low\" rating, a safety plan must be developed.    A safety plan was indicated: no  If yes, describe in detail n/a    PLAN: Between sessions, Yesenia Tillman will speak with her son and daughter in law regarding upcoming appt fr [facility]. At the next session, the therapist will use Engagement Strategies, Bereavement Therapy, Client-centered Therapy, and Supportive Psychotherapy to address depression/anxiety.    Behavioral Health Treatment Plan and Discharge Planning: Yesenia Tillman is aware of and agrees to continue to work on their treatment plan. They have identified and are working toward their discharge goals. yes    Visit start and stop times:    09/09/24  Start Time: 1200  Stop Time: 1325  Total Visit Time: 85 minutes  "

## 2024-09-10 ENCOUNTER — TELEPHONE (OUTPATIENT)
Age: 89
End: 2024-09-10

## 2024-09-10 ENCOUNTER — OFFICE VISIT (OUTPATIENT)
Dept: PSYCHIATRY | Facility: CLINIC | Age: 89
End: 2024-09-10
Payer: COMMERCIAL

## 2024-09-10 DIAGNOSIS — R41.3 MEMORY DIFFICULTY: ICD-10-CM

## 2024-09-10 DIAGNOSIS — F33.1 MODERATE EPISODE OF RECURRENT MAJOR DEPRESSIVE DISORDER (HCC): Primary | ICD-10-CM

## 2024-09-10 DIAGNOSIS — I10 ESSENTIAL HYPERTENSION: ICD-10-CM

## 2024-09-10 PROCEDURE — G2211 COMPLEX E/M VISIT ADD ON: HCPCS | Performed by: PHYSICIAN ASSISTANT

## 2024-09-10 PROCEDURE — 99214 OFFICE O/P EST MOD 30 MIN: CPT | Performed by: PHYSICIAN ASSISTANT

## 2024-09-10 RX ORDER — METOPROLOL SUCCINATE 50 MG/1
50 TABLET, EXTENDED RELEASE ORAL DAILY
Qty: 90 TABLET | Refills: 0 | Status: SHIPPED | OUTPATIENT
Start: 2024-09-10

## 2024-09-10 NOTE — ASSESSMENT & PLAN NOTE
Confirmed with pharmacy that pt is not taking venlafaxine; start fluoxetine 20 mg qd instead for depression  Referred to neuro/geriatrics for memory evaluation

## 2024-09-10 NOTE — PSYCH
"This note was not shared with the patient due to reasonable likelihood of causing patient harm     PROGRESS NOTE        [facility]      Name and Date of Birth:  Yesenia Tillman 88 y.o. 1935 MRN: [id number]    Insurance: Payor: [employer]  REP / Plan: [facility] BLUE CROSS  REP / Product Type: Medicare HMO /     Date of Visit: September 10, 2024    Reason for Visit:   Chief Complaint   Patient presents with    Follow-up    Medication Management     Assessment & Plan  Moderate episode of recurrent major depressive disorder (HCC)  Confirmed with pharmacy that pt is not taking venlafaxine; start fluoxetine 20 mg qd instead for depression  Referred to neuro/geriatrics for memory evaluation    Orders:    FLUoxetine (PROzac) 20 mg capsule; Take 1 capsule (20 mg total) by mouth daily    Memory difficulty            Pt was more oriented and less confused today, though she still was not sure if she was taking venlafaxine (on her med list but she said didn't sound familiar). I called pharmacy with pt on speaker and pharmacist reports last time this was picked up was for #30 in March 2024, so it is very unlikely she is taking it. Instead I have recommended fluoxetine 20 mg qd as pt reports trying it in the past and thinking it was helpful. PARQ for SSRI completed including serotonin syndrome, SIADH, worsening depression, suicidality, induction of joseline, GI upset, headaches, activation, sexual side effects, sedation, potential drug interactions, and others. Interaction with metoprolol discussed. She has appt with [facility] in Jan 2025 and says \"I want to go but it's so far\". Discussed that she should bring someone with her anyway to help with history since it is for memory. She says \"I don't want to be a burden\" but says myself or Galina can call her son and ask if he will go with her. She will continue to work with Galina Holland [facility], as well. We have discussed her " "safety plan and she agrees that if she experience unsafe thoughts that she will reach out to her supports including this office, the suicide hotline, and emergency services if necessary. Yesenia is aware of non-emergent and emergent mental health resources. They are able to contract for their own safety at this time.    Will follow up in 1 months. Patient is aware to call the office if questions or concerns arise sooner.     Treatment Recommendations/Precautions:    Start new medication:    - fluoxetine 20 mg qd    Aware of 24 hour and weekend coverage for urgent situations accessed by calling [facility] main practice number  Medication management every 1 months  Continue psychotherapy with [facility] therapist Galina Holland  I am scheduling this patient out for greater than 3 months: No    Medications Risks/Benefits      Risks, Benefits And Possible Side Effects Of Medications:    Risks, benefits, and possible side effects of medications explained to Yesenia and she verbalizes understanding and agreement for treatment.    Controlled Medication Discussion:     Not applicable    SUBJECTIVE:    Yesenia Tillman is a shivam 88 y.o. female with a history of Major Depressive Disorder and cognitive disorder who presents today for follow-up and medication management. She reports still being very depressed and not knowing if she is taking the venlafaxine, though she states it doesn't sound familiar. She states she is \"tired of crying\" all the time. She also is still very upset by and frustrated with the memory issues. She states she wants to go to the \"memory doctor\" but was told she can only go to [facility] which is in [place] and is \"too far\".     She denies any suicidal ideation, intent or plan at present; denies any homicidal ideation, intent or plan at present.    She denies any auditory hallucinations, denies any visual hallucinations, denies any delusions.    She denies any side effects " from current psychiatric medications.    HPI ROS Appetite Changes and Sleep:     She reports adequate number of sleep hours, adequate appetite, adequate energy level    Current Rating Scores:     None completed today.    Review Of Systems:    Mood depression   Behavior appropriate, cooperative, and calm   Thought Content negative thoughts   General emotional problems and decreased functioning   Personality no change in personality   Other Psych Symptoms decreased memory   Constitutional as noted in HPI   ENT as noted in HPI   Cardiovascular as noted in HPI   Respiratory as noted in HPI   Gastrointestinal as noted in HPI   Genitourinary as noted in HPI   Musculoskeletal as noted in HPI   Integumentary as noted in HPI   Neurological as noted in HPI   Endocrine negative   Other Symptoms none, all other systems are negative     Family Psychiatric History:     Family History   Problem Relation Age of Onset    Kidney disease Mother     Heart disease Mother     Cancer Father         colon    Heart disease Father     Colon cancer Father     Heart disease Sister     Diabetes Son     Multiple sclerosis Son     No Known Problems Son     Heart disease Maternal Grandmother        Social/Substance Abuse History:    Social History     Socioeconomic History    Marital status:      Spouse name: Not on file    Number of children: Not on file    Years of education: Not on file    Highest education level: Not on file   Occupational History    Not on file   Tobacco Use    Smoking status: Never    Smokeless tobacco: Never   Vaping Use    Vaping status: Never Used   Substance and Sexual Activity    Alcohol use: Never     Alcohol/week: 0.0 standard drinks of alcohol     Comment: 0    Drug use: No    Sexual activity: Not Currently     Partners: Male     Birth control/protection: Post-menopausal   Other Topics Concern    Not on file   Social History Narrative    Not on file     Social Determinants of Health     Financial Resource  Strain: Low Risk  (10/12/2023)    Overall Financial Resource Strain (CARDIA)     Difficulty of Paying Living Expenses: Not hard at all   Food Insecurity: Unknown (12/8/2023)    Hunger Vital Sign     Worried About Running Out of Food in the Last Year: Never true     Ran Out of Food in the Last Year: Not on file   Transportation Needs: No Transportation Needs (12/8/2023)    PRAPARE - Transportation     Lack of Transportation (Medical): No     Lack of Transportation (Non-Medical): No   Physical Activity: Not on file   Stress: Not on file   Social Connections: Not on file   Intimate Partner Violence: Not on file   Housing Stability: Low Risk  (12/8/2023)    Housing Stability Vital Sign     Unable to Pay for Housing in the Last Year: No     Number of Times Moved in the Last Year: 1     Homeless in the Last Year: No     The following portions of the patient's history were reviewed and updated as appropriate: past family history, past medical history, past social history, past surgical history and problem list.    OBJECTIVE:     Mental Status Evaluation:  Appearance:  dressed appropriately, adequate grooming, looks stated age   Behavior:  pleasant, cooperative, calm, interacts appropriately with this writer   Speech:  normal rate, normal volume, normal pitch   Mood:  depressed   Affect:  tearful, mood-congruent   Thought Process:  organized, logical, coherent   Associations: intact associations   Thought Content:  no overt delusions, no paranoia noted on exam   Perceptual Disturbances: no auditory hallucinations, no visual hallucinations   Risk Potential: Suicidal ideation - None  Homicidal ideation - None  Potential for aggression - No   Sensorium:  oriented to person, place, and time/date   Memory:  recent memory impaired, remote memory impaired   Consciousness:  alert and awake   Attention/Concentration: attention span and concentration appear shorter than expected for age   Insight:  age appropriate   Judgment: age  appropriate   Gait/Station: normal gait/station   Motor Activity: no abnormal movements     Laboratory Results: I have personally reviewed all pertinent laboratory/tests results    Suicide/Homicide Risk Assessment:    Risk of Harm to Self:  The following ratings are based on assessment at the time of the interview  Based on today's assessment, Yesenia presents the following risk of harm to self: none    Risk of Harm to Others:  The following ratings are based on assessment at the time of the interview  Based on today's assessment, Yesenia presents the following risk of harm to others: none    The following interventions are recommended: no intervention changes needed    Psychotherapy Provided:     Individual psychotherapy provided: No    Treatment Plan:    Completed and signed during the session: Not applicable - Treatment Plan to be completed by [facility] therapist    Visit Time    Visit Start Time:  11:00 AM  Visit End Time:  11:25 AM  Total Visit Duration:  25 minutes    Jeet Avalos 09/10/24

## 2024-09-13 ENCOUNTER — OFFICE VISIT (OUTPATIENT)
Facility: CLINIC | Age: 89
End: 2024-09-13
Payer: COMMERCIAL

## 2024-09-13 ENCOUNTER — DOCUMENTATION (OUTPATIENT)
Dept: BEHAVIORAL/MENTAL HEALTH CLINIC | Facility: CLINIC | Age: 89
End: 2024-09-13

## 2024-09-13 DIAGNOSIS — G40.909 SEIZURE DISORDER (HCC): ICD-10-CM

## 2024-09-13 DIAGNOSIS — Z86.73 HISTORY OF MULTIPLE STROKES: Primary | ICD-10-CM

## 2024-09-13 PROCEDURE — 97110 THERAPEUTIC EXERCISES: CPT

## 2024-09-13 PROCEDURE — 97530 THERAPEUTIC ACTIVITIES: CPT

## 2024-09-13 NOTE — PROGRESS NOTES
Occupational Therapy Progress Note    Today's date: 2024  Patient name: Yesenia Tillman  : 1935  MRN: [id number]  Referring provider: Liam Zepeda MD  Dx:   Encounter Diagnoses   Name Primary?    History of multiple strokes Yes    Seizure disorder (HCC)        Start Time: 1150  Stop Time: 1310  Total time in clinic (min): 80 minutes        PLAN OF CARE START: 24  PLAN OF CARE END: 24  FREQUENCY: 2x/week  PRECAUTIONS standard    Subjective: pt reports no changes since last session    Objective: See treatment below.    Pt participated in cog group session to incorporate socialization while addressing cognitive skills for overall well being.     TE:  Pt completed the following exercises 2 rounds of 2 minutes each:  Sit to stands   Bicep curls with small tidal tank  Arm bike  Step ups with knee drive      Dual tasks during exercises: stating foods that begin with last letter of food previously stated;  stating animals that begin with second letter of animal previous stated; subtract by 6 from 100, cities in alphabetical order      TA:  -Sudoku puzzle x4 minutes then switch X 4 rounds  -organgir.am cube game  -Uzzle with color category prompts        Assessment: Tolerated treatment well. Good attention during dual tasks. Pt benefits from additional instructions however was happy to assist other members of the group when they needed assistance. Pt would benefit from continued OT services to address cognitive function for life roles.     Plan: Continued skilled OT per POC.          Goals added 24:   Pt will improved immediate delayed recall as evidenced on the CMT, increasing to 12 immediate, 6 delayed    GOALS ADDED 24:   SHORT TERM GOALS:   Memory  Pt will increase delayed recall being able to recall 8 items on RBANS in STM.  Pt will increase delayed recall being able to recall 8 items on RBANs in LTM        Attention     Pt will maintain attention to task for 10 minutes in semi modal  environment for baseline performance,  improved role performance and to improve learning and to simulate return to IADLs and complete cooking/cleaning tasks.    Pt will demo ability to participate in dual tasking/divided attention task with 50% accuracy in multimodal environment to simulate return to life roles          LONG TERM GOALS  Memory  Pt will increase delayed recall being able to recall 9 items on RBANS in STM.  Pt will increase delayed recall being able to recall 9 items on RBANs in LTM        Attention     Pt will maintain attention to task for 20 minutes in semi modal environment for baseline performance,  improved role performance and to improve learning and to simulate return to IADLs and complete cooking/cleaning tasks.    Pt will demo ability to participate in dual tasking/divided attention task with 75% accuracy in multimodal environment to simulate return to life roles

## 2024-09-13 NOTE — PROGRESS NOTES
"This writer called Yesenia to remind her that I would be calling her son to discuss her upcoming appts with [facility].  Yesenia became slightly confused thinking she had missed an appt with this writer but realized that she had an appt with OT instead. Yesenia apologized for her behavior at our last session.  She stated that she would appreciate the call to her son since she believes \"he does not think my memory is as bad as it is.\"      This writer called Rahel's son Chucky and left a message asking him to call back.  "

## 2024-09-20 ENCOUNTER — OFFICE VISIT (OUTPATIENT)
Facility: CLINIC | Age: 89
End: 2024-09-20
Payer: COMMERCIAL

## 2024-09-20 DIAGNOSIS — Z86.73 HISTORY OF MULTIPLE STROKES: Primary | ICD-10-CM

## 2024-09-20 DIAGNOSIS — G40.909 SEIZURE DISORDER (HCC): ICD-10-CM

## 2024-09-20 PROCEDURE — 97530 THERAPEUTIC ACTIVITIES: CPT | Performed by: OCCUPATIONAL THERAPIST

## 2024-09-20 PROCEDURE — 97110 THERAPEUTIC EXERCISES: CPT | Performed by: OCCUPATIONAL THERAPIST

## 2024-09-20 NOTE — PROGRESS NOTES
Occupational Therapy Progress Note    Today's date: 2024  Patient name: Yesenia Tillman  : 1935  MRN: [id number]  Referring provider: Liam Zepeda MD  Dx:   Encounter Diagnoses   Name Primary?    History of multiple strokes Yes    Seizure disorder (HCC)        Start Time:               PLAN OF CARE START: 24  PLAN OF CARE END: 24  FREQUENCY: 2x/week  PRECAUTIONS standard    Subjective: pt reports no changes since last session    Objective: See treatment below.  1  Pt participated in cog group session to incorporate socialization while addressing cognitive skills for overall well being.     TE:  -11 minutes on recumbent bike with dual tasks of alternating stroop test with group member, alphabetical naming foods.      TA:  -Pt education on mental health, socialization, and cognitive reserve principles in relation to cognitive function.  -Word search with recall of 3 words to find at a time.         Assessment: Tolerated treatment well. Good attention during dual tasks and sustained attention activity. Pt benefits from additional instructions however was happy to assist other members of the group when they needed assistance. Pt would benefit from continued OT services to address cognitive function for life roles.     Plan: Continued skilled OT per POC.          Goals added 24:   Pt will improved immediate delayed recall as evidenced on the CMT, increasing to 12 immediate, 6 delayed    GOALS ADDED 24:   SHORT TERM GOALS:   Memory  Pt will increase delayed recall being able to recall 8 items on RBANS in STM.  Pt will increase delayed recall being able to recall 8 items on RBANs in LTM        Attention     Pt will maintain attention to task for 10 minutes in semi modal environment for baseline performance,  improved role performance and to improve learning and to simulate return to IADLs and complete cooking/cleaning tasks.    Pt will demo ability to participate in dual  tasking/divided attention task with 50% accuracy in multimodal environment to simulate return to life roles          LONG TERM GOALS  Memory  Pt will increase delayed recall being able to recall 9 items on RBANS in STM.  Pt will increase delayed recall being able to recall 9 items on RBANs in LTM        Attention     Pt will maintain attention to task for 20 minutes in semi modal environment for baseline performance,  improved role performance and to improve learning and to simulate return to IADLs and complete cooking/cleaning tasks.    Pt will demo ability to participate in dual tasking/divided attention task with 75% accuracy in multimodal environment to simulate return to life roles

## 2024-09-23 ENCOUNTER — SOCIAL WORK (OUTPATIENT)
Dept: BEHAVIORAL/MENTAL HEALTH CLINIC | Facility: CLINIC | Age: 89
End: 2024-09-23
Payer: COMMERCIAL

## 2024-09-23 DIAGNOSIS — F32.1 CURRENT MODERATE EPISODE OF MAJOR DEPRESSIVE DISORDER WITHOUT PRIOR EPISODE (HCC): Primary | ICD-10-CM

## 2024-09-23 DIAGNOSIS — R41.3 MEMORY CHANGES: ICD-10-CM

## 2024-09-23 PROCEDURE — 90834 PSYTX W PT 45 MINUTES: CPT | Performed by: SOCIAL WORKER

## 2024-09-23 NOTE — PSYCH
Behavioral Health Psychotherapy Progress Note    Psychotherapy Provided: Individual Psychotherapy     1. 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 Memory changes            Goals addressed in session: Goal 1 and Goal 2     DATA: Yesenia noted that she has been feeling calmer and more in control even though she notes continuing difficulties with her memory.  Yesenia was told that her son did not return this writer's phone call and Yesenia noted that his wife is having a double mastectomy which may be the reason.  Yesenia noted that she spent the day with her daughter in law on Saturday and they had a pleasant time.  She forgot to mention to her about the memory care appts.  Yesenia noted that they spoke briefly about her son who she was  to, but they did speak about their grandson who just had a baby girl and who is living in [place].  Yesenia noted that she respects others time and energy, noting that she lived her life and they have the right to live theirs.  Yesenia discussed feelings of sadness about navigating this time alone.  She noted that she stopped by the art shop in town and spoke with the owner for over an hour and is thinking of taking a class.  She also went ot another [employer] social but felt out of place.  Yesenia noted that there is a woman in her OT group that she thinks she has a connection with and we role played asking her to go to lunch after a session.  During this session, this clinician used the following therapeutic modalities: Bereavement Therapy, Client-centered Therapy, and Supportive Psychotherapy    Substance Abuse was not addressed during this session. If the client is diagnosed with a co-occurring substance use disorder, please indicate any changes in the frequency or amount of use: n/a. Stage of change for addressing substance use diagnoses: No substance use/Not applicable    ASSESSMENT:  Yesenia Dianesalvador presents with a Euthymic/  "normal and sad  mood.     her affect is Normal range and intensity, which is congruent, with her mood and the content of the session. The client has made progress on their goals.  Yesenia appeared more relaxed and in a more focused state this session.     Yesenia Tillman presents with a none risk of suicide, none risk of self-harm, and none risk of harm to others.    For any risk assessment that surpasses a \"low\" rating, a safety plan must be developed.    A safety plan was indicated: no  If yes, describe in detail n/a    PLAN: Between sessions, Yesenia Tillman will ask the woman at OT if she would like to go to lunch, sign up for art class.. At the next session, the therapist will use Bereavement Therapy, Client-centered Therapy, and Supportive Psychotherapy to address depression.    Behavioral Health Treatment Plan and Discharge Planning: Yesenia Tillman is aware of and agrees to continue to work on their treatment plan. They have identified and are working toward their discharge goals. yes    Visit start and stop times:    09/23/24  Start Time: 1200  Stop Time: 1250  Total Visit Time: 50 minutes  "

## 2024-09-27 ENCOUNTER — EVALUATION (OUTPATIENT)
Facility: CLINIC | Age: 89
End: 2024-09-27
Payer: COMMERCIAL

## 2024-09-27 DIAGNOSIS — G40.909 SEIZURE DISORDER (HCC): ICD-10-CM

## 2024-09-27 DIAGNOSIS — Z86.73 HISTORY OF MULTIPLE STROKES: Primary | ICD-10-CM

## 2024-09-27 PROCEDURE — 97110 THERAPEUTIC EXERCISES: CPT | Performed by: OCCUPATIONAL THERAPIST

## 2024-09-27 PROCEDURE — 97530 THERAPEUTIC ACTIVITIES: CPT | Performed by: OCCUPATIONAL THERAPIST

## 2024-09-27 NOTE — PROGRESS NOTES
"Occupational Therapy Progress Note    Today's date: 2024  Patient name: Yesenia Tillman  : 1935  MRN: [id number]  Referring provider: Liam Zepeda MD  Dx:   Encounter Diagnoses   Name Primary?    History of multiple strokes Yes    Seizure disorder (HCC)                       PLAN OF CARE START: 24  PLAN OF CARE END: 24  FREQUENCY: 2x/week  PRECAUTIONS standard    Subjective: \"I'm alone too much.\" (Pt then became tearful 2/2 feeling \"out of control\" emotionally. Emotional support provided.)    Objective: See treatment below.  Pt participated in cog group session to incorporate socialization while addressing cognitive skills for overall well being.    TE:  Alternating walking around gym x2 laps and the following:  -STS from chair. Airex foam added after 1st round d/t fatigue  -Biceps curls with 6b med ball  -Magneciser  Dual tasks: alphabetical words, serial 3 subtraction from 102, then subtraction from 100 by 3    TA:  -Interwoven words worksheet focusing on EF.   -Qbitz extreme x2 rounds focusing on  skills. Required extra time, min cues.   -Spot it focusing on sustained attention.          Assessment: Tolerated treatment fairly. Max difficulty with interwoven words worksheet and needed emotional support and grading down 2/2 frustration. Pt would benefit from continued OT services to address cognitive function for life roles.     Plan: Continued skilled OT per POC.          Goals added 24:   Pt will improved immediate delayed recall as evidenced on the CMT, increasing to 12 immediate, 6 delayed    GOALS ADDED 24:   SHORT TERM GOALS:   Memory  Pt will increase delayed recall being able to recall 8 items on RBANS in STM.  Pt will increase delayed recall being able to recall 8 items on RBANs in LTM        Attention     Pt will maintain attention to task for 10 minutes in semi modal environment for baseline performance,  improved role performance and to improve learning and to " simulate return to IADLs and complete cooking/cleaning tasks.    Pt will demo ability to participate in dual tasking/divided attention task with 50% accuracy in multimodal environment to simulate return to life roles          LONG TERM GOALS  Memory  Pt will increase delayed recall being able to recall 9 items on RBANS in STM.  Pt will increase delayed recall being able to recall 9 items on RBANs in LTM        Attention     Pt will maintain attention to task for 20 minutes in semi modal environment for baseline performance,  improved role performance and to improve learning and to simulate return to IADLs and complete cooking/cleaning tasks.    Pt will demo ability to participate in dual tasking/divided attention task with 75% accuracy in multimodal environment to simulate return to life roles

## 2024-10-11 ENCOUNTER — OFFICE VISIT (OUTPATIENT)
Dept: PSYCHIATRY | Facility: CLINIC | Age: 89
End: 2024-10-11
Payer: COMMERCIAL

## 2024-10-11 ENCOUNTER — TELEPHONE (OUTPATIENT)
Age: 89
End: 2024-10-11

## 2024-10-11 DIAGNOSIS — R41.3 MEMORY DIFFICULTY: ICD-10-CM

## 2024-10-11 DIAGNOSIS — F33.1 MODERATE EPISODE OF RECURRENT MAJOR DEPRESSIVE DISORDER (HCC): Primary | ICD-10-CM

## 2024-10-11 PROCEDURE — G2211 COMPLEX E/M VISIT ADD ON: HCPCS | Performed by: PHYSICIAN ASSISTANT

## 2024-10-11 PROCEDURE — 99214 OFFICE O/P EST MOD 30 MIN: CPT | Performed by: PHYSICIAN ASSISTANT

## 2024-10-11 NOTE — ASSESSMENT & PLAN NOTE
Not at goal - pt unsure if she has been taking fluoxetine, will call with updated list of meds; if she has not been taking consistently can restart at 20 mg qd; continue talk therapy

## 2024-10-11 NOTE — PSYCH
This note was not shared with the patient due to reasonable likelihood of causing patient harm     PROGRESS NOTE        [facility]      Name and Date of Birth:  Yesenia Tillman 88 y.o. 1935 MRN: [id number]    Insurance: Payor: BLUE CROSS [employer] REP / [place] [facility] / Product Type: Medicare HMO /     Date of Visit: October 11, 2024    Reason for Visit:   Chief Complaint   Patient presents with    Follow-up    Medication Management     Assessment & Plan  Moderate episode of recurrent major depressive disorder (HCC)  Not at goal - pt unsure if she has been taking fluoxetine, will call with updated list of meds; if she has not been taking consistently can restart at 20 mg qd; continue talk therapy         Memory difficulty  Needs consult with neuro/layne med, pt unsure about pursuing this            She was very confused again today and states she thinks she took the fluoxetine at least once but isn't sure if she's been taking it daily. Pt was advised to call when she gets home to read off all pill bottles, doses, and timing to our office including # of caps of fluoxetine left to see if she has been taking it. She remains very depressed. If she has not been taking it consistently, she can start this again. Advised to use pill box. Also advised to consult with neuro/layne med but pt is unsure about this. She will continue to work with Galina Holland LCSW, as well. We have discussed her safety plan and she agrees that if she experience unsafe thoughts that she will reach out to her supports including this office, the suicide hotline, and emergency services if necessary. Yesenia is aware of non-emergent and emergent mental health resources. They are able to contract for their own safety at this time.    Will follow up in 1 months. Patient is aware to call the office if questions or concerns arise sooner.     Treatment Recommendations/Precautions:    If  not taking start medication:    - fluoxetine 20 mg qd    Aware of 24 hour and weekend coverage for urgent situations accessed by calling [facility] main practice number  Medication management every 1 months  Continue psychotherapy with [facility] therapist Galina Holland  I am scheduling this patient out for greater than 3 months: No    Medications Risks/Benefits      Risks, Benefits And Possible Side Effects Of Medications:    Risks, benefits, and possible side effects of medications explained to Yesenia and she verbalizes understanding and agreement for treatment.    Controlled Medication Discussion:     Not applicable    SUBJECTIVE:    Yesenia Tillman is a shivam 88 y.o. female with a history of Major Depressive Disorder and cognitive disorder who presents today for follow-up and medication management. She reports still being very depressed and forgetful. She forgot she had to come here today. She forgot to bring her medicines again. She thinks she tried the fluoxetine but isn't sure if she's been taking it daily - though she doesn't think she has. She has not noticed any benefit in her mood nor any SE.     She denies any suicidal ideation, intent or plan at present; denies any homicidal ideation, intent or plan at present.    She denies any auditory hallucinations, denies any visual hallucinations, denies any delusions.    She denies any side effects from current psychiatric medications.    HPI ROS Appetite Changes and Sleep:     She reports adequate number of sleep hours, adequate appetite, adequate energy level    Current Rating Scores:     None completed today.    Review Of Systems:    Mood depression   Behavior appropriate, cooperative, and calm   Thought Content negative thoughts   General emotional problems and decreased functioning   Personality no change in personality   Other Psych Symptoms decreased memory   Constitutional as noted in HPI   ENT as noted in HPI   Cardiovascular as  noted in HPI   Respiratory as noted in HPI   Gastrointestinal as noted in HPI   Genitourinary as noted in HPI   Musculoskeletal as noted in HPI   Integumentary as noted in HPI   Neurological as noted in HPI   Endocrine negative   Other Symptoms none, all other systems are negative     Family Psychiatric History:     Family History   Problem Relation Age of Onset    Kidney disease Mother     Heart disease Mother     Cancer Father         colon    Heart disease Father     Colon cancer Father     Heart disease Sister     Diabetes Son     Multiple sclerosis Son     No Known Problems Son     Heart disease Maternal Grandmother      Social/Substance Abuse History:    Social History     Socioeconomic History    Marital status:      Spouse name: Not on file    Number of children: Not on file    Years of education: Not on file    Highest education level: Not on file   Occupational History    Not on file   Tobacco Use    Smoking status: Never    Smokeless tobacco: Never   Vaping Use    Vaping status: Never Used   Substance and Sexual Activity    Alcohol use: Never     Alcohol/week: 0.0 standard drinks of alcohol     Comment: 0    Drug use: No    Sexual activity: Not Currently     Partners: Male     Birth control/protection: Post-menopausal   Other Topics Concern    Not on file   Social History Narrative    Not on file     Social Determinants of Health     Financial Resource Strain: Low Risk  (10/12/2023)    Overall Financial Resource Strain (CARDIA)     Difficulty of Paying Living Expenses: Not hard at all   Food Insecurity: Unknown (12/8/2023)    Hunger Vital Sign     Worried About Running Out of Food in the Last Year: Never true     Ran Out of Food in the Last Year: Not on file   Transportation Needs: No Transportation Needs (12/8/2023)    PRAPARE - Transportation     Lack of Transportation (Medical): No     Lack of Transportation (Non-Medical): No   Physical Activity: Not on file   Stress: Not on file   Social  Connections: Not on file   Intimate Partner Violence: Not on file   Housing Stability: Low Risk  (12/8/2023)    Housing Stability Vital Sign     Unable to Pay for Housing in the Last Year: No     Number of Times Moved in the Last Year: 1     Homeless in the Last Year: No     The following portions of the patient's history were reviewed and updated as appropriate: past family history, past medical history, past social history, past surgical history and problem list.    OBJECTIVE:     Mental Status Evaluation:  Appearance:  dressed appropriately, adequate grooming, looks stated age   Behavior:  pleasant, cooperative, calm, interacts appropriately with this writer   Speech:  normal rate, normal volume, normal pitch   Mood:  depressed   Affect:  tearful, mood-congruent   Thought Process:  organized, logical, coherent   Associations: intact associations   Thought Content:  no overt delusions, no paranoia noted on exam   Perceptual Disturbances: no auditory hallucinations, no visual hallucinations   Risk Potential: Suicidal ideation - None  Homicidal ideation - None  Potential for aggression - No   Sensorium:  oriented to person, place, and time/date   Memory:  recent memory impaired, remote memory impaired   Consciousness:  alert and awake   Attention/Concentration: attention span and concentration appear shorter than expected for age   Insight:  age appropriate   Judgment: age appropriate   Gait/Station: normal gait/station   Motor Activity: no abnormal movements     Laboratory Results: I have personally reviewed all pertinent laboratory/tests results    Suicide/Homicide Risk Assessment:    Risk of Harm to Self:  The following ratings are based on assessment at the time of the interview  Based on today's assessment, Yesenia presents the following risk of harm to self: none    Risk of Harm to Others:  The following ratings are based on assessment at the time of the interview  Based on today's assessment, Yesenia presents  the following risk of harm to others: none    The following interventions are recommended: no intervention changes needed    Psychotherapy Provided:     Individual psychotherapy provided: No    Treatment Plan:    Completed and signed during the session: Not applicable - Treatment Plan to be completed by [facility] therapist    Visit Time    Visit Start Time:  10:30 AM  Visit End Time:  10:45 AM  Total Visit Duration:  15 minutes    Jeet Avalos 10/11/24

## 2024-10-11 NOTE — TELEPHONE ENCOUNTER
Patient called in to confirm their Behavioral Health medication management appointment today, 10/11/24 @10:30am.    Writer was able to confirm the appointment details and stated it was in office and gave the urg office address.

## 2024-10-14 ENCOUNTER — SOCIAL WORK (OUTPATIENT)
Dept: BEHAVIORAL/MENTAL HEALTH CLINIC | Facility: CLINIC | Age: 89
End: 2024-10-14
Payer: COMMERCIAL

## 2024-10-14 DIAGNOSIS — F32.1 CURRENT MODERATE EPISODE OF MAJOR DEPRESSIVE DISORDER WITHOUT PRIOR EPISODE (HCC): Primary | ICD-10-CM

## 2024-10-14 DIAGNOSIS — R41.3 MEMORY CHANGES: ICD-10-CM

## 2024-10-14 PROCEDURE — 90834 PSYTX W PT 45 MINUTES: CPT | Performed by: SOCIAL WORKER

## 2024-10-18 ENCOUNTER — EVALUATION (OUTPATIENT)
Facility: CLINIC | Age: 89
End: 2024-10-18
Payer: COMMERCIAL

## 2024-10-18 DIAGNOSIS — Z86.73 HISTORY OF MULTIPLE STROKES: Primary | ICD-10-CM

## 2024-10-18 DIAGNOSIS — G40.909 SEIZURE DISORDER (HCC): ICD-10-CM

## 2024-10-18 PROCEDURE — 97530 THERAPEUTIC ACTIVITIES: CPT | Performed by: OCCUPATIONAL THERAPIST

## 2024-10-18 NOTE — PROGRESS NOTES
Occupational Therapy Progress Note    Today's date: 10/18/2024  Patient name: Yesenia Tillman  : 1935  MRN: [id number]  Referring provider: Liam Zepeda MD  Dx:   No diagnosis found.                     PLAN OF CARE START: 24  PLAN OF CARE END: 24  FREQUENCY: 2x/week  PRECAUTIONS standard    Subjective: pt reports no changes since last session    Objective: See treatment below.    Pt participated in cog group session to incorporate socialization while addressing cognitive skills for overall well being.     TE:  BP checked and was 136/66  -Attempted walking on treadmill, however pt unable to maintain 0.5mph pace with cues. Exercise terminated for safety.  -3 minutes on arm bike with dual task of reverse alphabetical names     TA:  -Scattergories x3 rounds focusing on recall and direction following. Required extra cues initially for direction following (wrote answers starting with incorrect letter).  -26 Skiddoo puzzle 2 completed to address EF.  -Qbitz x1 round focusing on  skills. Required extra time.        Assessment: Tolerated treatment well. Required extra repetitions of more complex directions. Difficulty with spelling. Pt would benefit from continued OT services to address cognitive function for life roles.     Plan: Continued skilled OT per POC.          Goals added 24:   Pt will improved immediate delayed recall as evidenced on the CMT, increasing to 12 immediate, 6 delayed    GOALS ADDED 24:   SHORT TERM GOALS:   Memory  Pt will increase delayed recall being able to recall 8 items on RBANS in STM.  Pt will increase delayed recall being able to recall 8 items on RBANs in LTM        Attention     Pt will maintain attention to task for 10 minutes in semi modal environment for baseline performance,  improved role performance and to improve learning and to simulate return to IADLs and complete cooking/cleaning tasks.    Pt will demo ability to participate in dual tasking/divided  attention task with 50% accuracy in multimodal environment to simulate return to life roles          LONG TERM GOALS  Memory  Pt will increase delayed recall being able to recall 9 items on RBANS in STM.  Pt will increase delayed recall being able to recall 9 items on RBANs in LTM        Attention     Pt will maintain attention to task for 20 minutes in semi modal environment for baseline performance,  improved role performance and to improve learning and to simulate return to IADLs and complete cooking/cleaning tasks.    Pt will demo ability to participate in dual tasking/divided attention task with 75% accuracy in multimodal environment to simulate return to life roles

## 2024-10-20 NOTE — PSYCH
"Behavioral Health Psychotherapy Progress Note    Psychotherapy Provided: Individual Psychotherapy     1. 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 Memory changes            Goals addressed in session: Goal 1     DATA: Yesenia brought her medications with her as per her med provider, Jeet Avalos PA-C.  This writer went through Yesenia's medications with her and Yesenia had them clearly labeled as to when she takes them.  As per what was in the bottle, Yesenia has been taking her Prozac. Yesenia shared that she continues to be frustrated by her memory issues. She reports that she continues to go to [facility] and stay for the social hour, noting that this past week she spoke at length to a young woman who was struggling with her , and Yesenia noted feeling as though she had a purpose which made her feel happy.Discussed how the more opportunities she has to be social the more feelings of happiness are possible, encouraging Yesenia to go to the [facility] center and discussing setting up transportation if that is needed.  During this session, this clinician used the following therapeutic modalities: Client-centered Therapy and Supportive Psychotherapy    Substance Abuse was not addressed during this session. If the client is diagnosed with a co-occurring substance use disorder, please indicate any changes in the frequency or amount of use: n/a. Stage of change for addressing substance use diagnoses: No substance use/Not applicable    ASSESSMENT:  Yesenia Tillman presents with a Euthymic/ normal and sad  mood.     her affect is Normal range and intensity, which is congruent, with her mood and the content of the session. The client has made progress on their goals.     Yesenia Tillman presents with a none risk of suicide, none risk of self-harm, and none risk of harm to others.    For any risk assessment that surpasses a \"low\" rating, a safety plan must be developed.    A " safety plan was indicated: no  If yes, describe in detail n/a    PLAN: Between sessions, Yesenia Tillman will visit the [facility] center. At the next session, the therapist will use Client-centered Therapy and Supportive Psychotherapy to address depression.    Behavioral Health Treatment Plan and Discharge Planning: Yesenia Tillman is aware of and agrees to continue to work on their treatment plan. They have identified and are working toward their discharge goals. yes    Visit start and stop times:    10/14/24  Start Time: 1200  Stop Time: 1251  Total Visit Time: 51 minutes

## 2024-10-25 ENCOUNTER — OFFICE VISIT (OUTPATIENT)
Facility: CLINIC | Age: 89
End: 2024-10-25
Payer: COMMERCIAL

## 2024-10-25 DIAGNOSIS — G40.909 SEIZURE DISORDER (HCC): ICD-10-CM

## 2024-10-25 DIAGNOSIS — Z86.73 HISTORY OF MULTIPLE STROKES: Primary | ICD-10-CM

## 2024-10-25 PROCEDURE — 97530 THERAPEUTIC ACTIVITIES: CPT | Performed by: OCCUPATIONAL THERAPIST

## 2024-10-25 NOTE — PROGRESS NOTES
Occupational Therapy Progress Note    Today's date: 10/25/2024  Patient name: Yesenia Tillman  : 1935  MRN: [id number]  Referring provider: Liam Zepeda MD  Dx:   Encounter Diagnoses   Name Primary?    History of multiple strokes Yes    Seizure disorder (HCC)          Start Time: 1155  Stop Time: 1315  Total time in clinic (min): 80 minutes        PLAN OF CARE START: 24  PLAN OF CARE END: 24  FREQUENCY: 2x/week  PRECAUTIONS standard    Subjective: pt reports she forgot she had an appt today    Objective: See treatment below.    Pt participated in cog group session to incorporate socialization while addressing cognitive skills for overall well being.     TE:  -Magneciser x5 minutes with dual task of stating 10 items associated with chronological holidays.     TA:  -Immediate verbal recall with pt reading a sequence of 4-7 words, covering it, and writing them. Educated on chunking strategy if unable to recall entire sequence. Poor follow through with grading/chunking d/t impaired attention affecting direction following.  -Change one letter worksheet to address EF. Categories provided for all items.  -Create change one letter worksheet x12 words total to address EF. Max difficulty with recall of directions and required frequent repetition of directions for completion.  -48 piece whale shaped puzzle to address  skills.        Assessment: Tolerated treatment well. Required extra repetitions of more complex directions. Difficulty with spelling, immediate recall. Pt would benefit from continued OT services to address cognitive function for life roles.     Plan: Continued skilled OT per POC.          Goals added 24:   Pt will improved immediate delayed recall as evidenced on the CMT, increasing to 12 immediate, 6 delayed    GOALS ADDED 24:   SHORT TERM GOALS:   Memory  Pt will increase delayed recall being able to recall 8 items on RBANS in STM.  Pt will increase delayed recall being able to  recall 8 items on RBANs in LTM        Attention     Pt will maintain attention to task for 10 minutes in semi modal environment for baseline performance,  improved role performance and to improve learning and to simulate return to IADLs and complete cooking/cleaning tasks.    Pt will demo ability to participate in dual tasking/divided attention task with 50% accuracy in multimodal environment to simulate return to life roles          LONG TERM GOALS  Memory  Pt will increase delayed recall being able to recall 9 items on RBANS in STM.  Pt will increase delayed recall being able to recall 9 items on RBANs in LTM        Attention     Pt will maintain attention to task for 20 minutes in semi modal environment for baseline performance,  improved role performance and to improve learning and to simulate return to IADLs and complete cooking/cleaning tasks.    Pt will demo ability to participate in dual tasking/divided attention task with 75% accuracy in multimodal environment to simulate return to life roles

## 2024-10-28 ENCOUNTER — SOCIAL WORK (OUTPATIENT)
Dept: BEHAVIORAL/MENTAL HEALTH CLINIC | Facility: CLINIC | Age: 89
End: 2024-10-28
Payer: COMMERCIAL

## 2024-10-28 DIAGNOSIS — F32.1 CURRENT MODERATE EPISODE OF MAJOR DEPRESSIVE DISORDER WITHOUT PRIOR EPISODE (HCC): Primary | ICD-10-CM

## 2024-10-28 DIAGNOSIS — F41.1 GENERALIZED ANXIETY DISORDER: ICD-10-CM

## 2024-10-28 PROCEDURE — 90834 PSYTX W PT 45 MINUTES: CPT | Performed by: SOCIAL WORKER

## 2024-10-28 NOTE — PSYCH
Behavioral Health Psychotherapy Progress Note    Psychotherapy Provided: Individual Psychotherapy     1. 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 Generalized anxiety disorder            Goals addressed in session: Goal 1     DATA: Yesenia arrived late for session, explaining that she could not remember which office she needed to get to.   Yesenia shared that her step daughter was coming to see her after her appt and she did not have any milk in the house for tea and wanted to go to the store, noting that she did not go earlier in the week because she felt unmotivated. She shared that she needs to go grocery shopping but does not want to spend her time going grocery shopping.  Yesenia noted that she recognizes that she is depressed, and would like to try to get out but does not have the drive.  She shared that she feels as though she has no purpose since her son .  Encouraged Yesenia to share her thoughts and feelings and went over the resources again at the [facility].  She noted feeling scared that she cannot remember many things.  She noted that she is showering, eating regularly though she does not cook, and only drives locally.  We talked about her upcoming appt with [facility] and encouraged her to give the appt information to her step daughter so that she could possibly go with her or arrange for another family member to take her, which she agreed to.  During this session, this clinician used the following therapeutic modalities: Bereavement Therapy, Client-centered Therapy, and Supportive Psychotherapy    Substance Abuse was not addressed during this session. If the client is diagnosed with a co-occurring substance use disorder, please indicate any changes in the frequency or amount of use: n/a. Stage of change for addressing substance use diagnoses: No substance use/Not applicable    ASSESSMENT:  Yesenia Tillman presents with a Anxious and Depressed  "mood.     her affect is Normal range and intensity and Tearful, which is congruent, with her mood and the content of the session. The client has made progress on their goals.     Yesenia Tillman presents with a none risk of suicide, none risk of self-harm, and none risk of harm to others.    For any risk assessment that surpasses a \"low\" rating, a safety plan must be developed.    A safety plan was indicated: no  If yes, describe in detail n/a    PLAN: Between sessions, Yesenia Tillman will give her step daughter her appt information. At the next session, the therapist will use Bereavement Therapy, Client-centered Therapy, and Supportive Psychotherapy to address depression/memory issues.    Behavioral Health Treatment Plan and Discharge Planning: Yesenia Tillman is aware of and agrees to continue to work on their treatment plan. They have identified and are working toward their discharge goals. yes    Visit start and stop times:    10/28/24  Start Time: 1206  Stop Time: 1258  Total Visit Time: 52 minutes  "

## 2024-11-11 ENCOUNTER — SOCIAL WORK (OUTPATIENT)
Dept: BEHAVIORAL/MENTAL HEALTH CLINIC | Facility: CLINIC | Age: 89
End: 2024-11-11
Payer: COMMERCIAL

## 2024-11-11 DIAGNOSIS — R41.3 MEMORY CHANGES: ICD-10-CM

## 2024-11-11 DIAGNOSIS — F32.1 CURRENT MODERATE EPISODE OF MAJOR DEPRESSIVE DISORDER WITHOUT PRIOR EPISODE (HCC): Primary | ICD-10-CM

## 2024-11-11 DIAGNOSIS — F41.1 GENERALIZED ANXIETY DISORDER: ICD-10-CM

## 2024-11-11 PROCEDURE — 90834 PSYTX W PT 45 MINUTES: CPT | Performed by: SOCIAL WORKER

## 2024-11-11 NOTE — BH TREATMENT PLAN
"Outpatient Behavioral Health Psychotherapy Treatment Plan    Yesenia Tillman  1935     Date of Initial Psychotherapy Assessment:4/4/24  Date of Current Treatment Plan: 11/11/24  Treatment Plan Target Date: 5/08/25  Treatment Plan Expiration Date: 5/08/25    Diagnosis:   1. 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 Generalized anxiety disorder        3. Memory changes              Area(s) of Need: Depression, grief, lack of social support    Long Term Goal 1 (in the client's own words): \"Let go of the guilt about my sons, I want to be able to believe that I did the best I could.\" 11/11/24 Update:  Yesenia processes her grief in sessions.    Stage of Change: Preparation    Target Date for completion: 5/8/25     Anticipated therapeutic modalities: Bereavement, CBT, Supportive     People identified to complete this goal: Yesenia (client), and Galina (therapist)      Objective 1: (identify the means of measuring success in meeting the objective): Yesenia will be educated on the grieving process and identify what she is feeling in the moment and practice self compassion.  11/11/24 Update:  Yesenia reports that she can have self compassion once she \"brow beats\" herself first.      Objective 2: (identify the means of measuring success in meeting the objective): Yesenia will learn to speak to and believe positive self talk about herself as a mother.  11/11/24 Update:  Yesenia shares that she sees her thoughts as somewhat normal as a mother and recognizes that she did the best she could at the time.      Long Term Goal 2 (in the client's own words): \"I want to find a balance between being too much and then not enough.\"  11/11/24 Update:  Yesenia is having issues with her memory which is hindering her from reaching this goal.    Stage of Change: Preparation    Target Date for completion: 5/8/25     Anticipated therapeutic modalities: CBT, Psychodynamic     People identified to " "complete this goal: Yesenia (client) and Galina (therapist)      Objective 1: (identify the means of measuring success in meeting the objective): Yesenia will identify, challenge and change irrational thoughts that lead to low self worth.      Objective 2: (identify the means of measuring success in meeting the objective): Yesenia will practice managing impulsive behaviors of over-sharing by learning  and practicing 3 skills to slow down her impulsiveness.    Long Term Goal 2 (in the client's own words): \"Accept where I'm at.\"    Stage of Change: Preparation    Target Date for completion: 5/8/25     Anticipated therapeutic modalities: CBT, Psychodynamic, Bereavement     People identified to complete this goal: Yesenia (client) and Galina (therapist)      Objective 1: (identify the means of measuring success in meeting the objective): Yesenia will use time in session to share her thoughts and feelings about her changes and move towards acceptance.      Objective 2: (identify the means of measuring success in meeting the objective): Follow through with memory care appts.        I am currently under the care of a [facility] psychiatric provider: no    My [facility] psychiatric provider is: n/a    I am currently taking psychiatric medications: Yes, as prescribed    I feel that I will be ready for discharge from mental health care when I reach the following (measurable goal/objective): \"When I make the changes I want.\"    For children and adults who have a legal guardian:   Has there been any change to custody orders and/or guardianship status? NA. If yes, attach updated documentation.    I have created my Crisis Plan and have been offered a copy of this plan    Behavioral Health Treatment Plan [facility]: Diagnosis and Treatment Plan explained to Yesenia Tillman acknowledges an understanding of their diagnosis. Yesenia Tillman agrees to this treatment plan.    I have been offered a copy of this Treatment Plan. " yes

## 2024-11-11 NOTE — PSYCH
Behavioral Health Psychotherapy Progress Note    Psychotherapy Provided: Individual Psychotherapy     1. 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 Generalized anxiety disorder        3. Memory changes            Goals addressed in session: Goal 1 and Goal 2     DATA: Yesenia shared that she was feeling good, she had a good visit with her step daughter.  She signed a release so that this writer could call her back to discuss her upcoming appts with senior care.  Reviewed Yesenia's treatment plan with her, updating her goals, and adding new objectives.  This led to a discussion about her son and an incident that occurred the other day when he and his wife came to visit her.  Yesenia shared that she gets angry with herself when se says things when she is trying to be helpful.  She shared that she feels lost since she can no longer help anyone.  Discussed channeling this by finding out how she can help out at the senior center.  With Yesenia noting that she continually forgets to go.  Discussed her fears about her memory and sontinued to encourage her to keep her appts with OT and senior care. Yesenia shared that she continues to be able to go to appts, has a method for her medication, eats when she is hungry and does not use the stove since she does not like to cook, rather she buys things that she can put in the oven and has cereal and fruit for breakfast/lunch.    During this session, this clinician used the following therapeutic modalities: Bereavement Therapy, Client-centered Therapy, and Supportive Psychotherapy    Substance Abuse was not addressed during this session. If the client is diagnosed with a co-occurring substance use disorder, please indicate any changes in the frequency or amount of use: n/a. Stage of change for addressing substance use diagnoses: No substance use/Not applicable    ASSESSMENT:  Yesenia Tillman presents with a Anxious and Depressed  "mood.     her affect is Normal range and intensity and Tearful, which is congruent, with her mood and the content of the session. The client has made progress on their goals.     Yesenia Tillman presents with a none risk of suicide, none risk of self-harm, and none risk of harm to others.    For any risk assessment that surpasses a \"low\" rating, a safety plan must be developed.    A safety plan was indicated: no  If yes, describe in detail n/a    PLAN: Between sessions, Yesenia Tillman will follow up with her appts, write on her calendar to go to [facility]. At the next session, the therapist will use Bereavement Therapy, Client-centered Therapy, Cognitive Behavioral Therapy, and Supportive Psychotherapy to address anxiety/depression/memory issues.    Behavioral Health Treatment Plan and Discharge Planning: Yesenia Tillman is aware of and agrees to continue to work on their treatment plan. They have identified and are working toward their discharge goals. yes    Visit start and stop times:    11/11/24  Start Time: 1200  Stop Time: 1250  Total Visit Time: 50 minutes  "

## 2024-11-14 ENCOUNTER — OFFICE VISIT (OUTPATIENT)
Dept: PSYCHIATRY | Facility: CLINIC | Age: 89
End: 2024-11-14
Payer: COMMERCIAL

## 2024-11-14 ENCOUNTER — TELEPHONE (OUTPATIENT)
Age: 89
End: 2024-11-14

## 2024-11-14 DIAGNOSIS — F33.1 MODERATE EPISODE OF RECURRENT MAJOR DEPRESSIVE DISORDER (HCC): Primary | ICD-10-CM

## 2024-11-14 DIAGNOSIS — R41.3 MEMORY DIFFICULTY: ICD-10-CM

## 2024-11-14 PROCEDURE — 99214 OFFICE O/P EST MOD 30 MIN: CPT | Performed by: PHYSICIAN ASSISTANT

## 2024-11-14 PROCEDURE — G2211 COMPLEX E/M VISIT ADD ON: HCPCS | Performed by: PHYSICIAN ASSISTANT

## 2024-11-14 RX ORDER — FLUOXETINE 40 MG/1
40 CAPSULE ORAL DAILY
Qty: 30 CAPSULE | Refills: 2 | Status: SHIPPED | OUTPATIENT
Start: 2024-11-14

## 2024-11-14 NOTE — ASSESSMENT & PLAN NOTE
Has appt at [facility] on 3/5/25 at 10:30 AM, will need transportation

## 2024-11-14 NOTE — PSYCH
"This note was not shared with the patient due to reasonable likelihood of causing patient harm     PROGRESS NOTE        [facility]      Name and Date of Birth:  Yesenia Tillman 89 y.o. 1935 MRN: [id number]    Insurance: Payor: [employer]  REP / Plan: [facility] BLUE CROSS  REP / Product Type: Medicare HMO /     Date of Visit: November 14, 2024    Reason for Visit:   Chief Complaint   Patient presents with    Follow-up    Medication Management     Assessment & Plan  Moderate episode of recurrent major depressive disorder (HCC)  Mild improvement but not at goal - increase fluoxetine to 40 mg qd; continue talk therapy    Orders:    FLUoxetine (PROzac) 40 MG capsule; Take 1 capsule (40 mg total) by mouth daily    Memory difficulty  Has appt at [facility] on 3/5/25 at 10:30 AM, will need transportation            She is slightly less confused today and states she has been taking her Prozac as prescribed and feels \"a little better\". Denies SE so advised increasing to 40 mg qd. Her memory is her bigger concern per pt today and pt does have upcoming appt at [facility] for further evaluation which I encouraged her to keep. She will continue to work with Galina Holland LCSW, as well. We have discussed her safety plan and she agrees that if she experience unsafe thoughts that she will reach out to her supports including this office, the suicide hotline, and emergency services if necessary. Yesenia is aware of non-emergent and emergent mental health resources. They are able to contract for their own safety at this time.    Will follow up in 1 months. Patient is aware to call the office if questions or concerns arise sooner.     Treatment Recommendations/Precautions:    Increase medication:    - fluoxetine 20 mg qd to 40 mg qd    Aware of 24 hour and weekend coverage for urgent situations accessed by calling [facility] main practice " "number  Medication management every 1 months  Continue psychotherapy with [facility] therapist Galina Holland  I am scheduling this patient out for greater than 3 months: No    Medications Risks/Benefits      Risks, Benefits And Possible Side Effects Of Medications:    Risks, benefits, and possible side effects of medications explained to Yesenia and she verbalizes understanding and agreement for treatment.    Controlled Medication Discussion:     Not applicable    SUBJECTIVE:    Yesenia Tillman is a shivam 89 y.o. female with a history of Major Depressive Disorder and cognitive disorder who presents today for follow-up and medication management. Since her last visit she reports that she has been taking the Prozac daily. She thinks she feels \"a little better\" and is less tearful today. She denies any SE of medication. She has upcoming appt at [facility] to evaluate memory. She is very concerned about this and states this is more upsetting to her than her mood or anxiety at this time.     She denies any suicidal ideation, intent or plan at present; denies any homicidal ideation, intent or plan at present.    She denies any auditory hallucinations, denies any visual hallucinations, denies any delusions.    She denies any side effects from current psychiatric medications.    HPI ROS Appetite Changes and Sleep:     She reports adequate number of sleep hours, adequate appetite, adequate energy level    Current Rating Scores:     None completed today.    Review Of Systems:    Mood depression   Behavior appropriate, cooperative, and calm   Thought Content negative thoughts   General emotional problems   Personality no change in personality   Other Psych Symptoms decreased memory   Constitutional as noted in HPI   ENT as noted in HPI   Cardiovascular as noted in HPI   Respiratory as noted in HPI   Gastrointestinal as noted in HPI   Genitourinary as noted in HPI   Musculoskeletal as noted in HPI   Integumentary as noted in " HPI   Neurological as noted in HPI   Endocrine negative   Other Symptoms none, all other systems are negative     Family Psychiatric History:     Family History   Problem Relation Age of Onset    Kidney disease Mother     Heart disease Mother     Cancer Father         colon    Heart disease Father     Colon cancer Father     Heart disease Sister     Diabetes Son     Multiple sclerosis Son     No Known Problems Son     Heart disease Maternal Grandmother      Social/Substance Abuse History:    Social History     Socioeconomic History    Marital status:      Spouse name: Not on file    Number of children: Not on file    Years of education: Not on file    Highest education level: Not on file   Occupational History    Not on file   Tobacco Use    Smoking status: Never    Smokeless tobacco: Never   Vaping Use    Vaping status: Never Used   Substance and Sexual Activity    Alcohol use: Never     Alcohol/week: 0.0 standard drinks of alcohol     Comment: 0    Drug use: No    Sexual activity: Not Currently     Partners: Male     Birth control/protection: Post-menopausal   Other Topics Concern    Not on file   Social History Narrative    Not on file     Social Drivers of Health     Financial Resource Strain: Low Risk  (10/12/2023)    Overall Financial Resource Strain (CARDIA)     Difficulty of Paying Living Expenses: Not hard at all   Food Insecurity: Unknown (12/8/2023)    Nursing - Inadequate Food Risk Classification     Worried About Running Out of Food in the Last Year: Never true     Ran Out of Food in the Last Year: Not on file     Ran Out of Food in the Last Year: Not on file   Transportation Needs: No Transportation Needs (12/8/2023)    PRAPARE - Transportation     Lack of Transportation (Medical): No     Lack of Transportation (Non-Medical): No   Physical Activity: Not on file   Stress: Not on file   Social Connections: Not on file   Intimate Partner Violence: Not on file   Housing Stability: Low Risk   (12/8/2023)    Housing Stability Vital Sign     Unable to Pay for Housing in the Last Year: No     Number of Times Moved in the Last Year: 1     Homeless in the Last Year: No     The following portions of the patient's history were reviewed and updated as appropriate: past family history, past medical history, past social history, past surgical history and problem list.    OBJECTIVE:     Mental Status Evaluation:  Appearance:  dressed appropriately, adequate grooming, looks stated age   Behavior:  pleasant, cooperative, calm, interacts appropriately with this writer   Speech:  normal rate, normal volume, normal pitch   Mood:  less depressed   Affect:  slightly brighter, mood-congruent   Thought Process:  organized, logical, coherent   Associations: intact associations   Thought Content:  no overt delusions, no paranoia noted on exam   Perceptual Disturbances: no auditory hallucinations, no visual hallucinations   Risk Potential: Suicidal ideation - None  Homicidal ideation - None  Potential for aggression - No   Sensorium:  oriented to person, place, and time/date   Memory:  recent memory impaired, remote memory impaired   Consciousness:  alert and awake   Attention/Concentration: attention span and concentration appear shorter than expected for age   Insight:  age appropriate   Judgment: age appropriate   Gait/Station: normal gait/station   Motor Activity: no abnormal movements     Laboratory Results: I have personally reviewed all pertinent laboratory/tests results    Suicide/Homicide Risk Assessment:    Risk of Harm to Self:  The following ratings are based on assessment at the time of the interview  Based on today's assessment, Yesenia presents the following risk of harm to self: none    Risk of Harm to Others:  The following ratings are based on assessment at the time of the interview  Based on today's assessment, Yesenia presents the following risk of harm to others: none    The following interventions are  recommended: no intervention changes needed    Psychotherapy Provided:     Individual psychotherapy provided: No    Treatment Plan:    Completed and signed during the session: Not applicable - Treatment Plan to be completed by [facility] therapist    Visit Time    Visit Start Time:  3:30 PM  Visit End Time:  3:45 PM  Total Visit Duration:  15 minutes    Jeet Avalos 11/14/24

## 2024-11-25 ENCOUNTER — SOCIAL WORK (OUTPATIENT)
Dept: BEHAVIORAL/MENTAL HEALTH CLINIC | Facility: CLINIC | Age: 89
End: 2024-11-25
Payer: COMMERCIAL

## 2024-11-25 DIAGNOSIS — F32.1 CURRENT MODERATE EPISODE OF MAJOR DEPRESSIVE DISORDER WITHOUT PRIOR EPISODE (HCC): Primary | ICD-10-CM

## 2024-11-25 DIAGNOSIS — F41.1 GENERALIZED ANXIETY DISORDER: ICD-10-CM

## 2024-11-25 DIAGNOSIS — R41.3 MEMORY CHANGES: ICD-10-CM

## 2024-11-25 PROCEDURE — 90834 PSYTX W PT 45 MINUTES: CPT | Performed by: SOCIAL WORKER

## 2024-11-27 NOTE — PSYCH
"Behavioral Health Psychotherapy Progress Note    Psychotherapy Provided: Individual Psychotherapy     1. 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 Generalized anxiety disorder        3. Memory changes            Goals addressed in session: Goal 1, Goal 2, and Goal 3      DATA: Yesenia shared that her daughter in law has been reaching out to her more and Yesenia fears that it is because she is now retired and has nothing better to do since prior to now, she had very little to do with Yesenia.  Assisted Yesenia in looking at this, challenging thoughts and changing perspectives.  Yesenia shared that she is lonely but does not \"want to commit to anything.\"  Explored feelings of loss, not just of her sons but of her role in life.  This writer called the [facility] with Yesenia to get information on activities, level of commitment, times, etc and directions were printed out for her.  Also discussed the possibility of a  through [employer] which Yesenia agreed to.  During this session, this clinician used the following therapeutic modalities: Client-centered Therapy, Cognitive Behavioral Therapy, Cognitive Processing Therapy, Solution-Focused Therapy, and Supportive Psychotherapy    Substance Abuse was not addressed during this session. If the client is diagnosed with a co-occurring substance use disorder, please indicate any changes in the frequency or amount of use: n/a. Stage of change for addressing substance use diagnoses: No substance use/Not applicable    ASSESSMENT:  Yesenia Tillman presents with a Anxious and Depressed mood.     her affect is Normal range and intensity, which is congruent, with her mood and the content of the session. The client has made progress on their goals.     Yesenia Tillman presents with a none risk of suicide, none risk of self-harm, and none risk of harm to others.    For any risk assessment that surpasses a \"low\" " rating, a safety plan must be developed.    A safety plan was indicated: no  If yes, describe in detail n/a    PLAN: Between sessions, Yesenia K Benedictosalvador will go to [facility]. At the next session, the therapist will use Bereavement Therapy, Client-centered Therapy, Cognitive Behavioral Therapy, and Supportive Psychotherapy to address anxiety/depression/grief.    Behavioral Health Treatment Plan and Discharge Planning: Yeseniavickie Tillman is aware of and agrees to continue to work on their treatment plan. They have identified and are working toward their discharge goals. yes    Visit start and stop times:    11/25/24  Start Time: 1202  Stop Time: 1252  Total Visit Time: 50 minutes

## 2024-11-29 ENCOUNTER — OFFICE VISIT (OUTPATIENT)
Facility: CLINIC | Age: 89
End: 2024-11-29
Payer: COMMERCIAL

## 2024-11-29 ENCOUNTER — TELEPHONE (OUTPATIENT)
Dept: PSYCHIATRY | Facility: CLINIC | Age: 89
End: 2024-11-29

## 2024-11-29 DIAGNOSIS — G40.909 SEIZURE DISORDER (HCC): ICD-10-CM

## 2024-11-29 DIAGNOSIS — Z86.73 HISTORY OF MULTIPLE STROKES: Primary | ICD-10-CM

## 2024-11-29 PROCEDURE — 97110 THERAPEUTIC EXERCISES: CPT | Performed by: OCCUPATIONAL THERAPIST

## 2024-11-29 PROCEDURE — 97530 THERAPEUTIC ACTIVITIES: CPT | Performed by: OCCUPATIONAL THERAPIST

## 2024-11-29 NOTE — TELEPHONE ENCOUNTER
----- Message from Galina tapia sent at 11/27/2024  4:46 PM EST -----  Regarding: Case Management  Hi. This is the patient I was asking about for Marge Granados. Can you let me know if you can put a referral in please?  Thanks!

## 2024-11-29 NOTE — PROGRESS NOTES
"Occupational Therapy Daily Note    Today's date: 2024  Patient name: Yesenia Tillman  : 1935  MRN: [id number]  Referring provider: Liam Zepeda MD  Dx:   Encounter Diagnoses   Name Primary?    History of multiple strokes Yes    Seizure disorder (HCC)                       PLAN OF CARE START: 24  PLAN OF CARE END: 24  FREQUENCY: 2x/week  PRECAUTIONS standard    Subjective: \"Am I supposed to be here?\" (Pt walked into tx gym unsure of whether she had an appt today)    Objective: See treatment below.    Pt participated in cog group session to incorporate socialization while addressing cognitive skills for overall well being.     TE:  -Walking at self selected pace alternating with STS from chair with airex pad while completing serial 3 subtraction from 200.     TA:  -100 piece jogsaw puzzle with partner x25 minutes focusing on  skills. Required Alexander/grading down and extra time for pieces completed.   -Seek out words worksheet focusing on EF. Required extra time, min cues.   -Scrambled sentences worksheet focusing on EF. Required extra time and min cues for comprehension of directions.     Assessment: Tolerated treatment well. Required extra repetitions of more complex directions. Difficulty with spelling, immediate recall. Pt would benefit from continued OT services to address cognitive function for life roles.     Plan: Continued skilled OT per POC.          Goals added 24:   Pt will improved immediate delayed recall as evidenced on the CMT, increasing to 12 immediate, 6 delayed    GOALS ADDED 24:   SHORT TERM GOALS:   Memory  Pt will increase delayed recall being able to recall 8 items on RBANS in STM.  Pt will increase delayed recall being able to recall 8 items on RBANs in LTM        Attention     Pt will maintain attention to task for 10 minutes in semi modal environment for baseline performance,  improved role performance and to improve learning and to simulate return to " IADLs and complete cooking/cleaning tasks.    Pt will demo ability to participate in dual tasking/divided attention task with 50% accuracy in multimodal environment to simulate return to life roles          LONG TERM GOALS  Memory  Pt will increase delayed recall being able to recall 9 items on RBANS in STM.  Pt will increase delayed recall being able to recall 9 items on RBANs in LTM        Attention     Pt will maintain attention to task for 20 minutes in semi modal environment for baseline performance,  improved role performance and to improve learning and to simulate return to IADLs and complete cooking/cleaning tasks.    Pt will demo ability to participate in dual tasking/divided attention task with 75% accuracy in multimodal environment to simulate return to life roles

## 2024-11-29 NOTE — TELEPHONE ENCOUNTER
Added to CM work queue. Please send a message to our CM Pool at [phone] if Pt contacts office in regards to their referral.     Patient transferred at stepdown unit at around 1545pm. Report givenby CN to the RN in the receiving unit.

## 2024-12-02 ENCOUNTER — EVALUATION (OUTPATIENT)
Facility: CLINIC | Age: 89
End: 2024-12-02
Payer: COMMERCIAL

## 2024-12-02 DIAGNOSIS — G40.909 SEIZURE DISORDER (HCC): ICD-10-CM

## 2024-12-02 DIAGNOSIS — Z86.73 HISTORY OF MULTIPLE STROKES: Primary | ICD-10-CM

## 2024-12-02 PROCEDURE — 96125 COGNITIVE TEST BY HC PRO: CPT | Performed by: OCCUPATIONAL THERAPIST

## 2024-12-02 NOTE — PROGRESS NOTES
"OCCUPATIONAL THERAPY INITIAL EVALUATION- TRANSITION TO MAINTENANCE PROGRAM      Today's Date: 2024  Patient Name: Yesenia Tillman  : 1935  MRN: [id number]  Referring Provider: Liam Zepeda MD  Dx: History of multiple strokes [Z86.73]    SKILLED ANALYSIS:  Pt presents to re-evaluation on 2024 status post multiple CVAs and subjective reports of decline in thinking and memory. As per interview, pt reports that she feels her memory is getting worse. She reported today that she did not know she had an appointment, despite being given a schedule with today's appointment highlighted on it 3 days ago. This therapist called pt 90 minutes prior to her appt today, and she arrived a few minutes late stating she didn't know where to go. Despite difficulty with managing her schedule, pt reports that she \"never miss[es]\" appointments, doesn't get lost when driving, and is managing her medications well at home. Broached topic of needing more assistance at home, however pt was not open to this at this time.     RBANS completed today with maintenance of overall score (50%ile for pts age). Her score for immediate recall and language declined, and visuospatial skills improved. Based on maintenance of overall cognitive function, and multiple risk factors for cognitive decline including advanced age, social isolation, and hx of multiple CVAs recommend transition to skilled maintenance OT to prevent decline in cognitive function in order to maximize independence and QOL. Recommend OT maintenance program 1-2x/wk for 12 weeks with pt participating in cognitive group 1x/wk. Kristen Loza (2013) is a [place] District Court decision that clarified that Medicare will in fact cover skilled therapy services to maintain a patient’s current condition or prevent/slow further deterioration.     Next RE: TMA    PLAN OF CARE START: 24  PLAN OF CARE END: 3/2/25  FREQUENCY: 1-2x/week  PRECAUTIONS standard, " "psych    Subjective    Mechanism of Injury as per medical record of most recent hospitalization (2023)  \"Yesenia Tillman is a 88 y.o. female patient with past medical history of stroke, hypothyroidism, anxiety, depression, dyslipidemia, GERD, esophageal dysmotility, PAF, seizure disorder who originally presented to the hospital on 2023 due to right shoulder pain.  Upon further questioning patient also reported she might be slightly weak in the right arm and had some speech issues and might be feeling off balance and fatigued and tired.  CAT scan of the brain was unremarkable in the ED and shoulder x-ray was negative.  Patient was treated symptomatically for shoulder pain and patient also had MRI of the brain which was negative.  Patient continued remained stable and was doing well with physical therapy.  Patient was also seen by orthopedics who agreed with conservative treatment with an outpatient follow-up for MRI/steroid injection as needed\"    Occupational Profile    Pt lives by herself. She lives in a first floor apartment. She has 3 sons, one has MS, one has , the youngest helps as needed. Pt reports she has deficits with her memory since about a year ago. Pt reports her   a year ago, however she barely remembers it. Pt son is managing her money. Pt can make her own meals. Pt is managing her own medication with a pill box. Pt is currently driving. Pt enjoys artwork, and uses many of her coloring books.    PATIENT GOAL: \"To slow progression of memory loss\"    HISTORY OF PRESENT ILLNESS:   Pt was recently referred to outpatient OT from her PCP due to changes in her memory. She has a history of multiple CVA's, and reports her memory has been much worse since her  passed away ~1 year ago. She recently had a TIA in 2023, however pt had limited recall of this event.     PMH:   Past Medical History:   Diagnosis Date    Acute CVA (cerebrovascular accident) (HCC) 2016 "    Adult hypothyroidism 2016    Anxiety 06/10/2019    Arthritis     Asthma     Colon polyp     Depression     Disease of thyroid gland     Dyslipidemia 06/10/2019    Esophageal dysmotility 06/10/2019    Essential hypertension 2016    GERD (gastroesophageal reflux disease)     Hyperlipidemia     Hypertension     Hypothyroidism     Obesity     Paroxysmal atrial fibrillation (HCC) 2016    Seizure disorder (HCC) 2018    Seizures (HCC)     Stenosis of left carotid artery 2019    Stenosis of left middle cerebral artery 2019    Stroke (HCC) 2016    Subarachnoid hemorrhage (HCC) 2016    Subdural hematoma (HCC) 2016       Past Surgical Hx:   Past Surgical History:   Procedure Laterality Date    APPENDECTOMY      BACK SURGERY  2016    CARPAL TUNNEL RELEASE Right     COLONOSCOPY      TUBAL LIGATION          Pain: None    Objective    [employer] Cognitive Assessment Version 8.3 (MoCA V8.3)  Visuospatial/executive functionin/5  Naming:  3/3  Memory: 1st trial:  5/5, 2nd trial:  5/5  Attention/concentration: 2/2  List of letters: 1/  Serial Seven Subtraction:  3/3 w/ 0 errors  Language/sentence repetition:  2/2  Language Fluency:  0/1 (n= 10)  Abstract/Correlational Thinkin/2  Delayed Recall:  3/5  Orientation:  /6   Memory Index Score: 12/15  MoCA V1 8.1 Raw Score:  26/30, MIS:  12/15, indicative of NO neurocognitive impairments.      MoCA Scoring        Normal: 26+         Mild Cognitive Impairment: 18-25          Moderate Cognitive Impairment: 10-17         Severe Cognitive Impairment: <10      Functional Cognition:  Highest level of education: 1 Semester of college    Trail making Part A and Part B:   Part A: 31.23 seconds (previously: 33 seconds) NOT REASSESSED 24   Part B: 1 minute and 13 seconds; with 3 verbal cues for sequencing  (previously: 1 minute 28 seconds) IMPROVED   Indicating deficits: Part A > 63 seconds and Part B > 140 seconds      Contextual  Memory Test:  Immediate: 10/20 (previously: 8/20, 0 confabulations) IMPROVED   Delayed: 6/20 with 0 confabulations (previously: 6/20, 0 confabulations) MAINTAINED     Assessments  The Repeatable Battery for the Assessment of Neuropsychological Status (RBANS) is a brief, individually-administered assessment which measures attention, language, visuospatial/constructional abilities, and immediate & delayed memory. The RBANS is intended for use with adolescents to adults, ages 12 to 89 years. The following results were obtained during the administration of the assessment.    Form: A    Cognitive Domain/Subtest: Index Score: Classification: IE: Status:   IMMEDIATE MEMORY 94 average 109 declined        1. List Learning (20/40)          2. Story Memory (15/24)           VISUOSPATIAL/  CONSTRUCTIONAL 112 High average 89 improved        3. Figure Copy (19/20)          4. Line Orientation (17/20)           LANGUAGE 95 average 103 declined        5. Picture Naming (10/10)          6. Semantic Fluency (14/40)           ATTENTION 100 average 100 maintained        7. Digit Span (9/16)          8. Coding (34/89)           DELAYED MEMORY 101 average 101 maintained        9. List Recall (1/10)          10. List Recognition (20/20)          11. Story Recall (5/12)          12. Figure Recall (13/20)           Sum of Index Scores:  502  12/2/24  502 (5/7/24)    Percentile: 50%ile  50    Classification: average  average      Percentile Bands:  >75- high average  26-74- average  10-25- low average  3-9- borderline  <= 2- extremely low    “IE” indicates the scores from the initial evaluation (12/2/24). Form: A    50 minutes spent on administering, scoring, and interpreting the RBANS, and creating/modifying POC based on results      MAINTENANCE GOALS (established 12/2/24):  -Pt will demonstrate maintenance of overall cognitive function based on RBANS score in the 45%ile or better to perform IADLs and participate in meaningful  activities.  -Pt will maintain CLOF for immediate recall based on RBANS subtest score in the average range to complete IADLs.  -Pt will maintain CLOF for delayed recall based on RBANS subtest score in the average range to complete IADLs.        Objective Measurement Tracker:    IE (3/29/24) 1st Re-eval:   (5/3/24) 2nd Re-eval:  (6/14/24) 3rd Re-eval:  8/30/24 4th RE:  12/2/24   MoCA 22/30 23/30  DC 26/30    RBANS  502   502   TM Test A 28.36 independently  33 seconds  31.23 seconds      TM Test B 1 minute 55 seconds with 3 mistakes and 1 VCs for recall of instructions 1 minute 26.62 seconds  1 minute and 28.9 seconds; with 3 verbal cues for sequencing   73 seconds independently   CMT Complete next session Immediate:   8/20  Delayed: 6/20 Immediate: 10/20   Delayed: 6/20 with 0 confabulations

## 2024-12-03 DIAGNOSIS — G40.909 SEIZURE DISORDER (HCC): ICD-10-CM

## 2024-12-03 RX ORDER — LEVETIRACETAM 500 MG/1
500 TABLET ORAL DAILY
Qty: 90 TABLET | Refills: 1 | Status: SHIPPED | OUTPATIENT
Start: 2024-12-03

## 2024-12-09 ENCOUNTER — TELEPHONE (OUTPATIENT)
Age: 89
End: 2024-12-09

## 2024-12-09 NOTE — TELEPHONE ENCOUNTER
Patient is calling regarding cancelling an appointment.    Date/Time: 12/9/2024 at 12 pm    Reason: pt sick    Patient was rescheduled: YES [] NO [x]  If yes, when was Patient reschedule for: n/a    Patient requesting call back to reschedule: YES [] NO [x]

## 2024-12-12 ENCOUNTER — TELEPHONE (OUTPATIENT)
Dept: PSYCHIATRY | Facility: CLINIC | Age: 89
End: 2024-12-12

## 2024-12-12 NOTE — TELEPHONE ENCOUNTER
Attempted to reach Sejal ; unable to leave msg at this time, will need to try again later.   Called patient to see if she was ok due to her missing her appt with Jeet Avalos on 12/16 at 2:00 pm  Patient forgot about the appt and doesn't know why she can't wait till January sometime she is ok. Informed Yesenia when she comes in to see Galina on 1/6/2025 and will schedule something with Jeet

## 2025-01-01 ENCOUNTER — APPOINTMENT (EMERGENCY)
Dept: RADIOLOGY | Facility: HOSPITAL | Age: 89
DRG: 871 | End: 2025-01-01
Payer: COMMERCIAL

## 2025-01-01 ENCOUNTER — HOSPITAL ENCOUNTER (INPATIENT)
Facility: HOSPITAL | Age: 89
LOS: 7 days | Discharge: NURSING, REHAB OR LONG-TERM CARE | DRG: 871 | End: 2025-01-08
Attending: EMERGENCY MEDICINE | Admitting: FAMILY MEDICINE
Payer: COMMERCIAL

## 2025-01-01 DIAGNOSIS — E55.9 VITAMIN D DEFICIENCY: ICD-10-CM

## 2025-01-01 DIAGNOSIS — E53.8 FOLATE DEFICIENCY: ICD-10-CM

## 2025-01-01 DIAGNOSIS — J18.9 PNEUMONIA: Primary | ICD-10-CM

## 2025-01-01 DIAGNOSIS — Z29.9 PROPHYLACTIC MEASURE: ICD-10-CM

## 2025-01-01 DIAGNOSIS — J18.9 MULTIFOCAL PNEUMONIA: ICD-10-CM

## 2025-01-01 DIAGNOSIS — E53.8 B12 DEFICIENCY: ICD-10-CM

## 2025-01-01 DIAGNOSIS — F33.1 MODERATE EPISODE OF RECURRENT MAJOR DEPRESSIVE DISORDER (HCC): ICD-10-CM

## 2025-01-01 PROBLEM — R79.89 ELEVATED BRAIN NATRIURETIC PEPTIDE (BNP) LEVEL: Status: ACTIVE | Noted: 2025-01-01

## 2025-01-01 PROBLEM — E87.1 HYPONATREMIA: Status: ACTIVE | Noted: 2025-01-01

## 2025-01-01 PROBLEM — A41.9 SEPSIS (HCC): Status: ACTIVE | Noted: 2025-01-01

## 2025-01-01 LAB
2HR DELTA HS TROPONIN: -1 NG/L
4HR DELTA HS TROPONIN: -1 NG/L
ALBUMIN SERPL BCG-MCNC: 3.6 G/DL (ref 3.5–5)
ALP SERPL-CCNC: 94 U/L (ref 34–104)
ALT SERPL W P-5'-P-CCNC: 14 U/L (ref 7–52)
ANION GAP SERPL CALCULATED.3IONS-SCNC: 9 MMOL/L (ref 4–13)
APTT PPP: 30 SECONDS (ref 23–34)
AST SERPL W P-5'-P-CCNC: 18 U/L (ref 13–39)
BACTERIA UR QL AUTO: NORMAL /HPF
BASOPHILS # BLD AUTO: 0.03 THOUSANDS/ΜL (ref 0–0.1)
BASOPHILS NFR BLD AUTO: 0 % (ref 0–1)
BILIRUB SERPL-MCNC: 1.01 MG/DL (ref 0.2–1)
BILIRUB UR QL STRIP: NEGATIVE
BNP SERPL-MCNC: 173 PG/ML (ref 0–100)
BUN SERPL-MCNC: 10 MG/DL (ref 5–25)
CALCIUM SERPL-MCNC: 8.5 MG/DL (ref 8.4–10.2)
CARDIAC TROPONIN I PNL SERPL HS: 6 NG/L (ref ?–50)
CARDIAC TROPONIN I PNL SERPL HS: 6 NG/L (ref ?–50)
CARDIAC TROPONIN I PNL SERPL HS: 7 NG/L (ref ?–50)
CHLORIDE SERPL-SCNC: 99 MMOL/L (ref 96–108)
CLARITY UR: CLEAR
CO2 SERPL-SCNC: 24 MMOL/L (ref 21–32)
COLOR UR: YELLOW
CREAT SERPL-MCNC: 0.78 MG/DL (ref 0.6–1.3)
EOSINOPHIL # BLD AUTO: 0.03 THOUSAND/ΜL (ref 0–0.61)
EOSINOPHIL NFR BLD AUTO: 0 % (ref 0–6)
ERYTHROCYTE [DISTWIDTH] IN BLOOD BY AUTOMATED COUNT: 13.2 % (ref 11.6–15.1)
FLUAV AG UPPER RESP QL IA.RAPID: NEGATIVE
FLUBV AG UPPER RESP QL IA.RAPID: NEGATIVE
GFR SERPL CREATININE-BSD FRML MDRD: 68 ML/MIN/1.73SQ M
GLUCOSE SERPL-MCNC: 113 MG/DL (ref 65–140)
GLUCOSE UR STRIP-MCNC: NEGATIVE MG/DL
HCT VFR BLD AUTO: 36.1 % (ref 34.8–46.1)
HGB BLD-MCNC: 12.1 G/DL (ref 11.5–15.4)
HGB UR QL STRIP.AUTO: ABNORMAL
IMM GRANULOCYTES # BLD AUTO: 0.12 THOUSAND/UL (ref 0–0.2)
IMM GRANULOCYTES NFR BLD AUTO: 1 % (ref 0–2)
INR PPP: 1.13 (ref 0.85–1.19)
KETONES UR STRIP-MCNC: ABNORMAL MG/DL
LACTATE SERPL-SCNC: 1.1 MMOL/L (ref 0.5–2)
LEUKOCYTE ESTERASE UR QL STRIP: ABNORMAL
LYMPHOCYTES # BLD AUTO: 0.65 THOUSANDS/ΜL (ref 0.6–4.47)
LYMPHOCYTES NFR BLD AUTO: 5 % (ref 14–44)
MAGNESIUM SERPL-MCNC: 1.6 MG/DL (ref 1.9–2.7)
MCH RBC QN AUTO: 30.9 PG (ref 26.8–34.3)
MCHC RBC AUTO-ENTMCNC: 33.5 G/DL (ref 31.4–37.4)
MCV RBC AUTO: 92 FL (ref 82–98)
MONOCYTES # BLD AUTO: 0.73 THOUSAND/ΜL (ref 0.17–1.22)
MONOCYTES NFR BLD AUTO: 6 % (ref 4–12)
NEUTROPHILS # BLD AUTO: 11.51 THOUSANDS/ΜL (ref 1.85–7.62)
NEUTS SEG NFR BLD AUTO: 88 % (ref 43–75)
NITRITE UR QL STRIP: NEGATIVE
NON-SQ EPI CELLS URNS QL MICRO: NORMAL /HPF
NRBC BLD AUTO-RTO: 0 /100 WBCS
PH UR STRIP.AUTO: 6 [PH]
PLATELET # BLD AUTO: 310 THOUSANDS/UL (ref 149–390)
PMV BLD AUTO: 10.1 FL (ref 8.9–12.7)
POTASSIUM SERPL-SCNC: 3.4 MMOL/L (ref 3.5–5.3)
PROCALCITONIN SERPL-MCNC: 0.77 NG/ML
PROT SERPL-MCNC: 6.9 G/DL (ref 6.4–8.4)
PROT UR STRIP-MCNC: ABNORMAL MG/DL
PROTHROMBIN TIME: 15 SECONDS (ref 12.3–15)
RBC # BLD AUTO: 3.91 MILLION/UL (ref 3.81–5.12)
RBC #/AREA URNS AUTO: NORMAL /HPF
SARS-COV+SARS-COV-2 AG RESP QL IA.RAPID: NEGATIVE
SODIUM SERPL-SCNC: 132 MMOL/L (ref 135–147)
SP GR UR STRIP.AUTO: 1.01 (ref 1–1.03)
UROBILINOGEN UR STRIP-ACNC: <2 MG/DL
WBC # BLD AUTO: 13.07 THOUSAND/UL (ref 4.31–10.16)
WBC #/AREA URNS AUTO: NORMAL /HPF

## 2025-01-01 PROCEDURE — 99285 EMERGENCY DEPT VISIT HI MDM: CPT

## 2025-01-01 PROCEDURE — 71275 CT ANGIOGRAPHY CHEST: CPT

## 2025-01-01 PROCEDURE — 83880 ASSAY OF NATRIURETIC PEPTIDE: CPT | Performed by: EMERGENCY MEDICINE

## 2025-01-01 PROCEDURE — 99222 1ST HOSP IP/OBS MODERATE 55: CPT | Performed by: NURSE PRACTITIONER

## 2025-01-01 PROCEDURE — 87804 INFLUENZA ASSAY W/OPTIC: CPT | Performed by: EMERGENCY MEDICINE

## 2025-01-01 PROCEDURE — 84145 PROCALCITONIN (PCT): CPT | Performed by: NURSE PRACTITIONER

## 2025-01-01 PROCEDURE — 87811 SARS-COV-2 COVID19 W/OPTIC: CPT | Performed by: EMERGENCY MEDICINE

## 2025-01-01 PROCEDURE — 36415 COLL VENOUS BLD VENIPUNCTURE: CPT | Performed by: EMERGENCY MEDICINE

## 2025-01-01 PROCEDURE — 83605 ASSAY OF LACTIC ACID: CPT | Performed by: EMERGENCY MEDICINE

## 2025-01-01 PROCEDURE — 94760 N-INVAS EAR/PLS OXIMETRY 1: CPT

## 2025-01-01 PROCEDURE — 99285 EMERGENCY DEPT VISIT HI MDM: CPT | Performed by: EMERGENCY MEDICINE

## 2025-01-01 PROCEDURE — 71045 X-RAY EXAM CHEST 1 VIEW: CPT

## 2025-01-01 PROCEDURE — 84484 ASSAY OF TROPONIN QUANT: CPT | Performed by: EMERGENCY MEDICINE

## 2025-01-01 PROCEDURE — 87040 BLOOD CULTURE FOR BACTERIA: CPT | Performed by: EMERGENCY MEDICINE

## 2025-01-01 PROCEDURE — 83735 ASSAY OF MAGNESIUM: CPT | Performed by: NURSE PRACTITIONER

## 2025-01-01 PROCEDURE — 85610 PROTHROMBIN TIME: CPT | Performed by: EMERGENCY MEDICINE

## 2025-01-01 PROCEDURE — 87086 URINE CULTURE/COLONY COUNT: CPT | Performed by: EMERGENCY MEDICINE

## 2025-01-01 PROCEDURE — 80053 COMPREHEN METABOLIC PANEL: CPT | Performed by: EMERGENCY MEDICINE

## 2025-01-01 PROCEDURE — 74177 CT ABD & PELVIS W/CONTRAST: CPT

## 2025-01-01 PROCEDURE — 85730 THROMBOPLASTIN TIME PARTIAL: CPT | Performed by: EMERGENCY MEDICINE

## 2025-01-01 PROCEDURE — 81001 URINALYSIS AUTO W/SCOPE: CPT | Performed by: EMERGENCY MEDICINE

## 2025-01-01 PROCEDURE — 96365 THER/PROPH/DIAG IV INF INIT: CPT

## 2025-01-01 PROCEDURE — 96361 HYDRATE IV INFUSION ADD-ON: CPT

## 2025-01-01 PROCEDURE — 85025 COMPLETE CBC W/AUTO DIFF WBC: CPT | Performed by: EMERGENCY MEDICINE

## 2025-01-01 RX ORDER — CEFTRIAXONE 1 G/50ML
1000 INJECTION, SOLUTION INTRAVENOUS EVERY 24 HOURS
Status: DISCONTINUED | OUTPATIENT
Start: 2025-01-02 | End: 2025-01-08 | Stop reason: HOSPADM

## 2025-01-01 RX ORDER — ACETAMINOPHEN 325 MG/1
650 TABLET ORAL EVERY 6 HOURS PRN
Status: DISCONTINUED | OUTPATIENT
Start: 2025-01-01 | End: 2025-01-08 | Stop reason: HOSPADM

## 2025-01-01 RX ORDER — SODIUM CHLORIDE 9 MG/ML
60 INJECTION, SOLUTION INTRAVENOUS CONTINUOUS
Status: DISPENSED | OUTPATIENT
Start: 2025-01-01 | End: 2025-01-02

## 2025-01-01 RX ORDER — LEVOTHYROXINE SODIUM 88 UG/1
88 TABLET ORAL
Status: DISCONTINUED | OUTPATIENT
Start: 2025-01-02 | End: 2025-01-08 | Stop reason: HOSPADM

## 2025-01-01 RX ORDER — LOSARTAN POTASSIUM 50 MG/1
100 TABLET ORAL DAILY
Status: DISCONTINUED | OUTPATIENT
Start: 2025-01-02 | End: 2025-01-08 | Stop reason: HOSPADM

## 2025-01-01 RX ORDER — ONDANSETRON 2 MG/ML
4 INJECTION INTRAMUSCULAR; INTRAVENOUS EVERY 6 HOURS PRN
Status: DISCONTINUED | OUTPATIENT
Start: 2025-01-01 | End: 2025-01-08 | Stop reason: HOSPADM

## 2025-01-01 RX ORDER — ATORVASTATIN CALCIUM 40 MG/1
40 TABLET, FILM COATED ORAL
Status: DISCONTINUED | OUTPATIENT
Start: 2025-01-02 | End: 2025-01-08 | Stop reason: HOSPADM

## 2025-01-01 RX ORDER — PANTOPRAZOLE SODIUM 40 MG/1
40 TABLET, DELAYED RELEASE ORAL
Status: DISCONTINUED | OUTPATIENT
Start: 2025-01-02 | End: 2025-01-08 | Stop reason: HOSPADM

## 2025-01-01 RX ORDER — FAMOTIDINE 20 MG/1
20 TABLET, FILM COATED ORAL
Status: DISCONTINUED | OUTPATIENT
Start: 2025-01-01 | End: 2025-01-08 | Stop reason: HOSPADM

## 2025-01-01 RX ORDER — CEFTRIAXONE 1 G/50ML
1000 INJECTION, SOLUTION INTRAVENOUS ONCE
Status: COMPLETED | OUTPATIENT
Start: 2025-01-01 | End: 2025-01-01

## 2025-01-01 RX ORDER — IPRATROPIUM BROMIDE AND ALBUTEROL SULFATE 2.5; .5 MG/3ML; MG/3ML
3 SOLUTION RESPIRATORY (INHALATION) EVERY 6 HOURS PRN
Status: DISCONTINUED | OUTPATIENT
Start: 2025-01-01 | End: 2025-01-08 | Stop reason: HOSPADM

## 2025-01-01 RX ORDER — POTASSIUM CHLORIDE 20MEQ/15ML
40 LIQUID (ML) ORAL ONCE
Status: COMPLETED | OUTPATIENT
Start: 2025-01-01 | End: 2025-01-01

## 2025-01-01 RX ORDER — CLOPIDOGREL BISULFATE 75 MG/1
75 TABLET ORAL DAILY
Status: DISCONTINUED | OUTPATIENT
Start: 2025-01-02 | End: 2025-01-08 | Stop reason: HOSPADM

## 2025-01-01 RX ORDER — METOPROLOL SUCCINATE 50 MG/1
50 TABLET, EXTENDED RELEASE ORAL DAILY
Status: DISCONTINUED | OUTPATIENT
Start: 2025-01-02 | End: 2025-01-08 | Stop reason: HOSPADM

## 2025-01-01 RX ORDER — ACETAMINOPHEN 325 MG/1
650 TABLET ORAL ONCE
Status: COMPLETED | OUTPATIENT
Start: 2025-01-01 | End: 2025-01-01

## 2025-01-01 RX ORDER — SACCHAROMYCES BOULARDII 250 MG
250 CAPSULE ORAL 2 TIMES DAILY
Status: DISCONTINUED | OUTPATIENT
Start: 2025-01-01 | End: 2025-01-08 | Stop reason: HOSPADM

## 2025-01-01 RX ORDER — ENOXAPARIN SODIUM 100 MG/ML
40 INJECTION SUBCUTANEOUS DAILY
Status: DISCONTINUED | OUTPATIENT
Start: 2025-01-02 | End: 2025-01-08 | Stop reason: HOSPADM

## 2025-01-01 RX ORDER — MAGNESIUM SULFATE HEPTAHYDRATE 40 MG/ML
2 INJECTION, SOLUTION INTRAVENOUS ONCE
Status: COMPLETED | OUTPATIENT
Start: 2025-01-01 | End: 2025-01-02

## 2025-01-01 RX ORDER — LEVETIRACETAM 500 MG/1
500 TABLET ORAL DAILY
Status: DISCONTINUED | OUTPATIENT
Start: 2025-01-01 | End: 2025-01-08 | Stop reason: HOSPADM

## 2025-01-01 RX ORDER — AZITHROMYCIN 250 MG/1
500 TABLET, FILM COATED ORAL EVERY 24 HOURS
Status: DISCONTINUED | OUTPATIENT
Start: 2025-01-01 | End: 2025-01-03

## 2025-01-01 RX ORDER — GUAIFENESIN 600 MG/1
600 TABLET, EXTENDED RELEASE ORAL 2 TIMES DAILY
Status: DISCONTINUED | OUTPATIENT
Start: 2025-01-01 | End: 2025-01-08 | Stop reason: HOSPADM

## 2025-01-01 RX ADMIN — SODIUM CHLORIDE 500 ML: 0.9 INJECTION, SOLUTION INTRAVENOUS at 14:45

## 2025-01-01 RX ADMIN — CEFTRIAXONE 1000 MG: 1 INJECTION, SOLUTION INTRAVENOUS at 17:57

## 2025-01-01 RX ADMIN — AZITHROMYCIN DIHYDRATE 500 MG: 250 TABLET ORAL at 20:49

## 2025-01-01 RX ADMIN — POTASSIUM CHLORIDE 40 MEQ: 20 SOLUTION ORAL at 20:49

## 2025-01-01 RX ADMIN — SODIUM CHLORIDE 60 ML/HR: 0.9 INJECTION, SOLUTION INTRAVENOUS at 22:16

## 2025-01-01 RX ADMIN — GUAIFENESIN 600 MG: 600 TABLET, EXTENDED RELEASE ORAL at 20:49

## 2025-01-01 RX ADMIN — IOHEXOL 100 ML: 350 INJECTION, SOLUTION INTRAVENOUS at 15:35

## 2025-01-01 RX ADMIN — ACETAMINOPHEN 650 MG: 325 TABLET ORAL at 20:48

## 2025-01-01 RX ADMIN — MAGNESIUM SULFATE HEPTAHYDRATE 2 G: 40 INJECTION, SOLUTION INTRAVENOUS at 22:14

## 2025-01-01 RX ADMIN — ACETAMINOPHEN 650 MG: 325 TABLET ORAL at 14:45

## 2025-01-01 RX ADMIN — LEVETIRACETAM 500 MG: 500 TABLET, FILM COATED ORAL at 20:49

## 2025-01-01 RX ADMIN — IOHEXOL 85 ML: 350 INJECTION, SOLUTION INTRAVENOUS at 17:02

## 2025-01-01 RX ADMIN — FAMOTIDINE 20 MG: 20 TABLET, FILM COATED ORAL at 22:17

## 2025-01-01 RX ADMIN — Medication 250 MG: at 20:49

## 2025-01-01 NOTE — ED NOTES
"25 @ 1430:  89-year-old female brought to ED by her sister due to medical issues and increased depression. Patient reports that she's been feeling depressed for the last several months, but also says, \"I've had a lot of death in my life and she's having a difficult time dealing with that. Patient says, \"I lost two sons, one 5 months ago, a son that committed suicide, and my  . Patient admits to suicidal ideations, but no plan or intent. Patient provided good protective factors and is thinking about writing a book, which PES provided information to help. Patient says she's having trouble eating and is sleeping excessively. Patient was offered outpatient resources, but she declined. Please see copy of crisis assessment for further details. Patient discharged home.     MS Kirti  "

## 2025-01-01 NOTE — ED NOTES
1/1/25: This writer discussed the patients current presentation and recommended discharge plan with       The patient was Instructed to follow up with their PCP     This writer and the patient completed a safety plan.     In addition, the patient was instructed to call local [facility] crisis, other crisis services, 911 or to go to the nearest ER immediately if their situation changes at any time.     This writer discussed discharge plans with the patient       SAFETY PLAN  Warning Signs (thoughts, images, mood, behavior, situations) of a potential crisis:       Coping Skills (what can I do to take my mind off the problem, or to keep myself safe):       Outside Support (who can I reach out to for support and help):         National Suicide Prevention Hotline:  565                                          [facility]) [phone] - [facility] Crisis         MS Kirti

## 2025-01-01 NOTE — ED PROVIDER NOTES
Time reflects when diagnosis was documented in both MDM as applicable and the Disposition within this note       Time User Action Codes Description Comment    1/1/2025  6:37 PM Everett Todd Add [J18.9] Pneumonia           ED Disposition       ED Disposition   Admit    Condition   Stable    Date/Time   Wed Jan 1, 2025  6:37 PM    Comment   Case was discussed with zoila and the patient's admission status was agreed to be Admission Status: inpatient status to the service of Dr. Bolden .               Assessment & Plan       Medical Decision Making  89-year-old female presents to the ED with not feeling well for a few days and depression over the loss of her  and 2 sons this year    Amount and/or Complexity of Data Reviewed  Labs: ordered.  Radiology: ordered.    Risk  OTC drugs.  Prescription drug management.  Decision regarding hospitalization.             Medications   acetaminophen (TYLENOL) tablet 650 mg (650 mg Oral Given 1/1/25 1445)   sodium chloride 0.9 % bolus 500 mL (0 mL Intravenous Stopped 1/1/25 1625)   iohexol (OMNIPAQUE) 350 MG/ML injection (MULTI-DOSE) 100 mL (100 mL Intravenous Given 1/1/25 1535)   iohexol (OMNIPAQUE) 350 MG/ML injection (MULTI-DOSE) 85 mL (85 mL Intravenous Given 1/1/25 1702)   cefTRIAXone (ROCEPHIN) IVPB (premix in dextrose) 1,000 mg 50 mL (1,000 mg Intravenous New Bag 1/1/25 1757)       ED Risk Strat Scores                          SBIRT 20yo+      Flowsheet Row Most Recent Value   Initial Alcohol Screen: US AUDIT-C     1. How often do you have a drink containing alcohol? 0 Filed at: 01/01/2025 1307   3b. FEMALE Any Age, or MALE 65+: How often do you have 4 or more drinks on one occassion? 0 Filed at: 01/01/2025 1307   Audit-C Score 0 Filed at: 01/01/2025 1307   FRED: How many times in the past year have you...    Used an illegal drug or used a prescription medication for non-medical reasons? Never Filed at: 01/01/2025 1307                            History of Present  "Illness       Chief Complaint   Patient presents with    Fever     States she started a few days ago with a \" cough , cough, cough \". States her chest and ribs hurt from coughing. States just laying in bed, no energy. Has nausea, no vomiting. Unaware that she has a fever. Sat 91 %. Feels very weak.     Cough    Psychiatric Evaluation     Suicidal thoughts at time, 2 sons and  . Starts to cry. Sister is concerned about depression. Patient denies SI at this time       Past Medical History:   Diagnosis Date    Acute CVA (cerebrovascular accident) ([facility]) 2016    Adult hypothyroidism 2016    Anxiety 06/10/2019    Arthritis     Asthma     Colon polyp     Depression     Disease of thyroid gland     Dyslipidemia 06/10/2019    Esophageal dysmotility 06/10/2019    Essential hypertension 2016    GERD (gastroesophageal reflux disease)     Hyperlipidemia     Hypertension     Hypothyroidism     Obesity     Paroxysmal atrial fibrillation ([facility]) 2016    Seizure disorder ([facility]) 2018    Seizures ([facility])     Stenosis of left carotid artery 2019    Stenosis of left middle cerebral artery 2019    Stroke ([facility]) 2016    Subarachnoid hemorrhage ([facility]) 2016    Subdural hematoma ([facility]) 2016      Past Surgical History:   Procedure Laterality Date    APPENDECTOMY      BACK SURGERY  2016    CARPAL TUNNEL RELEASE Right     COLONOSCOPY      TUBAL LIGATION        Family History   Problem Relation Age of Onset    Kidney disease Mother     Heart disease Mother     Cancer Father         colon    Heart disease Father     Colon cancer Father     Heart disease Sister     Diabetes Son     Multiple sclerosis Son     No Known Problems Son     Heart disease Maternal Grandmother       Social History     Tobacco Use    Smoking status: Never    Smokeless tobacco: Never   Vaping Use    Vaping status: Never Used   Substance Use Topics    Alcohol use: Never     Alcohol/week: 0.0 standard drinks of " alcohol     Comment: 0    Drug use: No      E-Cigarette/Vaping    E-Cigarette Use Never User       E-Cigarette/Vaping Substances    Nicotine No     THC No     CBD No     Flavoring No     Other No     Unknown No       I have reviewed and agree with the history as documented.     89-year-old female states that she has been feeling well for last few days does have a temp of one 1.4 here in the ED states she does have some left sided rib pain with no known injury.  States it feels worse when she takes a deep breath.  Patient also has some diffuse abdominal discomfort.  No fevers other than what she knows of here today she states she might of had some at home but never took it.  She denies any shortness of breath no nausea vomiting or diarrhea.  Patient states she has had thoughts of suicide however would never do it but she just feels that she wonders what else is left since her  and 2 of her sons passed away this year.        Review of Systems   Constitutional:  Positive for chills and fever. Negative for activity change and diaphoresis.   HENT:  Negative for congestion, ear pain, nosebleeds, sore throat, trouble swallowing and voice change.    Eyes:  Negative for pain, discharge and redness.   Respiratory:  Positive for cough. Negative for apnea, choking, shortness of breath, wheezing and stridor.    Cardiovascular:  Positive for chest pain. Negative for palpitations.   Gastrointestinal:  Positive for abdominal pain. Negative for abdominal distention, constipation, diarrhea, nausea and vomiting.   Endocrine: Negative for polydipsia.   Genitourinary:  Negative for difficulty urinating, dysuria, flank pain, frequency, hematuria and urgency.   Musculoskeletal:  Negative for back pain, gait problem, joint swelling, myalgias, neck pain and neck stiffness.   Skin:  Negative for pallor and rash.   Neurological:  Positive for weakness. Negative for dizziness, tremors, syncope, speech difficulty, numbness and headaches.    Hematological:  Negative for adenopathy.   Psychiatric/Behavioral:  Negative for confusion, hallucinations, self-injury and suicidal ideas. The patient is not nervous/anxious.            Objective       ED Triage Vitals [01/01/25 1302]   Temperature Pulse Blood Pressure Respirations SpO2 Patient Position - Orthostatic VS   (!) 101.4 °F (38.6 °C) 99 155/88 20 91 % Sitting      Temp Source Heart Rate Source BP Location FiO2 (%) Pain Score    Tympanic Monitor Left arm -- 8      Vitals      Date and Time Temp Pulse SpO2 Resp BP Pain Score FACES Pain Rating User   01/01/25 1815 -- 66 94 % 19 184/74 -- -- OE   01/01/25 1730 -- 69 92 % 23 168/72 -- -- OE   01/01/25 1615 98 °F (36.7 °C) 72 94 % 22 -- -- -- OE   01/01/25 1600 -- 73 96 % 22 179/72 -- -- OE   01/01/25 1500 -- 77 93 % 20 168/74 -- -- OE   01/01/25 1445 -- -- -- -- -- Med Not Given for Pain - for MAR use only -- OE   01/01/25 1302 101.4 °F (38.6 °C) 99 91 % 20 155/88 8 -- SW            Physical Exam  Vitals and nursing note reviewed.   Constitutional:       General: She is not in acute distress.     Appearance: She is well-developed. She is not diaphoretic.   HENT:      Head: Normocephalic and atraumatic.      Right Ear: External ear normal.      Left Ear: External ear normal.      Nose: Nose normal.   Eyes:      Conjunctiva/sclera: Conjunctivae normal.      Pupils: Pupils are equal, round, and reactive to light.   Cardiovascular:      Rate and Rhythm: Normal rate and regular rhythm.      Heart sounds: Normal heart sounds.   Pulmonary:      Effort: Pulmonary effort is normal.      Breath sounds: Normal breath sounds.   Abdominal:      General: Bowel sounds are normal.      Palpations: Abdomen is soft.      Tenderness: There is abdominal tenderness.      Comments: Diffuse tenderness   Musculoskeletal:         General: Normal range of motion.      Cervical back: Normal range of motion and neck supple.   Skin:     General: Skin is warm and dry.   Neurological:       Mental Status: She is alert and oriented to person, place, and time.      Deep Tendon Reflexes: Reflexes are normal and symmetric.   Psychiatric:         Behavior: Behavior is cooperative.         Results Reviewed       Procedure Component Value Units Date/Time    B-Type Natriuretic Peptide(BNP) [[id number]]  (Abnormal) Collected: 01/01/25 1430    Lab Status: Final result Specimen: Blood from Arm, Left Updated: 01/01/25 1751      pg/mL     Urine Microscopic [[id number]]  (Normal) Collected: 01/01/25 1643    Lab Status: Final result Specimen: Urine, Clean Catch Updated: 01/01/25 1723     RBC, UA 1-2 /hpf      WBC, UA 0-1 /hpf      Epithelial Cells Occasional /hpf      Bacteria, UA Occasional /hpf     HS Troponin I 2hr [[id number]]  (Normal) Collected: 01/01/25 1632    Lab Status: Final result Specimen: Blood from Arm, Left Updated: 01/01/25 1713     hs TnI 2hr 6 ng/L      Delta 2hr hsTnI -1 ng/L     UA (URINE) with reflex to Scope [[id number]]  (Abnormal) Collected: 01/01/25 1643    Lab Status: Final result Specimen: Urine, Clean Catch Updated: 01/01/25 1701     Color, UA Yellow     Clarity, UA Clear     Specific Gravity, UA 1.015     pH, UA 6.0     Leukocytes, UA Small     Nitrite, UA Negative     Protein, UA Trace mg/dl      Glucose, UA Negative mg/dl      Ketones, UA 40 (2+) mg/dl      Urobilinogen, UA <2.0 mg/dl      Bilirubin, UA Negative     Occult Blood, UA Small    Urine culture [[id number]] Collected: 01/01/25 1643    Lab Status: In process Specimen: Urine, Clean Catch Updated: 01/01/25 1653    HS Troponin I 4hr [[id number]]     Lab Status: No result Specimen: Blood     Protime-INR [[id number]]  (Normal) Collected: 01/01/25 1430    Lab Status: Final result Specimen: Blood from Arm, Left Updated: 01/01/25 1516     Protime 15.0 seconds      INR 1.13    Narrative:      INR Therapeutic Range    Indication                                             INR Range      Atrial Fibrillation                                                2.0-3.0  Hypercoagulable State                                    2.0.2.3  Left Ventricular Asist Device                            2.0-3.0  Mechanical Heart Valve                                  -    Aortic(with afib, MI, embolism, HF, LA enlargement,    and/or coagulopathy)                                     2.0-3.0 (2.5-3.5)     Mitral                                                             2.5-3.5  Prosthetic/Bioprosthetic Heart Valve               2.0-3.0  Venous thromboembolism (VTE: VT, PE        2.0-3.0    APTT [[id number]]  (Normal) Collected: 01/01/25 1430    Lab Status: Final result Specimen: Blood from Arm, Left Updated: 01/01/25 1516     PTT 30 seconds     FLU/COVID Rapid Antigen (30 min. TAT) - Preferred screening test in ED [[id number]]  (Normal) Collected: 01/01/25 1430    Lab Status: Final result Specimen: Nares from Nose Updated: 01/01/25 1515     SARS COV Rapid Antigen Negative     Influenza A Rapid Antigen Negative     Influenza B Rapid Antigen Negative    Narrative:      This test has been performed using the [employer] 2 FLU+SARS Antigen test under the Emergency Use Authorization (EUA). This test has been validated by the  and verified by the performing laboratory. The [employer] uses lateral flow immunofluorescent sandwich assay to detect SARS-COV, Influenza A and Influenza B Antigen.     The [employer] 2 SARS Antigen test does not differentiate between SARS-CoV and SARS-CoV-2.     Negative results are presumptive and may be confirmed with a molecular assay, if necessary, for patient management. Negative results do not rule out SARS-CoV-2 or influenza infection and should not be used as the sole basis for treatment or patient management decisions. A negative test result may occur if the level of antigen in a sample is below the limit of detection of this test.     Positive results are indicative of the presence of viral antigens, but do not rule out bacterial  infection or co-infection with other viruses.     All test results should be used as an adjunct to clinical observations and other information available to the provider.    FOR PEDIATRIC PATIENTS - copy/paste COVID Guidelines URL to browser: [email or web address]    HS Troponin 0hr (reflex protocol) [[id number]]  (Normal) Collected: 01/01/25 1430    Lab Status: Final result Specimen: Blood from Arm, Left Updated: 01/01/25 1506     hs TnI 0hr 7 ng/L     Lactic acid, plasma (w/reflex if result > 2.0) [[id number]]  (Normal) Collected: 01/01/25 1430    Lab Status: Final result Specimen: Blood from Arm, Left Updated: 01/01/25 1503     LACTIC ACID 1.1 mmol/L     Narrative:      Result may be elevated if tourniquet was used during collection.    Comprehensive metabolic panel [[id number]]  (Abnormal) Collected: 01/01/25 1430    Lab Status: Final result Specimen: Blood from Arm, Left Updated: 01/01/25 1501     Sodium 132 mmol/L      Potassium 3.4 mmol/L      Chloride 99 mmol/L      CO2 24 mmol/L      ANION GAP 9 mmol/L      BUN 10 mg/dL      Creatinine 0.78 mg/dL      Glucose 113 mg/dL      Calcium 8.5 mg/dL      AST 18 U/L      ALT 14 U/L      Alkaline Phosphatase 94 U/L      Total Protein 6.9 g/dL      Albumin 3.6 g/dL      Total Bilirubin 1.01 mg/dL      eGFR 68 ml/min/1.73sq m     Narrative:      National Kidney Disease Foundation guidelines for Chronic Kidney Disease (CKD):     Stage 1 with normal or high GFR (GFR > 90 mL/min/1.73 square meters)    Stage 2 Mild CKD (GFR = 60-89 mL/min/1.73 square meters)    Stage 3A Moderate CKD (GFR = 45-59 mL/min/1.73 square meters)    Stage 3B Moderate CKD (GFR = 30-44 mL/min/1.73 square meters)    Stage 4 Severe CKD (GFR = 15-29 mL/min/1.73 square meters)    Stage 5 End Stage CKD (GFR <15 mL/min/1.73 square meters)  Note: GFR calculation is accurate only with a steady state creatinine    Blood culture #1 [[id number]] Collected:  01/01/25 1440    Lab Status: In process Specimen: Blood from Arm, Left Updated: 01/01/25 1444    Blood culture #2 [[id number]] Collected: 01/01/25 1430    Lab Status: In process Specimen: Blood from Arm, Left Updated: 01/01/25 1444    CBC and differential [[id number]]  (Abnormal) Collected: 01/01/25 1430    Lab Status: Final result Specimen: Blood from Arm, Left Updated: 01/01/25 1440     WBC 13.07 Thousand/uL      RBC 3.91 Million/uL      Hemoglobin 12.1 g/dL      Hematocrit 36.1 %      MCV 92 fL      MCH 30.9 pg      MCHC 33.5 g/dL      RDW 13.2 %      MPV 10.1 fL      Platelets 310 Thousands/uL      nRBC 0 /100 WBCs      Segmented % 88 %      Immature Grans % 1 %      Lymphocytes % 5 %      Monocytes % 6 %      Eosinophils Relative 0 %      Basophils Relative 0 %      Absolute Neutrophils 11.51 Thousands/µL      Absolute Immature Grans 0.12 Thousand/uL      Absolute Lymphocytes 0.65 Thousands/µL      Absolute Monocytes 0.73 Thousand/µL      Eosinophils Absolute 0.03 Thousand/µL      Basophils Absolute 0.03 Thousands/µL             CTA chest pe study   Final Interpretation by Lilian Clark MD (01/01 1733)      No pulmonary embolism identified. No pulmonary infarct noted.      Wedge-shaped consolidation involving the lower aspect of the right lower lobe, and patchy groundglass opacities which involve the left lower lobe, and focal groundglass opacities and likely associated tree-in-bud nodules involving the posterior left    upper lobe. These likely present multifocal pneumonia and follow-up until resolution is recommended.      Bilateral hilar lymphadenopathy and mediastinal lymphadenopathy, likely reactive, but recommend follow-up chest CT in 2 months time to, document stability or resolution as neoplastic processes such as metastatic disease or lymphoma cannot be entirely    excluded.      Small hiatal hernia.      Diffuse hepatic steatosis.      The study was marked in EPIC for immediate notification.             Workstation performed: [id number]         CT abdomen pelvis with contrast   Final Interpretation by Lilian Clark MD (01/01 1641)      Peripheral wedge-shaped opacity involving the right lower lobe and patchy groundglass opacities involving the left lower lobe and likely clustered tree-in-bud opacities involving the visualized posterior left upper lobe. These findings likely represent    multifocal pneumonia,, but given the wedge-shaped peripheral opacity in the right lower lobe, recommend dedicated pulmonary CT angiogram of the chest with contrast to exclude a pulmonary embolus/pulmonary infarct in this region.      Mildly enlarged left hilar lymph node likely present, incomplete included in the field-of-view incomplete evaluated examination. This can also be further evaluated with dedicated with the pulmonary CT angiogram of the chest.      Trace left pleural effusion.      No bowel wall thickening or bowel obstruction. Colonic diverticulosis without diverticulitis. No intraperitoneal free air or ascites.      Diffuse hepatic steatosis.      The study was marked in EPIC for immediate notification.         Workstation performed: [id number]         XR chest 1 view portable    (Results Pending)       Procedures    ED Medication and Procedure Management   Prior to Admission Medications   Prescriptions Last Dose Informant Patient Reported? Taking?   FLUoxetine (PROzac) 40 MG capsule   No No   Sig: Take 1 capsule (40 mg total) by mouth daily   acetaminophen (TYLENOL) 325 mg tablet  Self No No   Sig: Take 2 tablets (650 mg total) by mouth every 6 (six) hours as needed for mild pain, headaches or fever   atorvastatin (LIPITOR) 40 mg tablet   No No   Sig: Take 1 tablet (40 mg total) by mouth daily   clopidogrel (PLAVIX) 75 mg tablet   No No   Sig: Take 1 tablet (75 mg total) by mouth daily   famotidine (PEPCID) 20 mg tablet   No No   Sig: Take 1 tablet (20 mg total) by mouth daily   levETIRAcetam (KEPPRA) 500 mg tablet   No  No   Sig: Take 1 tablet (500 mg total) by mouth daily   levothyroxine 88 mcg tablet   No No   Sig: Take 1 tablet (88 mcg total) by mouth daily   lidocaine (LIDODERM) 5 %   No No   Sig: Apply 1 patch topically over 12 hours daily Remove & Discard patch within 12 hours or as directed by MD Do not start before December 9, 2023.   losartan (COZAAR) 100 MG tablet   No No   Sig: Take 1 tablet (100 mg total) by mouth daily   metoprolol succinate (TOPROL-XL) 50 mg 24 hr tablet   No No   Sig: Take 1 tablet (50 mg total) by mouth daily   omeprazole (PriLOSEC) 20 mg delayed release capsule   No No   Sig: Take 1 capsule (20 mg total) by mouth daily      Facility-Administered Medications: None     Patient's Medications   Discharge Prescriptions    No medications on file     No discharge procedures on file.  ED SEPSIS DOCUMENTATION   Time reflects when diagnosis was documented in both MDM as applicable and the Disposition within this note       Time User Action Codes Description Comment    1/1/2025  6:37 PM Everett Todd [J18.9] Pneumonia                  Everett Todd DO  01/01/25 1838

## 2025-01-02 ENCOUNTER — APPOINTMENT (INPATIENT)
Dept: NON INVASIVE DIAGNOSTICS | Facility: HOSPITAL | Age: 89
DRG: 871 | End: 2025-01-02
Payer: COMMERCIAL

## 2025-01-02 LAB
ANION GAP SERPL CALCULATED.3IONS-SCNC: 7 MMOL/L (ref 4–13)
AORTIC ROOT: 2.6 CM
APICAL FOUR CHAMBER EJECTION FRACTION: 61 %
BSA FOR ECHO PROCEDURE: 1.67 M2
BUN SERPL-MCNC: 7 MG/DL (ref 5–25)
CALCIUM SERPL-MCNC: 8.1 MG/DL (ref 8.4–10.2)
CHLORIDE SERPL-SCNC: 104 MMOL/L (ref 96–108)
CO2 SERPL-SCNC: 25 MMOL/L (ref 21–32)
CREAT SERPL-MCNC: 0.66 MG/DL (ref 0.6–1.3)
E WAVE DECELERATION TIME: 185 MS
E/A RATIO: 0.96
ERYTHROCYTE [DISTWIDTH] IN BLOOD BY AUTOMATED COUNT: 13.2 % (ref 11.6–15.1)
FRACTIONAL SHORTENING: 40 (ref 28–44)
GFR SERPL CREATININE-BSD FRML MDRD: 79 ML/MIN/1.73SQ M
GLUCOSE SERPL-MCNC: 100 MG/DL (ref 65–140)
HCT VFR BLD AUTO: 33.6 % (ref 34.8–46.1)
HGB BLD-MCNC: 11.1 G/DL (ref 11.5–15.4)
INTERVENTRICULAR SEPTUM IN DIASTOLE (PARASTERNAL SHORT AXIS VIEW): 1 CM
INTERVENTRICULAR SEPTUM: 1 CM (ref 0.6–1.1)
L PNEUMO1 AG UR QL IA.RAPID: NEGATIVE
LAAS-AP2: 18.5 CM2
LAAS-AP4: 10.7 CM2
LEFT ATRIUM SIZE: 3.2 CM
LEFT ATRIUM VOLUME (MOD BIPLANE): 43 ML
LEFT ATRIUM VOLUME INDEX (MOD BIPLANE): 25.7 ML/M2
LEFT INTERNAL DIMENSION IN SYSTOLE: 2.4 CM (ref 2.1–4)
LEFT VENTRICULAR INTERNAL DIMENSION IN DIASTOLE: 4 CM (ref 3.5–6)
LEFT VENTRICULAR POSTERIOR WALL IN END DIASTOLE: 1 CM
LEFT VENTRICULAR STROKE VOLUME: 48 ML
LVSV (TEICH): 48 ML
MAGNESIUM SERPL-MCNC: 2.3 MG/DL (ref 1.9–2.7)
MCH RBC QN AUTO: 30.4 PG (ref 26.8–34.3)
MCHC RBC AUTO-ENTMCNC: 33 G/DL (ref 31.4–37.4)
MCV RBC AUTO: 92 FL (ref 82–98)
MV E'TISSUE VEL-LAT: 8 CM/S
MV E'TISSUE VEL-SEP: 11 CM/S
MV PEAK A VEL: 0.93 M/S
MV PEAK E VEL: 89 CM/S
MV STENOSIS PRESSURE HALF TIME: 54 MS
MV VALVE AREA P 1/2 METHOD: 4.07
PLATELET # BLD AUTO: 279 THOUSANDS/UL (ref 149–390)
PMV BLD AUTO: 9.7 FL (ref 8.9–12.7)
POTASSIUM SERPL-SCNC: 3.8 MMOL/L (ref 3.5–5.3)
PROCALCITONIN SERPL-MCNC: 1.19 NG/ML
RBC # BLD AUTO: 3.65 MILLION/UL (ref 3.81–5.12)
RIGHT ATRIUM AREA SYSTOLE A4C: 11.8 CM2
RIGHT VENTRICLE ID DIMENSION: 3.4 CM
S PNEUM AG UR QL: NEGATIVE
SL CV LEFT ATRIUM LENGTH A2C: 5.1 CM
SL CV LV EF: 60
SL CV PED ECHO LEFT VENTRICLE DIASTOLIC VOLUME (MOD BIPLANE) 2D: 69 ML
SL CV PED ECHO LEFT VENTRICLE SYSTOLIC VOLUME (MOD BIPLANE) 2D: 21 ML
SODIUM SERPL-SCNC: 136 MMOL/L (ref 135–147)
TRICUSPID ANNULAR PLANE SYSTOLIC EXCURSION: 2.2 CM
TSH SERPL DL<=0.05 MIU/L-ACNC: 3.9 UIU/ML (ref 0.45–4.5)
WBC # BLD AUTO: 13.01 THOUSAND/UL (ref 4.31–10.16)

## 2025-01-02 PROCEDURE — 93306 TTE W/DOPPLER COMPLETE: CPT | Performed by: INTERNAL MEDICINE

## 2025-01-02 PROCEDURE — 85027 COMPLETE CBC AUTOMATED: CPT | Performed by: NURSE PRACTITIONER

## 2025-01-02 PROCEDURE — 93306 TTE W/DOPPLER COMPLETE: CPT

## 2025-01-02 PROCEDURE — 84443 ASSAY THYROID STIM HORMONE: CPT | Performed by: NURSE PRACTITIONER

## 2025-01-02 PROCEDURE — 99232 SBSQ HOSP IP/OBS MODERATE 35: CPT | Performed by: FAMILY MEDICINE

## 2025-01-02 PROCEDURE — 83735 ASSAY OF MAGNESIUM: CPT | Performed by: NURSE PRACTITIONER

## 2025-01-02 PROCEDURE — 84145 PROCALCITONIN (PCT): CPT | Performed by: NURSE PRACTITIONER

## 2025-01-02 PROCEDURE — 80048 BASIC METABOLIC PNL TOTAL CA: CPT | Performed by: NURSE PRACTITIONER

## 2025-01-02 PROCEDURE — 87449 NOS EACH ORGANISM AG IA: CPT | Performed by: NURSE PRACTITIONER

## 2025-01-02 RX ADMIN — METOPROLOL SUCCINATE 50 MG: 50 TABLET, EXTENDED RELEASE ORAL at 09:16

## 2025-01-02 RX ADMIN — Medication 250 MG: at 17:38

## 2025-01-02 RX ADMIN — FLUOXETINE HYDROCHLORIDE 40 MG: 20 CAPSULE ORAL at 09:15

## 2025-01-02 RX ADMIN — PANTOPRAZOLE SODIUM 40 MG: 40 TABLET, DELAYED RELEASE ORAL at 05:49

## 2025-01-02 RX ADMIN — ENOXAPARIN SODIUM 40 MG: 40 INJECTION SUBCUTANEOUS at 09:16

## 2025-01-02 RX ADMIN — FAMOTIDINE 20 MG: 20 TABLET, FILM COATED ORAL at 22:08

## 2025-01-02 RX ADMIN — Medication 250 MG: at 09:16

## 2025-01-02 RX ADMIN — LOSARTAN POTASSIUM 100 MG: 50 TABLET, FILM COATED ORAL at 09:15

## 2025-01-02 RX ADMIN — CLOPIDOGREL 75 MG: 75 TABLET ORAL at 09:16

## 2025-01-02 RX ADMIN — LEVOTHYROXINE SODIUM 88 MCG: 88 TABLET ORAL at 05:48

## 2025-01-02 RX ADMIN — GUAIFENESIN 600 MG: 600 TABLET, EXTENDED RELEASE ORAL at 17:38

## 2025-01-02 RX ADMIN — ATORVASTATIN CALCIUM 40 MG: 40 TABLET, FILM COATED ORAL at 17:38

## 2025-01-02 RX ADMIN — GUAIFENESIN 600 MG: 600 TABLET, EXTENDED RELEASE ORAL at 09:16

## 2025-01-02 RX ADMIN — AZITHROMYCIN DIHYDRATE 500 MG: 250 TABLET ORAL at 22:07

## 2025-01-02 RX ADMIN — LEVETIRACETAM 500 MG: 500 TABLET, FILM COATED ORAL at 09:16

## 2025-01-02 RX ADMIN — CEFTRIAXONE 1000 MG: 1 INJECTION, SOLUTION INTRAVENOUS at 17:38

## 2025-01-02 NOTE — UTILIZATION REVIEW
"Initial Clinical Review    Admission: Date/Time/Statement:   Admission Orders (From admission, onward)       Ordered        25 1838  INPATIENT ADMISSION  Once                          Orders Placed This Encounter   Procedures    INPATIENT ADMISSION     Standing Status:   Standing     Number of Occurrences:   1     Level of Care:   Med Surg [16]     Estimated length of stay:   More than 2 Midnights     Certification:   I certify that inpatient services are medically necessary for this patient for a duration of greater than two midnights. See H&P and MD Progress Notes for additional information about the patient's course of treatment.     ED Arrival Information       Expected   -    Arrival   2025 12:36    Acuity   Emergent              Means of arrival   Walk-In    Escorted by   Family Member    Service   Hospitalist    Admission type   Emergency              Arrival complaint   lack of appetite, rib pain             Chief Complaint   Patient presents with    Fever     States she started a few days ago with a \" cough , cough, cough \". States her chest and ribs hurt from coughing. States just laying in bed, no energy. Has nausea, no vomiting. Unaware that she has a fever. Sat 91 %. Feels very weak.     Cough    Psychiatric Evaluation     Suicidal thoughts at time, 2 sons and  . Starts to cry. Sister is concerned about depression. Patient denies SI at this time       Initial Presentation:  89 yof to ER from home c/o not feeling week for few days. Reports L sided rib pain worse with seep breath, diffuse abd discomfort. Patient states she has had thoughts of suicide however would never do it but she just feels that she wonders what else is left since her  and 2 of her sons passed away this year. Hx depression, CVA, seizure, GERD, hypertension, hyperlipidemia, asthma, hypothyroid, paroxysmal A-fib. Presents febrile, fatigued, chills, rhinorrhea, cough, SOB, L side rib pain with coughing. Lungs " with diminished breath in the lower lobes and left upper lobe, on room air. Admission CXR RLL pneumonia. CT a/p: multifocal pneumonia. Trace pleural effusion. CTA chest: np PE. Multifocal pneumonia. Labs: WBC 13.07, Na 132, K 3.4, Mg 1.6, tbili 1.01, procalcitonin 0.77, , u/a+ketones, blood, prot, leuk.   Admitted to inpatient status for multifocal pneumonia. Plan: IVABT, IVF, aspiration precautions.     Anticipated Length of Stay/Certification Statement:   Patient will be admitted on an inpatient basis with an anticipated length of stay of greater than 2 midnights secondary to multifocal pneumonia sepsis     Date: 1/2/25   Day 2:  Tmax 99.3. IVABT & IVF continued for pneumonia POA. Procalvitonin up to 1.19. Persistent JUAREZ, lungs with diminished breath sounds, NPC, currently on RA. Crisis following for thoughts of suicide. Oriented to person & place, forgetful.  Continue to monitor respiratory status, O2 needs, VS, follow labs.     Date: 1/3/25  Day 3: Has surpassed a 2nd midnight with active treatments and services.  Tmax 99.5. IVABT, nebs continued for multifocal pneumonia POA. Lungs with decreased breath sounds, dry NPC, currently on RA. Continue to monitor respiratory status & O2 needs, monitor VS, follow labs.     ED Treatment-Medication Administration from 01/01/2025 1236 to 01/01/2025 1959         Date/Time Order Dose Route Action     01/01/2025 1445 acetaminophen (TYLENOL) tablet 650 mg 650 mg Oral Given     01/01/2025 1445 sodium chloride 0.9 % bolus 500 mL 500 mL Intravenous New Bag     01/01/2025 1535 iohexol (OMNIPAQUE) 350 MG/ML injection (MULTI-DOSE) 100 mL 100 mL Intravenous Given     01/01/2025 1702 iohexol (OMNIPAQUE) 350 MG/ML injection (MULTI-DOSE) 85 mL 85 mL Intravenous Given     01/01/2025 1757 cefTRIAXone (ROCEPHIN) IVPB (premix in dextrose) 1,000 mg 50 mL 1,000 mg Intravenous New Bag            Scheduled Medications:  Medications 12/25 12/26 12/27 12/28 12/29 12/30 12/31 01/01 01/02  01/03   acetaminophen (TYLENOL) tablet 650 mg  Dose: 650 mg  Freq: Once Route: PO  Start: 01/01/25 1430 End: 01/01/25 1445           1445          atorvastatin (LIPITOR) tablet 40 mg  Dose: 40 mg  Freq: Daily with dinner Route: PO  Start: 01/02/25 1630            1738      1630        azithromycin (ZITHROMAX) tablet 500 mg  Dose: 500 mg  Freq: Every 24 hours Route: PO  Start: 01/01/25 2100 End: 01/03/25 1044   Order specific questions:              2049 2207      1044-D/C'd      cefTRIAXone (ROCEPHIN) IVPB (premix in dextrose) 1,000 mg 50 mL  Dose: 1,000 mg  Freq: Every 24 hours Route: IV  Indications of Use: PNEUMONIA  Last Dose: 1,000 mg (01/02/25 1738)  Start: 01/02/25 1730   Admin Instructions:      Order specific questions:               1738 1730        cefTRIAXone (ROCEPHIN) IVPB (premix in dextrose) 1,000 mg 50 mL  Dose: 1,000 mg  Freq: Once Route: IV  Last Dose: Stopped (01/01/25 1850)  Start: 01/01/25 1800 End: 01/01/25 1850   Admin Instructions:      Order specific questions:              1757 1850          clopidogrel (PLAVIX) tablet 75 mg  Dose: 75 mg  Freq: Daily Route: PO  Indications of Use: THROMBOTIC STROKE  Start: 01/02/25 0900   Admin Instructions:               0916      0849        enoxaparin (LOVENOX) subcutaneous injection 40 mg  Dose: 40 mg  Freq: Daily Route: SC  Start: 01/02/25 0900   Admin Instructions:               0916      0850        famotidine (PEPCID) tablet 20 mg  Dose: 20 mg  Freq: Daily at bedtime Route: PO  Start: 01/01/25 2200           2217      2208      2200        FLUoxetine (PROzac) capsule 40 mg  Dose: 40 mg  Freq: Daily Route: PO  Start: 01/02/25 0900   Admin Instructions:               0915      0850        guaiFENesin (MUCINEX) 12 hr tablet 600 mg  Dose: 600 mg  Freq: 2 times daily Route: PO  Start: 01/01/25 2045   Admin Instructions:              2049      0916     1738      0850     1800        levETIRAcetam (KEPPRA) tablet 500 mg  Dose: 500 mg  Freq:  Daily Route: PO  Start: 01/01/25 2045   Admin Instructions:              2049      0916      0851        levothyroxine tablet 88 mcg  Dose: 88 mcg  Freq: Daily (early morning) Route: PO  Start: 01/02/25 0600   Admin Instructions:               0548      0536        losartan (COZAAR) tablet 100 mg  Dose: 100 mg  Freq: Daily Route: PO  Start: 01/02/25 0900   Order specific questions:               0915      0851        magnesium sulfate 2 g/50 mL IVPB (premix) 2 g  Dose: 2 g  Freq: Once Route: IV  Last Dose: 2 g (01/01/25 2214)  Start: 01/01/25 2115 End: 01/02/25 0214   Admin Instructions:              2214          metoprolol succinate (TOPROL-XL) 24 hr tablet 50 mg  Dose: 50 mg  Freq: Daily Route: PO  Start: 01/02/25 0900   Admin Instructions:      Order specific questions:               0916      0851        pantoprazole (PROTONIX) EC tablet 40 mg  Dose: 40 mg  Freq: Daily (early morning) Route: PO  Start: 01/02/25 0600   Admin Instructions:               0549      0536        potassium chloride (Klor-Con M20) CR tablet 40 mEq  Dose: 40 mEq  Freq: Once Route: PO  Start: 01/03/25 1045   Admin Instructions:                1045        potassium chloride oral solution 40 mEq  Dose: 40 mEq  Freq: Once Route: PO  Start: 01/01/25 2015 End: 01/01/25 2049   Admin Instructions:              2049          saccharomyces boulardii (FLORASTOR) capsule 250 mg  Dose: 250 mg  Freq: 2 times daily Route: PO  Start: 01/01/25 2045 2049      0916     1738      0852     1800        sodium chloride 0.9 % bolus 500 mL  Dose: 500 mL  Freq: Once Route: IV  Last Dose: Stopped (01/01/25 1625)  Start: 01/01/25 1430 End: 01/01/25 1625           1445     1625                      Continuous Meds Sorted by Name  for Yesenia Tillman as of 12/25/24 through 1/3/25  Legend:       Medications 12/25 12/26 12/27 12/28 12/29 12/30 12/31 01/01 01/02 01/03   sodium chloride 0.9 % infusion  Rate: 60 mL/hr Dose: 60 mL/hr  Freq: Continuous Route:  IV  Indications of Use: IV Hydration  Last Dose: Stopped (01/02/25 2211)  Start: 01/01/25 2045 End: 01/02/25 2215 2216 2211 [C]     2215-D/C'd       Legend:       [id number]/2812/2912/3012/3101/0101/0201/03        PRN Meds Sorted by Name  for Yesenia Tillman as of 12/25/24 through 1/3/25  Legend:       Medications 12/25 12/26 12/27 12/28 12/29 12/30 12/31 01/01 01/02 01/03   acetaminophen (TYLENOL) tablet 650 mg  Dose: 650 mg  Freq: Every 6 hours PRN Route: PO  PRN Reasons: fever,headaches,mild pain  Indications of Use: FEVER  Start: 01/01/25 2030 2048          iohexol (OMNIPAQUE) 350 MG/ML injection (MULTI-DOSE) 100 mL  Dose: 100 mL  Freq: Once in imaging Route: IV  PRN Reason: contrast  Start: 01/01/25 1535 End: 01/01/25 1535           1535          iohexol (OMNIPAQUE) 350 MG/ML injection (MULTI-DOSE) 85 mL  Dose: 85 mL  Freq: Once in imaging Route: IV  PRN Reason: contrast  Start: 01/01/25 1705 End: 01/01/25 1702           1702          ipratropium-albuterol (DUO-NEB) 0.5-2.5 mg/3 mL inhalation solution 3 mL  Dose: 3 mL  Freq: Every 6 hours PRN Route: NEBULIZATION  PRN Reason: shortness of breath  Start: 01/01/25 2030                ondansetron (ZOFRAN) injection 4 mg  Dose: 4 mg  Freq: Every 6 hours PRN Route: IV  PRN Reasons: nausea,vomiting  Start: 01/01/25 2030   Admin Instructions:                                   ED Triage Vitals [01/01/25 1302]   Temperature Pulse Respirations Blood Pressure SpO2 Pain Score   (!) 101.4 °F (38.6 °C) 99 20 155/88 91 % 8     Weight (last 2 days)       Date/Time Weight    01/02/25 1106 69.9 (154)    01/01/25 23:22:43 69.9 (154)    01/01/25 1302 69.9 (154)     Weight: last weight used at 01/01/25 1302            Vital Signs (last 3 days)       Date/Time Temp Pulse Resp BP MAP (mmHg) SpO2 O2 Device Patient Position - Orthostatic VS Pain    01/03/25 07:39:01 98.5 °F (36.9 °C) 72 17 166/72 103 92 % None (Room air) -- No Pain     01/02/25 22:18:50 99.5 °F (37.5 °C) 74 18 166/71 103 93 % -- -- --    01/02/25 2014 -- -- -- -- -- -- -- -- No Pain    01/02/25 19:28:05 99.1 °F (37.3 °C) 72 20 137/55 82 91 % -- -- --    01/02/25 15:38:10 98.1 °F (36.7 °C) -- 18 138/57 84 -- -- -- --    01/02/25 1106 -- 77 -- 159/66 -- 92 % -- -- --    01/02/25 07:45:11 98.2 °F (36.8 °C) 81 19 159/66 97 90 % None (Room air) Lying No Pain    01/02/25 02:17:26 97.8 °F (36.6 °C) 76 -- 152/75 101 94 % -- -- --    01/01/25 23:22:43 99.3 °F (37.4 °C) 78 -- 150/78 102 91 % -- -- No Pain    01/01/25 2144 -- -- 19 -- -- -- None (Room air) -- --    01/01/25 20:07:39 97.9 °F (36.6 °C) 70 16 149/75 100 93 % None (Room air) -- --    01/01/25 1930 -- 69 22 179/71 102 94 % None (Room air) Sitting --    01/01/25 1830 -- 66 23 156/67 97 94 % -- -- --    01/01/25 1815 -- 66 19 184/74 107 94 % -- -- --    01/01/25 1730 -- 69 23 168/72 104 92 % -- -- --    01/01/25 1615 98 °F (36.7 °C) 72 22 -- -- 94 % None (Room air) -- --    01/01/25 1600 -- 73 22 179/72 104 96 % None (Room air) -- --    01/01/25 1500 -- 77 20 168/74 107 93 % -- -- --    01/01/25 1445 -- -- -- -- -- -- -- -- Med Not Given for Pain - for MAR use only    01/01/25 1302 101.4 °F (38.6 °C) 99 20 155/88 -- 91 % None (Room air) Sitting 8              Pertinent Labs/Diagnostic Test Results:   Radiology:  CTA chest pe study   Final Interpretation by Lilian Clark MD (01/01 6977)      No pulmonary embolism identified. No pulmonary infarct noted.      Wedge-shaped consolidation involving the lower aspect of the right lower lobe, and patchy groundglass opacities which involve the left lower lobe, and focal groundglass opacities and likely associated tree-in-bud nodules involving the posterior left    upper lobe. These likely present multifocal pneumonia and follow-up until resolution is recommended.      Bilateral hilar lymphadenopathy and mediastinal lymphadenopathy, likely reactive, but recommend follow-up chest CT in 2 months  time to, document stability or resolution as neoplastic processes such as metastatic disease or lymphoma cannot be entirely    excluded.      Small hiatal hernia.      Diffuse hepatic steatosis.      The study was marked in EPIC for immediate notification.            Workstation performed: [id number]         CT abdomen pelvis with contrast   Final Interpretation by Lilian Clark MD (01/01 1641)      Peripheral wedge-shaped opacity involving the right lower lobe and patchy groundglass opacities involving the left lower lobe and likely clustered tree-in-bud opacities involving the visualized posterior left upper lobe. These findings likely represent    multifocal pneumonia,, but given the wedge-shaped peripheral opacity in the right lower lobe, recommend dedicated pulmonary CT angiogram of the chest with contrast to exclude a pulmonary embolus/pulmonary infarct in this region.      Mildly enlarged left hilar lymph node likely present, incomplete included in the field-of-view incomplete evaluated examination. This can also be further evaluated with dedicated with the pulmonary CT angiogram of the chest.      Trace left pleural effusion.      No bowel wall thickening or bowel obstruction. Colonic diverticulosis without diverticulitis. No intraperitoneal free air or ascites.      Diffuse hepatic steatosis.      The study was marked in EPIC for immediate notification.         Workstation performed: [id number]         XR chest 1 view portable   Final Interpretation by Efrain Miller MD (01/01 2046)      Right lower lobe pneumonia.            Workstation performed: [id number]           Cardiology:  Echo complete w/ contrast if indicated   Final Result by Carmelita Gudino MD (01/02 1653)        Left Ventricle: Left ventricular cavity size is normal. Wall thickness    is normal. The left ventricular ejection fraction is 60% by visual    estimation. Systolic function is normal. Wall motion is normal. Diastolic   "  function is normal for age.     Aortic Valve: There is trace regurgitation. There is aortic valve    sclerosis.     Mitral Valve: There is mild annular calcification. There is mild    regurgitation.     Tricuspid Valve: There is trace regurgitation. There is no indirect    evidence of pulmonary hypertension.     Prior TTE study available for comparison. Prior study date: 12/8/2023.     No significant change.  Mitral regurgitation is now at least mild.           GI:  No orders to display           Results from last 7 days   Lab Units 01/03/25  0541 01/02/25  0553 01/01/25  1430   WBC Thousand/uL 9.20 13.01* 13.07*   HEMOGLOBIN g/dL 10.6* 11.1* 12.1   HEMATOCRIT % 31.9* 33.6* 36.1   PLATELETS Thousands/uL 325 279 310   TOTAL NEUT ABS Thousands/µL 7.15  --  11.51*         Results from last 7 days   Lab Units 01/03/25  0541 01/02/25  0553 01/01/25  1853 01/01/25  1430   SODIUM mmol/L 136 136  --  132*   POTASSIUM mmol/L 3.5 3.8  --  3.4*   CHLORIDE mmol/L 107 104  --  99   CO2 mmol/L 23 25  --  24   ANION GAP mmol/L 6 7  --  9   BUN mg/dL 8 7  --  10   CREATININE mg/dL 0.74 0.66  --  0.78   EGFR ml/min/1.73sq m 73 79  --  68   CALCIUM mg/dL 7.8* 8.1*  --  8.5   MAGNESIUM mg/dL 1.9 2.3 1.6*  --      Results from last 7 days   Lab Units 01/01/25  1430   AST U/L 18   ALT U/L 14   ALK PHOS U/L 94   TOTAL PROTEIN g/dL 6.9   ALBUMIN g/dL 3.6   TOTAL BILIRUBIN mg/dL 1.01*         Results from last 7 days   Lab Units 01/03/25  0541 01/02/25  0553 01/01/25  1430   GLUCOSE RANDOM mg/dL 88 100 113             No results found for: \"BETA-HYDROXYBUTYRATE\"                   Results from last 7 days   Lab Units 01/01/25  1853 01/01/25  1632 01/01/25  1430   HS TNI 0HR ng/L  --   --  7   HS TNI 2HR ng/L  --  6  --    HSTNI D2 ng/L  --  -1  --    HS TNI 4HR ng/L 6  --   --    HSTNI D4 ng/L -1  --   --          Results from last 7 days   Lab Units 01/01/25  1430   PROTIME seconds 15.0   INR  1.13   PTT seconds 30     Results from last " 7 days   Lab Units 01/02/25  0553   TSH 3RD GENERATON uIU/mL 3.897     Results from last 7 days   Lab Units 01/02/25  0553 01/01/25  1632   PROCALCITONIN ng/ml 1.19* 0.77*     Results from last 7 days   Lab Units 01/01/25  1430   LACTIC ACID mmol/L 1.1             Results from last 7 days   Lab Units 01/01/25  1430   BNP pg/mL 173*                                     Results from last 7 days   Lab Units 01/01/25  1643   CLARITY UA  Clear   COLOR UA  Yellow   SPEC GRAV UA  1.015   PH UA  6.0   GLUCOSE UA mg/dl Negative   KETONES UA mg/dl 40 (2+)*   BLOOD UA  Small*   PROTEIN UA mg/dl Trace*   NITRITE UA  Negative   BILIRUBIN UA  Negative   UROBILINOGEN UA (BE) mg/dl <2.0   LEUKOCYTES UA  Small*   WBC UA /hpf 0-1   RBC UA /hpf 1-2   BACTERIA UA /hpf Occasional   EPITHELIAL CELLS WET PREP /hpf Occasional     Results from last 7 days   Lab Units 01/02/25  1233   STREP PNEUMONIAE ANTIGEN, URINE  Negative   LEGIONELLA URINARY ANTIGEN  Negative                             Results from last 7 days   Lab Units 01/01/25  1643 01/01/25  1440 01/01/25  1430   BLOOD CULTURE   --  No Growth at 24 hrs. No Growth at 24 hrs.   URINE CULTURE  20,000-29,000 cfu/ml  --   --                    Past Medical History:   Diagnosis Date    Acute CVA (cerebrovascular accident) ([facility]) 06/18/2016    Adult hypothyroidism 09/12/2016    Anxiety 06/10/2019    Arthritis     Asthma     Colon polyp     Depression     Disease of thyroid gland     Dyslipidemia 06/10/2019    Esophageal dysmotility 06/10/2019    Essential hypertension 09/12/2016    GERD (gastroesophageal reflux disease)     Hyperlipidemia     Hypertension     Hypothyroidism     Obesity     Paroxysmal atrial fibrillation ([facility]) 09/12/2016    Seizure disorder ([facility]) 08/24/2018    Seizures ([facility])     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    Stroke ([facility]) 06/2016    Subarachnoid hemorrhage ([facility]) 09/13/2016    Subdural hematoma ([facility])  09/13/2016     Present on Admission:   HTN (hypertension), benign   Acquired hypothyroidism   Paroxysmal atrial fibrillation ([facility])   Seizure disorder (HCC)   Mild intermittent asthma without complication   Mixed hyperlipidemia   Gastroesophageal reflux disease without esophagitis   Moderate episode of recurrent major depressive disorder (HCC)   Memory difficulty      Admitting Diagnosis: Cough [R05.9]  Pneumonia [J18.9]  Fever [R50.9]  Encounter for psychological evaluation [Z00.8]  Age/Sex: 89 y.o. female    Network Utilization Review Department  ATTENTION: Please call with any questions or concerns to [phone] and carefully listen to the prompts so that you are directed to the right person. All voicemails are confidential.   For Discharge needs, contact Care Management DC Support Team at [phone] opt. [phone]  Send all requests for admission clinical reviews, approved or denied determinations and any other requests to dedicated fax number below belonging to the campus where the patient is receiving treatment. List of dedicated fax numbers for the Facilities:  FACILITY NAME UR FAX NUMBER   ADMISSION DENIALS (Administrative/Medical Necessity) [phone]   DISCHARGE SUPPORT TEAM (NETWORK) [phone]   PARENT CHILD HEALTH (Maternity/NICU/Pediatrics) [phone]   [facility] [phone]   [facility] [phone]   Critical access hospital [phone]   [facility] [phone]   Critical access hospital [phone]   [facility] [phone]   [facility] [phone]   [facility] [phone]   [facility] [phone]   Critical access hospital [phone]   [facility] [phone]   [facility] [phone]

## 2025-01-02 NOTE — ASSESSMENT & PLAN NOTE
Present on admission, as evidenced by fever, elevated heart rate, with source of infection multifocal pneumonia.  Lactic acid normal  Initial procalcitonin 0.77  Antibiotics as above  Follow-up blood cultures, urine antigens, sputum Gram stain and culture if able  Gentle IV hydration for 1 day.  Repeat CBC, procalcitonin in the morning

## 2025-01-02 NOTE — ASSESSMENT & PLAN NOTE
NSR on ED monitor  On Plavix metoprolol at home.  Will continue.  Optimize electrolytes.  K3.8.  Magnesium 2.3.

## 2025-01-02 NOTE — ASSESSMENT & PLAN NOTE
NSR on ED monitor  On Plavix metoprolol at home.  Will continue.  Optimize electrolytes.  K3.4.  Replete.  Check magnesium level.

## 2025-01-02 NOTE — PLAN OF CARE
Problem: RESPIRATORY - ADULT  Goal: Achieves optimal ventilation and oxygenation  Description: INTERVENTIONS:  - Assess for changes in respiratory status  - Assess for changes in mentation and behavior  - Position to facilitate oxygenation and minimize respiratory effort  - Oxygen administered by appropriate delivery if ordered  - Initiate smoking cessation education as indicated  - Encourage broncho-pulmonary hygiene including cough, deep breathe, Incentive Spirometry  - Assess the need for suctioning and aspirate as needed  - Assess and instruct to report SOB or any respiratory difficulty  - Respiratory Therapy support as indicated  Outcome: Progressing     Problem: CARDIOVASCULAR - ADULT  Goal: Maintains optimal cardiac output and hemodynamic stability  Description: INTERVENTIONS:  - Monitor I/O, vital signs and rhythm  - Monitor for S/S and trends of decreased cardiac output  - Administer and titrate ordered vasoactive medications to optimize hemodynamic stability  - Assess quality of pulses, skin color and temperature  - Assess for signs of decreased coronary artery perfusion  - Instruct patient to report change in severity of symptoms  Outcome: Progressing  Goal: Absence of cardiac dysrhythmias or at baseline rhythm  Description: INTERVENTIONS:  - Continuous cardiac monitoring, vital signs, obtain 12 lead EKG if ordered  - Administer antiarrhythmic and heart rate control medications as ordered  - Monitor electrolytes and administer replacement therapy as ordered  Outcome: Progressing     Problem: GASTROINTESTINAL - ADULT  Goal: Minimal or absence of nausea and/or vomiting  Description: INTERVENTIONS:  - Administer IV fluids if ordered to ensure adequate hydration  - Maintain NPO status until nausea and vomiting are resolved  - Nasogastric tube if ordered  - Administer ordered antiemetic medications as needed  - Provide nonpharmacologic comfort measures as appropriate  - Advance diet as tolerated, if  ordered  - Consider nutrition services referral to assist patient with adequate nutrition and appropriate food choices  Outcome: Progressing  Goal: Maintains or returns to baseline bowel function  Description: INTERVENTIONS:  - Assess bowel function  - Encourage oral fluids to ensure adequate hydration  - Administer IV fluids if ordered to ensure adequate hydration  - Administer ordered medications as needed  - Encourage mobilization and activity  - Consider nutritional services referral to assist patient with adequate nutrition and appropriate food choices  Outcome: Progressing  Goal: Maintains adequate nutritional intake  Description: INTERVENTIONS:  - Monitor percentage of each meal consumed  - Identify factors contributing to decreased intake, treat as appropriate  - Assist with meals as needed  - Monitor I&O, weight, and lab values if indicated  - Obtain nutrition services referral as needed  Outcome: Progressing  Goal: Oral mucous membranes remain intact  Description: INTERVENTIONS  - Assess oral mucosa and hygiene practices  - Implement preventative oral hygiene regimen  - Implement oral medicated treatments as ordered  - Initiate Nutrition services referral as needed  Outcome: Progressing     Problem: METABOLIC, FLUID AND ELECTROLYTES - ADULT  Goal: Electrolytes maintained within normal limits  Description: INTERVENTIONS:  - Monitor labs and assess patient for signs and symptoms of electrolyte imbalances  - Administer electrolyte replacement as ordered  - Monitor response to electrolyte replacements, including repeat lab results as appropriate  - Instruct patient on fluid and nutrition as appropriate  Outcome: Progressing  Goal: Fluid balance maintained  Description: INTERVENTIONS:  - Monitor labs   - Monitor I/O and WT  - Instruct patient on fluid and nutrition as appropriate  - Assess for signs & symptoms of volume excess or deficit  Outcome: Progressing     Problem: HEMATOLOGIC - ADULT  Goal: Maintains  hematologic stability  Description: INTERVENTIONS  - Assess for signs and symptoms of bleeding or hemorrhage  - Monitor labs  - Administer supportive blood products/factors as ordered and appropriate  Outcome: Progressing

## 2025-01-02 NOTE — ASSESSMENT & PLAN NOTE
No clinical evidence of fluid overload.  2D echo a year ago showed EF 55%, normal diastolic function, normal atriums, no significant valvular disease  Update 2D echo.  Monitor volume status.

## 2025-01-02 NOTE — PROGRESS NOTES
Progress Note - Hospitalist   Name: Yesenia Tillman 89 y.o. female I MRN: [id number]  Unit/Bed#: 2 [address] Date of Admission: 1/1/2025   Date of Service: 1/2/2025 I Hospital Day: 1     Assessment & Plan  Multifocal pneumonia  Patient presents with dry cough for a few days with associated fatigue, runny nose, poor appetite.  Reports left lower chest rib hurts when coughing.  Reports possible chills at home.  Reports had a party in apartment on Saturday 4 days ago.  Patient lives alone.  Fever 101.4 on ED arrival, pulse rate 99  COVID flu negative.  Chest x-ray shows right lower lobe consolidation, pending final read  CTA chest PE study showed - No PE. Wedge-shaped consolidation involving the lower aspect of the right lower lobe, and patchy groundglass opacities which involve the left lower lobe, and focal groundglass opacities and likely associated tree-in-bud nodules involving the posterior left upper lobe. These likely present multifocal pneumonia and follow-up until resolution is recommended. Bilateral hilar lymphadenopathy and mediastinal lymphadenopathy, likely reactive, but recommend follow-up chest CT in 2 months time to, document stability or resolution as neoplastic processes such as metastatic disease or lymphoma cannot be entirely excluded.  Initial procalcitonin 0.77  Continue IV Rocephin and Zithromax.  Continue Mucinex in addition to DuoNeb therapy as needed.  Follow-up urine antigens.  Follow-up repeat procalcitonin level.  Follow-up blood cultures.  Sepsis (HCC)  Present on admission, as evidenced by fever, elevated heart rate, with source of infection multifocal pneumonia.  Lactic acid normal  Initial procalcitonin 0.77  Antibiotics as above  Follow-up blood cultures, urine antigens, sputum Gram stain and culture if able  Gentle IV hydration for 1 day.  Repeat CBC, procalcitonin in the morning  Hyponatremia  Sodium 132 on admission improved to 136 today  Received normal saline 500 mL in  ED.  Continue gentle IV hydration  Repeat lab in the morning  Elevated brain natriuretic peptide (BNP) level    No clinical evidence of fluid overload.  2D echo a year ago showed EF 55%, normal diastolic function, normal atriums, no significant valvular disease  Update 2D echo.  Monitor volume status.  HTN (hypertension), benign  On losartan metoprolol at home.  Will continue.  BP not well-controlled.  Monitor  Acquired hypothyroidism  Continue Synthroid  TSH 3.897.  Paroxysmal atrial fibrillation (HCC)  NSR on ED monitor  On Plavix metoprolol at home.  Will continue.  Optimize electrolytes.  K3.8.  Magnesium 2.3.  Seizure disorder (HCC)  Continue Keppra  H/O: CVA (cerebrovascular accident)  History of small SAH and subdural hematoma in the left parietal area after receiving tPA for strokelike symptoms in September 2016.  Continue Lipitor Plavix  Mild intermittent asthma without complication  No wheezing on auscultation  Mixed hyperlipidemia  Continue statin  CT showed diffuse hepatic steatosis.  Gastroesophageal reflux disease without esophagitis  Continue PPI and Pepcid daily.  Appears to have history of esophageal dysmotility as well.  Consult speech for swallow eval  Moderate episode of recurrent major depressive disorder (HCC)  Continue Prozac  History of suicidal thoughts at times per ED note.  Patient denies SI currently.  Follow-up psych/behavioral health as outpatient  Memory difficulty  Patient awake alert oriented to place and person on exam, follows commands.  Supportive care  VTE Pharmacologic Prophylaxis: VTE Score: 6 Lovenox prophylaxis.    Mobility:   Basic Mobility Inpatient Raw Score: 20  JH-HLM Goal: 6: Walk 10 steps or more  JH-HLM Achieved: 6: Walk 10 steps or more    Patient Centered Rounds: I performed bedside rounds with nursing staff today.     Current Length of Stay: 1 day(s)  Current Patient Status: Inpatient   Certification Statement: The patient will continue to require additional  inpatient hospital stay due to above.  Discharge Plan: TBD    Code Status: Level 1 - Full Code    Subjective   Patient was seen and examined at bedside. No acute events overnight. No new complaints. No chest pain or shortness of breath.    Objective :  Temp:  [97.8 °F (36.6 °C)-99.3 °F (37.4 °C)] 98.2 °F (36.8 °C)  HR:  [66-81] 77  BP: (149-184)/(66-78) 159/66  Resp:  [16-23] 19  SpO2:  [90 %-96 %] 92 %  O2 Device: None (Room air)    Body mass index is 30.08 kg/m².     Input and Output Summary (last 24 hours):   No intake or output data in the 24 hours ending 01/02/25 1458    Physical Exam  HENT:      Head: Normocephalic.      Mouth/Throat:      Mouth: Mucous membranes are moist.   Eyes:      Extraocular Movements: Extraocular movements intact.   Cardiovascular:      Rate and Rhythm: Normal rate.   Pulmonary:      Effort: Pulmonary effort is normal. No respiratory distress.      Comments: Decreased in the bases  Abdominal:      Palpations: Abdomen is soft.      Tenderness: There is no abdominal tenderness.   Skin:     General: Skin is warm.   Neurological:      Mental Status: She is alert and oriented to person, place, and time.   Psychiatric:         Mood and Affect: Mood normal.         Lab Results: I have reviewed the following results:   Results from last 7 days   Lab Units 01/02/25  0553 01/01/25  1430   WBC Thousand/uL 13.01* 13.07*   HEMOGLOBIN g/dL 11.1* 12.1   HEMATOCRIT % 33.6* 36.1   PLATELETS Thousands/uL 279 310   SEGS PCT %  --  88*   LYMPHO PCT %  --  5*   MONO PCT %  --  6   EOS PCT %  --  0     Results from last 7 days   Lab Units 01/02/25  0553 01/01/25  1430   SODIUM mmol/L 136 132*   POTASSIUM mmol/L 3.8 3.4*   CHLORIDE mmol/L 104 99   CO2 mmol/L 25 24   BUN mg/dL 7 10   CREATININE mg/dL 0.66 0.78   ANION GAP mmol/L 7 9   CALCIUM mg/dL 8.1* 8.5   ALBUMIN g/dL  --  3.6   TOTAL BILIRUBIN mg/dL  --  1.01*   ALK PHOS U/L  --  94   ALT U/L  --  14   AST U/L  --  18   GLUCOSE RANDOM mg/dL 100 113      Results from last 7 days   Lab Units 01/01/25  1430   INR  1.13     Results from last 7 days   Lab Units 01/02/25  0553 01/01/25  1632 01/01/25  1430   LACTIC ACID mmol/L  --   --  1.1   PROCALCITONIN ng/ml 1.19* 0.77*  --      Recent Cultures (last 7 days):   Results from last 7 days   Lab Units 01/01/25  1440 01/01/25  1430   BLOOD CULTURE  Received in Microbiology Lab. Culture in Progress. Received in Microbiology Lab. Culture in Progress.     Last 24 Hours Medication List:     Current Facility-Administered Medications:     acetaminophen (TYLENOL) tablet 650 mg, Q6H PRN    atorvastatin (LIPITOR) tablet 40 mg, Daily With Dinner    azithromycin (ZITHROMAX) tablet 500 mg, Q24H    cefTRIAXone (ROCEPHIN) IVPB (premix in dextrose) 1,000 mg 50 mL, Q24H    clopidogrel (PLAVIX) tablet 75 mg, Daily    enoxaparin (LOVENOX) subcutaneous injection 40 mg, Daily    famotidine (PEPCID) tablet 20 mg, HS    FLUoxetine (PROzac) capsule 40 mg, Daily    guaiFENesin (MUCINEX) 12 hr tablet 600 mg, BID    ipratropium-albuterol (DUO-NEB) 0.5-2.5 mg/3 mL inhalation solution 3 mL, Q6H PRN    levETIRAcetam (KEPPRA) tablet 500 mg, Daily    levothyroxine tablet 88 mcg, Early Morning    losartan (COZAAR) tablet 100 mg, Daily    metoprolol succinate (TOPROL-XL) 24 hr tablet 50 mg, Daily    ondansetron (ZOFRAN) injection 4 mg, Q6H PRN    pantoprazole (PROTONIX) EC tablet 40 mg, Early Morning    saccharomyces boulardii (FLORASTOR) capsule 250 mg, BID    sodium chloride 0.9 % infusion, Continuous, Last Rate: 60 mL/hr (01/01/25 1720)    Administrative Statements   Today, Patient Was Seen By: Chris Edmond MD    **Please Note: This note may have been constructed using a voice recognition system.**

## 2025-01-02 NOTE — ASSESSMENT & PLAN NOTE
Patient presents with dry cough for a few days with associated fatigue, runny nose, poor appetite.  Reports left lower chest rib hurts when coughing.  Reports possible chills at home.  Reports had a party in apartment on Saturday 4 days ago.  Patient lives alone.  Fever 101.4 on ED arrival, pulse rate 99  WBC 13.07, no bands.  Meeting SIRS criteria.  See below.  COVID flu negative.  Chest x-ray shows right lower lobe consolidation, pending final read  CTA chest PE study showed - No PE. Wedge-shaped consolidation involving the lower aspect of the right lower lobe, and patchy groundglass opacities which involve the left lower lobe, and focal groundglass opacities and likely associated tree-in-bud nodules involving the posterior left upper lobe. These likely present multifocal pneumonia and follow-up until resolution is recommended. Bilateral hilar lymphadenopathy and mediastinal lymphadenopathy, likely reactive, but recommend follow-up chest CT in 2 months time to, document stability or resolution as neoplastic processes such as metastatic disease or lymphoma cannot be entirely excluded.  Initial procalcitonin 0.77  Treat as CAP  Given Rocephin in ED.  Will continue.  Will add Zithromax.  Start Mucinex  DuoNeb as needed  Check urine antigens, sputum Gram stain and culture if able  Follow-up blood cultures  Repeat procalcitonin, CBC in the morning

## 2025-01-02 NOTE — ASSESSMENT & PLAN NOTE
History of small SAH and subdural hematoma in the left parietal area after receiving tPA for strokelike symptoms in September 2016.  Continue Lipitor Plavix

## 2025-01-02 NOTE — ASSESSMENT & PLAN NOTE
Patient presents with dry cough for a few days with associated fatigue, runny nose, poor appetite.  Reports left lower chest rib hurts when coughing.  Reports possible chills at home.  Reports had a party in apartment on Saturday 4 days ago.  Patient lives alone.  Fever 101.4 on ED arrival, pulse rate 99  COVID flu negative.  Chest x-ray shows right lower lobe consolidation, pending final read  CTA chest PE study showed - No PE. Wedge-shaped consolidation involving the lower aspect of the right lower lobe, and patchy groundglass opacities which involve the left lower lobe, and focal groundglass opacities and likely associated tree-in-bud nodules involving the posterior left upper lobe. These likely present multifocal pneumonia and follow-up until resolution is recommended. Bilateral hilar lymphadenopathy and mediastinal lymphadenopathy, likely reactive, but recommend follow-up chest CT in 2 months time to, document stability or resolution as neoplastic processes such as metastatic disease or lymphoma cannot be entirely excluded.  Initial procalcitonin 0.77  Continue IV Rocephin and Zithromax.  Continue Mucinex in addition to DuoNeb therapy as needed.  Follow-up urine antigens.  Follow-up repeat procalcitonin level.  Follow-up blood cultures.

## 2025-01-02 NOTE — H&P
H&P - Hospitalist   Name: Yesenia Tillman 89 y.o. female I MRN: [id number]  Unit/Bed#: 2 [address] Date of Admission: 1/1/2025   Date of Service: 1/1/2025 I Hospital Day: 0     Assessment & Plan  Multifocal pneumonia  Patient presents with dry cough for a few days with associated fatigue, runny nose, poor appetite.  Reports left lower chest rib hurts when coughing.  Reports possible chills at home.  Reports had a party in apartment on Saturday 4 days ago.  Patient lives alone.  Fever 101.4 on ED arrival, pulse rate 99  WBC 13.07, no bands.  Meeting SIRS criteria.  See below.  COVID flu negative.  Chest x-ray shows right lower lobe consolidation, pending final read  CTA chest PE study showed - No PE. Wedge-shaped consolidation involving the lower aspect of the right lower lobe, and patchy groundglass opacities which involve the left lower lobe, and focal groundglass opacities and likely associated tree-in-bud nodules involving the posterior left upper lobe. These likely present multifocal pneumonia and follow-up until resolution is recommended. Bilateral hilar lymphadenopathy and mediastinal lymphadenopathy, likely reactive, but recommend follow-up chest CT in 2 months time to, document stability or resolution as neoplastic processes such as metastatic disease or lymphoma cannot be entirely excluded.  Initial procalcitonin 0.77  Treat as CAP  Given Rocephin in ED.  Will continue.  Will add Zithromax.  Start Mucinex  DuoNeb as needed  Check urine antigens, sputum Gram stain and culture if able  Follow-up blood cultures  Repeat procalcitonin, CBC in the morning    Sepsis (HCC)  Present on admission, as evidenced by fever, elevated heart rate, with source of infection multifocal pneumonia.  Lactic acid normal  Initial procalcitonin 0.77  Antibiotics as above  Follow-up blood cultures, urine antigens, sputum Gram stain and culture if able  Gentle IV hydration for 1 day.  Repeat CBC, procalcitonin in the  morning  Hyponatremia  Sodium 132.  Mild.  Likely due to low solute intake.  Received normal saline 500 mL in ED.  Continue gentle IV hydration  Repeat lab in the morning  Elevated brain natriuretic peptide (BNP) level    No clinical evidence of fluid overload.  2D echo a year ago showed EF 55%, normal diastolic function, normal atriums, no significant valvular disease  Update 2D echo.  Monitor volume status.  HTN (hypertension), benign  On losartan metoprolol at home.  Will continue.  BP not well-controlled.  Monitor  Acquired hypothyroidism  Continue Synthroid  Check TSH, free T4  Paroxysmal atrial fibrillation (HCC)  NSR on ED monitor  On Plavix metoprolol at home.  Will continue.  Optimize electrolytes.  K3.4.  Replete.  Check magnesium level.  Seizure disorder (HCC)  Continue Keppra  H/O: CVA (cerebrovascular accident)  History of small SAH and subdural hematoma in the left parietal area after receiving tPA for strokelike symptoms in September 2016.  Continue Lipitor Plavix  Mild intermittent asthma without complication  No wheezing on auscultation  Mixed hyperlipidemia  Continue statin  CT showed diffuse hepatic steatosis.  Gastroesophageal reflux disease without esophagitis  Continue PPI and Pepcid daily.  Appears to have history of esophageal dysmotility as well.  Consult speech for swallow eval  Moderate episode of recurrent major depressive disorder (HCC)  Continue Prozac  History of suicidal thoughts at times per ED note.  Patient denies SI currently.  Follow-up psych/behavioral health as outpatient  Memory difficulty  Patient awake alert oriented to place and person on exam, follows commands.  Supportive care      VTE Pharmacologic Prophylaxis: VTE Score: 6 High Risk (Score >/= 5) - Pharmacological DVT Prophylaxis Ordered: enoxaparin (Lovenox). Sequential Compression Devices Ordered.  Code Status: Level 1 - Full Code   Discussion with family:  no.     Anticipated Length of Stay: Patient will be  admitted on an inpatient basis with an anticipated length of stay of greater than 2 midnights secondary to multifocal pneumonia sepsis.    History of Present Illness   Chief Complaint: Candy Tillman is a 89 y.o. female with a PMH of depression, CVA, seizure, GERD, hypertension, hyperlipidemia, asthma, hypothyroid, paroxysmal A-fib who presents with dry cough for a few days with associated fatigue, runny nose, poor appetite.  Reports left lower chest rib hurts when coughing.  Reports possible chills at home.  Reports had a party in apartment on Saturday 4 days ago.  Patient lives alone.  Ambulates independently.  Patient denies headache dizziness substernal chest pain, nausea vomiting diarrhea constipation.  Reports SOB when doing things in past few days.  Patient admits history of depression, denies suicidal ideation.  No other complaints.      Review of Systems   Constitutional:  Positive for activity change, appetite change, chills and fatigue.   HENT:  Positive for rhinorrhea.    Respiratory:  Positive for cough and shortness of breath.    Cardiovascular:         Left lower chest rib pain when coughing   All other systems reviewed and are negative.      Historical Information   Past Medical History:   Diagnosis Date    Acute CVA (cerebrovascular accident) (HCC) 06/18/2016    Adult hypothyroidism 09/12/2016    Anxiety 06/10/2019    Arthritis     Asthma     Colon polyp     Depression     Disease of thyroid gland     Dyslipidemia 06/10/2019    Esophageal dysmotility 06/10/2019    Essential hypertension 09/12/2016    GERD (gastroesophageal reflux disease)     Hyperlipidemia     Hypertension     Hypothyroidism     Obesity     Paroxysmal atrial fibrillation (HCC) 09/12/2016    Seizure disorder (formerly [facility]) 08/24/2018    Seizures (formerly [facility])     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    Stroke (formerly [facility]) 06/2016    Subarachnoid hemorrhage (formerly [facility]) 09/13/2016    Subdural  hematoma (HCC) 09/13/2016     Past Surgical History:   Procedure Laterality Date    APPENDECTOMY      BACK SURGERY  01/2016    CARPAL TUNNEL RELEASE Right     COLONOSCOPY      TUBAL LIGATION       Social History     Tobacco Use    Smoking status: Never    Smokeless tobacco: Never   Vaping Use    Vaping status: Never Used   Substance and Sexual Activity    Alcohol use: Never     Alcohol/week: 0.0 standard drinks of alcohol     Comment: 0    Drug use: No    Sexual activity: Not Currently     Partners: Male     Birth control/protection: Post-menopausal     E-Cigarette/Vaping    E-Cigarette Use Never User      E-Cigarette/Vaping Substances    Nicotine No     THC No     CBD No     Flavoring No     Other No     Unknown No      Family history non-contributory  Social History:  Marital Status:    Occupation: Retired  Patient Pre-hospital Living Situation: Apartment  Patient Pre-hospital Level of Mobility: walks  Patient Pre-hospital Diet Restrictions: Cardiac    Meds/Allergies   I have reviewed home medications with patient personally.  Prior to Admission medications    Medication Sig Start Date End Date Taking? Authorizing Provider   atorvastatin (LIPITOR) 40 mg tablet Take 1 tablet (40 mg total) by mouth daily 6/20/24  Yes Liam Zepeda MD   clopidogrel (PLAVIX) 75 mg tablet Take 1 tablet (75 mg total) by mouth daily 5/6/24  Yes Liam Zepeda MD   famotidine (PEPCID) 20 mg tablet Take 1 tablet (20 mg total) by mouth daily 1/25/24  Yes Liam Zepeda MD   levETIRAcetam (KEPPRA) 500 mg tablet Take 1 tablet (500 mg total) by mouth daily 12/3/24  Yes Liam Zepeda MD   levothyroxine 88 mcg tablet Take 1 tablet (88 mcg total) by mouth daily 5/1/24  Yes Liam Zepeda MD   losartan (COZAAR) 100 MG tablet Take 1 tablet (100 mg total) by mouth daily 9/3/24  Yes Liam Zepeda MD   metoprolol succinate (TOPROL-XL) 50 mg 24 hr tablet Take 1 tablet (50 mg total) by mouth daily 9/10/24  Yes Liam Zepeda MD   omeprazole  (PriLOSEC) 20 mg delayed release capsule Take 1 capsule (20 mg total) by mouth daily 5/1/24  Yes Liam Zepeda MD   acetaminophen (TYLENOL) 325 mg tablet Take 2 tablets (650 mg total) by mouth every 6 (six) hours as needed for mild pain, headaches or fever 2/10/20   SACHI Augustin   FLUoxetine (PROzac) 40 MG capsule Take 1 capsule (40 mg total) by mouth daily 11/14/24   Jeet Avalos   lidocaine (LIDODERM) 5 % Apply 1 patch topically over 12 hours daily Remove & Discard patch within 12 hours or as directed by MD Do not start before December 9, 2023.  Patient not taking: Reported on 1/1/2025 12/9/23   Racquel Marcus MD     Allergies   Allergen Reactions    Amlodipine Other (See Comments)     Unknown reaction    Hydralazine Other (See Comments)     Unknown reaction    Penicillins Other (See Comments)     Unknown since childhood       Objective :  Temp:  [97.9 °F (36.6 °C)-101.4 °F (38.6 °C)] 97.9 °F (36.6 °C)  HR:  [66-99] 69  BP: (149-184)/(67-88) 149/75  Resp:  [16-23] 16  SpO2:  [91 %-96 %] 94 %  O2 Device: None (Room air)    Physical Exam  Vitals and nursing note reviewed.   Constitutional:       Appearance: She is well-developed.   HENT:      Head: Normocephalic and atraumatic.   Neck:      Thyroid: No thyromegaly.      Vascular: No JVD.      Trachea: No tracheal deviation.   Cardiovascular:      Rate and Rhythm: Normal rate and regular rhythm.      Heart sounds: Normal heart sounds.   Pulmonary:      Effort: Pulmonary effort is normal. No respiratory distress.      Breath sounds: No wheezing or rales.      Comments: Diminished breath in the lower lobes and left upper lobe, on room air, respiration easy on exam.  Satting 96% on room air.  Abdominal:      General: Bowel sounds are normal.      Palpations: Abdomen is soft.   Musculoskeletal:      Cervical back: Neck supple.      Right lower leg: No edema.      Left lower leg: No edema.   Skin:     General: Skin is warm and dry.    Neurological:      General: No focal deficit present.      Mental Status: She is alert.      Comments: Patient awake alert oriented to place and person, follows commands   Psychiatric:         Judgment: Judgment normal.          Lines/Drains:            Lab Results: I have reviewed the following results:  Results from last 7 days   Lab Units 01/01/25  1430   WBC Thousand/uL 13.07*   HEMOGLOBIN g/dL 12.1   HEMATOCRIT % 36.1   PLATELETS Thousands/uL 310   SEGS PCT % 88*   LYMPHO PCT % 5*   MONO PCT % 6   EOS PCT % 0     Results from last 7 days   Lab Units 01/01/25  1430   SODIUM mmol/L 132*   POTASSIUM mmol/L 3.4*   CHLORIDE mmol/L 99   CO2 mmol/L 24   BUN mg/dL 10   CREATININE mg/dL 0.78   ANION GAP mmol/L 9   CALCIUM mg/dL 8.5   ALBUMIN g/dL 3.6   TOTAL BILIRUBIN mg/dL 1.01*   ALK PHOS U/L 94   ALT U/L 14   AST U/L 18   GLUCOSE RANDOM mg/dL 113     Results from last 7 days   Lab Units 01/01/25  1430   INR  1.13         Lab Results   Component Value Date    HGBA1C 6.2 (H) 12/08/2023    HGBA1C 6.2 02/10/2020    HGBA1C 6.1 06/12/2019     Results from last 7 days   Lab Units 01/01/25  1632 01/01/25  1430   LACTIC ACID mmol/L  --  1.1   PROCALCITONIN ng/ml 0.77*  --        Imaging Results Review: I reviewed radiology reports from this admission including: CTA chest PE study, chest xray, and CT abdomen/pelvis.  Other Study Results Review: No additional pertinent studies reviewed.    Administrative Statements       ** Please Note: This note has been constructed using a voice recognition system. **

## 2025-01-02 NOTE — ASSESSMENT & PLAN NOTE
Sodium 132.  Mild.  Likely due to low solute intake.  Received normal saline 500 mL in ED.  Continue gentle IV hydration  Repeat lab in the morning

## 2025-01-02 NOTE — ASSESSMENT & PLAN NOTE
Continue PPI and Pepcid daily.  Appears to have history of esophageal dysmotility as well.  Consult speech for swallow eval

## 2025-01-02 NOTE — ASSESSMENT & PLAN NOTE
Sodium 132 on admission improved to 136 today  Received normal saline 500 mL in ED.  Continue gentle IV hydration  Repeat lab in the morning

## 2025-01-02 NOTE — ASSESSMENT & PLAN NOTE
Continue Prozac  History of suicidal thoughts at times per ED note.  Patient denies SI currently.  Follow-up psych/behavioral health as outpatient

## 2025-01-03 LAB
ANION GAP SERPL CALCULATED.3IONS-SCNC: 6 MMOL/L (ref 4–13)
BACTERIA UR CULT: NORMAL
BASOPHILS # BLD AUTO: 0.04 THOUSANDS/ΜL (ref 0–0.1)
BASOPHILS NFR BLD AUTO: 0 % (ref 0–1)
BUN SERPL-MCNC: 8 MG/DL (ref 5–25)
CALCIUM SERPL-MCNC: 7.8 MG/DL (ref 8.4–10.2)
CHLORIDE SERPL-SCNC: 107 MMOL/L (ref 96–108)
CO2 SERPL-SCNC: 23 MMOL/L (ref 21–32)
CREAT SERPL-MCNC: 0.74 MG/DL (ref 0.6–1.3)
EOSINOPHIL # BLD AUTO: 0.25 THOUSAND/ΜL (ref 0–0.61)
EOSINOPHIL NFR BLD AUTO: 3 % (ref 0–6)
ERYTHROCYTE [DISTWIDTH] IN BLOOD BY AUTOMATED COUNT: 13.4 % (ref 11.6–15.1)
GFR SERPL CREATININE-BSD FRML MDRD: 73 ML/MIN/1.73SQ M
GLUCOSE SERPL-MCNC: 88 MG/DL (ref 65–140)
HCT VFR BLD AUTO: 31.9 % (ref 34.8–46.1)
HGB BLD-MCNC: 10.6 G/DL (ref 11.5–15.4)
IMM GRANULOCYTES # BLD AUTO: 0.12 THOUSAND/UL (ref 0–0.2)
IMM GRANULOCYTES NFR BLD AUTO: 1 % (ref 0–2)
LYMPHOCYTES # BLD AUTO: 1.16 THOUSANDS/ΜL (ref 0.6–4.47)
LYMPHOCYTES NFR BLD AUTO: 13 % (ref 14–44)
MAGNESIUM SERPL-MCNC: 1.9 MG/DL (ref 1.9–2.7)
MCH RBC QN AUTO: 30.9 PG (ref 26.8–34.3)
MCHC RBC AUTO-ENTMCNC: 33.2 G/DL (ref 31.4–37.4)
MCV RBC AUTO: 93 FL (ref 82–98)
MONOCYTES # BLD AUTO: 0.48 THOUSAND/ΜL (ref 0.17–1.22)
MONOCYTES NFR BLD AUTO: 5 % (ref 4–12)
NEUTROPHILS # BLD AUTO: 7.15 THOUSANDS/ΜL (ref 1.85–7.62)
NEUTS SEG NFR BLD AUTO: 78 % (ref 43–75)
NRBC BLD AUTO-RTO: 0 /100 WBCS
PLATELET # BLD AUTO: 325 THOUSANDS/UL (ref 149–390)
PMV BLD AUTO: 10.2 FL (ref 8.9–12.7)
POTASSIUM SERPL-SCNC: 3.5 MMOL/L (ref 3.5–5.3)
RBC # BLD AUTO: 3.43 MILLION/UL (ref 3.81–5.12)
SODIUM SERPL-SCNC: 136 MMOL/L (ref 135–147)
WBC # BLD AUTO: 9.2 THOUSAND/UL (ref 4.31–10.16)

## 2025-01-03 PROCEDURE — 80048 BASIC METABOLIC PNL TOTAL CA: CPT | Performed by: FAMILY MEDICINE

## 2025-01-03 PROCEDURE — 99232 SBSQ HOSP IP/OBS MODERATE 35: CPT | Performed by: FAMILY MEDICINE

## 2025-01-03 PROCEDURE — 83735 ASSAY OF MAGNESIUM: CPT | Performed by: FAMILY MEDICINE

## 2025-01-03 PROCEDURE — 85025 COMPLETE CBC W/AUTO DIFF WBC: CPT | Performed by: FAMILY MEDICINE

## 2025-01-03 RX ORDER — POTASSIUM CHLORIDE 1500 MG/1
40 TABLET, EXTENDED RELEASE ORAL ONCE
Status: COMPLETED | OUTPATIENT
Start: 2025-01-03 | End: 2025-01-03

## 2025-01-03 RX ADMIN — CLOPIDOGREL 75 MG: 75 TABLET ORAL at 08:49

## 2025-01-03 RX ADMIN — FAMOTIDINE 20 MG: 20 TABLET, FILM COATED ORAL at 22:50

## 2025-01-03 RX ADMIN — POTASSIUM CHLORIDE 40 MEQ: 1500 TABLET, EXTENDED RELEASE ORAL at 12:17

## 2025-01-03 RX ADMIN — Medication 250 MG: at 17:52

## 2025-01-03 RX ADMIN — ENOXAPARIN SODIUM 40 MG: 40 INJECTION SUBCUTANEOUS at 08:50

## 2025-01-03 RX ADMIN — Medication 250 MG: at 08:52

## 2025-01-03 RX ADMIN — GUAIFENESIN 600 MG: 600 TABLET, EXTENDED RELEASE ORAL at 17:52

## 2025-01-03 RX ADMIN — LEVOTHYROXINE SODIUM 88 MCG: 88 TABLET ORAL at 05:36

## 2025-01-03 RX ADMIN — LEVETIRACETAM 500 MG: 500 TABLET, FILM COATED ORAL at 08:51

## 2025-01-03 RX ADMIN — GUAIFENESIN 600 MG: 600 TABLET, EXTENDED RELEASE ORAL at 08:50

## 2025-01-03 RX ADMIN — FLUOXETINE HYDROCHLORIDE 40 MG: 20 CAPSULE ORAL at 08:50

## 2025-01-03 RX ADMIN — LOSARTAN POTASSIUM 100 MG: 50 TABLET, FILM COATED ORAL at 08:51

## 2025-01-03 RX ADMIN — ATORVASTATIN CALCIUM 40 MG: 40 TABLET, FILM COATED ORAL at 17:51

## 2025-01-03 RX ADMIN — PANTOPRAZOLE SODIUM 40 MG: 40 TABLET, DELAYED RELEASE ORAL at 05:36

## 2025-01-03 RX ADMIN — CEFTRIAXONE 1000 MG: 1 INJECTION, SOLUTION INTRAVENOUS at 17:52

## 2025-01-03 RX ADMIN — METOPROLOL SUCCINATE 50 MG: 50 TABLET, EXTENDED RELEASE ORAL at 08:51

## 2025-01-03 NOTE — ASSESSMENT & PLAN NOTE
Patient presents with dry cough for a few days with associated fatigue, runny nose, poor appetite.  Reports left lower chest rib hurts when coughing.  Reports possible chills at home.  Reports had a party in apartment on Saturday 4 days ago.  Patient lives alone.  Fever 101.4 on ED arrival  COVID flu negative.  Chest x-ray showed right lower lobe pneumonia  CTA chest PE study showed - No PE. Wedge-shaped consolidation involving the lower aspect of the right lower lobe, and patchy groundglass opacities which involve the left lower lobe, and focal groundglass opacities and likely associated tree-in-bud nodules involving the posterior left upper lobe. These likely present multifocal pneumonia and follow-up until resolution is recommended. Bilateral hilar lymphadenopathy and mediastinal lymphadenopathy, likely reactive, but recommend follow-up chest CT in 2 months time to, document stability or resolution as neoplastic processes such as metastatic disease or lymphoma cannot be entirely excluded.  Initial procalcitonin 0.77  Continue IV Rocephin.  Zithromax discontinued as urine legionella and strep are negative.

## 2025-01-03 NOTE — CASE MANAGEMENT
Case Management Assessment & Discharge Planning Note    Patient name Yesenia Tillman  Location [address] [facility] [facility] MRN [id number]  : 1935 Date 1/3/2025       Current Admission Date: 2025  Current Admission Diagnosis:Multifocal pneumonia   Patient Active Problem List    Diagnosis Date Noted Date Diagnosed    Multifocal pneumonia 2025     Sepsis (HCC) 2025     Hyponatremia 2025     Elevated brain natriuretic peptide (BNP) level 2025     Memory difficulty 10/11/2024     Right shoulder pain 2023     Gastroesophageal reflux disease without esophagitis 10/11/2022     Moderate episode of recurrent major depressive disorder (HCC) 10/11/2022     Seasonal allergic rhinitis due to pollen 2022     Mild intermittent asthma without complication 2022     Mixed hyperlipidemia 2022     Stroke-like symptoms 2020     H/O: CVA (cerebrovascular accident) 2020     Stenosis of left middle cerebral artery 2019     Esophageal dysmotility 06/10/2019     Seizure disorder (HCC) 2018     HTN (hypertension), benign 2016     Acquired hypothyroidism 2016     Paroxysmal atrial fibrillation (HCC) 2016       LOS (days): 2  Geometric Mean LOS (GMLOS) (days): 4.9  Days to GMLOS:3     OBJECTIVE:    Risk of Unplanned Readmission Score: 15.96     Current admission status: Inpatient    Preferred Pharmacy:   [id number] - [place], [place] - [address]  [facility] [address]  Phone: [phone] Fax: [phone]    Primary Care Provider: Liam Zepeda MD    Primary Insurance: [employer]  Secondary Insurance:     ASSESSMENT:  Active Health Care Proxies    There are no active Health Care Proxies on file.       Advance Directives  Does patient have a Health Care POA?: No  Was patient offered paperwork?: Yes (HCR/AMDD form given to pt)  Does patient have Advance Directives?: No  Was  patient offered paperwork?: Yes (HCR/AMDD form given to pt)  Primary Contact: Chucky Porter    Readmission Root Cause  30 Day Readmission: No    Patient Information  Admitted from:: Home  Mental Status: Alert  During Assessment patient was accompanied by: Son  Assessment information provided by:: Patient, Son  Primary Caregiver: Self  Support Systems: Self, Son  County of Residence: [place]  What city do you live in?: [place]  Home entry access options. Select all that apply.: Stairs  Number of steps to enter home.: 4  Do the steps have railings?: Yes  Type of Current Residence: Apartment  Floor Level: 1  Upon entering residence, is there a bedroom on the main floor (no further steps)?: Yes  Upon entering residence, is there a bathroom on the main floor (no further steps)?: Yes  Living Arrangements: Lives Alone    Activities of Daily Living Prior to Admission  Functional Status: Independent  Completes ADLs independently?: Yes  Ambulates independently?: Yes  Does patient use assisted devices?: No  Does patient currently own DME?: Yes  What DME does the patient currently own?: Straight Cane, Walker  Does patient have a history of Outpatient Therapy (PT/OT)?: Yes  Does the patient have a history of Short-Term Rehab?: No  Does patient have a history of HHC?: No  Does patient currently have HHC?: No    Patient Information Continued  Income Source: Pension/senior care  Does patient have prescription coverage?: Yes ([facility])  Does patient receive dialysis treatments?: No  Does patient have a history of substance abuse?: No  Does patient have a history of Mental Health Diagnosis?: Yes (Major Depressive Disorder)  Is patient receiving treatment for mental health?: Yes    Means of Transportation  Means of Transport to Appts:: Drives Self      DISCHARGE DETAILS:    Discharge planning discussed with:: Patient and son, Clarence Porter  Freedom of Choice: Yes  Comments - Freedom of Choice: SW met with pt and son to  assess needs and discuss plans.  Pt's plan is to return home when discharged.  No discharge needs expressed by pt or son at this time. Per pt and son pt has been going to outpatient therapy for cognitive function and will resume.  SW offered assistance in future if needed.  CM contacted family/caregiver?: Yes  Were Treatment Team discharge recommendations reviewed with patient/caregiver?: Yes  Did patient/caregiver verbalize understanding of patient care needs?: Yes    Contacts  Patient Contacts: Chucky Porter  Relationship to Patient:: Family (son)  Contact Method: In Person  Reason/Outcome: Discharge Planning    Requested Home Health Care         Is the patient interested in HHC at discharge?: No    DME Referral Provided  Referral made for DME?: No    Other Referral/Resources/Interventions Provided:  Interventions: None Indicated    Treatment Team Recommendation: Home  Discharge Destination Plan:: Home  Transport at Discharge : Family    IMM Given (Date):: 01/03/25  IMM Given to:: Patient (IMM #2 reviewed with pt and son.  Pt verbalized understanding. Pt signed IMM and copy given. Copy also placed in scan bin for chart.)

## 2025-01-03 NOTE — ASSESSMENT & PLAN NOTE
NSR on ED monitor  On Plavix metoprolol at home.  Will continue.  Optimize electrolytes.  Potassium 3.5.  Magnesium 1.9.

## 2025-01-03 NOTE — ASSESSMENT & PLAN NOTE
Present on admission, as evidenced by fever, elevated heart rate, with source of infection multifocal pneumonia.  Lactic acid normal  Initial procalcitonin 0.77  Antibiotics as above  Urine legionella and strep negative

## 2025-01-03 NOTE — PROGRESS NOTES
Progress Note - Hospitalist   Name: Yesenia Tillman 89 y.o. female I MRN: [id number]  Unit/Bed#: 2 [address] Date of Admission: 1/1/2025   Date of Service: 1/3/2025 I Hospital Day: 2     Assessment & Plan  Multifocal pneumonia  Patient presents with dry cough for a few days with associated fatigue, runny nose, poor appetite.  Reports left lower chest rib hurts when coughing.  Reports possible chills at home.  Reports had a party in apartment on Saturday 4 days ago.  Patient lives alone.  Fever 101.4 on ED arrival  COVID flu negative.  Chest x-ray showed right lower lobe pneumonia  CTA chest PE study showed - No PE. Wedge-shaped consolidation involving the lower aspect of the right lower lobe, and patchy groundglass opacities which involve the left lower lobe, and focal groundglass opacities and likely associated tree-in-bud nodules involving the posterior left upper lobe. These likely present multifocal pneumonia and follow-up until resolution is recommended. Bilateral hilar lymphadenopathy and mediastinal lymphadenopathy, likely reactive, but recommend follow-up chest CT in 2 months time to, document stability or resolution as neoplastic processes such as metastatic disease or lymphoma cannot be entirely excluded.  Initial procalcitonin 0.77  Continue IV Rocephin.  Zithromax discontinued as urine legionella and strep are negative.  Sepsis (HCC)  Present on admission, as evidenced by fever, elevated heart rate, with source of infection multifocal pneumonia.  Lactic acid normal  Initial procalcitonin 0.77  Antibiotics as above  Urine legionella and strep negative  Hyponatremia  Sodium 132 on admission improved to 136  Received normal saline 500 mL in ED.  Monitor off IV fluids  Elevated brain natriuretic peptide (BNP) level    No clinical evidence of fluid overload.  2D echo a year ago showed EF 55%, normal diastolic function, normal atriums, no significant valvular disease.  Updated echo performed on 1/2/25  "shows no significant changes  Monitor volume status.  HTN (hypertension), benign  On losartan metoprolol at home.  Will continue.  BP not well-controlled.  Monitor  Acquired hypothyroidism  Continue Synthroid  TSH 3.897.  Paroxysmal atrial fibrillation (HCC)  NSR on ED monitor  On Plavix metoprolol at home.  Will continue.  Optimize electrolytes.  Potassium 3.5.  Magnesium 1.9.  Seizure disorder (HCC)  Continue Keppra  H/O: CVA (cerebrovascular accident)  History of small SAH and subdural hematoma in the left parietal area after receiving tPA for strokelike symptoms in September 2016.  Continue Lipitor Plavix  Mild intermittent asthma without complication  No wheezing on auscultation  Mixed hyperlipidemia  Continue statin  CT showed diffuse hepatic steatosis.  Gastroesophageal reflux disease without esophagitis  Continue PPI and Pepcid daily.  Moderate episode of recurrent major depressive disorder (HCC)  Continue Prozac  History of suicidal thoughts at times per ED note.  Patient denies SI currently.  Follow-up psych/behavioral health as outpatient  Memory difficulty  Patient awake alert oriented to place and person on exam, follows commands.  Supportive care  VTE Pharmacologic Prophylaxis: VTE Score: 6 Lovenox prophylaxis.    Mobility:   Basic Mobility Inpatient Raw Score: 22  JH-HLM Goal: 7: Walk 25 feet or more  JH-HLM Achieved: 3: Sit at edge of bed    Patient Centered Rounds: I performed bedside rounds with nursing staff today.     Current Length of Stay: 2 day(s)  Current Patient Status: Inpatient   Certification Statement: The patient will continue to require additional inpatient hospital stay due to above.  Discharge Plan: TBD    Code Status: Level 1 - Full Code    Subjective   Patient was seen and examined at bedside. No acute events overnight. Reports feeling \"a little better\" today.    Objective :  Temp:  [98.1 °F (36.7 °C)-99.5 °F (37.5 °C)] 98.5 °F (36.9 °C)  HR:  [72-74] 72  BP: (137-166)/(55-72) " 166/72  Resp:  [17-20] 17  SpO2:  [91 %-93 %] 92 %  O2 Device: None (Room air)    Body mass index is 30.08 kg/m².     Input and Output Summary (last 24 hours):     Intake/Output Summary (Last 24 hours) at 1/3/2025 1338  Last data filed at 1/3/2025 0739  Gross per 24 hour   Intake 200 ml   Output 700 ml   Net -500 ml       Physical Exam  HENT:      Head: Normocephalic.      Mouth/Throat:      Mouth: Mucous membranes are moist.   Eyes:      Extraocular Movements: Extraocular movements intact.   Cardiovascular:      Rate and Rhythm: Normal rate.   Pulmonary:      Effort: Pulmonary effort is normal. No respiratory distress.      Comments: Decreased in the bases  Abdominal:      Palpations: Abdomen is soft.      Tenderness: There is no abdominal tenderness.   Skin:     General: Skin is warm.   Neurological:      Mental Status: She is alert and oriented to person, place, and time.   Psychiatric:         Mood and Affect: Mood normal.         Lab Results: I have reviewed the following results:   Results from last 7 days   Lab Units 01/03/25  0541   WBC Thousand/uL 9.20   HEMOGLOBIN g/dL 10.6*   HEMATOCRIT % 31.9*   PLATELETS Thousands/uL 325   SEGS PCT % 78*   LYMPHO PCT % 13*   MONO PCT % 5   EOS PCT % 3     Results from last 7 days   Lab Units 01/03/25  0541 01/02/25  0553 01/01/25  1430   SODIUM mmol/L 136   < > 132*   POTASSIUM mmol/L 3.5   < > 3.4*   CHLORIDE mmol/L 107   < > 99   CO2 mmol/L 23   < > 24   BUN mg/dL 8   < > 10   CREATININE mg/dL 0.74   < > 0.78   ANION GAP mmol/L 6   < > 9   CALCIUM mg/dL 7.8*   < > 8.5   ALBUMIN g/dL  --   --  3.6   TOTAL BILIRUBIN mg/dL  --   --  1.01*   ALK PHOS U/L  --   --  94   ALT U/L  --   --  14   AST U/L  --   --  18   GLUCOSE RANDOM mg/dL 88   < > 113    < > = values in this interval not displayed.     Results from last 7 days   Lab Units 01/01/25  1430   INR  1.13     Results from last 7 days   Lab Units 01/02/25  0553 01/01/25  1632 01/01/25  1430   LACTIC ACID mmol/L  --    --  1.1   PROCALCITONIN ng/ml 1.19* 0.77*  --      Recent Cultures (last 7 days):   Results from last 7 days   Lab Units 01/02/25  1233 01/01/25  1643 01/01/25  1440 01/01/25  1430   BLOOD CULTURE   --   --  No Growth at 24 hrs. No Growth at 24 hrs.   URINE CULTURE   --  20,000-29,000 cfu/ml  --   --    LEGIONELLA URINARY ANTIGEN  Negative  --   --   --      Last 24 Hours Medication List:     Current Facility-Administered Medications:     acetaminophen (TYLENOL) tablet 650 mg, Q6H PRN    atorvastatin (LIPITOR) tablet 40 mg, Daily With Dinner    cefTRIAXone (ROCEPHIN) IVPB (premix in dextrose) 1,000 mg 50 mL, Q24H, Last Rate: 1,000 mg (01/02/25 1738)    clopidogrel (PLAVIX) tablet 75 mg, Daily    enoxaparin (LOVENOX) subcutaneous injection 40 mg, Daily    famotidine (PEPCID) tablet 20 mg, HS    FLUoxetine (PROzac) capsule 40 mg, Daily    guaiFENesin (MUCINEX) 12 hr tablet 600 mg, BID    ipratropium-albuterol (DUO-NEB) 0.5-2.5 mg/3 mL inhalation solution 3 mL, Q6H PRN    levETIRAcetam (KEPPRA) tablet 500 mg, Daily    levothyroxine tablet 88 mcg, Early Morning    losartan (COZAAR) tablet 100 mg, Daily    metoprolol succinate (TOPROL-XL) 24 hr tablet 50 mg, Daily    ondansetron (ZOFRAN) injection 4 mg, Q6H PRN    pantoprazole (PROTONIX) EC tablet 40 mg, Early Morning    saccharomyces boulardii (FLORASTOR) capsule 250 mg, BID    Administrative Statements   Today, Patient Was Seen By: Chris Edmond MD    **Please Note: This note may have been constructed using a voice recognition system.**

## 2025-01-03 NOTE — ASSESSMENT & PLAN NOTE
Sodium 132 on admission improved to 136  Received normal saline 500 mL in ED.  Monitor off IV fluids

## 2025-01-03 NOTE — PLAN OF CARE
Problem: RESPIRATORY - ADULT  Goal: Achieves optimal ventilation and oxygenation  Description: INTERVENTIONS:  - Assess for changes in respiratory status  - Assess for changes in mentation and behavior  - Position to facilitate oxygenation and minimize respiratory effort  - Oxygen administered by appropriate delivery if ordered  - Initiate smoking cessation education as indicated  - Encourage broncho-pulmonary hygiene including cough, deep breathe, Incentive Spirometry  - Assess the need for suctioning and aspirate as needed  - Assess and instruct to report SOB or any respiratory difficulty  - Respiratory Therapy support as indicated  Outcome: Progressing     Problem: GASTROINTESTINAL - ADULT  Goal: Minimal or absence of nausea and/or vomiting  Description: INTERVENTIONS:  - Administer IV fluids if ordered to ensure adequate hydration  - Maintain NPO status until nausea and vomiting are resolved  - Nasogastric tube if ordered  - Administer ordered antiemetic medications as needed  - Provide nonpharmacologic comfort measures as appropriate  - Advance diet as tolerated, if ordered  - Consider nutrition services referral to assist patient with adequate nutrition and appropriate food choices  Outcome: Progressing  Goal: Maintains or returns to baseline bowel function  Description: INTERVENTIONS:  - Assess bowel function  - Encourage oral fluids to ensure adequate hydration  - Administer IV fluids if ordered to ensure adequate hydration  - Administer ordered medications as needed  - Encourage mobilization and activity  - Consider nutritional services referral to assist patient with adequate nutrition and appropriate food choices  Outcome: Progressing  Goal: Maintains adequate nutritional intake  Description: INTERVENTIONS:  - Monitor percentage of each meal consumed  - Identify factors contributing to decreased intake, treat as appropriate  - Assist with meals as needed  - Monitor I&O, weight, and lab values if  indicated  - Obtain nutrition services referral as needed  Outcome: Progressing  Goal: Oral mucous membranes remain intact  Description: INTERVENTIONS  - Assess oral mucosa and hygiene practices  - Implement preventative oral hygiene regimen  - Implement oral medicated treatments as ordered  - Initiate Nutrition services referral as needed  Outcome: Progressing     Problem: METABOLIC, FLUID AND ELECTROLYTES - ADULT  Goal: Electrolytes maintained within normal limits  Description: INTERVENTIONS:  - Monitor labs and assess patient for signs and symptoms of electrolyte imbalances  - Administer electrolyte replacement as ordered  - Monitor response to electrolyte replacements, including repeat lab results as appropriate  - Instruct patient on fluid and nutrition as appropriate  Outcome: Progressing  Goal: Fluid balance maintained  Description: INTERVENTIONS:  - Monitor labs   - Monitor I/O and WT  - Instruct patient on fluid and nutrition as appropriate  - Assess for signs & symptoms of volume excess or deficit  Outcome: Progressing     Problem: HEMATOLOGIC - ADULT  Goal: Maintains hematologic stability  Description: INTERVENTIONS  - Assess for signs and symptoms of bleeding or hemorrhage  - Monitor labs  - Administer supportive blood products/factors as ordered and appropriate  Outcome: Progressing     Problem: Nutrition/Hydration-ADULT  Goal: Nutrient/Hydration intake appropriate for improving, restoring or maintaining nutritional needs  Description: Monitor and assess patient's nutrition/hydration status for malnutrition. Collaborate with interdisciplinary team and initiate plan and interventions as ordered.  Monitor patient's weight and dietary intake as ordered or per policy. Utilize nutrition screening tool and intervene as necessary. Determine patient's food preferences and provide high-protein, high-caloric foods as appropriate.     INTERVENTIONS:  - Monitor oral intake, urinary output, labs, and treatment  plans  - Assess nutrition and hydration status and recommend course of action  - Evaluate amount of meals eaten  - Assist patient with eating if necessary   - Allow adequate time for meals  - Recommend/ encourage appropriate diets, oral nutritional supplements, and vitamin/mineral supplements  - Order, calculate, and assess calorie counts as needed  - Recommend, monitor, and adjust tube feedings and TPN/PPN based on assessed needs  - Assess need for intravenous fluids  - Provide specific nutrition/hydration education as appropriate  - Include patient/family/caregiver in decisions related to nutrition  Outcome: Progressing

## 2025-01-03 NOTE — ASSESSMENT & PLAN NOTE
No clinical evidence of fluid overload.  2D echo a year ago showed EF 55%, normal diastolic function, normal atriums, no significant valvular disease.  Updated echo performed on 1/2/25 shows no significant changes  Monitor volume status.

## 2025-01-03 NOTE — PLAN OF CARE
Problem: RESPIRATORY - ADULT  Goal: Achieves optimal ventilation and oxygenation  Description: INTERVENTIONS:  - Assess for changes in respiratory status  - Assess for changes in mentation and behavior  - Position to facilitate oxygenation and minimize respiratory effort  - Oxygen administered by appropriate delivery if ordered  - Initiate smoking cessation education as indicated  - Encourage broncho-pulmonary hygiene including cough, deep breathe, Incentive Spirometry  - Assess the need for suctioning and aspirate as needed  - Assess and instruct to report SOB or any respiratory difficulty  - Respiratory Therapy support as indicated  Outcome: Progressing     Problem: CARDIOVASCULAR - ADULT  Goal: Maintains optimal cardiac output and hemodynamic stability  Description: INTERVENTIONS:  - Monitor I/O, vital signs and rhythm  - Monitor for S/S and trends of decreased cardiac output  - Administer and titrate ordered vasoactive medications to optimize hemodynamic stability  - Assess quality of pulses, skin color and temperature  - Assess for signs of decreased coronary artery perfusion  - Instruct patient to report change in severity of symptoms  Outcome: Progressing  Goal: Absence of cardiac dysrhythmias or at baseline rhythm  Description: INTERVENTIONS:  - Continuous cardiac monitoring, vital signs, obtain 12 lead EKG if ordered  - Administer antiarrhythmic and heart rate control medications as ordered  - Monitor electrolytes and administer replacement therapy as ordered  Outcome: Progressing     Problem: GASTROINTESTINAL - ADULT  Goal: Minimal or absence of nausea and/or vomiting  Description: INTERVENTIONS:  - Administer IV fluids if ordered to ensure adequate hydration  - Maintain NPO status until nausea and vomiting are resolved  - Nasogastric tube if ordered  - Administer ordered antiemetic medications as needed  - Provide nonpharmacologic comfort measures as appropriate  - Advance diet as tolerated, if  ordered  - Consider nutrition services referral to assist patient with adequate nutrition and appropriate food choices  Outcome: Progressing  Goal: Maintains or returns to baseline bowel function  Description: INTERVENTIONS:  - Assess bowel function  - Encourage oral fluids to ensure adequate hydration  - Administer IV fluids if ordered to ensure adequate hydration  - Administer ordered medications as needed  - Encourage mobilization and activity  - Consider nutritional services referral to assist patient with adequate nutrition and appropriate food choices  Outcome: Progressing  Goal: Maintains adequate nutritional intake  Description: INTERVENTIONS:  - Monitor percentage of each meal consumed  - Identify factors contributing to decreased intake, treat as appropriate  - Assist with meals as needed  - Monitor I&O, weight, and lab values if indicated  - Obtain nutrition services referral as needed  Outcome: Progressing  Goal: Oral mucous membranes remain intact  Description: INTERVENTIONS  - Assess oral mucosa and hygiene practices  - Implement preventative oral hygiene regimen  - Implement oral medicated treatments as ordered  - Initiate Nutrition services referral as needed  Outcome: Progressing     Problem: METABOLIC, FLUID AND ELECTROLYTES - ADULT  Goal: Electrolytes maintained within normal limits  Description: INTERVENTIONS:  - Monitor labs and assess patient for signs and symptoms of electrolyte imbalances  - Administer electrolyte replacement as ordered  - Monitor response to electrolyte replacements, including repeat lab results as appropriate  - Instruct patient on fluid and nutrition as appropriate  Outcome: Progressing  Goal: Fluid balance maintained  Description: INTERVENTIONS:  - Monitor labs   - Monitor I/O and WT  - Instruct patient on fluid and nutrition as appropriate  - Assess for signs & symptoms of volume excess or deficit  Outcome: Progressing     Problem: HEMATOLOGIC - ADULT  Goal: Maintains  hematologic stability  Description: INTERVENTIONS  - Assess for signs and symptoms of bleeding or hemorrhage  - Monitor labs  - Administer supportive blood products/factors as ordered and appropriate  Outcome: Progressing     Problem: Nutrition/Hydration-ADULT  Goal: Nutrient/Hydration intake appropriate for improving, restoring or maintaining nutritional needs  Description: Monitor and assess patient's nutrition/hydration status for malnutrition. Collaborate with interdisciplinary team and initiate plan and interventions as ordered.  Monitor patient's weight and dietary intake as ordered or per policy. Utilize nutrition screening tool and intervene as necessary. Determine patient's food preferences and provide high-protein, high-caloric foods as appropriate.     INTERVENTIONS:  - Monitor oral intake, urinary output, labs, and treatment plans  - Assess nutrition and hydration status and recommend course of action  - Evaluate amount of meals eaten  - Assist patient with eating if necessary   - Allow adequate time for meals  - Recommend/ encourage appropriate diets, oral nutritional supplements, and vitamin/mineral supplements  - Order, calculate, and assess calorie counts as needed  - Recommend, monitor, and adjust tube feedings and TPN/PPN based on assessed needs  - Assess need for intravenous fluids  - Provide specific nutrition/hydration education as appropriate  - Include patient/family/caregiver in decisions related to nutrition  Outcome: Progressing

## 2025-01-04 ENCOUNTER — APPOINTMENT (INPATIENT)
Dept: RADIOLOGY | Facility: HOSPITAL | Age: 89
DRG: 871 | End: 2025-01-04
Payer: COMMERCIAL

## 2025-01-04 LAB
ANION GAP SERPL CALCULATED.3IONS-SCNC: 2 MMOL/L (ref 4–13)
BASOPHILS # BLD AUTO: 0.04 THOUSANDS/ΜL (ref 0–0.1)
BASOPHILS NFR BLD AUTO: 1 % (ref 0–1)
BUN SERPL-MCNC: 9 MG/DL (ref 5–25)
CALCIUM SERPL-MCNC: 8.1 MG/DL (ref 8.4–10.2)
CHLORIDE SERPL-SCNC: 107 MMOL/L (ref 96–108)
CO2 SERPL-SCNC: 23 MMOL/L (ref 21–32)
CREAT SERPL-MCNC: 0.71 MG/DL (ref 0.6–1.3)
EOSINOPHIL # BLD AUTO: 0.29 THOUSAND/ΜL (ref 0–0.61)
EOSINOPHIL NFR BLD AUTO: 4 % (ref 0–6)
ERYTHROCYTE [DISTWIDTH] IN BLOOD BY AUTOMATED COUNT: 13.2 % (ref 11.6–15.1)
GFR SERPL CREATININE-BSD FRML MDRD: 76 ML/MIN/1.73SQ M
GLUCOSE SERPL-MCNC: 100 MG/DL (ref 65–140)
HCT VFR BLD AUTO: 33.8 % (ref 34.8–46.1)
HGB BLD-MCNC: 11 G/DL (ref 11.5–15.4)
IMM GRANULOCYTES # BLD AUTO: 0.07 THOUSAND/UL (ref 0–0.2)
IMM GRANULOCYTES NFR BLD AUTO: 1 % (ref 0–2)
LYMPHOCYTES # BLD AUTO: 1.02 THOUSANDS/ΜL (ref 0.6–4.47)
LYMPHOCYTES NFR BLD AUTO: 13 % (ref 14–44)
MAGNESIUM SERPL-MCNC: 1.8 MG/DL (ref 1.9–2.7)
MCH RBC QN AUTO: 29.7 PG (ref 26.8–34.3)
MCHC RBC AUTO-ENTMCNC: 32.5 G/DL (ref 31.4–37.4)
MCV RBC AUTO: 91 FL (ref 82–98)
MONOCYTES # BLD AUTO: 0.43 THOUSAND/ΜL (ref 0.17–1.22)
MONOCYTES NFR BLD AUTO: 6 % (ref 4–12)
NEUTROPHILS # BLD AUTO: 5.87 THOUSANDS/ΜL (ref 1.85–7.62)
NEUTS SEG NFR BLD AUTO: 75 % (ref 43–75)
NRBC BLD AUTO-RTO: 0 /100 WBCS
PLATELET # BLD AUTO: 368 THOUSANDS/UL (ref 149–390)
PMV BLD AUTO: 10 FL (ref 8.9–12.7)
POTASSIUM SERPL-SCNC: 3.9 MMOL/L (ref 3.5–5.3)
RBC # BLD AUTO: 3.7 MILLION/UL (ref 3.81–5.12)
SODIUM SERPL-SCNC: 132 MMOL/L (ref 135–147)
WBC # BLD AUTO: 7.72 THOUSAND/UL (ref 4.31–10.16)

## 2025-01-04 PROCEDURE — 99232 SBSQ HOSP IP/OBS MODERATE 35: CPT | Performed by: NURSE PRACTITIONER

## 2025-01-04 PROCEDURE — 71045 X-RAY EXAM CHEST 1 VIEW: CPT

## 2025-01-04 PROCEDURE — 85025 COMPLETE CBC W/AUTO DIFF WBC: CPT | Performed by: FAMILY MEDICINE

## 2025-01-04 PROCEDURE — 80048 BASIC METABOLIC PNL TOTAL CA: CPT | Performed by: FAMILY MEDICINE

## 2025-01-04 PROCEDURE — 83735 ASSAY OF MAGNESIUM: CPT | Performed by: FAMILY MEDICINE

## 2025-01-04 RX ORDER — SODIUM CHLORIDE 9 MG/ML
50 INJECTION, SOLUTION INTRAVENOUS CONTINUOUS
Status: DISPENSED | OUTPATIENT
Start: 2025-01-04 | End: 2025-01-04

## 2025-01-04 RX ORDER — HYDROCODONE POLISTIREX AND CHLORPHENIRAMINE POLISTIREX 10; 8 MG/5ML; MG/5ML
2.5 SUSPENSION, EXTENDED RELEASE ORAL EVERY 12 HOURS PRN
Refills: 0 | Status: DISCONTINUED | OUTPATIENT
Start: 2025-01-04 | End: 2025-01-06

## 2025-01-04 RX ORDER — MAGNESIUM SULFATE HEPTAHYDRATE 40 MG/ML
2 INJECTION, SOLUTION INTRAVENOUS ONCE
Status: COMPLETED | OUTPATIENT
Start: 2025-01-04 | End: 2025-01-04

## 2025-01-04 RX ADMIN — HYDROCODONE POLISTIREX AND CHLORPHENIRAMINE POLISTIREX 2.5 ML: 10; 8 SUSPENSION, EXTENDED RELEASE ORAL at 03:22

## 2025-01-04 RX ADMIN — LEVETIRACETAM 500 MG: 500 TABLET, FILM COATED ORAL at 08:23

## 2025-01-04 RX ADMIN — LOSARTAN POTASSIUM 100 MG: 50 TABLET, FILM COATED ORAL at 08:23

## 2025-01-04 RX ADMIN — GUAIFENESIN 600 MG: 600 TABLET, EXTENDED RELEASE ORAL at 08:23

## 2025-01-04 RX ADMIN — PANTOPRAZOLE SODIUM 40 MG: 40 TABLET, DELAYED RELEASE ORAL at 05:17

## 2025-01-04 RX ADMIN — ENOXAPARIN SODIUM 40 MG: 40 INJECTION SUBCUTANEOUS at 08:23

## 2025-01-04 RX ADMIN — Medication 250 MG: at 17:43

## 2025-01-04 RX ADMIN — FAMOTIDINE 20 MG: 20 TABLET, FILM COATED ORAL at 22:16

## 2025-01-04 RX ADMIN — GUAIFENESIN 600 MG: 600 TABLET, EXTENDED RELEASE ORAL at 17:43

## 2025-01-04 RX ADMIN — ATORVASTATIN CALCIUM 40 MG: 40 TABLET, FILM COATED ORAL at 17:43

## 2025-01-04 RX ADMIN — Medication 250 MG: at 08:23

## 2025-01-04 RX ADMIN — FLUOXETINE HYDROCHLORIDE 40 MG: 20 CAPSULE ORAL at 08:23

## 2025-01-04 RX ADMIN — CEFTRIAXONE 1000 MG: 1 INJECTION, SOLUTION INTRAVENOUS at 17:43

## 2025-01-04 RX ADMIN — MAGNESIUM SULFATE HEPTAHYDRATE 2 G: 40 INJECTION, SOLUTION INTRAVENOUS at 08:48

## 2025-01-04 RX ADMIN — SODIUM CHLORIDE 50 ML/HR: 0.9 INJECTION, SOLUTION INTRAVENOUS at 10:26

## 2025-01-04 RX ADMIN — CLOPIDOGREL 75 MG: 75 TABLET ORAL at 08:23

## 2025-01-04 RX ADMIN — LEVOTHYROXINE SODIUM 88 MCG: 88 TABLET ORAL at 05:17

## 2025-01-04 RX ADMIN — METOPROLOL SUCCINATE 50 MG: 50 TABLET, EXTENDED RELEASE ORAL at 08:23

## 2025-01-04 NOTE — PLAN OF CARE

## 2025-01-04 NOTE — PROGRESS NOTES
Progress Note - Hospitalist   Name: Yesenia Tillman 89 y.o. female I MRN: [id number]  Unit/Bed#: 2 [address] Date of Admission: 1/1/2025   Date of Service: 1/4/2025 I Hospital Day: 3    Assessment & Plan  Multifocal pneumonia  Patient presents with dry cough for a few days with associated fatigue, runny nose, poor appetite.  Reports left lower chest rib hurts when coughing.  Reports possible chills at home.  Reports had a party in apartment on Saturday 4 days ago.  Patient lives alone.  Fever 101.4 on ED arrival  COVID flu negative.  Chest x-ray showed right lower lobe pneumonia  CTA chest PE study showed - No PE. Wedge-shaped consolidation involving the lower aspect of the right lower lobe, and patchy groundglass opacities which involve the left lower lobe, and focal groundglass opacities and likely associated tree-in-bud nodules involving the posterior left upper lobe. These likely present multifocal pneumonia and follow-up until resolution is recommended. Bilateral hilar lymphadenopathy and mediastinal lymphadenopathy, likely reactive, but recommend follow-up chest CT in 2 months time to, document stability or resolution as neoplastic processes such as metastatic disease or lymphoma cannot be entirely excluded.  Initial procalcitonin 0.77  Continue IV Rocephin.  Zithromax discontinued as urine legionella and strep are negative.  Sepsis (HCC)  Present on admission, as evidenced by fever, elevated heart rate, with source of infection multifocal pneumonia.  Lactic acid normal  Initial procalcitonin 0.77  Antibiotics as above  Urine legionella and strep negative  Hyponatremia  Hyponatremia, poor intake  Gentle IV hydration today   Elevated brain natriuretic peptide (BNP) level    No clinical evidence of fluid overload.  2D echo a year ago showed EF 55%, normal diastolic function, normal atriums, no significant valvular disease.  Updated echo performed on 1/2/25 shows no significant changes  Monitor volume  status.  HTN (hypertension), benign  On losartan metoprolol at home.  Will continue.  BP not well-controlled.  Monitor  Acquired hypothyroidism  Continue Synthroid  TSH 3.897.  Paroxysmal atrial fibrillation (HCC)  NSR on ED monitor  On Plavix metoprolol at home.  Will continue.  Optimize electrolytes.    Seizure disorder (HCC)  Continue Keppra  H/O: CVA (cerebrovascular accident)  History of small SAH and subdural hematoma in the left parietal area after receiving tPA for strokelike symptoms in September 2016.  Continue Lipitor Plavix  Mild intermittent asthma without complication  No wheezing on auscultation  Mixed hyperlipidemia  Continue statin  CT showed diffuse hepatic steatosis.  Gastroesophageal reflux disease without esophagitis  Continue PPI and Pepcid daily.  Moderate episode of recurrent major depressive disorder (HCC)  Continue Prozac  History of suicidal thoughts at times per ED note.  Patient denies SI currently.  Follow-up psych/behavioral health as outpatient  Memory difficulty  Patient awake alert oriented to place and person on exam, follows commands.  Supportive care    VTE Pharmacologic Prophylaxis: VTE Score: 6 High Risk (Score >/= 5) - Pharmacological DVT Prophylaxis Ordered: enoxaparin (Lovenox). Sequential Compression Devices Ordered.    Mobility:   Basic Mobility Inpatient Raw Score: 22  JH-HLM Goal: 7: Walk 25 feet or more  JH-HLM Achieved: 3: Sit at edge of bed  JH-HLM Goal NOT achieved. Continue with multidisciplinary rounding and encourage appropriate mobility to improve upon JH-HLM goals.    Patient Centered Rounds: I performed bedside rounds with nursing staff today.   Discussions with Specialists or Other Care Team Provider:     Education and Discussions with Family / Patient: Patient declined call to .     Current Length of Stay: 3 day(s)  Current Patient Status: Inpatient   Certification Statement: The patient will continue to require additional inpatient hospital  stay due to shortness of breath, cough  Discharge Plan: Anticipate discharge tomorrow to home.    Code Status: Level 1 - Full Code    Subjective   Patient laying in bed, states she didn't sleep and doesn't feel well - generally.  She has a productive cough.      Objective :  Temp:  [97.9 °F (36.6 °C)-100.2 °F (37.9 °C)] 98.6 °F (37 °C)  HR:  [68-79] 70  BP: (117-171)/(69-83) 166/72  Resp:  [17-20] 17  SpO2:  [89 %-95 %] 89 %    Body mass index is 30.08 kg/m².     Input and Output Summary (last 24 hours):     Intake/Output Summary (Last 24 hours) at 1/4/2025 1005  Last data filed at 1/3/2025 2200  Gross per 24 hour   Intake 530 ml   Output 650 ml   Net -120 ml       Physical Exam  Vitals and nursing note reviewed.   HENT:      Head: Normocephalic.      Nose: Nose normal.   Eyes:      Extraocular Movements: Extraocular movements intact.   Cardiovascular:      Rate and Rhythm: Normal rate and regular rhythm.   Pulmonary:      Breath sounds: Normal breath sounds.      Comments: Mildly tachypneic    Abdominal:      General: Abdomen is flat. Bowel sounds are normal.      Palpations: Abdomen is soft.   Musculoskeletal:         General: Normal range of motion.   Skin:     General: Skin is warm and dry.   Neurological:      General: No focal deficit present.      Mental Status: She is alert and oriented to person, place, and time.           Lines/Drains:              Lab Results: I have reviewed the following results:   Results from last 7 days   Lab Units 01/04/25  0522   WBC Thousand/uL 7.72   HEMOGLOBIN g/dL 11.0*   HEMATOCRIT % 33.8*   PLATELETS Thousands/uL 368   SEGS PCT % 75   LYMPHO PCT % 13*   MONO PCT % 6   EOS PCT % 4     Results from last 7 days   Lab Units 01/04/25  0522 01/02/25  0553 01/01/25  1430   SODIUM mmol/L 132*   < > 132*   POTASSIUM mmol/L 3.9   < > 3.4*   CHLORIDE mmol/L 107   < > 99   CO2 mmol/L 23   < > 24   BUN mg/dL 9   < > 10   CREATININE mg/dL 0.71   < > 0.78   ANION GAP mmol/L 2*   < > 9    CALCIUM mg/dL 8.1*   < > 8.5   ALBUMIN g/dL  --   --  3.6   TOTAL BILIRUBIN mg/dL  --   --  1.01*   ALK PHOS U/L  --   --  94   ALT U/L  --   --  14   AST U/L  --   --  18   GLUCOSE RANDOM mg/dL 100   < > 113    < > = values in this interval not displayed.     Results from last 7 days   Lab Units 01/01/25  1430   INR  1.13             Results from last 7 days   Lab Units 01/02/25  0553 01/01/25  1632 01/01/25  1430   LACTIC ACID mmol/L  --   --  1.1   PROCALCITONIN ng/ml 1.19* 0.77*  --        Recent Cultures (last 7 days):   Results from last 7 days   Lab Units 01/02/25  1233 01/01/25  1643 01/01/25  1440 01/01/25  1430   BLOOD CULTURE   --   --  No Growth at 48 hrs. No Growth at 48 hrs.   URINE CULTURE   --  20,000-29,000 cfu/ml  --   --    LEGIONELLA URINARY ANTIGEN  Negative  --   --   --              Last 24 Hours Medication List:     Current Facility-Administered Medications:     acetaminophen (TYLENOL) tablet 650 mg, Q6H PRN    atorvastatin (LIPITOR) tablet 40 mg, Daily With Dinner    cefTRIAXone (ROCEPHIN) IVPB (premix in dextrose) 1,000 mg 50 mL, Q24H, Last Rate: Stopped (01/03/25 1822)    clopidogrel (PLAVIX) tablet 75 mg, Daily    enoxaparin (LOVENOX) subcutaneous injection 40 mg, Daily    famotidine (PEPCID) tablet 20 mg, HS    FLUoxetine (PROzac) capsule 40 mg, Daily    guaiFENesin (MUCINEX) 12 hr tablet 600 mg, BID    Hydrocod Nader-Chlorphe Nader ER (TUSSIONEX) ER suspension 2.5 mL, Q12H PRN    ipratropium-albuterol (DUO-NEB) 0.5-2.5 mg/3 mL inhalation solution 3 mL, Q6H PRN    levETIRAcetam (KEPPRA) tablet 500 mg, Daily    levothyroxine tablet 88 mcg, Early Morning    losartan (COZAAR) tablet 100 mg, Daily    magnesium sulfate 2 g/50 mL IVPB (premix) 2 g, Once, Last Rate: 2 g (01/04/25 0848)    metoprolol succinate (TOPROL-XL) 24 hr tablet 50 mg, Daily    ondansetron (ZOFRAN) injection 4 mg, Q6H PRN    pantoprazole (PROTONIX) EC tablet 40 mg, Early Morning    saccharomyces boulardii (FLORASTOR)  capsule 250 mg, BID    sodium chloride 0.9 % infusion, Continuous    Administrative Statements   Today, Patient Was Seen By: SACHI Briggs      **Please Note: This note may have been constructed using a voice recognition system.**

## 2025-01-04 NOTE — PLAN OF CARE
Problem: RESPIRATORY - ADULT  Goal: Achieves optimal ventilation and oxygenation  Description: INTERVENTIONS:  - Assess for changes in respiratory status  - Assess for changes in mentation and behavior  - Position to facilitate oxygenation and minimize respiratory effort  - Oxygen administered by appropriate delivery if ordered  - Initiate smoking cessation education as indicated  - Encourage broncho-pulmonary hygiene including cough, deep breathe, Incentive Spirometry  - Assess the need for suctioning and aspirate as needed  - Assess and instruct to report SOB or any respiratory difficulty  - Respiratory Therapy support as indicated  Outcome: Progressing     Problem: GASTROINTESTINAL - ADULT  Goal: Maintains or returns to baseline bowel function  Description: INTERVENTIONS:  - Assess bowel function  - Encourage oral fluids to ensure adequate hydration  - Administer IV fluids if ordered to ensure adequate hydration  - Administer ordered medications as needed  - Encourage mobilization and activity  - Consider nutritional services referral to assist patient with adequate nutrition and appropriate food choices  Outcome: Progressing  Goal: Maintains adequate nutritional intake  Description: INTERVENTIONS:  - Monitor percentage of each meal consumed  - Identify factors contributing to decreased intake, treat as appropriate  - Assist with meals as needed  - Monitor I&O, weight, and lab values if indicated  - Obtain nutrition services referral as needed  Outcome: Progressing  Goal: Oral mucous membranes remain intact  Description: INTERVENTIONS  - Assess oral mucosa and hygiene practices  - Implement preventative oral hygiene regimen  - Implement oral medicated treatments as ordered  - Initiate Nutrition services referral as needed  Outcome: Progressing     Problem: METABOLIC, FLUID AND ELECTROLYTES - ADULT  Goal: Electrolytes maintained within normal limits  Description: INTERVENTIONS:  - Monitor labs and assess patient  for signs and symptoms of electrolyte imbalances  - Administer electrolyte replacement as ordered  - Monitor response to electrolyte replacements, including repeat lab results as appropriate  - Instruct patient on fluid and nutrition as appropriate  Outcome: Progressing     Problem: HEMATOLOGIC - ADULT  Goal: Maintains hematologic stability  Description: INTERVENTIONS  - Assess for signs and symptoms of bleeding or hemorrhage  - Monitor labs  - Administer supportive blood products/factors as ordered and appropriate  Outcome: Progressing     Problem: Nutrition/Hydration-ADULT  Goal: Nutrient/Hydration intake appropriate for improving, restoring or maintaining nutritional needs  Description: Monitor and assess patient's nutrition/hydration status for malnutrition. Collaborate with interdisciplinary team and initiate plan and interventions as ordered.  Monitor patient's weight and dietary intake as ordered or per policy. Utilize nutrition screening tool and intervene as necessary. Determine patient's food preferences and provide high-protein, high-caloric foods as appropriate.     INTERVENTIONS:  - Monitor oral intake, urinary output, labs, and treatment plans  - Assess nutrition and hydration status and recommend course of action  - Evaluate amount of meals eaten  - Assist patient with eating if necessary   - Allow adequate time for meals  - Recommend/ encourage appropriate diets, oral nutritional supplements, and vitamin/mineral supplements  - Order, calculate, and assess calorie counts as needed  - Recommend, monitor, and adjust tube feedings and TPN/PPN based on assessed needs  - Assess need for intravenous fluids  - Provide specific nutrition/hydration education as appropriate  - Include patient/family/caregiver in decisions related to nutrition  Outcome: Progressing

## 2025-01-04 NOTE — ASSESSMENT & PLAN NOTE
Continue Keppra   She would probably benefit more from an orthopedist or physical medicine physician. Please call and ask if she is having pains specifically in her knees or the entire leg.

## 2025-01-04 NOTE — PLAN OF CARE
Problem: RESPIRATORY - ADULT  Goal: Achieves optimal ventilation and oxygenation  Description: INTERVENTIONS:  - Assess for changes in respiratory status  - Assess for changes in mentation and behavior  - Position to facilitate oxygenation and minimize respiratory effort  - Oxygen administered by appropriate delivery if ordered  - Initiate smoking cessation education as indicated  - Encourage broncho-pulmonary hygiene including cough, deep breathe, Incentive Spirometry  - Assess the need for suctioning and aspirate as needed  - Assess and instruct to report SOB or any respiratory difficulty  - Respiratory Therapy support as indicated  Outcome: Progressing     Problem: GASTROINTESTINAL - ADULT  Goal: Maintains or returns to baseline bowel function  Description: INTERVENTIONS:  - Assess bowel function  - Encourage oral fluids to ensure adequate hydration  - Administer IV fluids if ordered to ensure adequate hydration  - Administer ordered medications as needed  - Encourage mobilization and activity  - Consider nutritional services referral to assist patient with adequate nutrition and appropriate food choices  Outcome: Progressing  Goal: Maintains adequate nutritional intake  Description: INTERVENTIONS:  - Monitor percentage of each meal consumed  - Identify factors contributing to decreased intake, treat as appropriate  - Assist with meals as needed  - Monitor I&O, weight, and lab values if indicated  - Obtain nutrition services referral as needed  Outcome: Progressing  Goal: Oral mucous membranes remain intact  Description: INTERVENTIONS  - Assess oral mucosa and hygiene practices  - Implement preventative oral hygiene regimen  - Implement oral medicated treatments as ordered  - Initiate Nutrition services referral as needed  Outcome: Progressing     Problem: METABOLIC, FLUID AND ELECTROLYTES - ADULT  Goal: Electrolytes maintained within normal limits  Description: INTERVENTIONS:  - Monitor labs and assess patient  for signs and symptoms of electrolyte imbalances  - Administer electrolyte replacement as ordered  - Monitor response to electrolyte replacements, including repeat lab results as appropriate  - Instruct patient on fluid and nutrition as appropriate  Outcome: Progressing     Problem: HEMATOLOGIC - ADULT  Goal: Maintains hematologic stability  Description: INTERVENTIONS  - Assess for signs and symptoms of bleeding or hemorrhage  - Monitor labs  - Administer supportive blood products/factors as ordered and appropriate  Outcome: Progressing     Problem: Nutrition/Hydration-ADULT  Goal: Nutrient/Hydration intake appropriate for improving, restoring or maintaining nutritional needs  Description: Monitor and assess patient's nutrition/hydration status for malnutrition. Collaborate with interdisciplinary team and initiate plan and interventions as ordered.  Monitor patient's weight and dietary intake as ordered or per policy. Utilize nutrition screening tool and intervene as necessary. Determine patient's food preferences and provide high-protein, high-caloric foods as appropriate.     INTERVENTIONS:  - Monitor oral intake, urinary output, labs, and treatment plans  - Assess nutrition and hydration status and recommend course of action  - Evaluate amount of meals eaten  - Assist patient with eating if necessary   - Allow adequate time for meals  - Recommend/ encourage appropriate diets, oral nutritional supplements, and vitamin/mineral supplements  - Order, calculate, and assess calorie counts as needed  - Recommend, monitor, and adjust tube feedings and TPN/PPN based on assessed needs  - Assess need for intravenous fluids  - Provide specific nutrition/hydration education as appropriate  - Include patient/family/caregiver in decisions related to nutrition  Outcome: Progressing     Problem: GASTROINTESTINAL - ADULT  Goal: Minimal or absence of nausea and/or vomiting  Description: INTERVENTIONS:  - Administer IV fluids if  ordered to ensure adequate hydration  - Maintain NPO status until nausea and vomiting are resolved  - Nasogastric tube if ordered  - Administer ordered antiemetic medications as needed  - Provide nonpharmacologic comfort measures as appropriate  - Advance diet as tolerated, if ordered  - Consider nutrition services referral to assist patient with adequate nutrition and appropriate food choices  Outcome: Completed     Problem: METABOLIC, FLUID AND ELECTROLYTES - ADULT  Goal: Fluid balance maintained  Description: INTERVENTIONS:  - Monitor labs   - Monitor I/O and WT  - Instruct patient on fluid and nutrition as appropriate  - Assess for signs & symptoms of volume excess or deficit  Outcome: Completed

## 2025-01-05 LAB
ANION GAP SERPL CALCULATED.3IONS-SCNC: 3 MMOL/L (ref 4–13)
BASOPHILS # BLD AUTO: 0.04 THOUSANDS/ΜL (ref 0–0.1)
BASOPHILS NFR BLD AUTO: 1 % (ref 0–1)
BUN SERPL-MCNC: 9 MG/DL (ref 5–25)
CALCIUM SERPL-MCNC: 7.8 MG/DL (ref 8.4–10.2)
CHLORIDE SERPL-SCNC: 105 MMOL/L (ref 96–108)
CO2 SERPL-SCNC: 25 MMOL/L (ref 21–32)
CREAT SERPL-MCNC: 0.72 MG/DL (ref 0.6–1.3)
EOSINOPHIL # BLD AUTO: 0.28 THOUSAND/ΜL (ref 0–0.61)
EOSINOPHIL NFR BLD AUTO: 4 % (ref 0–6)
ERYTHROCYTE [DISTWIDTH] IN BLOOD BY AUTOMATED COUNT: 13.3 % (ref 11.6–15.1)
GFR SERPL CREATININE-BSD FRML MDRD: 75 ML/MIN/1.73SQ M
GLUCOSE SERPL-MCNC: 92 MG/DL (ref 65–140)
HCT VFR BLD AUTO: 34.9 % (ref 34.8–46.1)
HGB BLD-MCNC: 11.4 G/DL (ref 11.5–15.4)
IMM GRANULOCYTES # BLD AUTO: 0.12 THOUSAND/UL (ref 0–0.2)
IMM GRANULOCYTES NFR BLD AUTO: 2 % (ref 0–2)
LYMPHOCYTES # BLD AUTO: 1.25 THOUSANDS/ΜL (ref 0.6–4.47)
LYMPHOCYTES NFR BLD AUTO: 17 % (ref 14–44)
MAGNESIUM SERPL-MCNC: 2.1 MG/DL (ref 1.9–2.7)
MCH RBC QN AUTO: 30.5 PG (ref 26.8–34.3)
MCHC RBC AUTO-ENTMCNC: 32.7 G/DL (ref 31.4–37.4)
MCV RBC AUTO: 93 FL (ref 82–98)
MONOCYTES # BLD AUTO: 0.43 THOUSAND/ΜL (ref 0.17–1.22)
MONOCYTES NFR BLD AUTO: 6 % (ref 4–12)
NEUTROPHILS # BLD AUTO: 5.38 THOUSANDS/ΜL (ref 1.85–7.62)
NEUTS SEG NFR BLD AUTO: 70 % (ref 43–75)
NRBC BLD AUTO-RTO: 0 /100 WBCS
PLATELET # BLD AUTO: 418 THOUSANDS/UL (ref 149–390)
PMV BLD AUTO: 9.9 FL (ref 8.9–12.7)
POTASSIUM SERPL-SCNC: 4 MMOL/L (ref 3.5–5.3)
RBC # BLD AUTO: 3.74 MILLION/UL (ref 3.81–5.12)
SODIUM SERPL-SCNC: 133 MMOL/L (ref 135–147)
WBC # BLD AUTO: 7.5 THOUSAND/UL (ref 4.31–10.16)

## 2025-01-05 PROCEDURE — 80048 BASIC METABOLIC PNL TOTAL CA: CPT | Performed by: NURSE PRACTITIONER

## 2025-01-05 PROCEDURE — 85025 COMPLETE CBC W/AUTO DIFF WBC: CPT | Performed by: NURSE PRACTITIONER

## 2025-01-05 PROCEDURE — 99232 SBSQ HOSP IP/OBS MODERATE 35: CPT | Performed by: NURSE PRACTITIONER

## 2025-01-05 PROCEDURE — 83735 ASSAY OF MAGNESIUM: CPT | Performed by: NURSE PRACTITIONER

## 2025-01-05 RX ORDER — SODIUM CHLORIDE 9 MG/ML
50 INJECTION, SOLUTION INTRAVENOUS CONTINUOUS
Status: DISPENSED | OUTPATIENT
Start: 2025-01-05 | End: 2025-01-05

## 2025-01-05 RX ADMIN — FLUOXETINE HYDROCHLORIDE 40 MG: 20 CAPSULE ORAL at 08:40

## 2025-01-05 RX ADMIN — Medication 250 MG: at 08:40

## 2025-01-05 RX ADMIN — METOPROLOL SUCCINATE 50 MG: 50 TABLET, EXTENDED RELEASE ORAL at 08:40

## 2025-01-05 RX ADMIN — LOSARTAN POTASSIUM 100 MG: 50 TABLET, FILM COATED ORAL at 08:40

## 2025-01-05 RX ADMIN — CLOPIDOGREL 75 MG: 75 TABLET ORAL at 08:40

## 2025-01-05 RX ADMIN — ATORVASTATIN CALCIUM 40 MG: 40 TABLET, FILM COATED ORAL at 17:39

## 2025-01-05 RX ADMIN — ENOXAPARIN SODIUM 40 MG: 40 INJECTION SUBCUTANEOUS at 08:40

## 2025-01-05 RX ADMIN — LEVETIRACETAM 500 MG: 500 TABLET, FILM COATED ORAL at 08:41

## 2025-01-05 RX ADMIN — FAMOTIDINE 20 MG: 20 TABLET, FILM COATED ORAL at 22:02

## 2025-01-05 RX ADMIN — SODIUM CHLORIDE 50 ML/HR: 0.9 INJECTION, SOLUTION INTRAVENOUS at 08:41

## 2025-01-05 RX ADMIN — LEVOTHYROXINE SODIUM 88 MCG: 88 TABLET ORAL at 06:28

## 2025-01-05 RX ADMIN — Medication 250 MG: at 17:39

## 2025-01-05 RX ADMIN — GUAIFENESIN 600 MG: 600 TABLET, EXTENDED RELEASE ORAL at 17:39

## 2025-01-05 RX ADMIN — PANTOPRAZOLE SODIUM 40 MG: 40 TABLET, DELAYED RELEASE ORAL at 06:28

## 2025-01-05 RX ADMIN — GUAIFENESIN 600 MG: 600 TABLET, EXTENDED RELEASE ORAL at 08:41

## 2025-01-05 RX ADMIN — CEFTRIAXONE 1000 MG: 1 INJECTION, SOLUTION INTRAVENOUS at 17:38

## 2025-01-05 NOTE — PROGRESS NOTES
Progress Note - Hospitalist   Name: Yesenia Tillman 89 y.o. female I MRN: [id number]  Unit/Bed#: 2 [address] Date of Admission: 1/1/2025   Date of Service: 1/5/2025 I Hospital Day: 4    Assessment & Plan  Multifocal pneumonia  Patient presents with dry cough for a few days with associated fatigue, runny nose, poor appetite.  Reports left lower chest rib hurts when coughing.  Reports possible chills at home.  Reports had a party in apartment on Saturday 4 days ago.  Patient lives alone.  Fever 101.4 on ED arrival  COVID flu negative.  Chest x-ray showed right lower lobe pneumonia  CTA chest PE study showed - No PE. Wedge-shaped consolidation involving the lower aspect of the right lower lobe, and patchy groundglass opacities which involve the left lower lobe, and focal groundglass opacities and likely associated tree-in-bud nodules involving the posterior left upper lobe. These likely present multifocal pneumonia and follow-up until resolution is recommended. Bilateral hilar lymphadenopathy and mediastinal lymphadenopathy, likely reactive, but recommend follow-up chest CT in 2 months time to, document stability or resolution as neoplastic processes such as metastatic disease or lymphoma cannot be entirely excluded.  Initial procalcitonin 0.77  Continue IV Rocephin.  Zithromax discontinued as urine legionella and strep are negative.  Sepsis (HCC)  Present on admission, as evidenced by fever, elevated heart rate, with source of infection multifocal pneumonia.  Lactic acid normal  Initial procalcitonin 0.77  Antibiotics as above  Urine legionella and strep negative  Hyponatremia  Hyponatremia, poor intake  Gentle IV hydration today   Elevated brain natriuretic peptide (BNP) level    No clinical evidence of fluid overload.  2D echo a year ago showed EF 55%, normal diastolic function, normal atriums, no significant valvular disease.  Updated echo performed on 1/2/25 shows no significant changes  Monitor volume  status.  HTN (hypertension), benign  On losartan metoprolol at home.  Will continue.  BP not well-controlled.  Monitor  Acquired hypothyroidism  Continue Synthroid  TSH 3.897.  Paroxysmal atrial fibrillation (HCC)  NSR on ED monitor  On Plavix metoprolol at home.  Will continue.  Optimize electrolytes.    Seizure disorder (HCC)  Continue Keppra  H/O: CVA (cerebrovascular accident)  History of small SAH and subdural hematoma in the left parietal area after receiving tPA for strokelike symptoms in September 2016.  Continue Lipitor Plavix  Mild intermittent asthma without complication  No wheezing on auscultation  Mixed hyperlipidemia  Continue statin  CT showed diffuse hepatic steatosis.  Gastroesophageal reflux disease without esophagitis  Continue PPI and Pepcid daily.  Moderate episode of recurrent major depressive disorder (HCC)  Continue Prozac  History of suicidal thoughts at times per ED note.  Patient denies SI currently.  Follow-up psych/behavioral health as outpatient  Refusing to get out of bed, tearful - states she is just sad all the time - no SI/HI  Psych consult   Memory difficulty  Patient awake alert oriented to place and person on exam, follows commands.  Supportive care    VTE Pharmacologic Prophylaxis: VTE Score: 6 High Risk (Score >/= 5) - Pharmacological DVT Prophylaxis Ordered: enoxaparin (Lovenox). Sequential Compression Devices Ordered.    Mobility:   Basic Mobility Inpatient Raw Score: 15  JH-HLM Goal: 4: Move to chair/commode  JH-HLM Achieved: 7: Walk 25 feet or more  JH-HLM Goal NOT achieved. Continue with multidisciplinary rounding and encourage appropriate mobility to improve upon JH-HLM goals.    Patient Centered Rounds: I performed bedside rounds with nursing staff today.   Discussions with Specialists or Other Care Team Provider:     Education and Discussions with Family / Patient: Patient declined call to .     Current Length of Stay: 4 day(s)  Current Patient Status:  Inpatient   Certification Statement: The patient will continue to require additional inpatient hospital stay due to difficulty ambulating   Discharge Plan: Anticipate discharge tomorrow to discharge location to be determined pending rehab evaluations.    Code Status: Level 1 - Full Code    Subjective   Patient laying in bed, she refuses to get up.  She states that she feels very sad.  She denies any SI or HI but reports that she has decreased a desire to do the things she previously did.  She is sad about getting older and a lot of life changes she has been experiencing.  She wants to make friends but is afraid to commit to meeting with others.  She is requesting to stay in the hospital because she just does not feel well.  Extensive therapeutic conversation had with patient's on the importance of moving around.  Per nursing she is very unsteady on her feet, PT was ordered.  She declines to have family contacted.  She states that she wants her son to be able to live his life and does not want to burden him with her problems.    Objective :  Temp:  [97.4 °F (36.3 °C)-98.1 °F (36.7 °C)] 98.1 °F (36.7 °C)  HR:  [68-77] 68  BP: (144-158)/() 158/80  Resp:  [20] 20  SpO2:  [92 %-94 %] 92 %    Body mass index is 30.08 kg/m².     Input and Output Summary (last 24 hours):     Intake/Output Summary (Last 24 hours) at 1/5/2025 1104  Last data filed at 1/5/2025 0601  Gross per 24 hour   Intake 790 ml   Output 825 ml   Net -35 ml       Physical Exam  Vitals and nursing note reviewed.   Constitutional:       Appearance: Normal appearance.   HENT:      Head: Normocephalic.      Nose: Nose normal.   Eyes:      Extraocular Movements: Extraocular movements intact.   Cardiovascular:      Rate and Rhythm: Normal rate and regular rhythm.   Pulmonary:      Effort: Pulmonary effort is normal.      Breath sounds: Normal breath sounds.   Abdominal:      General: Abdomen is flat. Bowel sounds are normal.      Palpations: Abdomen is  soft.   Musculoskeletal:         General: No swelling. Normal range of motion.   Skin:     General: Skin is warm and dry.   Neurological:      General: No focal deficit present.      Mental Status: She is alert and oriented to person, place, and time.           Lines/Drains:              Lab Results: I have reviewed the following results:   Results from last 7 days   Lab Units 01/05/25  0534   WBC Thousand/uL 7.50   HEMOGLOBIN g/dL 11.4*   HEMATOCRIT % 34.9   PLATELETS Thousands/uL 418*   SEGS PCT % 70   LYMPHO PCT % 17   MONO PCT % 6   EOS PCT % 4     Results from last 7 days   Lab Units 01/05/25  0534 01/02/25  0553 01/01/25  1430   SODIUM mmol/L 133*   < > 132*   POTASSIUM mmol/L 4.0   < > 3.4*   CHLORIDE mmol/L 105   < > 99   CO2 mmol/L 25   < > 24   BUN mg/dL 9   < > 10   CREATININE mg/dL 0.72   < > 0.78   ANION GAP mmol/L 3*   < > 9   CALCIUM mg/dL 7.8*   < > 8.5   ALBUMIN g/dL  --   --  3.6   TOTAL BILIRUBIN mg/dL  --   --  1.01*   ALK PHOS U/L  --   --  94   ALT U/L  --   --  14   AST U/L  --   --  18   GLUCOSE RANDOM mg/dL 92   < > 113    < > = values in this interval not displayed.     Results from last 7 days   Lab Units 01/01/25  1430   INR  1.13             Results from last 7 days   Lab Units 01/02/25  0553 01/01/25  1632 01/01/25  1430   LACTIC ACID mmol/L  --   --  1.1   PROCALCITONIN ng/ml 1.19* 0.77*  --        Recent Cultures (last 7 days):   Results from last 7 days   Lab Units 01/02/25  1233 01/01/25  1643 01/01/25  1440 01/01/25  1430   BLOOD CULTURE   --   --  No Growth at 72 hrs. No Growth at 72 hrs.   URINE CULTURE   --  20,000-29,000 cfu/ml  --   --    LEGIONELLA URINARY ANTIGEN  Negative  --   --   --              Last 24 Hours Medication List:     Current Facility-Administered Medications:     acetaminophen (TYLENOL) tablet 650 mg, Q6H PRN    atorvastatin (LIPITOR) tablet 40 mg, Daily With Dinner    cefTRIAXone (ROCEPHIN) IVPB (premix in dextrose) 1,000 mg 50 mL, Q24H, Last Rate: 1,000 mg  (01/04/25 9547)    clopidogrel (PLAVIX) tablet 75 mg, Daily    enoxaparin (LOVENOX) subcutaneous injection 40 mg, Daily    famotidine (PEPCID) tablet 20 mg, HS    FLUoxetine (PROzac) capsule 40 mg, Daily    guaiFENesin (MUCINEX) 12 hr tablet 600 mg, BID    Hydrocod Nader-Chlorphe Nader ER (TUSSIONEX) ER suspension 2.5 mL, Q12H PRN    ipratropium-albuterol (DUO-NEB) 0.5-2.5 mg/3 mL inhalation solution 3 mL, Q6H PRN    levETIRAcetam (KEPPRA) tablet 500 mg, Daily    levothyroxine tablet 88 mcg, Early Morning    losartan (COZAAR) tablet 100 mg, Daily    metoprolol succinate (TOPROL-XL) 24 hr tablet 50 mg, Daily    ondansetron (ZOFRAN) injection 4 mg, Q6H PRN    pantoprazole (PROTONIX) EC tablet 40 mg, Early Morning    saccharomyces boulardii (FLORASTOR) capsule 250 mg, BID    sodium chloride 0.9 % infusion, Continuous, Last Rate: 50 mL/hr (01/05/25 6200)    Administrative Statements   Today, Patient Was Seen By: SACHI Briggs      **Please Note: This note may have been constructed using a voice recognition system.**

## 2025-01-05 NOTE — PLAN OF CARE
Problem: RESPIRATORY - ADULT  Goal: Achieves optimal ventilation and oxygenation  Description: INTERVENTIONS:  - Assess for changes in respiratory status  - Assess for changes in mentation and behavior  - Position to facilitate oxygenation and minimize respiratory effort  - Oxygen administered by appropriate delivery if ordered  - Initiate smoking cessation education as indicated  - Encourage broncho-pulmonary hygiene including cough, deep breathe, Incentive Spirometry  - Assess the need for suctioning and aspirate as needed  - Assess and instruct to report SOB or any respiratory difficulty  - Respiratory Therapy support as indicated  Outcome: Progressing     Problem: METABOLIC, FLUID AND ELECTROLYTES - ADULT  Goal: Electrolytes maintained within normal limits  Description: INTERVENTIONS:  - Monitor labs and assess patient for signs and symptoms of electrolyte imbalances  - Administer electrolyte replacement as ordered  - Monitor response to electrolyte replacements, including repeat lab results as appropriate  - Instruct patient on fluid and nutrition as appropriate  Outcome: Progressing     Problem: HEMATOLOGIC - ADULT  Goal: Maintains hematologic stability  Description: INTERVENTIONS  - Assess for signs and symptoms of bleeding or hemorrhage  - Monitor labs  - Administer supportive blood products/factors as ordered and appropriate  Outcome: Progressing     Problem: Nutrition/Hydration-ADULT  Goal: Nutrient/Hydration intake appropriate for improving, restoring or maintaining nutritional needs  Description: Monitor and assess patient's nutrition/hydration status for malnutrition. Collaborate with interdisciplinary team and initiate plan and interventions as ordered.  Monitor patient's weight and dietary intake as ordered or per policy. Utilize nutrition screening tool and intervene as necessary. Determine patient's food preferences and provide high-protein, high-caloric foods as appropriate.     INTERVENTIONS:  -  Monitor oral intake, urinary output, labs, and treatment plans  - Assess nutrition and hydration status and recommend course of action  - Evaluate amount of meals eaten  - Assist patient with eating if necessary   - Allow adequate time for meals  - Recommend/ encourage appropriate diets, oral nutritional supplements, and vitamin/mineral supplements  - Order, calculate, and assess calorie counts as needed  - Recommend, monitor, and adjust tube feedings and TPN/PPN based on assessed needs  - Assess need for intravenous fluids  - Provide specific nutrition/hydration education as appropriate  - Include patient/family/caregiver in decisions related to nutrition  Outcome: Progressing

## 2025-01-05 NOTE — ASSESSMENT & PLAN NOTE
Continue Prozac  History of suicidal thoughts at times per ED note.  Patient denies SI currently.  Follow-up psych/behavioral health as outpatient  Refusing to get out of bed, tearful - states she is just sad all the time - no SI/HI  Psych consult

## 2025-01-06 PROBLEM — E78.2 MIXED HYPERLIPIDEMIA: Status: RESOLVED | Noted: 2022-08-13 | Resolved: 2025-01-06

## 2025-01-06 PROBLEM — J45.20 MILD INTERMITTENT ASTHMA WITHOUT COMPLICATION: Status: RESOLVED | Noted: 2022-08-13 | Resolved: 2025-01-06

## 2025-01-06 LAB
25(OH)D3 SERPL-MCNC: 17 NG/ML (ref 30–100)
ANION GAP SERPL CALCULATED.3IONS-SCNC: 2 MMOL/L (ref 4–13)
BASOPHILS # BLD AUTO: 0.05 THOUSANDS/ΜL (ref 0–0.1)
BASOPHILS NFR BLD AUTO: 1 % (ref 0–1)
BUN SERPL-MCNC: 8 MG/DL (ref 5–25)
CALCIUM SERPL-MCNC: 7.8 MG/DL (ref 8.4–10.2)
CHLORIDE SERPL-SCNC: 107 MMOL/L (ref 96–108)
CO2 SERPL-SCNC: 26 MMOL/L (ref 21–32)
CREAT SERPL-MCNC: 0.71 MG/DL (ref 0.6–1.3)
EOSINOPHIL # BLD AUTO: 0.27 THOUSAND/ΜL (ref 0–0.61)
EOSINOPHIL NFR BLD AUTO: 3 % (ref 0–6)
ERYTHROCYTE [DISTWIDTH] IN BLOOD BY AUTOMATED COUNT: 13.3 % (ref 11.6–15.1)
FOLATE SERPL-MCNC: 3.8 NG/ML
GFR SERPL CREATININE-BSD FRML MDRD: 76 ML/MIN/1.73SQ M
GLUCOSE SERPL-MCNC: 94 MG/DL (ref 65–140)
HCT VFR BLD AUTO: 37.4 % (ref 34.8–46.1)
HGB BLD-MCNC: 12.1 G/DL (ref 11.5–15.4)
IMM GRANULOCYTES # BLD AUTO: 0.16 THOUSAND/UL (ref 0–0.2)
IMM GRANULOCYTES NFR BLD AUTO: 2 % (ref 0–2)
LYMPHOCYTES # BLD AUTO: 1.1 THOUSANDS/ΜL (ref 0.6–4.47)
LYMPHOCYTES NFR BLD AUTO: 13 % (ref 14–44)
MAGNESIUM SERPL-MCNC: 1.9 MG/DL (ref 1.9–2.7)
MCH RBC QN AUTO: 30 PG (ref 26.8–34.3)
MCHC RBC AUTO-ENTMCNC: 32.4 G/DL (ref 31.4–37.4)
MCV RBC AUTO: 93 FL (ref 82–98)
MONOCYTES # BLD AUTO: 0.49 THOUSAND/ΜL (ref 0.17–1.22)
MONOCYTES NFR BLD AUTO: 6 % (ref 4–12)
NEUTROPHILS # BLD AUTO: 6.44 THOUSANDS/ΜL (ref 1.85–7.62)
NEUTS SEG NFR BLD AUTO: 75 % (ref 43–75)
NRBC BLD AUTO-RTO: 0 /100 WBCS
PLATELET # BLD AUTO: 445 THOUSANDS/UL (ref 149–390)
PMV BLD AUTO: 9.8 FL (ref 8.9–12.7)
POTASSIUM SERPL-SCNC: 3.9 MMOL/L (ref 3.5–5.3)
PROCALCITONIN SERPL-MCNC: 0.34 NG/ML
RBC # BLD AUTO: 4.03 MILLION/UL (ref 3.81–5.12)
SODIUM SERPL-SCNC: 135 MMOL/L (ref 135–147)
VIT B12 SERPL-MCNC: 283 PG/ML (ref 180–914)
WBC # BLD AUTO: 8.51 THOUSAND/UL (ref 4.31–10.16)

## 2025-01-06 PROCEDURE — 82746 ASSAY OF FOLIC ACID SERUM: CPT | Performed by: PSYCHIATRY & NEUROLOGY

## 2025-01-06 PROCEDURE — 82607 VITAMIN B-12: CPT | Performed by: PSYCHIATRY & NEUROLOGY

## 2025-01-06 PROCEDURE — 99222 1ST HOSP IP/OBS MODERATE 55: CPT | Performed by: PSYCHIATRY & NEUROLOGY

## 2025-01-06 PROCEDURE — 99233 SBSQ HOSP IP/OBS HIGH 50: CPT | Performed by: INTERNAL MEDICINE

## 2025-01-06 PROCEDURE — 97535 SELF CARE MNGMENT TRAINING: CPT

## 2025-01-06 PROCEDURE — 97167 OT EVAL HIGH COMPLEX 60 MIN: CPT

## 2025-01-06 PROCEDURE — 84145 PROCALCITONIN (PCT): CPT | Performed by: NURSE PRACTITIONER

## 2025-01-06 PROCEDURE — 82306 VITAMIN D 25 HYDROXY: CPT | Performed by: PSYCHIATRY & NEUROLOGY

## 2025-01-06 PROCEDURE — 83735 ASSAY OF MAGNESIUM: CPT | Performed by: NURSE PRACTITIONER

## 2025-01-06 PROCEDURE — 80048 BASIC METABOLIC PNL TOTAL CA: CPT | Performed by: NURSE PRACTITIONER

## 2025-01-06 PROCEDURE — 97116 GAIT TRAINING THERAPY: CPT

## 2025-01-06 PROCEDURE — 97163 PT EVAL HIGH COMPLEX 45 MIN: CPT

## 2025-01-06 PROCEDURE — 85025 COMPLETE CBC W/AUTO DIFF WBC: CPT | Performed by: NURSE PRACTITIONER

## 2025-01-06 RX ORDER — BENZONATATE 100 MG/1
100 CAPSULE ORAL 3 TIMES DAILY PRN
Status: DISCONTINUED | OUTPATIENT
Start: 2025-01-06 | End: 2025-01-08 | Stop reason: HOSPADM

## 2025-01-06 RX ADMIN — FAMOTIDINE 20 MG: 20 TABLET, FILM COATED ORAL at 23:02

## 2025-01-06 RX ADMIN — METOPROLOL SUCCINATE 50 MG: 50 TABLET, EXTENDED RELEASE ORAL at 10:01

## 2025-01-06 RX ADMIN — GUAIFENESIN 600 MG: 600 TABLET, EXTENDED RELEASE ORAL at 18:18

## 2025-01-06 RX ADMIN — Medication 250 MG: at 18:18

## 2025-01-06 RX ADMIN — LOSARTAN POTASSIUM 100 MG: 50 TABLET, FILM COATED ORAL at 10:01

## 2025-01-06 RX ADMIN — ATORVASTATIN CALCIUM 40 MG: 40 TABLET, FILM COATED ORAL at 16:30

## 2025-01-06 RX ADMIN — FLUOXETINE HYDROCHLORIDE 40 MG: 20 CAPSULE ORAL at 10:01

## 2025-01-06 RX ADMIN — ENOXAPARIN SODIUM 40 MG: 40 INJECTION SUBCUTANEOUS at 10:00

## 2025-01-06 RX ADMIN — CEFTRIAXONE 1000 MG: 1 INJECTION, SOLUTION INTRAVENOUS at 16:30

## 2025-01-06 RX ADMIN — CLOPIDOGREL 75 MG: 75 TABLET ORAL at 10:01

## 2025-01-06 RX ADMIN — LEVETIRACETAM 500 MG: 500 TABLET, FILM COATED ORAL at 10:01

## 2025-01-06 RX ADMIN — Medication 250 MG: at 10:01

## 2025-01-06 RX ADMIN — GUAIFENESIN 600 MG: 600 TABLET, EXTENDED RELEASE ORAL at 10:01

## 2025-01-06 RX ADMIN — LEVOTHYROXINE SODIUM 88 MCG: 88 TABLET ORAL at 06:35

## 2025-01-06 RX ADMIN — PANTOPRAZOLE SODIUM 40 MG: 40 TABLET, DELAYED RELEASE ORAL at 06:36

## 2025-01-06 NOTE — CASE MANAGEMENT
Case Management Discharge Planning Note    Patient name Yesenia Tillman  Location [address] MRN [id number]  : 1935 Date 2025       Current Admission Date: 2025  Current Admission Diagnosis:Multifocal pneumonia   Patient Active Problem List    Diagnosis Date Noted Date Diagnosed    Multifocal pneumonia 2025     Sepsis (HCC) 2025     Hyponatremia 2025     Elevated brain natriuretic peptide (BNP) level 2025     Memory difficulty 10/11/2024     Right shoulder pain 2023     Gastroesophageal reflux disease without esophagitis 10/11/2022     Moderate episode of recurrent major depressive disorder (HCC) 10/11/2022     Seasonal allergic rhinitis due to pollen 2022     Mild intermittent asthma without complication 2022     Mixed hyperlipidemia 2022     Stroke-like symptoms 2020     H/O: CVA (cerebrovascular accident) 2020     Stenosis of left middle cerebral artery 2019     Esophageal dysmotility 06/10/2019     Seizure disorder (HCC) 2018     HTN (hypertension), benign 2016     Acquired hypothyroidism 2016     Paroxysmal atrial fibrillation (HCC) 2016       LOS (days): 5  Geometric Mean LOS (GMLOS) (days): 4.9  Days to GMLOS:0.2     OBJECTIVE:  Risk of Unplanned Readmission Score: 15.14     Current admission status: Inpatient   Preferred Pharmacy:   [id number] - [place], [place] - [address]  [facility] [address]  Phone: [phone] Fax: [phone]    Primary Care Provider: Liam Zepeda MD    Primary Insurance: [employer]  Secondary Insurance:     DISCHARGE DETAILS:    Discharge planning discussed with:: Patient and sonClarence  Freedom of Choice: Yes  Comments - Freedom of Choice: SW following to assist with planning.  PT evaluated pt at [facility] and STR placement is being recommended.  SW met with pt and spoke with son over  phone to review recommendations.  Both are open to rehab placement.  SW offered to make blanket referrals to determine facility availability.  Both pt and son said their preference would be a facility close to pt's apartment.  Referrals were made and responses were received quickly.  SW discussed list with pt and son.  Both requested rehab placement at [facility].  Facility will be reserved and authorization request will be submitted.  SW discussed transportation options with son.  At this time son is planning on transporting pt by car.  SW will continue to follow to monitor progress and assist as needed.  CM contacted family/caregiver?: Yes  Were Treatment Team discharge recommendations reviewed with patient/caregiver?: Yes  Did patient/caregiver verbalize understanding of patient care needs?: Yes  Were patient/caregiver advised of the risks associated with not following Treatment Team discharge recommendations?: Yes    Contacts  Patient Contacts: Chucky Porter  Relationship to Patient:: Family (son)  Contact Method: Phone  Phone Number: [phone]  Reason/Outcome: Discharge Planning    Requested Home Health Care         Is the patient interested in HHC at discharge?: No    DME Referral Provided  Referral made for DME?: No    Other Referral/Resources/Interventions Provided:  Interventions: Short Term Rehab  Referral Comments: STR placement at [facility] is planned.  Information for authorization request will be sent to  Discharge Support for submission.    Treatment Team Recommendation: Short Term Rehab  Discharge Destination Plan:: Short Term Rehab  Transport at Discharge : Family    IMM Given (Date):: 01/06/25  IMM Given to:: Patient (IMM #3 reviewed with pt. Pt verbalized understanding. Pt signed IMM and copy given. Copy also placed in scan bin for chart.)

## 2025-01-06 NOTE — PROGRESS NOTES
Patient:    MRN:  [id number]    Aidin Request ID:  [id number]    Level of care reserved:  Skilled Nursing Facility    Partner Reserved:  [place] [id number] [phone]    Clinical needs requested:    Geography searched:  25 miles around [id number]    Start of Service:    Request sent:  11:04am EST on 1/6/2025 by Madelaine Dobson    Partner reserved:  11:55am EST on 1/6/2025 by Madelaine Dobson    Choice list shared:  11:24am EST on 1/6/2025 by Madelaine Dobson

## 2025-01-06 NOTE — ASSESSMENT & PLAN NOTE
Continue Prozac  History of suicidal thoughts at times per ED note.  Patient denies SI currently.  Follow-up psych/behavioral health as outpatient  Refusing to get out of bed, tearful - states she is just sad all the time - no SI/HI  Psych consulted and case discussed

## 2025-01-06 NOTE — ASSESSMENT & PLAN NOTE
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

## 2025-01-06 NOTE — ASSESSMENT & PLAN NOTE
Patient takes Keppra 500 mg daily.  Keppra is associated with mood disturbances.  Unclear if there is a contribution here. Per chart patient has been taking Keppra for years and patient reports worsening of depression since the loss of her  and then her son passed away a few months ago.  Follow-up with neurology regarding this

## 2025-01-06 NOTE — PHYSICAL THERAPY NOTE
PHYSICAL THERAPY EVALUATION/TREATMENT    NAME:  Yesenia Tillman  AGE:   89 y.o.  Mrn:   [id number]  Length Of Stay: 5    ADMIT DX:  Cough [R05.9]  Pneumonia [J18.9]  Fever [R50.9]  Encounter for psychological evaluation [Z00.8]    Past Medical History:   Diagnosis Date    Acute CVA (cerebrovascular accident) (HCC) 06/18/2016    Adult hypothyroidism 09/12/2016    Anxiety 06/10/2019    Arthritis     Asthma     Colon polyp     Depression     Disease of thyroid gland     Dyslipidemia 06/10/2019    Esophageal dysmotility 06/10/2019    Essential hypertension 09/12/2016    GERD (gastroesophageal reflux disease)     Hyperlipidemia     Hypertension     Hypothyroidism     Obesity     Paroxysmal atrial fibrillation ([facility]) 09/12/2016    Seizure disorder ([facility]) 08/24/2018    Seizures ([facility])     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    Stroke ([facility]) 06/2016    Subarachnoid hemorrhage ([facility]) 09/13/2016    Subdural hematoma ([facility]) 09/13/2016     Past Surgical History:   Procedure Laterality Date    APPENDECTOMY      BACK SURGERY  01/2016    CARPAL TUNNEL RELEASE Right     COLONOSCOPY      TUBAL LIGATION          01/06/25 0930   PT Last Visit   PT Visit Date 01/06/25   Note Type   Note type Evaluation   Pain Assessment   Pain Assessment Tool 0-10   Pain Score No Pain   Restrictions/Precautions   Other Precautions Cognitive;Fall Risk;Bed Alarm;Chair Alarm;Seizure  (Dilip on room air)   Home Living   Type of Home Apartment   Home Layout One level;Able to live on main level with bedroom/bathroom;Performs ADLs on one level;Stairs to enter with rails  (3-4 steps to enter)   Bathroom Shower/Tub Tub/shower unit   Bathroom Toilet Standard   Bathroom Equipment Shower chair;Grab bars in shower   Bathroom Accessibility Accessible   Home Equipment Cane  (RW)   Prior Function   Level of [place] Independent with ADLs;Independent with functional mobility;Independent with IADLS  "  Lives With (S)  Alone   Receives Help From Family;Neighbor   IADLs Independent with driving;Independent with meal prep;Independent with medication management   Falls in the last 6 months 1 to 4  (1, per patient)   Vocational Other (Comment)  (Homemaker)   Comments Patient normally ambulates without an assistive device prior to admission   General   Additional Pertinent History Patient is admitted with cough, fatigue, runny nose, poor appetite and fever   Family/Caregiver Present No   Cognition   Overall Cognitive Status Impaired   Arousal/Participation Cooperative   Orientation Level Oriented to person;Oriented to place;Disoriented to time;Disoriented to situation  (Generally oriented to place, knows she is in the hospital and is able to look at the white board and states \"[facility]\")   Memory Decreased recall of precautions;Decreased recall of recent events;Decreased short term memory   Following Commands Follows multistep commands with increased time or repetition   Comments At least 2 patient identifiers including name and date of birth   Subjective   Subjective \"Shaky\"   RLE Assessment   RLE Assessment WFL  (Grossly 3+ to 4 -/5)   LLE Assessment   LLE Assessment WFL  (Grossly 3+ to 4 -/5)   Vision-Basic Assessment   Current Vision Wears glasses all the time   Light Touch   RLE Light Touch Grossly intact   LLE Light Touch Grossly intact   Bed Mobility   Additional Comments Patient received out of bed in chair   Transfers   Sit to Stand 4  Minimal assistance   Additional items Armrests;Assist x 1;Verbal cues;Increased time required  (Cues for safe hand placement)   Stand to Sit 4  Minimal assistance   Additional items Armrests;Assist x 1;Verbal cues;Increased time required  (Cues for safe hand placement)   Ambulation/Elevation   Gait pattern Short stride;Step through pattern;Decreased foot clearance  (Shaky; minimal, generalized multidirectional unsteadiness)   Gait Assistance 4  Minimal assist   Additional " items Assist x 1;Verbal cues;Tactile cues   Assistive Device Rolling walker   Distance 50 feet with change in direction   Balance   Static Sitting Fair +   Static Standing Fair -  (With roller walker)   Ambulatory   (P+/F- with roller walker)   Endurance Deficit   Endurance Deficit Yes   Endurance Deficit Description Limited overall activity, standing and gait endurance   Activity Tolerance   Activity Tolerance Patient limited by fatigue;Treatment limited secondary to medical complications (Comment)  (Impaired cognition; weakness and deconditioning)   Medical Staff Made Aware AMRIO Bishop   Nurse Made Aware KAILYN Pickens   Assessment   Problem List Decreased strength;Decreased endurance;Impaired balance;Decreased mobility;Decreased cognition;Decreased safety awareness   Assessment Patient seen for Physical Therapy evaluation. Patient admitted with Multifocal pneumonia.  Comorbidities affecting patient's physical performance include: HTN, P A-fib, seizure disorder, depression, history of CVA, HLD, memory difficulty.  Personal factors affecting patient at time of initial evaluation include: lives in one story house, stairs to enter home, inability to navigate community distances, inability to navigate level surfaces without external assistance, inability to perform dynamic tasks in community, decreased cognition, limited home support, positive fall history, and limited insight into impairments. Prior to admission, patient was independent with functional mobility without assistive device, independent with ADLS, independent with IADLS, living alone in one story home with 3-4 steps to enter, ambulating household distance, and ambulating community distances.  Please find objective findings from Physical Therapy assessment regarding body systems outlined above with impairments and limitations including weakness, impaired balance, decreased endurance, gait deviations, decreased activity tolerance, decreased functional  mobility tolerance, decreased safety awareness, impaired judgement, fall risk, and decreased cognition.  The Barthel Index was used as a functional outcome tool presenting with a score of Barthel Index Score: 55 today indicating marked limitations of functional mobility and ADLS.  Patient's clinical presentation is currently unstable/unpredictable as seen in patient's presentation of vital sign response, changing level of pain, varying levels of cognitive performance, increased fall risk, new onset of impairment of functional mobility, decreased endurance, and new onset of weakness. Pt would benefit from continued Physical Therapy treatment to address deficits as defined above and maximize level of functional mobility. As demonstrated by objective findings, the assigned level of complexity for this evaluation is high.    The patient's -[facility] Basic Mobility Inpatient Short Form Raw Score is 17. A Raw score of greater than 16 suggests the patient may benefit from discharge to home. Please also refer to the recommendation of the Physical Therapist for safe discharge planning.   Barriers to Discharge Inaccessible home environment;Decreased caregiver support   Barriers to Discharge Comments Patient lives alone; 3-4 steps to enter; impaired cognition   Goals   Patient Goals To feel better   [facility] Expiration Date 01/16/25   Short Term Goal #1 Patient will: Increase bilateral LE strength 1/2 grade to facilitate independent mobility, Perform all bed mobility tasks independently to improve pt's independence w/ repositioning for decrease risk of skin breakdown, Perform all transfers independently consistently from various height surfaces in order to improve I w/ engagement w/ real-world environments/situations, Ambulate at least 500+ ft.  With LRAD versus no AD independently w/o LOB to facilitate return and engagement w/ previous living environment, Navigate 3-4 stairs independently with unilateral handrail to either improve  independence w/ entering home and/or so patient can fully access living areas in home, Increase ambulatory and static and dynamic standing balance 1 grade to decrease risk for falls, Tolerate at least 20 consecutive minutes of activity to demonstrate improved activity tolerance and endurance, and Tolerate 3 hr OOB to faciliate upright tolerance   Plan   Treatment/Interventions ADL retraining;Functional transfer training;LE strengthening/ROM;Elevations;Therapeutic exercise;Endurance training;Cognitive reorientation;Patient/family training;Equipment eval/education;Bed mobility;Gait training;Compensatory technique education;Spoke to nursing;Spoke to case management;OT   PT Frequency 3-5x/wk   Discharge Recommendation   Rehab Resource Intensity Level, PT II (Moderate Resource Intensity)   Equipment Recommended   (Patient has a cane and a roller walker)   Additional Comments (S)  Based on PT evaluation and treatment patient is functioning well below baseline level of mobility and is not safe for discharge home.  Patient presents with impaired cognition as well as needing min assist for safe functional mobility and gait with a roller walker.  Patient normally ambulates without an assistive device and was fully independent prior to admission.  Patient is at marked risk for falls and subsequent readmission if patient were to be discharged home.   Additional Comments 2 Pending medical optimization, length of stay, progress with PT and increased home support patient may be able to return home.  Will continue to assess each subsequent PT visit.   [facility] Basic Mobility Inpatient   Turning in Flat Bed Without Bedrails 3   Lying on Back to Sitting on Edge of Flat Bed Without Bedrails 3   Moving Bed to Chair 3   Standing Up From Chair Using Arms 3   Walk in Room 3   Climb 3-5 Stairs With Railing 2   Basic Mobility Inpatient Raw Score 17   Basic Mobility Standardized Score 39.67   [facility] Highest Level Of Mobility   [facility]  Goal 5: Stand one or more mins   -HL Achieved 7: Walk 25 feet or more   Barthel Index   Feeding 10   Bathing 0   Grooming Score 0   Dressing Score 5   Bladder Score 10   Bowels Score 10   Toilet Use Score 5   Transfers (Bed/Chair) Score 10   Mobility (Level Surface) Score 0   Stairs Score 5   Barthel Index Score 55   Additional Treatment Session   Start Time 0930   End Time 0945   Treatment Assessment Patient agreeable to additional trial of ambulation with a roller walker.  Patient transfers sit to stand with cues for safe hand placement and minimal assistance.  Patient ambulates with a roller walker and min assist 60 feet with change in direction.  Patient transfers stand to sit with minimal assistance.  Patient remains very shaky with all mobility.  A: patient with limited tolerance to PT evaluation and treatment secondary to generalized weakness and deconditioning, shakiness/unsteadiness and impaired cognition.  While admitted, patient will continue to benefit from skilled physical therapy services to increase the patient's strength, balance, endurance, safe functional mobility and gait.  At this time, patient will benefit from continued ambulation with a roller walker with progression as able as patient is normally independent without an assistive device.  When medically stable for discharge patient is appropriate for Level II Moderate Resource Intensity.   End of Consult   Patient Position at End of Consult Bed/Chair alarm activated;Bedside chair;All needs within reach   Licensure   [place] License Number  Lissette Ye PT [id number]   Portions of the documentation may have been created using voice recognition software. Occasional wrong word or sound alike substitutions may have occurred due to the inherent limitation of the voice recognition software. Read the chart carefully and recognize, using context, where substitutions have occurred.

## 2025-01-06 NOTE — CONSULTS
"Consultation - Behavioral Health   Yesenia Tillman 89 y.o. female MRN: [id number]  Unit/Bed#: 2 [place] [address] Encounter: [id number]          Assessment:  Yesenia Tillman is a 89 y.o. female with past medical history of A-fib, hypertension, hypothyroidism, seizure disorder, history of stroke, GERD, MDD, memory difficulty who presented to the ED with fever and psychiatric evaluation and is admitted for multifocal pneumonia.  Patient was evaluated by crisis in the emergency department for depression and was cleared for discharge-please see documentation for more information.  Psychiatry consultation is requested due to depression.  Patient endorses depression and she discussed the losses of her  and her son. Reports recently having had \"crazy thoughts\" and when asked to elaborate she states that she cannot remember. Patient reports a few weeks ago having had passive death wishes.  Patient denies current suicidal or homicidal ideation, plan, or intent.  She states her yareli is important to her.  She does not endorse current or previous episodes of joseline/hypomania.  Patient does not endorse current psychotic symptoms.  Reports experiencing anxiety on occasion. States that she follows with Jeet Avalos PA-C and affirms that her Prozac was increased at last visit however reports having had issues with compliance and that she cannot tell if she has noticed a difference since it has been increased.  Also per chart patient's Prozac was increased a little less than 8 weeks ago and in geriatric patients it can sometimes take up to 12 weeks for therapeutic effect.  Also per chart patient's Prozac was most recently filled on 11/14/24 for 30 days and therefore suggestive of noncompliance.  States that she does not have a follow-up appointment with her psychiatric provider.  Patient provided verbal consent for me to contact her outpatient psychiatric provider and therapist to establish follow-up appointments.  Patient is " "goal oriented and future focused reporting her level of learning new things and projects that she wants to engage in.  States that she is also considering moving to [place] to be closer to her grandchildren and great-grandchildren.  There is no clear indication for inpatient psychiatric hospitalization this time.    Assessment & Plan  Seizure disorder (HCC)  Patient takes Keppra 500 mg daily.  Keppra is associated with mood disturbances.  Unclear if there is a contribution here. Per chart patient has been taking Keppra for years and patient reports worsening of depression since the loss of her  and then her son passed away a few months ago.  Follow-up with neurology regarding this  Moderate episode of recurrent major depressive disorder (HCC)  Plan:   Continue medical management  There is no clear indication for inpatient psychiatric hospitalization at this time   Continue Prozac 40 mg daily  Spiritual care consult placed.  Patient states that she would like to discuss her yareli.  Appreciated.  B12, folate, vitamin D add-ons ordered-replete if indicated  Follow-up with Jeet Avalos PA-C for outpatient psychiatry   Follow-up with Galina Avalos and affirms that her Prozac was increased at last visit.  States that she also follows with Galina Fajardo. [facility] for therapy  Messages sent to above providers to facilitate follow-up appointments  Patient has an appointment with longterm on 3/5/2025 at 10:30 AM for memory evaluation  Message sent to above providers regarding follow-up  Discussed with the primary team  Please contact with questions.  For after hours and weekends please contact [facility] Psychiatry Consultation role   Memory difficulty  See MDD          Risks, benefits and possible side effects of Medications:   No new medication changes recommended by me at this time.           Chief Complaint: \"Probably because I have been here a while.  Maybe a little depression\"    History of Present " "Illness   Inpatient consult to Psychiatry  Consult performed by: Samm Jansen DO  Consult ordered by: SACHI Briggs        Physician Requesting Consult: Chris Edmond MD  Reason for Consult / Principal Problem: Dx: 1.  Moderate episode of recurrent major depressive disorder (HCC)    Yesenia Tillman is a 89 y.o. female with past medical history of A-fib, hypertension, hypothyroidism, seizure disorder, history of stroke, GERD, MDD, memory difficulty who presented to the ED with fever and psychiatric evaluation and is admitted for multifocal pneumonia.  Patient was evaluated by crisis in the emergency department for depression and was cleared for discharge-please see documentation for more information.  Psychiatry consultation is requested due to depression.     Patient reports having \"maybe a little depression\".  States that she feels that \"everyone\" she had experienced depression at some point in their lives for perspective.  States that she has been feeling more depressed since her  passed away and then her son passed away a few months ago from MS.  States that her life is \"upside down\".  She also reports stressors of her living situation and states that she is contemplating moving to an apartment in [place] to be closer to her grandchildren and great-grandchildren.  Patient reports adequate sleep, decreased appetite, decreased interest, decreased motivation, adequate concentration.  States that she has been sleeping more due to feeling sick.  Reports recently having had \"crazy thoughts\" and when asked to elaborate she states that she cannot remember. Patient reports a few weeks ago having had passive death wishes.  Patient denies current suicidal or homicidal ideation, plan, or intent.  Reports that she loves learning new things and that she has projects she wants to start such as writing a children's book as well as illustrations.  She does not endorse current or previous episodes of " "joseline/hypomania.  Patient denies auditory or visual hallucinations.  Reports experiencing anxiety on occasion.  States that she follows with Jeet Avalos and affirms that her Prozac was increased at last visit.  States that she also follows with Galina Fajardo.  Reports that she has issues with her memory and that she has [facility].  States that she cannot tell if there is a difference since the Prozac has been increased.  States that she also has been unsure about \"when [she] took\" Prozac or \"how much\".        Historical Information   Past Psychiatric History:   Patient denies previous psychiatric hospitalizations.  Patient sees Jeet Avalos PA-C for outpatient psychiatry.  States that she also follows with SHILOH Mojica for outpatient psychotherapy  Past Suicide attempts: Denies  Past Psychiatric medication trial: Prozac      Substance Abuse History:  Denies drug, alcohol, tobacco use       Family Psychiatric History:   States that her son committed suicide    Social History  Marital history:   Living arrangement, social support: Reports living alone      Past Medical History:   Diagnosis Date    Acute CVA (cerebrovascular accident) ([facility]) 06/18/2016    Adult hypothyroidism 09/12/2016    Anxiety 06/10/2019    Arthritis     Asthma     Colon polyp     Depression     Disease of thyroid gland     Dyslipidemia 06/10/2019    Esophageal dysmotility 06/10/2019    Essential hypertension 09/12/2016    GERD (gastroesophageal reflux disease)     Hyperlipidemia     Hypertension     Hypothyroidism     Obesity     Paroxysmal atrial fibrillation ([facility]) 09/12/2016    Seizure disorder ([facility]) 08/24/2018    Seizures ([facility])     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    Stroke ([facility]) 06/2016    Subarachnoid hemorrhage ([facility]) 09/13/2016    Subdural hematoma ([facility]) 09/13/2016     Past Surgical History:   Procedure Laterality Date    " APPENDECTOMY      BACK SURGERY  01/2016    CARPAL TUNNEL RELEASE Right     COLONOSCOPY      TUBAL LIGATION             Meds/Allergies     all current active meds have been reviewed and current meds:   Current Facility-Administered Medications:     acetaminophen (TYLENOL) tablet 650 mg, Q6H PRN    atorvastatin (LIPITOR) tablet 40 mg, Daily With Dinner    benzonatate (TESSALON PERLES) capsule 100 mg, TID PRN    cefTRIAXone (ROCEPHIN) IVPB (premix in dextrose) 1,000 mg 50 mL, Q24H, Last Rate: 1,000 mg (01/05/25 1738)    clopidogrel (PLAVIX) tablet 75 mg, Daily    enoxaparin (LOVENOX) subcutaneous injection 40 mg, Daily    famotidine (PEPCID) tablet 20 mg, HS    FLUoxetine (PROzac) capsule 40 mg, Daily    guaiFENesin (MUCINEX) 12 hr tablet 600 mg, BID    ipratropium-albuterol (DUO-NEB) 0.5-2.5 mg/3 mL inhalation solution 3 mL, Q6H PRN    levETIRAcetam (KEPPRA) tablet 500 mg, Daily    levothyroxine tablet 88 mcg, Early Morning    losartan (COZAAR) tablet 100 mg, Daily    metoprolol succinate (TOPROL-XL) 24 hr tablet 50 mg, Daily    ondansetron (ZOFRAN) injection 4 mg, Q6H PRN    pantoprazole (PROTONIX) EC tablet 40 mg, Early Morning    saccharomyces boulardii (FLORASTOR) capsule 250 mg, BID  Allergies   Allergen Reactions    Amlodipine Other (See Comments)     Unknown reaction    Hydralazine Other (See Comments)     Unknown reaction    Penicillins Other (See Comments)     Unknown since childhood       Objective     Vital signs in last 24 hours:  Temp:  [97.8 °F (36.6 °C)-98.2 °F (36.8 °C)] 97.8 °F (36.6 °C)  HR:  [66-68] 68  BP: (160-169)/(66-67) 164/66  Resp:  [16-19] 16  SpO2:  [92 %-94 %] 94 %  O2 Device: None (Room air)      Intake/Output Summary (Last 24 hours) at 1/6/2025 0838  Last data filed at 1/5/2025 1739  Gross per 24 hour   Intake 848.33 ml   Output 550 ml   Net 298.33 ml       Mental Status Evaluation:  Appearance:  appears stated age and sitting upright in chair   Behavior:  calm and cooperative   Speech:   "normal rate and normal volume   Mood:  \"A little depressed\"   Affect:  Dysphoric and Tearful at times, reactive, brightens, smiling at times   Thought Process:  goal directed and organized   Thought Content:  no delusions elicited   Perceptual Disturbances: Denies auditory or visual hallucinations and Does not appear to be responding to internal stimuli   Risk Potential: Denies current suicidal or homicidal ideation, plan, or intent   Sensorium:  person, place, and situation   Memory:  Unable to fully assess   Cognition:  Unable to fully assess   Consciousness:  alert and awake    Attention: attention span and concentration were age appropriate   Insight:  fair   Judgment: fair   Muscle Strength and Tone: Not assessed   Motor Activity: no abnormal movements     Lab Results:    Labs in last 72 hours:   Recent Labs     01/06/25  0539   WBC 8.51   RBC 4.03   HGB 12.1   HCT 37.4   *   RDW 13.3   NEUTROABS 6.44   SODIUM 135   K 3.9      CO2 26   BUN 8   CREATININE 0.71   GLUC 94   CALCIUM 7.8*     Thyroid Studies:   Lab Results   Component Value Date    TOF8LWFNTHFC 3.897 01/02/2025    FREET4 1.19 01/04/2021     Drug Screen:   Lab Results   Component Value Date    AMPMETHUR Negative 03/24/2021    BARBTUR Negative 03/24/2021    BDZUR Negative 03/24/2021    THCUR Negative 03/24/2021    COCAINEUR Negative 03/24/2021    METHADONEUR Negative 03/24/2021    OPIATEUR Negative 03/24/2021    PCPUR Negative 03/24/2021     Medical alcohol level   Lab Results   Component Value Date    ETOH <3 06/15/2016     Vitamin D Level   Lab Results   Component Value Date    DZRX22OMFSEK 21.0 (L) 09/25/2023     Vitamin B12   Lab Results   Component Value Date    OKSIOWMU14 188 12/07/2023     Folate No results found for: \"FOLATE\"  Ext Breath Alcohol No results found for: \"BREATHALC\"    Imaging Studies:     EKG/Pathology/Other Studies:   Lab Results   Component Value Date    VENTRATE 65 08/16/2024    ATRIALRATE 65 08/16/2024    PRINT " 170 08/16/2024    QRSDINT 88 08/16/2024    QTINT 398 08/16/2024    QTCINT 413 08/16/2024    PAXIS 44 08/16/2024    QRSAXIS 49 08/16/2024    TWAVEAXIS 22 08/16/2024        Code Status: Level 1 - Full Code  Advance Directive and Living Will:      Power of :    POLST:            Samm Jansen DO  01/06/25      This note has been constructed using a voice recognition system.    There may be translation, syntax,  or grammatical errors. If you have any questions, please contact the dictating provider.

## 2025-01-06 NOTE — OCCUPATIONAL THERAPY NOTE
Occupational Therapy Evaluation/Treatment Note       01/06/25 1010   Note Type   Note type Re-Evaluation   Pain Assessment   Pain Assessment Tool 0-10   Pain Score No Pain   Restrictions/Precautions   Other Precautions Cognitive;Chair Alarm;Bed Alarm;Fall Risk   Home Living   Type of Home Apartment   Home Layout One level;Stairs to enter with rails  (3-4 GAVIN +HR)   Bathroom Shower/Tub Tub/shower unit   Bathroom Toilet Standard   Bathroom Equipment Grab bars in shower;Shower chair   Bathroom Accessibility Accessible   Home Equipment Walker;Cane  (Does not currently use)   Prior Function   Level of [place] Independent with ADLs;Independent with functional mobility;Independent with IADLS   Lives With Alone   Receives Help From Family;Neighbor   IADLs Independent with driving;Independent with meal prep;Independent with medication management   Falls in the last 6 months 1 to 4   Comments Patient reports PTA totally independent in all ADLs, iADLs, and mobility without AD   General   Additional Pertinent History Patient presented to hospital with cough, PNA, fever   ADL   Eating Assistance 7  Independent   Grooming Assistance 5  Supervision/Setup   UB Bathing Assistance 4  Minimal Assistance   LB Bathing Assistance 3  Moderate Assistance   UB Dressing Assistance 4  Minimal Assistance   UB Dressing Deficit Pull around back;Pull down in back;Pull over head   LB Dressing Assistance 3  Moderate Assistance   LB Dressing Deficit Don/doff R sock;Don/doff L sock   Toileting Assistance  3  Moderate Assistance   Toileting Deficit Clothing management up;Clothing management down   Bed Mobility   Additional Comments Not assessed, patient received OOB at start of session   Transfers   Sit to Stand 4  Minimal assistance   Additional items Assist x 1;Verbal cues   Stand to Sit 4  Minimal assistance   Additional items Assist x 1;Verbal cues   Additional Comments Initially moderately unsteady upon standing, improved to minimally  unsteady with increased time   Functional Mobility   Functional Mobility 4  Minimal assistance   Additional Comments Ambulated few feet in room with RW; mod verbal cues for safe RW management   Balance   Static Sitting Fair +   Dynamic Sitting Fair   Static Standing Fair -   Dynamic Standing Fair -   Activity Tolerance   Activity Tolerance Patient limited by fatigue;Other (Comment)  (Limited by generalized weakness)   RUE Assessment   RUE Assessment WFL   LUE Assessment   LUE Assessment WFL   Vision-Basic Assessment   Current Vision Wears glasses all the time   Cognition   Overall Cognitive Status Impaired   Arousal/Participation Alert;Cooperative  (Forgetful)   Attention Attends with cues to redirect   Orientation Level Oriented to person;Oriented to place;Disoriented to time;Disoriented to situation   Memory Decreased short term memory;Decreased recall of recent events   Following Commands Follows multistep commands with increased time or repetition   Comments Patient forgetful throughout session, poor STM, questionable insight into current functional limitations, decreased problem solving skills   Assessment   Limitation Decreased ADL status;Decreased UE strength;Decreased Safe judgement during ADL;Decreased endurance;Decreased cognition;Decreased self-care trans;Decreased high-level ADLs   Prognosis Good   Assessment Patient evaluated by Occupational Therapy.  Patient admitted with Multifocal pneumonia.  The patients occupational profile, medical and therapy history includes a extensive additional review of physical, cognitive, or psychosocial history related to current functional performance.  Comorbidities affecting functional mobility and ADLS include: HTN, stroke, arthritis, seizures, asthma, CVA, anxiety, SDH, DLD.  Prior to admission, patient was independent with functional mobility without assistive device, independent with ADLS, and independent with IADLS.  The evaluation identifies the following  performance deficits: weakness, impaired balance, decreased endurance, increased fall risk, new onset of impairment of functional mobility, decreased ADLS, decreased IADLS, decreased activity tolerance, decreased safety awareness, impaired judgement, decreased cognition, decreased strength, and impaired psychosocial skills, that result in activity limitations and/or participation restrictions. This evaluation requires clinical decision making of high complexity, because the patient presents with comorbidites that affect occupational performance and required significant modification of tasks or assistance with consideration of multiple treatment options.  The Barthel Index was used as a functional outcome tool presenting with a score of Barthel Index Score: 50, indicating marked limitations of functional mobility and ADLS.  The patient's raw score on the -PAC Daily Activity Inpatient Short Form is 17. A raw score of less than 19 suggests the patient may benefit from discharge to post-acute rehabilitation services. Please refer to the recommendation of the Occupational Therapist for safe discharge planning. Patient will benefit from skilled Occupational Therapy services to address above deficits and facilitate a safe return to prior level of function.   Goals   Patient Goals To remain independent   STG Time Frame   (1-7 days)   Short Term Goal  Goals established to promote Patient Goals: To remain independent: Grooming: independent seated; Bathing: min assist; Upper Body Dressing supervision; Lower Body Dressing: min assist; Toileting: min assist; Patient will increase ambulatory standard toilet transfer to min assist with rolling walker to increase performance and safety with ADLS and functional mobility; Patient will increase standing tolerance to 10 minutes during ADL task to decrease assistance level and decrease fall risk; Patient will increase bed mobility to supervision in preparation for ADLS and transfers;  Patient will increase functional mobility to and from bathroom with rolling walker with min assist to increase performance with ADLS and to use a toilet; Patient will tolerate 10 minutes of UE ROM/strengthening to increase general activity tolerance and performance in ADLS/IADLS; Patient will improve functional activity tolerance to 10 minutes of sustained functional tasks to increase participation in basic self-care and decrease assistance level;  Patient will be able to to verbalize understanding and perform energy conservation/proper body mechanics during ADLS and functional mobility at least 75% of the time with minimal cueing to decrease signs of fatigue and increase stamina to return to prior level of function; Patient will increase dynamic sitting balance to fair+ to improve the ability to sit at edge of bed or on a chair for ADLS;  Patient will increase static/dynamic standing balance to fair to improve postural stability and decrease fall risk during standing ADLS and transfers.   LTG Time Frame   (8-14 days)   Long Term Goal Grooming: independent standing at sink; Bathing: supervision; Upper Body Dressing independent; Lower Body Dressing: independent; Toileting: independent; Patient will increase ambulatory standard toilet transfer to independent with rolling walker to increase performance and safety with ADLS and functional mobility; Patient will increase standing tolerance to 15 minutes during ADL task to decrease assistance level and decrease fall risk; Patient will increase bed mobility to independent in preparation for ADLS and transfers; Patient will increase functional mobility to and from bathroom with rolling walker independently to increase performance with ADLS and to use a toilet; Patient will tolerate 15 minutes of UE ROM/strengthening to increase general activity tolerance and performance in ADLS/IADLS; Patient will improve functional activity tolerance to 15 minutes of sustained functional  tasks to increase participation in basic self-care and decrease assistance level;  Patient will be able to to verbalize understanding and perform energy conservation/proper body mechanics during ADLS and functional mobility at least 90% of the time with no cueing to decrease signs of fatigue and increase stamina to return to prior level of function; Patient will increase static/dynamic sitting balance to good to improve the ability to sit at edge of bed or on a chair for ADLS;  Patient will increase static/dynamic standing balance to good to improve postural stability and decrease fall risk during standing ADLS and transfers.  Pt will score >/= 22/24 on AM-PAC Daily Activity Inpatient scale to promote safe independence with ADLs and functional mobility; Pt will score >/= 85/100 on Barthel Index in order to decrease caregiver assistance needed and increase ability to perform ADLs and functional mobility.   Plan   Treatment Interventions ADL retraining;Functional transfer training;UE strengthening/ROM;Endurance training;Patient/family training;Equipment evaluation/education;Compensatory technique education;Continued evaluation;Energy conservation;Activityengagement   Goal Expiration Date 01/20/25   OT Frequency 3-5x/wk   Discharge Recommendation   Rehab Resource Intensity Level, OT II (Moderate Resource Intensity)   AM-PAC Daily Activity Inpatient   Lower Body Dressing 2   Bathing 2   Toileting 3   Upper Body Dressing 3   Grooming 3   Eating 4   Daily Activity Raw Score 17   Daily Activity Standardized Score (Calc for Raw Score >=11) 37.26   AM-PAC Applied Cognition Inpatient   Following a Speech/Presentation 3   Understanding Ordinary Conversation 4   Taking Medications 3   Remembering Where Things Are Placed or Put Away 4   Remembering List of 4-5 Errands 3   Taking Care of Complicated Tasks 3   Applied Cognition Raw Score 20   Applied Cognition Standardized Score 41.76   Barthel Index   Feeding 10   Bathing 0    Grooming Score 0   Dressing Score 5   Bladder Score 5   Bowels Score 10   Toilet Use Score 5   Transfers (Bed/Chair) Score 10   Mobility (Level Surface) Score 0   Stairs Score 5   Barthel Index Score 50   Additional Treatment Session   Start Time 1000   End Time 1010   Treatment Assessment Patient seen for additional OT treatment session focusing on ADL and functional transfer training. Patient performed STS from chair with min assist; Ambulated short household distance to/from bathroom with RW and min assist; Ambulatory transfer to/from standard toilet with min assist; Hand hygiene standing to sink unsupported with supervision; Once back at recliner chair completed lower body dressing task: Don scrub pants with mod assist; increased assist to thread over BLEs and to maintain safe standing balance when pulling pants up over hips. Patient limited by generalized weakness, easily fatigues with minimal activity. At end of session seated in recliner chair with all needs met, chair alarm set. Continue OT per POC.   Licensure   [place] License Number  Edna Ruano, OTR/L [id number]

## 2025-01-06 NOTE — ASSESSMENT & PLAN NOTE
Plan:   Continue medical management  There is no clear indication for inpatient psychiatric hospitalization at this time   Continue Prozac 40 mg daily  Spiritual care consult placed.  Patient states that she would like to discuss her yareli.  Appreciated.  B12, folate, vitamin D add-ons ordered-replete if indicated  Follow-up with Jeet Avalos PA-C for outpatient psychiatry   Follow-up with Galina Avalos and affirms that her Prozac was increased at last visit.  States that she also follows with Galina Fajardo., [facility] for therapy  Messages sent to above providers to facilitate follow-up appointments  Patient has an appointment with [facility] on 3/5/2025 at 10:30 AM for memory evaluation  Message sent to above providers regarding follow-up  Discussed with the primary team  Please contact with questions.  For after hours and weekends please contact [facility] Psychiatry Consultation role

## 2025-01-06 NOTE — CASE MANAGEMENT
[facility] received request for authorization from Care Manager.  Authorization request submitted for: [facility]  Facility Name: [facility]   NPI: [id number]  Facility MD:  Dr. Muñoz  NPI: [id number]  Authorization initiated by contacting insurance:  shonda Peterson  Via: [employer] Portal   Clinicals submitted via [employer] attachment   Pending Reference #: [id number]     Care Manager notified: mercy wasserman     Updates to authorization status will be noted in chart. Please reach out to CM for updates on any clinical information.

## 2025-01-06 NOTE — PLAN OF CARE
Problem: PHYSICAL THERAPY ADULT  Goal: Performs mobility at highest level of function for planned discharge setting.  See evaluation for individualized goals.  Description: Treatment/Interventions: ADL retraining, Functional transfer training, LE strengthening/ROM, Elevations, Therapeutic exercise, Endurance training, Cognitive reorientation, Patient/family training, Equipment eval/education, Bed mobility, Gait training, Compensatory technique education, Spoke to nursing, Spoke to case management, OT  Equipment Recommended:  (Patient has a cane and a roller walker)       See flowsheet documentation for full assessment, interventions and recommendations.  1/6/2025 1012 by Lissette Ye PT  Note:    Problem List: Decreased strength, Decreased endurance, Impaired balance, Decreased mobility, Decreased cognition, Decreased safety awareness  Assessment: Patient seen for Physical Therapy evaluation. Patient admitted with Multifocal pneumonia.  Comorbidities affecting patient's physical performance include: HTN, P A-fib, seizure disorder, depression, history of CVA, HLD, memory difficulty.  Personal factors affecting patient at time of initial evaluation include: lives in one story house, stairs to enter home, inability to navigate community distances, inability to navigate level surfaces without external assistance, inability to perform dynamic tasks in community, decreased cognition, limited home support, positive fall history, and limited insight into impairments. Prior to admission, patient was independent with functional mobility without assistive device, independent with ADLS, independent with IADLS, living alone in one story home with 3-4 steps to enter, ambulating household distance, and ambulating community distances.  Please find objective findings from Physical Therapy assessment regarding body systems outlined above with impairments and limitations including weakness, impaired balance, decreased  endurance, gait deviations, decreased activity tolerance, decreased functional mobility tolerance, decreased safety awareness, impaired judgement, fall risk, and decreased cognition.  The Barthel Index was used as a functional outcome tool presenting with a score of Barthel Index Score: 55 today indicating marked limitations of functional mobility and ADLS.  Patient's clinical presentation is currently unstable/unpredictable as seen in patient's presentation of vital sign response, changing level of pain, varying levels of cognitive performance, increased fall risk, new onset of impairment of functional mobility, decreased endurance, and new onset of weakness. Pt would benefit from continued Physical Therapy treatment to address deficits as defined above and maximize level of functional mobility. As demonstrated by objective findings, the assigned level of complexity for this evaluation is high.The patient's -[facility] Basic Mobility Inpatient Short Form Raw Score is 17. A Raw score of greater than 16 suggests the patient may benefit from discharge to home. Please also refer to the recommendation of the Physical Therapist for safe discharge planning.  Barriers to Discharge: Inaccessible home environment, Decreased caregiver support  Barriers to Discharge Comments: Patient lives alone; 3-4 steps to enter; impaired cognition  Rehab Resource Intensity Level, PT: II (Moderate Resource Intensity)    See flowsheet documentation for full assessment.     1/6/2025 1012 by Lissette Ye, PT  Note:    Problem List: Decreased strength, Decreased endurance, Impaired balance, Decreased mobility, Decreased cognition, Decreased safety awareness  Assessment: Patient seen for Physical Therapy evaluation. Patient admitted with Multifocal pneumonia.  Comorbidities affecting patient's physical performance include: HTN, P A-fib, seizure disorder, depression, history of CVA, HLD, memory difficulty.  Personal factors affecting patient at  time of initial evaluation include: lives in one story house, stairs to enter home, inability to navigate community distances, inability to navigate level surfaces without external assistance, inability to perform dynamic tasks in community, decreased cognition, limited home support, positive fall history, and limited insight into impairments. Prior to admission, patient was independent with functional mobility without assistive device, independent with ADLS, independent with IADLS, living alone in one story home with 3-4 steps to enter, ambulating household distance, and ambulating community distances.  Please find objective findings from Physical Therapy assessment regarding body systems outlined above with impairments and limitations including weakness, impaired balance, decreased endurance, gait deviations, decreased activity tolerance, decreased functional mobility tolerance, decreased safety awareness, impaired judgement, fall risk, and decreased cognition.  The Barthel Index was used as a functional outcome tool presenting with a score of Barthel Index Score: 55 today indicating marked limitations of functional mobility and ADLS.  Patient's clinical presentation is currently unstable/unpredictable as seen in patient's presentation of vital sign response, changing level of pain, varying levels of cognitive performance, increased fall risk, new onset of impairment of functional mobility, decreased endurance, and new onset of weakness. Pt would benefit from continued Physical Therapy treatment to address deficits as defined above and maximize level of functional mobility. As demonstrated by objective findings, the assigned level of complexity for this evaluation is high.The patient's -[facility] Basic Mobility Inpatient Short Form Raw Score is 17. A Raw score of greater than 16 suggests the patient may benefit from discharge to home. Please also refer to the recommendation of the Physical Therapist for safe discharge  planning.  Barriers to Discharge: Inaccessible home environment, Decreased caregiver support  Barriers to Discharge Comments: Patient lives alone; 3-4 steps to enter; impaired cognition  Rehab Resource Intensity Level, PT: II (Moderate Resource Intensity)    See flowsheet documentation for full assessment.

## 2025-01-06 NOTE — PROGRESS NOTES
Progress Note - Hospitalist   Name: Yesenia Tillman 89 y.o. female I MRN: [id number]  Unit/Bed#: 2 [address] Date of Admission: 1/1/2025   Date of Service: 1/6/2025 I Hospital Day: 5    Assessment & Plan  Multifocal pneumonia  Patient presents with dry cough for a few days with associated fatigue, runny nose, poor appetite.  Reports left lower chest rib hurts when coughing.  Reports possible chills at home.  Reports had a party in apartment on Saturday 4 days ago.  Patient lives alone.  Fever 101.4 on ED arrival  COVID flu negative.  Chest x-ray showed right lower lobe pneumonia  CTA chest PE study showed  Bilateral hilar lymphadenopathy and mediastinal lymphadenopathy, likely reactive, but recommend follow-up chest CT in 2 months time to, document stability or resolution as neoplastic processes such as metastatic disease or lymphoma cannot be entirely excluded.  Initial procalcitonin 0.77  Continue IV Rocephin.  Zithromax discontinued as urine legionella and strep are negative  Currently on room air, will transition to Ceftin to complete course  Sepsis (HCC)  Present on admission, as evidenced by fever, elevated heart rate, with source of infection multifocal pneumonia.  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  2D echo a year ago showed EF 55%, normal diastolic function, normal atriums, no significant valvular disease.  Updated echo performed on 1/2/25 shows no significant changes  Monitor volume status.  HTN (hypertension), benign  On losartan metoprolol at home.  Will continue.  BP not well-controlled.  Monitor  Acquired hypothyroidism  Continue  Synthroid  TSH 3.897.  Paroxysmal atrial fibrillation (HCC)  NSR  On Plavix metoprolol at home.   Seizure disorder (HCC)  Continue Keppra  H/O: CVA (cerebrovascular accident)  History of small SAH and subdural hematoma in the left parietal area after receiving tPA for strokelike symptoms in September 2016.  Continue Lipitor Plavix  Gastroesophageal reflux disease without esophagitis  Continue PPI and Pepcid daily.  Moderate episode of recurrent major depressive disorder (HCC)  Continue Prozac  History of suicidal thoughts at times per ED note.  Patient denies SI currently.  Follow-up psych/behavioral health as outpatient  Refusing to get out of bed, tearful - states she is just sad all the time - no SI/HI  Psych consulted and case discussed  Memory difficulty  Patient awake alert oriented to place and person on exam, follows commands.  Supportive care            Hospital Course:     89-year-old female patient presenting with fever poor appetite weakness and found to have multifocal pneumonia.  Likely secondary to holiday gatherings.    Assessment:      Principal Problem:    Multifocal pneumonia  Active Problems:    HTN (hypertension), benign    Acquired hypothyroidism    Paroxysmal atrial fibrillation (HCC)    Seizure disorder (HCC)    H/O: CVA (cerebrovascular accident)    Gastroesophageal reflux disease without esophagitis    Moderate episode of recurrent major depressive disorder (HCC)    Memory difficulty    Sepsis (HCC)    Hyponatremia    Elevated brain natriuretic peptide (BNP) level      Plan:    Continue ceftriaxone, can transition to Ceftin to complete a course at discharge  Continue airway clearance       VTE Pharmacologic Prophylaxis:   Pharmacologic: Enoxaparin (Lovenox)  Mechanical VTE Prophylaxis in Place: Yes    AM-PAC Basic Mobility:  Basic Mobility Inpatient Raw Score: 17    JH-HLM Achieved: 7: Walk 25 feet or more  JH-HLM Goal: 5: Stand one or more mins    HLM Goal listed above. Continue with  multidisciplinary rounding and encourage appropriate mobility to improve upon HLM goals.         Patient Centered Rounds: Case discussed and reviewed with nursing    Discussions with Specialists or Other Care Team Provider: Case management    Education and Discussions with Family / Patient: Contacted patient's son at 12:03 PM, no answer.  Patient daughter-in-law contacted at 12:04 PM, no answer.    Time Spent for Care: 80 minutes.  More than 50% of total time spent on counseling and coordination of care as described above.    Current Length of Stay: 5 day(s)    Current Patient Status: Inpatient   Certification Statement: The patient will continue to require additional inpatient hospital stay due to need for rehab placement    Discharge Plan / Estimated Discharge Date: Patient is medically stable for discharge to inpatient rehab once bed can be found    Code Status: Level 1 - Full Code      Subjective:   Seen and examined, patient reports some sadness at the loss of her son and .  She reports cough.  Reports shortness of breath is improved, reports feeling generally weak with the pneumonia.  She states he lives alone    A complete and comprehensive 14 point organ system review has been performed and all other systems are negative other than stated above.    Objective:     Vitals:   Temp (24hrs), Av °F (36.7 °C), Min:97.8 °F (36.6 °C), Max:98.2 °F (36.8 °C)    Temp:  [97.8 °F (36.6 °C)-98.2 °F (36.8 °C)] 97.8 °F (36.6 °C)  HR:  [66-68] 68  Resp:  [16-19] 16  BP: (160-169)/(66-67) 164/66  SpO2:  [92 %-94 %] 92 %  Body mass index is 30.08 kg/m².     Input and Output Summary (last 24 hours):       Intake/Output Summary (Last 24 hours) at 2025 1201  Last data filed at 2025 1739  Gross per 24 hour   Intake 848.33 ml   Output 550 ml   Net 298.33 ml       Physical Exam:     General: well appearing, no acute distress  HEENT: atraumatic, PERRLA, moist mucosa, normal pharynx, normal tonsils and adenoids,  normal tongue, no fluid in sinuses  Neck: Trachea midline, no carotid bruit, no masses  Respiratory: normal chest wall expansion, coarse breath sounds, no r/r/w, no rubs  Cardiovascular: RRR, no m/r/g, Normal S1 and S2  Abdomen: Soft, non-tender, non-distended, normal bowel sounds in all quadrants, no hepatosplenomegaly, no tympany  Rectal: deferred  Musculoskeletal: Moves all  Integumentary: warm, dry, and pink, with no rash, purpura, or petechia  Heme/Lymph: no lymphadenopathy, no bruises  Neurological: Cranial Nerves II-XII grossly intact  Psychiatric: cooperative with normal mood, affect, and cognition      Additional Data:     Labs:    Results from last 7 days   Lab Units 01/06/25  0539   WBC Thousand/uL 8.51   HEMOGLOBIN g/dL 12.1   HEMATOCRIT % 37.4   PLATELETS Thousands/uL 445*   SEGS PCT % 75   LYMPHO PCT % 13*   MONO PCT % 6   EOS PCT % 3     Results from last 7 days   Lab Units 01/06/25  0539 01/02/25  0553 01/01/25  1430   POTASSIUM mmol/L 3.9   < > 3.4*   CHLORIDE mmol/L 107   < > 99   CO2 mmol/L 26   < > 24   BUN mg/dL 8   < > 10   CREATININE mg/dL 0.71   < > 0.78   CALCIUM mg/dL 7.8*   < > 8.5   ALK PHOS U/L  --   --  94   ALT U/L  --   --  14   AST U/L  --   --  18    < > = values in this interval not displayed.     Results from last 7 days   Lab Units 01/01/25  1430   INR  1.13       * I Have Reviewed All Lab Data Listed Above.  * Additional Pertinent Lab Tests Reviewed: All Labs For Current Hospital Admission Reviewed    Imaging:    Imaging Reports Reviewed Today Include: No new imaging  Imaging Personally Reviewed by Myself Includes: No new imaging    Recent Cultures (last 7 days):     Results from last 7 days   Lab Units 01/02/25  1233 01/01/25  1643 01/01/25  1440 01/01/25  1430   BLOOD CULTURE   --   --  No Growth After 4 Days. No Growth After 4 Days.   URINE CULTURE   --  20,000-29,000 cfu/ml  --   --    LEGIONELLA URINARY ANTIGEN  Negative  --   --   --        Last 24 Hours Medication List:    Current Facility-Administered Medications   Medication Dose Route Frequency Provider Last Rate    acetaminophen  650 mg Oral Q6H PRN Lelandibrenna Victorrik, CRNP      atorvastatin  40 mg Oral Daily With Dinner Cubrenna Victorrik, CRNP      benzonatate  100 mg Oral TID PRN Miguel Estrada DO      cefTRIAXone  1,000 mg Intravenous Q24H Cuiyin Yurik, CRNP 1,000 mg (01/05/25 1738)    clopidogrel  75 mg Oral Daily Cuikristen Victorrik, CRNP      enoxaparin  40 mg Subcutaneous Daily Cuin Kayleighrik, CRNP      famotidine  20 mg Oral HS Cuin Kayleighrik, CRNP      FLUoxetine  40 mg Oral Daily Cuin Kayleighrik, CRNP      guaiFENesin  600 mg Oral BID [facility], CRNP      ipratropium-albuterol  3 mL Nebulization Q6H PRN Cuin Kayleighrik, CRNP      levETIRAcetam  500 mg Oral Daily Cuikristen Victorrik, CRNP      levothyroxine  88 mcg Oral Early Morning [facility], CRNP      losartan  100 mg Oral Daily Cuin Kayleighrik, CRNP      metoprolol succinate  50 mg Oral Daily [facility]      ondansetron  4 mg Intravenous Q6H PRN [facility], CRNP      pantoprazole  40 mg Oral Early Morning Cuin Yurik, CRNP      saccharomyces boulardii  250 mg Oral BID Cuikristen Victorrik, CRNP         AM-PAC Basic Mobility:  Basic Mobility Inpatient Raw Score: 17    JH-HLM Achieved: 7: Walk 25 feet or more  JH-HLM Goal: 5: Stand one or more mins    HLM Goal listed above. Continue with multidisciplinary rounding and encourage appropriate mobility to improve upon HLM goals.     Today, Patient Was Seen By: Miguel Estrada DO    ** Please Note: This note was completed in part utilizing [employer] software dictation.  Grammatical errors, random word insertions, spelling mistakes, and incomplete sentences may be an occasional consequence of this system secondary to software limitations, ambient noise, and hardware issues.  If you have any questions or concerns about the content, text, or information contained within the body of this dictation, please contact the provider for  clarification. **

## 2025-01-06 NOTE — ASSESSMENT & PLAN NOTE
Patient presents with dry cough for a few days with associated fatigue, runny nose, poor appetite.  Reports left lower chest rib hurts when coughing.  Reports possible chills at home.  Reports had a party in apartment on Saturday 4 days ago.  Patient lives alone.  Fever 101.4 on ED arrival  COVID flu negative.  Chest x-ray showed right lower lobe pneumonia  CTA chest PE study showed  Bilateral hilar lymphadenopathy and mediastinal lymphadenopathy, likely reactive, but recommend follow-up chest CT in 2 months time to, document stability or resolution as neoplastic processes such as metastatic disease or lymphoma cannot be entirely excluded.  Initial procalcitonin 0.77  Continue IV Rocephin.  Zithromax discontinued as urine legionella and strep are negative  Currently on room air, will transition to Ceftin to complete course

## 2025-01-07 PROBLEM — A41.9 SEPSIS (HCC): Status: RESOLVED | Noted: 2025-01-01 | Resolved: 2025-01-07

## 2025-01-07 LAB
BACTERIA BLD CULT: NORMAL
BACTERIA BLD CULT: NORMAL

## 2025-01-07 PROCEDURE — 99232 SBSQ HOSP IP/OBS MODERATE 35: CPT | Performed by: INTERNAL MEDICINE

## 2025-01-07 RX ORDER — ERGOCALCIFEROL 1.25 MG/1
50000 CAPSULE, LIQUID FILLED ORAL WEEKLY
Status: DISCONTINUED | OUTPATIENT
Start: 2025-01-07 | End: 2025-01-08 | Stop reason: HOSPADM

## 2025-01-07 RX ORDER — FOLIC ACID 1 MG/1
1 TABLET ORAL DAILY
Status: DISCONTINUED | OUTPATIENT
Start: 2025-01-07 | End: 2025-01-08 | Stop reason: HOSPADM

## 2025-01-07 RX ADMIN — FLUOXETINE HYDROCHLORIDE 40 MG: 20 CAPSULE ORAL at 10:16

## 2025-01-07 RX ADMIN — CEFTRIAXONE 1000 MG: 1 INJECTION, SOLUTION INTRAVENOUS at 16:34

## 2025-01-07 RX ADMIN — Medication 250 MG: at 18:02

## 2025-01-07 RX ADMIN — FAMOTIDINE 20 MG: 20 TABLET, FILM COATED ORAL at 22:24

## 2025-01-07 RX ADMIN — PANTOPRAZOLE SODIUM 40 MG: 40 TABLET, DELAYED RELEASE ORAL at 06:28

## 2025-01-07 RX ADMIN — ENOXAPARIN SODIUM 40 MG: 40 INJECTION SUBCUTANEOUS at 10:16

## 2025-01-07 RX ADMIN — GUAIFENESIN 600 MG: 600 TABLET, EXTENDED RELEASE ORAL at 18:02

## 2025-01-07 RX ADMIN — METOPROLOL SUCCINATE 50 MG: 50 TABLET, EXTENDED RELEASE ORAL at 10:17

## 2025-01-07 RX ADMIN — LOSARTAN POTASSIUM 100 MG: 50 TABLET, FILM COATED ORAL at 10:17

## 2025-01-07 RX ADMIN — CYANOCOBALAMIN TAB 500 MCG 500 MCG: 500 TAB at 14:21

## 2025-01-07 RX ADMIN — ATORVASTATIN CALCIUM 40 MG: 40 TABLET, FILM COATED ORAL at 15:57

## 2025-01-07 RX ADMIN — GUAIFENESIN 600 MG: 600 TABLET, EXTENDED RELEASE ORAL at 10:17

## 2025-01-07 RX ADMIN — FOLIC ACID 1 MG: 1 TABLET ORAL at 14:21

## 2025-01-07 RX ADMIN — CLOPIDOGREL 75 MG: 75 TABLET ORAL at 10:17

## 2025-01-07 RX ADMIN — LEVOTHYROXINE SODIUM 88 MCG: 88 TABLET ORAL at 06:28

## 2025-01-07 RX ADMIN — Medication 250 MG: at 10:17

## 2025-01-07 RX ADMIN — ERGOCALCIFEROL 50000 UNITS: 1.25 CAPSULE ORAL at 15:10

## 2025-01-07 RX ADMIN — LEVETIRACETAM 500 MG: 500 TABLET, FILM COATED ORAL at 10:17

## 2025-01-07 NOTE — ASSESSMENT & PLAN NOTE
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

## 2025-01-07 NOTE — ASSESSMENT & PLAN NOTE
History of suicidal thoughts at times per ED note.  Patient denies SI currently.  Follow-up psych/behavioral health as outpatient  Seen by psychiatry, input appreciated, recommended to continue Prozac 40 mg daily  Follow-up with primary psychiatry, Galina Avalos PA-C.  Patient also also follows with Galina Fajardo., [facility] for therapy

## 2025-01-07 NOTE — PROGRESS NOTES
Progress Note - Hospitalist   Name: Yesenia Tillman 89 y.o. female I MRN: [id number]  Unit/Bed#: [address] [place] [address] Date of Admission: 1/1/2025   Date of Service: 1/7/2025 I Hospital Day: 6    Assessment & Plan  Multifocal pneumonia  Patient presents with dry cough for a few days with associated fatigue, runny nose, poor appetite.  Reports left lower chest rib hurts when coughing.  Reports possible chills at home.  Reports had a party in apartment on Saturday 4 days ago prior to presentation.  Patient lives alone.  Fever 101.4 on ED arrival  COVID flu negative.  Chest x-ray showed right lower lobe pneumonia  CTA chest PE study showed  Bilateral hilar lymphadenopathy and mediastinal lymphadenopathy, likely reactive, but recommend follow-up chest CT in 2 months time to, document stability or resolution as neoplastic processes such as metastatic disease or lymphoma cannot be entirely excluded.  Currently afebrile, clinically improved.  Reports improvement in presenting symptoms.  Denies any chest pain, shortness of breath, cough is improved  IV ceftriaxone, completing 7-day today  Zithromax discontinued as urine legionella and strep are negative  Currently on room air, will transition to Ceftin to complete course  Follow-up imaging as outpatient  Sepsis (HCC) (Resolved: 1/7/2025)  Present on admission, as evidenced by fever, elevated heart rate, with source of infection multifocal pneumonia.  Lactic acid normal  Initial procalcitonin 0.77-1.19-0.34  Antibiotics as above  Urine legionella and strep negative  Currently afebrile, normal white count.  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  2D echo a year ago showed EF 55%, normal diastolic function, normal atriums, no significant  valvular disease.  Updated echo performed on 1/2/25 shows no significant changes  Monitor volume status.  HTN (hypertension), benign  On losartan metoprolol at home..  BP fluctuates  Continue home meds  Monitor  Acquired hypothyroidism  Continue Synthroid  TSH 3.897.  Paroxysmal atrial fibrillation (HCC)  NSR  On Plavix metoprolol at home.   Seizure disorder (HCC)  Continue Keppra  H/O: CVA (cerebrovascular accident)  History of small SAH and subdural hematoma in the left parietal area after receiving tPA for strokelike symptoms in September 2016.  Continue Lipitor Plavix  Gastroesophageal reflux disease without esophagitis  Continue PPI and Pepcid daily.  Moderate episode of recurrent major depressive disorder (HCC)  History of suicidal thoughts at times per ED note.  Patient denies SI currently.  Follow-up psych/behavioral health as outpatient  Seen by psychiatry, input appreciated, recommended to continue Prozac 40 mg daily  Follow-up with primary psychiatry, Galina Avalos PA-C.  Patient also also follows with Galina Costa LCSW for therapy   Memory difficulty  Patient awake alert oriented to place and person on exam, follows commands.  Patient is scheduled to have appointment with custodial on 3/5/2025 at 10:30 AM for memory evaluation  Noted to have low normal B12, low folate and low vitamin D-initiated replacement  Supportive care    VTE Pharmacologic Prophylaxis: VTE Score: 6 High Risk (Score >/= 5) - Pharmacological DVT Prophylaxis Ordered: enoxaparin (Lovenox). Sequential Compression Devices Ordered.    Mobility:   Basic Mobility Inpatient Raw Score: 17  JH-HLM Goal: 5: Stand one or more mins  JH-HLM Achieved: 7: Walk 25 feet or more  JH-HLM Goal NOT achieved. Continue with multidisciplinary rounding and encourage appropriate mobility to improve upon JH-HLM goals.    Patient Centered Rounds: I performed bedside rounds with nursing staff today.   Discussions with Specialists or Other  Care Team Provider: Yes    Education and Discussions with Family / Patient: Attempted to update  (son) via phone. Unable to contact.    Current Length of Stay: 6 day(s)  Current Patient Status: Inpatient   Certification Statement: The patient will continue to require additional inpatient hospital stay due to IV antibiotics, awaiting rehab  Discharge Plan: Anticipate discharge later today or tomorrow to rehab facility.    Code Status: Level 1 - Full Code    Subjective   Sitting up comfortably  Reports minimal cough  Denies any chest pain, shortness of breath      Objective :  Temp:  [97.8 °F (36.6 °C)-98.2 °F (36.8 °C)] 98.2 °F (36.8 °C)  HR:  [68-72] 68  BP: (141-161)/(69-77) 161/69  SpO2:  [92 %] 92 %  O2 Device: None (Room air)    Body mass index is 30.08 kg/m².     Input and Output Summary (last 24 hours):     Intake/Output Summary (Last 24 hours) at 1/7/2025 0830  Last data filed at 1/6/2025 1401  Gross per 24 hour   Intake --   Output 500 ml   Net -500 ml       Physical Exam  Constitutional:       General: She is not in acute distress.  HENT:      Head: Normocephalic and atraumatic.   Cardiovascular:      Rate and Rhythm: Normal rate.   Pulmonary:      Effort: Pulmonary effort is normal. No respiratory distress.      Breath sounds: Decreased breath sounds present. No wheezing or rales.   Abdominal:      General: Bowel sounds are normal. There is no distension.      Palpations: Abdomen is soft.      Tenderness: There is no abdominal tenderness. There is no guarding or rebound.   Musculoskeletal:      Right lower leg: No edema.      Left lower leg: No edema.   Skin:     General: Skin is warm and dry.      Findings: No rash.   Neurological:      Mental Status: She is alert. Mental status is at baseline.      Cranial Nerves: No cranial nerve deficit.           Lines/Drains:              Lab Results: I have reviewed the following results:   Results from last 7 days   Lab Units 01/06/25  0539   WBC  Thousand/uL 8.51   HEMOGLOBIN g/dL 12.1   HEMATOCRIT % 37.4   PLATELETS Thousands/uL 445*   SEGS PCT % 75   LYMPHO PCT % 13*   MONO PCT % 6   EOS PCT % 3     Results from last 7 days   Lab Units 01/06/25  0539 01/02/25  0553 01/01/25  1430   SODIUM mmol/L 135   < > 132*   POTASSIUM mmol/L 3.9   < > 3.4*   CHLORIDE mmol/L 107   < > 99   CO2 mmol/L 26   < > 24   BUN mg/dL 8   < > 10   CREATININE mg/dL 0.71   < > 0.78   ANION GAP mmol/L 2*   < > 9   CALCIUM mg/dL 7.8*   < > 8.5   ALBUMIN g/dL  --   --  3.6   TOTAL BILIRUBIN mg/dL  --   --  1.01*   ALK PHOS U/L  --   --  94   ALT U/L  --   --  14   AST U/L  --   --  18   GLUCOSE RANDOM mg/dL 94   < > 113    < > = values in this interval not displayed.     Results from last 7 days   Lab Units 01/01/25  1430   INR  1.13             Results from last 7 days   Lab Units 01/06/25  0539 01/02/25  0553 01/01/25  1632 01/01/25  1430   LACTIC ACID mmol/L  --   --   --  1.1   PROCALCITONIN ng/ml 0.34* 1.19* 0.77*  --        Recent Cultures (last 7 days):   Results from last 7 days   Lab Units 01/02/25  1233 01/01/25  1643 01/01/25  1440 01/01/25  1430   BLOOD CULTURE   --   --  No Growth After 5 Days. No Growth After 5 Days.   URINE CULTURE   --  20,000-29,000 cfu/ml  --   --    LEGIONELLA URINARY ANTIGEN  Negative  --   --   --              Last 24 Hours Medication List:     Current Facility-Administered Medications:     acetaminophen (TYLENOL) tablet 650 mg, Q6H PRN    atorvastatin (LIPITOR) tablet 40 mg, Daily With Dinner    benzonatate (TESSALON PERLES) capsule 100 mg, TID PRN    cefTRIAXone (ROCEPHIN) IVPB (premix in dextrose) 1,000 mg 50 mL, Q24H, Last Rate: 1,000 mg (01/06/25 1630)    clopidogrel (PLAVIX) tablet 75 mg, Daily    enoxaparin (LOVENOX) subcutaneous injection 40 mg, Daily    famotidine (PEPCID) tablet 20 mg, HS    FLUoxetine (PROzac) capsule 40 mg, Daily    guaiFENesin (MUCINEX) 12 hr tablet 600 mg, BID    ipratropium-albuterol (DUO-NEB) 0.5-2.5 mg/3 mL  inhalation solution 3 mL, Q6H PRN    levETIRAcetam (KEPPRA) tablet 500 mg, Daily    levothyroxine tablet 88 mcg, Early Morning    losartan (COZAAR) tablet 100 mg, Daily    metoprolol succinate (TOPROL-XL) 24 hr tablet 50 mg, Daily    ondansetron (ZOFRAN) injection 4 mg, Q6H PRN    pantoprazole (PROTONIX) EC tablet 40 mg, Early Morning    saccharomyces boulardii (FLORASTOR) capsule 250 mg, BID    Administrative Statements   Today, Patient Was Seen By: Louann Renae MD      **Please Note: This note may have been constructed using a voice recognition system.**

## 2025-01-07 NOTE — ASSESSMENT & PLAN NOTE
Patient awake alert oriented to place and person on exam, follows commands.  Patient is scheduled to have appointment with residential on 3/5/2025 at 10:30 AM for memory evaluation  Noted to have low normal B12, low folate and low vitamin D-initiated replacement  Supportive care

## 2025-01-07 NOTE — CASE MANAGEMENT
Case Management Progress Note    Patient name Yesenia Tillman  Location 2 South 206/2 South 206 MRN [id number]  : 1935 Date 2025       LOS (days): 6  Geometric Mean LOS (GMLOS) (days): 4.9  Days to GMLOS:-1        OBJECTIVE:     Current admission status: Inpatient  Preferred Pharmacy:   [facility] #[id number] - [place], [place] - [address]  [address]  [facility] [address]  Phone: [phone] Fax: [phone]    Primary Care Provider: Liam Zepeda MD    Primary Insurance: BLUE CROSS [employer] REP  Secondary Insurance:     PROGRESS NOTE:    ISMA following to assist with planning.  STR placement at [facility] is planned pending insurance authorization approval.  Earlier today authorization was still pending per  Discharge Support.  This afternoon the request was found to be canceled on Availity.     Discharge Support submitted new request - pending reference #[id number].     SW obtained insurance contact information from  Discharge Support ([phone]) and call was placed.  ISMA spoke with [facility] representative to discuss situation.  Per representative the first request may have been submitted through the wrong portal but she said sometimes the other portals forward the request to the correct one.  Representative confirmed receipt of new request ([id number]) and clinical and said it is pending review.     SW updated pt's son on delay in authorization approval. Anticipating approval tomorrow.  SW will contact son as soon as approval is received. SW will continue to follow to monitor progress and assist as needed.

## 2025-01-07 NOTE — CASE MANAGEMENT
Upon checking availity there is a notice that the snf auth was cancelled , Live chatted with Kristen on availity due to not being able to get through on the phone . Spoke to Maryam SOLORZANO Who stated that no auth is showing in their system. Tried to call provider services again ([phone]) could not get someone on the line . CM was able to talk to someone and they were told that theres no way to tell why it was cancelled           [facility] received request for authorization from Care Manager.  Authorization request submitted for: [facility]  Facility Name: [facility]   NPI: [id number]   Facility MD:   Dr. Muñoz  NPI: [id number]   Authorization initiated by contacting insurance:  domitila   Via: [employer]   Clinicals submitted via Portal attachment   Pending Reference #: [id number]      Care Manager notified: mercy wasserman     Updates to authorization status will be noted in chart. Please reach out to CM for updates on any clinical information.

## 2025-01-07 NOTE — CASE MANAGEMENT
Per the Availity portal SNF auth is currently pending . Cm notified annabella case management email

## 2025-01-07 NOTE — ASSESSMENT & PLAN NOTE
Patient presents with dry cough for a few days with associated fatigue, runny nose, poor appetite.  Reports left lower chest rib hurts when coughing.  Reports possible chills at home.  Reports had a party in apartment on Saturday 4 days ago prior to presentation.  Patient lives alone.  Fever 101.4 on ED arrival  COVID flu negative.  Chest x-ray showed right lower lobe pneumonia  CTA chest PE study showed  Bilateral hilar lymphadenopathy and mediastinal lymphadenopathy, likely reactive, but recommend follow-up chest CT in 2 months time to, document stability or resolution as neoplastic processes such as metastatic disease or lymphoma cannot be entirely excluded.  Currently afebrile, clinically improved.  Reports improvement in presenting symptoms.  Denies any chest pain, shortness of breath, cough is improved  IV ceftriaxone, completing 7-day today  Zithromax discontinued as urine legionella and strep are negative  Currently on room air, will transition to Ceftin to complete course  Follow-up imaging as outpatient

## 2025-01-07 NOTE — ASSESSMENT & PLAN NOTE
Present on admission, as evidenced by fever, elevated heart rate, with source of infection multifocal pneumonia.  Lactic acid normal  Initial procalcitonin 0.77-1.19-0.34  Antibiotics as above  Urine legionella and strep negative  Currently afebrile, normal white count.

## 2025-01-08 VITALS
HEIGHT: 60 IN | TEMPERATURE: 97.9 F | WEIGHT: 154 LBS | RESPIRATION RATE: 18 BRPM | HEART RATE: 67 BPM | OXYGEN SATURATION: 94 % | DIASTOLIC BLOOD PRESSURE: 59 MMHG | BODY MASS INDEX: 30.23 KG/M2 | SYSTOLIC BLOOD PRESSURE: 120 MMHG

## 2025-01-08 PROBLEM — E87.1 HYPONATREMIA: Status: RESOLVED | Noted: 2025-01-01 | Resolved: 2025-01-08

## 2025-01-08 PROCEDURE — 99239 HOSP IP/OBS DSCHRG MGMT >30: CPT | Performed by: INTERNAL MEDICINE

## 2025-01-08 RX ORDER — GUAIFENESIN/DEXTROMETHORPHAN 100-10MG/5
5 SYRUP ORAL 3 TIMES DAILY PRN
Start: 2025-01-08

## 2025-01-08 RX ORDER — FOLIC ACID 1 MG/1
1 TABLET ORAL DAILY
Start: 2025-01-09

## 2025-01-08 RX ORDER — ERGOCALCIFEROL 1.25 MG/1
50000 CAPSULE, LIQUID FILLED ORAL WEEKLY
Start: 2025-01-14 | End: 2025-02-26

## 2025-01-08 RX ORDER — SACCHAROMYCES BOULARDII 250 MG
250 CAPSULE ORAL 2 TIMES DAILY
Start: 2025-01-08

## 2025-01-08 RX ADMIN — LOSARTAN POTASSIUM 100 MG: 50 TABLET, FILM COATED ORAL at 09:30

## 2025-01-08 RX ADMIN — Medication 250 MG: at 09:30

## 2025-01-08 RX ADMIN — CLOPIDOGREL 75 MG: 75 TABLET ORAL at 09:29

## 2025-01-08 RX ADMIN — LEVOTHYROXINE SODIUM 88 MCG: 88 TABLET ORAL at 06:46

## 2025-01-08 RX ADMIN — FLUOXETINE HYDROCHLORIDE 40 MG: 20 CAPSULE ORAL at 09:30

## 2025-01-08 RX ADMIN — PANTOPRAZOLE SODIUM 40 MG: 40 TABLET, DELAYED RELEASE ORAL at 06:47

## 2025-01-08 RX ADMIN — GUAIFENESIN 600 MG: 600 TABLET, EXTENDED RELEASE ORAL at 09:30

## 2025-01-08 RX ADMIN — METOPROLOL SUCCINATE 50 MG: 50 TABLET, EXTENDED RELEASE ORAL at 09:30

## 2025-01-08 RX ADMIN — ENOXAPARIN SODIUM 40 MG: 40 INJECTION SUBCUTANEOUS at 09:30

## 2025-01-08 RX ADMIN — CYANOCOBALAMIN TAB 500 MCG 500 MCG: 500 TAB at 09:30

## 2025-01-08 RX ADMIN — FOLIC ACID 1 MG: 1 TABLET ORAL at 09:30

## 2025-01-08 RX ADMIN — LEVETIRACETAM 500 MG: 500 TABLET, FILM COATED ORAL at 09:30

## 2025-01-08 NOTE — ASSESSMENT & PLAN NOTE
Patient awake alert oriented to place and person on exam, follows commands.  Patient is scheduled to have appointment with [facility] on 3/5/2025 at 10:30 AM for memory evaluation  Noted to have low normal B12, low folate and low vitamin D-initiated replacement .  Follow-up level as outpatient  Supportive care

## 2025-01-08 NOTE — NURSING NOTE
Patient's IV removed. Patient left with all belongings. Report called to Chrissy at [facility]. All questions answered. Paperwork for [facility] given to patient's son. Patient left 2 South in stable condition.

## 2025-01-08 NOTE — CASE MANAGEMENT
Case Management Discharge Planning Note    Patient name Yesenia Tillman  Location 2 South /2 South 206 MRN [id number]  : 1935 Date 2025       Current Admission Date: 2025  Current Admission Diagnosis:Multifocal pneumonia   Patient Active Problem List    Diagnosis Date Noted Date Diagnosed    Multifocal pneumonia 2025     Hyponatremia 2025     Elevated brain natriuretic peptide (BNP) level 2025     Memory difficulty 10/11/2024     Right shoulder pain 2023     Gastroesophageal reflux disease without esophagitis 10/11/2022     Moderate episode of recurrent major depressive disorder (HCC) 10/11/2022     Seasonal allergic rhinitis due to pollen 2022     Stroke-like symptoms 2020     H/O: CVA (cerebrovascular accident) 2020     Stenosis of left middle cerebral artery 2019     Esophageal dysmotility 06/10/2019     Seizure disorder (HCC) 2018     HTN (hypertension), benign 2016     Acquired hypothyroidism 2016     Paroxysmal atrial fibrillation (HCC) 2016       LOS (days): 7  Geometric Mean LOS (GMLOS) (days): 4.9  Days to GMLOS:-1.7     OBJECTIVE:  Risk of Unplanned Readmission Score: 15.38         Current admission status: Inpatient   Preferred Pharmacy:   [facility] #[id number] [place], [place] - [address]  [address]  [facility] [address]  Phone: [phone] Fax: [phone]    Primary Care Provider: Liam Zepeda MD    Primary Insurance: BLUE CROSS [employer] REP  Secondary Insurance:     DISCHARGE DETAILS:                                                                                                               Facility Insurance Auth Number: [id number]

## 2025-01-08 NOTE — [place] UNIVERSAL TRANSFER FORM
[place] UNIV[place]AL TRANSFER FORM  (ALL ITEMS MUST BE COMPLETED)    1. TRANSFER FROM: [facility]      TRANSFER TO: [facility]    2. DATE OF TRANSFER: 1/8/2025                        TIME OF TRANSFER: 1300    3. PATIENT NAME: Yesenia Tillman K      YOB: 1935                             GENDER: female    4. LANGUAGE:   English    5. PHYSICIAN NAME:  Louann Renae MD                   PHONE: [phone]    6. CODE STATUS: Level 1 - Full Code        Out of Hospital DNR Attached: No    7. :                                      :  Extended Emergency Contact Information  Primary Emergency Contact: carissa porter  Address: [address] [place] [address] of [facility]  Mobile Phone: [phone]  Relation: Son  Secondary Emergency Contact: Sera Porter  Mobile Phone: [phone]  Relation: Daughter In-Law   needed? No           Health Care Representative/Proxy:  Yes           Legal Guardian:  No             NAME OF:           HEALTH CARE REPRESENTATIVE/PROXY:                                         OR           LEGAL GUARDIAN, IF NOT :                                               PHONE:  (Day)           (Night)                        (Cell)    8. REASON FOR TRANSFER: (Must include brief medical history and recent changes in physical function or cognition.) Multifocal pneumonia            V/S: /59   Pulse 67   Temp 97.9 °F (36.6 °C)   Resp 18   Ht 5' (1.524 m)   Wt 69.9 kg (154 lb)   SpO2 94%   BMI 30.08 kg/m²           PAIN: None    9. PRIMARY DIAGNOSIS: Multifocal pneumonia      Secondary Diagnosis:         Pacemaker: No      Internal Defib: No          Mental Health Diagnosis (if Applicable):    10. RESTRAINTS: No     11. RESPIRATORY NEEDS: None    12. ISOLATION/PRECAUTION: None    13. ALLERGY: Amlodipine, Hydralazine, and Penicillins    14. SENSORY:        Vision Glasses    15. SKIN CONDITION: No Wounds    16. DIET: Cardiac    17. IV ACCESS: None    18. PERSONAL ITEMS SENT WITH PATIENT: Glasses    19. ATTACHED DOCUMENTS: MUST ATTACH CURRENT MEDICATION INFORMATION Face Sheet and MAR    20. AT RISK ALERTS:Falls        HARM TO: N/A    21. WEIGHT BEARING STATUS:         Left Leg: Full        Right Leg: Full    22. MENTAL STATUS:Alert and Oriented    23. FUNCTION:        Walk: With Help        Transfer: With Help        Toilet: With Help        Feed: Self    24. IMMUNIZATIONS/SCREENING:     Immunization History   Administered Date(s) Administered    COVID-19 MODERNA VACC 0.25 ML IM BOOSTER 11/23/2021    COVID-19 MODERNA VACC 0.5 ML IM 02/19/2021, 03/18/2021    INFLUENZA 10/24/2013, 09/10/2024    Influenza Quadrivalent 3 years and older 10/02/2015, 10/25/2018    Influenza Quadrivalent Preservative Free 3 years and older IM 10/16/2014    Influenza Split High Dose Preservative Free IM 10/06/2016, 10/08/2017, 10/01/2019    Influenza, high dose seasonal 0.7 mL 10/11/2022, 10/12/2023    Pneumococcal Conjugate 13-Valent 12/18/2015    Pneumococcal Polysaccharide PPV23 07/26/2013    Tdap 09/29/2021    influenza, trivalent, adjuvanted 10/09/2020, 10/11/2021       25. BOWEL: Continent    26. BLADDER: Continent    27. SENDING FACILITY CONTACT: Isadora Gibson RN                  Title: RN        Unit: [address] [place] [address]        Phone: [phone]          REC'G FACILITY CONTACT (if known):        Title:        Unit:         Phone:         FORM PREFILLED BY (if applicable)       Title:       Unit:        Phone:         FORM COMPLETED BY Isadora Gibson RN      Title: [facility]      Phone: [phone]

## 2025-01-08 NOTE — ASSESSMENT & PLAN NOTE
On losartan metoprolol at home..  BP fluctuates, currently acceptable  Continue home meds  Monitor blood pressure trend

## 2025-01-08 NOTE — DISCHARGE SUMMARY
Discharge Summary - Hospitalist   Name: Yesenia Tillman 89 y.o. female I MRN: [id number]  Unit/Bed#: 2 [address] Date of Admission: 1/1/2025   Date of Service: 1/8/2025 I Hospital Day: 7     Assessment & Plan  Multifocal pneumonia  Patient presents with dry cough for a few days with associated fatigue, runny nose, poor appetite.  Reports left lower chest rib hurts when coughing.  Reports possible chills at home.  Reports had a party in apartment on Saturday 4 days ago prior to presentation.  Patient lives alone.  Fever 101.4 on ED arrival  COVID flu negative.  Chest x-ray showed right lower lobe pneumonia  CTA chest PE study showed  Bilateral hilar lymphadenopathy and mediastinal lymphadenopathy, likely reactive, but recommend follow-up chest CT in 2 months time to, document stability or resolution as neoplastic processes such as metastatic disease or lymphoma cannot be entirely excluded.  Remains afebrile, denies any shortness of breath.  Chest pain.  Minimal cough.  Leukocytosis improved.  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  2D echo a year ago showed EF 55%, normal diastolic function, normal atriums, no significant valvular disease.  Updated echo performed on 1/2/25 shows no significant changes  Monitor volume status.  HTN (hypertension), benign  On losartan metoprolol at home..  BP fluctuates, currently acceptable  Continue home meds  Monitor blood pressure trend  Acquired hypothyroidism  Continue Synthroid  TSH 3.897.  Paroxysmal atrial  fibrillation (HCC)  NSR  On Plavix metoprolol at home.   Seizure disorder (HCC)  Continue Keppra  H/O: CVA (cerebrovascular accident)  History of small SAH and subdural hematoma in the left parietal area after receiving tPA for strokelike symptoms in September 2016.  Continue Lipitor Plavix  Gastroesophageal reflux disease without esophagitis  Continue PPI and Pepcid daily.  Moderate episode of recurrent major depressive disorder (HCC)  History of suicidal thoughts at times per ED note.  Patient denies SI currently.  Follow-up psych/behavioral health as outpatient  Seen by psychiatry, input appreciated, recommended to continue Prozac 40 mg daily  Follow-up with primary psychiatry, Galina Avalos PA-C.  Patient also also follows with Galina Costa LCSW for therapy   Memory difficulty  Patient awake alert oriented to place and person on exam, follows commands.  Patient is scheduled to have appointment with shelter on 3/5/2025 at 10:30 AM for memory evaluation  Noted to have low normal B12, low folate and low vitamin D-initiated replacement .  Follow-up level as outpatient  Supportive care     Medical Problems       Resolved Problems  Date Reviewed: 1/5/2025          Resolved    Sepsis (HCC) 1/7/2025     Resolved by  Louann Renae MD        Discharging Physician / Practitioner: Louann Renae MD  PCP: Liam Zepeda MD  Admission Date:   Admission Orders (From admission, onward)       Ordered        01/01/25 1838  INPATIENT ADMISSION  Once                          Discharge Date: 01/08/25    Consultations During Hospital Stay:  Psychiatric    Procedures Performed:   None    Significant Findings / Test Results:   Blood cultures negative x 5 days  SARS-CoV-2 influenza AB antigen negative  Urine Legionella pneumococcal antigen negative    Incidental Findings:    Finding: CT scan on admission revealed  Wedge-shaped consolidation involving the lower aspect of the right lower lobe, and patchy  groundglass opacities which involve the left lower lobe, and focal groundglass opacities and likely associated tree-in-bud nodules involving the posterior left upper lobe. These likely present multifocal pneumonia and follow-up until resolution is recommended.     Bilateral hilar lymphadenopathy and mediastinal lymphadenopathy, likely reactive, but recommend follow-up chest CT in 2 months time to, document stability or resolution as neoplastic processes such as metastatic disease or lymphoma cannot be entirely   excluded.     Small hiatal hernia.     Diffuse hepatic steatosis               Follow up required: Yes, CT chest in 1 to 2 months for pneumonia and lymphadenopathy     Test Results Pending at Discharge (will require follow up):   None     Outpatient Tests Requested:  None    Complications:  None    Reason for Admission: Cough,    Hospital Course:     As per HPI  Yesenia Tillman is a 89 y.o. female patient depression, CVA, seizures, GERD, hypertension, dyslipidemia, asthma, hypothyroidism, paroxysmal A-fib   Who originally presented to the hospital on 1/1/2025 due to ongoing dry cough associated with fatigue, URI symptoms, poor appetite.  Patient also reported left-sided pleuritic chest pain and associated chills.  In ED patient was noted to be febrile with leukocytosis, imaging revealed multifocal pneumonia patient was started on antibiotics and was subsequently admitted.  Patient was treated with azithromycin and ceftriaxone and gradually improved, sepsis resolved.  Patient respiratory symptoms improved.  Cultures remain negative.  Patient was eval by physical therapy and was recommended rehab now being transferred to rehab.  Patient also reported depressive symptoms during hospitalization, seen by psychiatry recommended to continue Prozac and follow-up with primary psychiatry after discharge.  Patient was checked for B12 folate and vitamin D which noted to be low and patient was started on replacement.   Patient currently clinically stable with resolution of presenting symptoms will be discharged to rehab will require follow-up imaging for resolution of pulmonary infiltrate and associated lymphadenopathy after discharge.        Please see above list of diagnoses and related plan for additional information.     Condition at Discharge: stable    Discharge Day Visit / Exam:   Subjective:    Comfortable lying in bed  Reports that she feels much better  Denies any chest pain shortness of breath or cough  Tolerating diet  Denies any  or GI complaints        Vitals: Blood Pressure: 120/59 (01/08/25 0929)  Pulse: 67 (01/08/25 0929)  Temperature: 97.9 °F (36.6 °C) (01/08/25 0929)  Temp Source: Oral (01/07/25 2354)  Respirations: 18 (01/08/25 0929)  Height: 5' (152.4 cm) (01/02/25 1106)  Weight - Scale: 69.9 kg (154 lb) (01/02/25 1106)  SpO2: 94 % (01/08/25 0929)  Physical Exam  Constitutional:       General: She is not in acute distress.  HENT:      Head: Normocephalic and atraumatic.   Cardiovascular:      Rate and Rhythm: Normal rate.   Pulmonary:      Effort: Pulmonary effort is normal. No respiratory distress.      Breath sounds: No wheezing or rales.   Abdominal:      General: Bowel sounds are normal. There is no distension.      Palpations: Abdomen is soft.      Tenderness: There is no abdominal tenderness. There is no guarding or rebound.   Musculoskeletal:      Right lower leg: No edema.      Left lower leg: No edema.   Skin:     General: Skin is warm and dry.      Findings: No rash.   Neurological:      Mental Status: She is alert. Mental status is at baseline.      Cranial Nerves: No cranial nerve deficit.            Discussion with Family: Updated  (son) at bedside.    Discharge instructions/Information to patient and family:   See after visit summary for information provided to patient and family.      Provisions for Follow-Up Care:  See after visit summary for information related to follow-up  care and any pertinent home health orders.      Mobility at time of Discharge:   Basic Mobility Inpatient Raw Score: 17  JH-HLM Goal: 5: Stand one or more mins  JH-HLM Achieved: 6: Walk 10 steps or more  HLM Goal NOT achieved. Continue to encourage mobility in post discharge setting.     Disposition:   Other Skilled Nursing Facility at [facility]    Planned Readmission: None    Discharge Medications:  See after visit summary for reconciled discharge medications provided to patient and/or family.      Administrative Statements   Discharge Statement:  I have spent a total time of 35 minutes in caring for this patient on the day of the visit/encounter. >30 minutes of time was spent on: Diagnostic results, Risks and benefits of tx options, Patient and family education, Counseling / Coordination of care, Documenting in the medical record, Reviewing / ordering tests, medicine, procedures  , and Communicating with other healthcare professionals .    **Please Note: This note may have been constructed using a voice recognition system**

## 2025-01-08 NOTE — CASE MANAGEMENT
[facility] has received APPROVED authorization.  Insurance:   domitila Sabillon obtained via Insurance Rep: Tulio MCNAIR#[phone]  Authorization received for: [facility]  Facility: [facility]   Authorization #:[id number]    Start of Care:1/8  Next Review Date:1/13  Continued Stay Care Coordinator: tulio MCNAIR#: [phone]  Submit next review to: parul crandall      Care Manager notified: annabella case management     Please reach out to  for updates on any clinical information.

## 2025-01-08 NOTE — CASE MANAGEMENT
Case Management Discharge Planning Note    Patient name Yesenia Tillman  Location [address] MRN [id number]  : 1935 Date 2025       Current Admission Date: 2025  Current Admission Diagnosis:Multifocal pneumonia   Patient Active Problem List    Diagnosis Date Noted Date Diagnosed    Multifocal pneumonia 2025     Hyponatremia 2025     Elevated brain natriuretic peptide (BNP) level 2025     Memory difficulty 10/11/2024     Right shoulder pain 2023     Gastroesophageal reflux disease without esophagitis 10/11/2022     Moderate episode of recurrent major depressive disorder (HCC) 10/11/2022     Seasonal allergic rhinitis due to pollen 2022     Stroke-like symptoms 2020     H/O: CVA (cerebrovascular accident) 2020     Stenosis of left middle cerebral artery 2019     Esophageal dysmotility 06/10/2019     Seizure disorder (HCC) 2018     HTN (hypertension), benign 2016     Acquired hypothyroidism 2016     Paroxysmal atrial fibrillation (HCC) 2016       LOS (days): 7  Geometric Mean LOS (GMLOS) (days): 4.9  Days to GMLOS:-1.8     OBJECTIVE:  Risk of Unplanned Readmission Score: 15.38     Current admission status: Inpatient   Preferred Pharmacy:   [id number] [address]  [address] [place] [address] ( [address])  [facility] [address]  Phone: [phone] Fax: [phone]    Primary Care Provider: Liam Zepeda MD    Primary Insurance: [employer] REP  Secondary Insurance:     DISCHARGE DETAILS:    Discharge planning discussed with:: SonClarence  Freedom of Choice: Yes  Comments - Freedom of Choice: SW following to assist with planning.  Rehab authorization approval was received by CM Discharge Support.  Approval information sent to [facility] via [employer].  Pt will be transferring to [facility] today for skilled rehab.  SW notified pt's son of approval  and ability to transfer.  Son said he will arrive around 1:00 pm to transport pt to rehab facility.  Dr. Renae, RN and facility aware.  CM contacted family/caregiver?: Yes    Contacts  Patient Contacts: Chucky Porter  Relationship to Patient:: Family  Contact Method: Phone  Phone Number: [phone]  Reason/Outcome: Discharge Planning    Other Referral/Resources/Interventions Provided:  Interventions: Short Term Rehab    Treatment Team Recommendation: Short Term Rehab  Discharge Destination Plan:: Short Term Rehab  Transport at Discharge : Family              Accepting Facility Name, City & State : [place], [place]  Receiving Facility/Agency Phone Number: [phone]

## 2025-01-08 NOTE — ASSESSMENT & PLAN NOTE
Patient presents with dry cough for a few days with associated fatigue, runny nose, poor appetite.  Reports left lower chest rib hurts when coughing.  Reports possible chills at home.  Reports had a party in apartment on Saturday 4 days ago prior to presentation.  Patient lives alone.  Fever 101.4 on ED arrival  COVID flu negative.  Chest x-ray showed right lower lobe pneumonia  CTA chest PE study showed  Bilateral hilar lymphadenopathy and mediastinal lymphadenopathy, likely reactive, but recommend follow-up chest CT in 2 months time to, document stability or resolution as neoplastic processes such as metastatic disease or lymphoma cannot be entirely excluded.  Remains afebrile, denies any shortness of breath.  Chest pain.  Minimal cough.  Leukocytosis improved.  Remains on room air  IV ceftriaxone, completed 7 days  Zithromax discontinued as urine legionella antigen was negative  Discharge planning to rehab  Repeat CT scan in 1 to 2 months for resolution of infiltrate and lymphadenopathy

## 2025-01-08 NOTE — ASSESSMENT & PLAN NOTE
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

## 2025-01-08 NOTE — PLAN OF CARE
Problem: RESPIRATORY - ADULT  Goal: Achieves optimal ventilation and oxygenation  Description: INTERVENTIONS:  - Assess for changes in respiratory status  - Assess for changes in mentation and behavior  - Position to facilitate oxygenation and minimize respiratory effort  - Oxygen administered by appropriate delivery if ordered  - Initiate smoking cessation education as indicated  - Encourage broncho-pulmonary hygiene including cough, deep breathe, Incentive Spirometry  - Assess the need for suctioning and aspirate as needed  - Assess and instruct to report SOB or any respiratory difficulty  - Respiratory Therapy support as indicated  Outcome: Progressing     Problem: Nutrition/Hydration-ADULT  Goal: Nutrient/Hydration intake appropriate for improving, restoring or maintaining nutritional needs  Description: Monitor and assess patient's nutrition/hydration status for malnutrition. Collaborate with interdisciplinary team and initiate plan and interventions as ordered.  Monitor patient's weight and dietary intake as ordered or per policy. Utilize nutrition screening tool and intervene as necessary. Determine patient's food preferences and provide high-protein, high-caloric foods as appropriate.     INTERVENTIONS:  - Monitor oral intake, urinary output, labs, and treatment plans  - Assess nutrition and hydration status and recommend course of action  - Evaluate amount of meals eaten  - Assist patient with eating if necessary   - Allow adequate time for meals  - Recommend/ encourage appropriate diets, oral nutritional supplements, and vitamin/mineral supplements  - Order, calculate, and assess calorie counts as needed  - Recommend, monitor, and adjust tube feedings and TPN/PPN based on assessed needs  - Assess need for intravenous fluids  - Provide specific nutrition/hydration education as appropriate  - Include patient/family/caregiver in decisions related to nutrition  Outcome: Progressing     Problem: METABOLIC,  FLUID AND ELECTROLYTES - ADULT  Goal: Electrolytes maintained within normal limits  Description: INTERVENTIONS:  - Monitor labs and assess patient for signs and symptoms of electrolyte imbalances  - Administer electrolyte replacement as ordered  - Monitor response to electrolyte replacements, including repeat lab results as appropriate  - Instruct patient on fluid and nutrition as appropriate  Outcome: Progressing

## 2025-01-08 NOTE — INCIDENTAL FINDINGS
The following findings require follow up:  Radiographic finding   Finding: CT scan on admission revealed  Wedge-shaped consolidation involving the lower aspect of the right lower lobe, and patchy groundglass opacities which involve the left lower lobe, and focal groundglass opacities and likely associated tree-in-bud nodules involving the posterior left upper lobe. These likely present multifocal pneumonia and follow-up until resolution is recommended.     Bilateral hilar lymphadenopathy and mediastinal lymphadenopathy, likely reactive, but recommend follow-up chest CT in 2 months time to, document stability or resolution as neoplastic processes such as metastatic disease or lymphoma cannot be entirely   excluded.     Small hiatal hernia.     Diffuse hepatic steatosis    Follow up required: Yes, CT chest in 1 to 2 months for pneumonia and lymphadenopathy       Please notify the following clinician to assist with the follow up:   Dr. Liam Zepeda MD      Incidental finding results were discussed with the Patient's POA by Louann Renae MD on 01/08/25.   They expressed understanding and all questions answered.

## 2025-01-09 ENCOUNTER — TRANSITIONAL CARE MANAGEMENT (OUTPATIENT)
Age: 89
End: 2025-01-09

## 2025-01-09 ENCOUNTER — PATIENT OUTREACH (OUTPATIENT)
Dept: CASE MANAGEMENT | Facility: OTHER | Age: 89
End: 2025-01-09

## 2025-01-09 NOTE — UTILIZATION REVIEW
NOTIFICATION OF ADMISSION DISCHARGE   This is a Notification of Discharge from [facility]. Please be advised that this patient has been discharge from our facility. Below you will find the admission and discharge date and time including the patient’s disposition.   UTILIZATION REVIEW CONTACT:  Marcus Banda  Utilization   Network Utilization Review Department  Phone: [phone] x carefully listen to the prompts. All voicemails are confidential.  Email: [email or web address]     ADMISSION INFORMATION  PRESENTATION DATE: 1/1/2025  1:43 PM  OBERVATION ADMISSION DATE: N/A  INPATIENT ADMISSION DATE: 1/1/25  6:38 PM   DISCHARGE DATE: 1/8/2025  1:21 PM   [facility]    Network Utilization Review Department  ATTENTION: Please call with any questions or concerns to [phone] and carefully listen to the prompts so that you are directed to the right person. All voicemails are confidential.   For Discharge needs, contact Care Management DC Support Team at [phone] opt. 2  Send all requests for admission clinical reviews, approved or denied determinations and any other requests to dedicated fax number below belonging to the campus where the patient is receiving treatment. List of dedicated fax numbers for the Facilities:  FACILITY NAME UR FAX NUMBER   ADMISSION DENIALS (Administrative/Medical Necessity) [phone]   DISCHARGE SUPPORT TEAM ([facility]) [phone]   PARENT CHILD HEALTH (Maternity/NICU/Pediatrics) [phone]   [facility] [phone]   [facility] [phone]   [facility] [phone]   [facility] [phone]   [facility] [phone]   [facility] [phone]   [facility] [phone]   [facility] [phone]    [facility] [phone]   [facility] [phone]   [facility] [phone]   [facility] [phone]

## 2025-01-09 NOTE — PROGRESS NOTES
Outpatient care management referral via HRR report 1/9/25. Discharged to [facility] 1/8/25. Email sent to facility to inform them I will be following the patient during their skilled stay.  This Admin Coordinator will continue to monitor via chart review.

## 2025-01-17 ENCOUNTER — PATIENT OUTREACH (OUTPATIENT)
Dept: CASE MANAGEMENT | Facility: OTHER | Age: 89
End: 2025-01-17

## 2025-01-17 ENCOUNTER — PATIENT OUTREACH (OUTPATIENT)
Dept: CASE MANAGEMENT | Facility: HOSPITAL | Age: 89
End: 2025-01-17

## 2025-01-17 ENCOUNTER — PATIENT OUTREACH (OUTPATIENT)
Dept: FAMILY MEDICINE CLINIC | Facility: CLINIC | Age: 89
End: 2025-01-17

## 2025-01-17 DIAGNOSIS — Z71.89 COMPLEX CARE COORDINATION: Primary | ICD-10-CM

## 2025-01-17 NOTE — PROGRESS NOTES
Email update received indicating the patient discharged 1/16/25 to Home. I have removed myself off of the care team and sent a inbasket to the appropriate care  to notifying them of the SNF discharge and HRR Referal.  Ambulatory referral placed for complex care management.  A email was sent to the facility requesting discharge instructions. When Admin Coordinator has received the Discharge paperwork  Admin Coordinator will attach to this encounter.

## 2025-01-20 ENCOUNTER — PATIENT OUTREACH (OUTPATIENT)
Dept: CASE MANAGEMENT | Facility: HOSPITAL | Age: 89
End: 2025-01-20

## 2025-01-20 NOTE — PROGRESS NOTES
HRR SNF/handoff referral received via IB:    Chart reviewed. Pt admitted to IP at [facility] 1/1-1/8 with multifocal PNA. Pt presented with fever of 101.4, dry cough, fatigue, and runny nose. Patient discharged to [facility] for STR and discharged to home on 1/16.    PMH: HTN, AF, hypothyroid, seizures, CVA, GERD, MDD, Anxiety, arthritis, HLD, subdural hematoma  Patient independent and lives alone. Patient uses walker and cane. Patient drives self to apt's.    Call placed to patient, introduced self and explained the role of the RNCM and CCM services. Patient says she is doing okay. She says she just got home and is getting organized. She says she was sent home with her medications from STR. She declined to review. She does not have an apt scheduled with her PCP yet and this was discussed. She says she will call eventually but not right now. Patient says she drives herself to apt's and shopping but has not been out since she got home.     Patient is agreeable to a follow up call the end of the week to check back in and review medications.    An IB msg sent to PCP and office staff with request to call patient and scheduled [facility] [address].

## 2025-01-22 ENCOUNTER — DOCUMENTATION (OUTPATIENT)
Dept: BEHAVIORAL/MENTAL HEALTH CLINIC | Facility: CLINIC | Age: 89
End: 2025-01-22

## 2025-01-22 NOTE — PROGRESS NOTES
Called and spoke with Yesenia since she has not been seen since November.  Yesenia shared that she did not remember me, though the name sounded familiar.  Yesenia shared that she got home yesterday, when in fact she got home on 1/16.  She stated that both her son and her step daughter have been to see her but does not know when.  She also shared that her son put her medication in baggies for her and she is taking them all at once, once per day.  Yesenia needed to look at a calendar to answer what year it was, she also was not sure if it was the 21st or the 22nd, stating that she had line drawn through the 20th and two through the 21st and was not sure if she should draw a line through the 22nd.  Yesenia was asked if she has been eating and she said she has been, but was not sure what she had eaten.  She reported that she has not been out since she got home, stating that she recognizes that her memory is not right and does not want to go out and then get lost.  This writer informed Yesenia that she would be calling Yesenia's son to follow up on her plan for care and her memory issues.      This writer then called Patient's son (AGUS on file) and left him a message asking him to call back.

## 2025-01-23 NOTE — PROGRESS NOTES
This writer spoke with Yesenia's son, Chucky Porter (AGUS on file) and shared concerns regarding memory concerns and medication, driving, getting proper nutrition, etc. Based on my phone call with Yesenia earlier today.  Chucky listened to this writers concerns and reported that he has been to her house along with his step sister, daily since she was discharged from the hospital.  He reports that he took her keys so she cannot drive, and he is making sure she is eating.  Chucky asked about resources for his mother as they are looking for more care for her including a long term care facility.  This writer gave him resources of [facility] as well as [facility]. Discussed closing her case since [facility] does not have transportation nor does she remember this writer, and He asked that she not be discharged yet as he would like to see if he can bring his mother to her next appt which Chucky had the information on and was agreed to.

## 2025-01-24 ENCOUNTER — PATIENT OUTREACH (OUTPATIENT)
Dept: CASE MANAGEMENT | Facility: HOSPITAL | Age: 89
End: 2025-01-24

## 2025-01-24 NOTE — PROGRESS NOTES
Chart reviewed. Patient had PCP [facility] apt scheduled on  but apt cancelled due to inclement weather. Apt not rescheduled but patient does have and apt with [facility] to establish care.     Call placed to patient. Patient says she is a little confused, tired and weak this morning. She says she knows she is in her home right now but was recently away. She says she doesn't know where she was staying. She says she thinks she was in the hospital out in [place] but she wasn't. She says she is thinking her son  but she cannot remember when but she thinks it was recent. She says she thinks this bothers her. She says she does speak with a therapist but hasn't since she was away in the hospital. She was A&O x 3 on the phone. Her speech was normal. She answered questions appropriately during our conversation. Patient says she has been confused a lot but today she feels more off. She says she spoke with her daughter-n-law Ellis and she is on her way to her house as we speak. She says she hasn't taken any of her medications yet today and Rhonda will be going over them with her when she gets there. We discussed calling her PCP to discuss and to schedule follow up. Patient says no she does not want to go any where it is too cold. We discussed having her d-n-l drive her and she said she does not want to be seen. Patient denies any breathing issues. She denies any cp, chest tightness, sob, dizziness or lightheadedness. She denies any one sided weakness in extremities, no numbness or tingling, no headache or vision changes. I instructed her to call 911 if any of these symptoms appear and she states understanding.     Call placed to patients daughter-n-law Ellis for follow up. There was no answer and a detailed message was left with a request for a call back.

## 2025-01-24 NOTE — PROGRESS NOTES
Received call back from patient DNL Rhonda. She states she is now with patient. She says that patient has been this way since she got home from [facility]. She says the confusion is worse in the morning and dissipates throughout the day.  Rhonda says she seems okay to her now. We discussed patients medications. She states that patients son Clarence helps care for patient and manages her medications. He lives in [facility] but is away on vacation in [place] at this time. She says she is not familiar with patients medications. Patients son set her pills up in a pill box and just makes sure she takes them. She says Clarence will be home to refill pill box this weekend.     We patient needing a follow up apt with her PCP. Rhonda says she will talk to patients son and have him make apt. Rhonda took down my contact information and says she will provide it to patients son Clarence. Rhonda says she will be stopping by every day to check in on patient while Clarence is away. Rhonda lives in [facility].    RNCM to follow up next week. Need to call patients son Clarence and review patients medications.

## 2025-01-27 ENCOUNTER — TELEPHONE (OUTPATIENT)
Age: 89
End: 2025-01-27

## 2025-01-29 NOTE — TELEPHONE ENCOUNTER
Aware PCP is out of office for the week Would like to know if he will sign orders for home health Please advise

## 2025-01-31 ENCOUNTER — PATIENT OUTREACH (OUTPATIENT)
Dept: CASE MANAGEMENT | Facility: HOSPITAL | Age: 89
End: 2025-01-31

## 2025-01-31 PROBLEM — J18.9 MULTIFOCAL PNEUMONIA: Status: RESOLVED | Noted: 2025-01-01 | Resolved: 2025-01-31

## 2025-01-31 NOTE — PROGRESS NOTES
IB reminder received for follow up. Chart reviewed.  [facility] contacted PCP office on 1/27 to have PCP sign off on orders for HHC. PCP out of office this week and will f/u when he gets back.    No PCP [facility] Apt scheduled at this time.    Call placed to patient. No answer and VM left with request for a call back.    Call placed to patients ree Lima. No answer and VM left with request for CB.    RNCM to follow.

## 2025-02-06 DIAGNOSIS — I10 ESSENTIAL HYPERTENSION: ICD-10-CM

## 2025-02-06 NOTE — TELEPHONE ENCOUNTER
Reason for call:   [x] Refill   [] Prior Auth  [] Other:     Office:   [x] PCP/Provider - [facility] - Liam Zepeda MD   [] Specialty/Provider -     Medication:  metoprolol succinate (TOPROL-XL) 50 mg 24 hr tablet    Dose/Frequency:  Take 1 tablet (50 mg total) by mouth daily     Quantity: 90 tablet     Pharmacy: [id number] [place] [id number]) [phone]    Does the patient have enough for 3 days?   [x] Yes   [] No - Send as HP to POD

## 2025-02-07 ENCOUNTER — PATIENT OUTREACH (OUTPATIENT)
Dept: CASE MANAGEMENT | Facility: OTHER | Age: 89
End: 2025-02-07

## 2025-02-07 ENCOUNTER — PATIENT OUTREACH (OUTPATIENT)
Dept: CASE MANAGEMENT | Facility: HOSPITAL | Age: 89
End: 2025-02-07

## 2025-02-07 DIAGNOSIS — Z75.4 INADEQUATE COMMUNITY RESOURCES: Primary | ICD-10-CM

## 2025-02-07 RX ORDER — METOPROLOL SUCCINATE 50 MG/1
50 TABLET, EXTENDED RELEASE ORAL DAILY
Qty: 90 TABLET | Refills: 1 | Status: SHIPPED | OUTPATIENT
Start: 2025-02-07

## 2025-02-07 NOTE — PROGRESS NOTES
Chart reviewed. Last RNCM outreach was 1/31. Patient had apt on 3/10 with [facility] to establish care. No PCP [facility] apt noted. No utilization since last outreach.    Call placed to patient. Patient says she is doing okay and her breathing is good. She denies any SOB, CP or chest tightness. She denies any fever or chills. She denies any needs at this time and says her son Clarence helps her with whatever she needs. She says she cannot remember if VNA is still coming out to her home. I provided her with my contact information and asked her to give to her son.     This RNCM called patient son Clarence. There was no answer and VM left with a request for a call back.     Received call back from patients son Clarence. She says patient seems to be getting better and stronger. He says her breathing has been good when he sees her. She does seem to have some sob on exertion which is normal for her. He says she continues to receive services through [facility]. He is unsure which services she has but they were last at her home on Monday. Clarence was asking about services available to get help for patient at home as well as paperwork for POA. We discussed making a referral to [facility] and he would like a referral placed.    Clarence is agreeable to a follow up clal in 2 weeks on follow up on SWCM referral.    A [facility] referral placed.    PLEASE CALL PATIENTS SON DENISE NEWTON REGARDING SERVICES AND APT'S -[phone]. PATIENT IS VERY CONFUSED.    RNCM to follow.    Note routed to [facility] in [facility] POD.

## 2025-02-07 NOTE — PROGRESS NOTES
TEDDY ASHBY received referral for patient for assistance with help at home.  TEDDY ASHBY reviewed patient's chart and reached out to her son-Clarence regarding the above.  TEDDY ASHBY spoke with son regarding the above.  Patient does not meet financial requirements for [facility] at this time.  Family would consider private pay, as patient's assets are very close to the limit for [facility] as well.  TEDDY ASHBY e-mailed information for Private Hire as well as [employer] to [email or web address]    No further ISMA assistance requested at this time.  Referral closed.

## 2025-02-14 ENCOUNTER — PATIENT OUTREACH (OUTPATIENT)
Dept: CASE MANAGEMENT | Facility: HOSPITAL | Age: 89
End: 2025-02-14

## 2025-02-14 NOTE — PROGRESS NOTES
Chart reviewed. SWCM called patients ree Locke on 2/7. Attempted call to Chucky. There was no answer and VM left with request for CB.    RNCM to follow.

## 2025-02-16 DIAGNOSIS — E78.2 MIXED HYPERLIPIDEMIA: ICD-10-CM

## 2025-02-16 DIAGNOSIS — K21.9 GASTROESOPHAGEAL REFLUX DISEASE WITHOUT ESOPHAGITIS: ICD-10-CM

## 2025-02-16 RX ORDER — FAMOTIDINE 20 MG/1
20 TABLET, FILM COATED ORAL DAILY
Qty: 90 TABLET | Refills: 1 | Status: SHIPPED | OUTPATIENT
Start: 2025-02-16

## 2025-02-17 RX ORDER — ATORVASTATIN CALCIUM 40 MG/1
40 TABLET, FILM COATED ORAL DAILY
Qty: 30 TABLET | Refills: 0 | Status: SHIPPED | OUTPATIENT
Start: 2025-02-17

## 2025-02-18 ENCOUNTER — PATIENT OUTREACH (OUTPATIENT)
Dept: CASE MANAGEMENT | Facility: HOSPITAL | Age: 89
End: 2025-02-18

## 2025-02-24 ENCOUNTER — OFFICE VISIT (OUTPATIENT)
Age: 89
End: 2025-02-24
Payer: COMMERCIAL

## 2025-02-24 ENCOUNTER — DOCUMENTATION (OUTPATIENT)
Dept: BEHAVIORAL/MENTAL HEALTH CLINIC | Facility: CLINIC | Age: 89
End: 2025-02-24

## 2025-02-24 VITALS
TEMPERATURE: 96.7 F | SYSTOLIC BLOOD PRESSURE: 124 MMHG | WEIGHT: 149.4 LBS | OXYGEN SATURATION: 96 % | HEART RATE: 88 BPM | DIASTOLIC BLOOD PRESSURE: 80 MMHG | RESPIRATION RATE: 18 BRPM | BODY MASS INDEX: 28.21 KG/M2 | HEIGHT: 61 IN

## 2025-02-24 DIAGNOSIS — E03.9 ACQUIRED HYPOTHYROIDISM: ICD-10-CM

## 2025-02-24 DIAGNOSIS — I10 ESSENTIAL HYPERTENSION: ICD-10-CM

## 2025-02-24 DIAGNOSIS — F33.1 MODERATE EPISODE OF RECURRENT MAJOR DEPRESSIVE DISORDER (HCC): ICD-10-CM

## 2025-02-24 DIAGNOSIS — I10 HTN (HYPERTENSION), BENIGN: Primary | ICD-10-CM

## 2025-02-24 DIAGNOSIS — E78.2 MIXED HYPERLIPIDEMIA: ICD-10-CM

## 2025-02-24 DIAGNOSIS — K21.9 GASTROESOPHAGEAL REFLUX DISEASE WITHOUT ESOPHAGITIS: ICD-10-CM

## 2025-02-24 DIAGNOSIS — Z00.00 MEDICARE ANNUAL WELLNESS VISIT, SUBSEQUENT: ICD-10-CM

## 2025-02-24 PROCEDURE — G2211 COMPLEX E/M VISIT ADD ON: HCPCS

## 2025-02-24 PROCEDURE — 99214 OFFICE O/P EST MOD 30 MIN: CPT

## 2025-02-24 PROCEDURE — G0439 PPPS, SUBSEQ VISIT: HCPCS

## 2025-02-24 RX ORDER — MELOXICAM 7.5 MG/1
7.5 TABLET ORAL DAILY
Qty: 30 TABLET | Refills: 2 | Status: CANCELLED | OUTPATIENT
Start: 2025-02-24

## 2025-02-24 RX ORDER — FLUOXETINE HYDROCHLORIDE 40 MG/1
40 CAPSULE ORAL DAILY
Qty: 90 CAPSULE | Refills: 2 | Status: SHIPPED | OUTPATIENT
Start: 2025-02-24

## 2025-02-24 RX ORDER — ATORVASTATIN CALCIUM 40 MG/1
40 TABLET, FILM COATED ORAL DAILY
Qty: 90 TABLET | Refills: 0 | Status: SHIPPED | OUTPATIENT
Start: 2025-02-24

## 2025-02-24 RX ORDER — OMEPRAZOLE 20 MG/1
20 CAPSULE, DELAYED RELEASE ORAL DAILY
Qty: 90 CAPSULE | Refills: 1 | Status: SHIPPED | OUTPATIENT
Start: 2025-02-24

## 2025-02-24 RX ORDER — MELOXICAM 7.5 MG/1
7.5 TABLET ORAL DAILY
COMMUNITY
End: 2025-03-07 | Stop reason: SDDI

## 2025-02-24 RX ORDER — METOPROLOL SUCCINATE 50 MG/1
50 TABLET, EXTENDED RELEASE ORAL DAILY
Qty: 90 TABLET | Refills: 1 | Status: SHIPPED | OUTPATIENT
Start: 2025-02-24

## 2025-02-24 RX ORDER — LEVOTHYROXINE SODIUM 88 UG/1
88 TABLET ORAL DAILY
Qty: 90 TABLET | Refills: 1 | Status: SHIPPED | OUTPATIENT
Start: 2025-02-24

## 2025-02-24 RX ORDER — FAMOTIDINE 20 MG/1
20 TABLET, FILM COATED ORAL DAILY
Qty: 90 TABLET | Refills: 1 | Status: SHIPPED | OUTPATIENT
Start: 2025-02-24

## 2025-02-24 NOTE — PATIENT INSTRUCTIONS
Medicare Preventive Visit Patient Instructions  Thank you for completing your Welcome to Medicare Visit or Medicare Annual Wellness Visit today. Your next wellness visit will be due in one year (2/25/2026).  The screening/preventive services that you may require over the next 5-10 years are detailed below. Some tests may not apply to you based off risk factors and/or age. Screening tests ordered at today's visit but not completed yet may show as past due. Also, please note that scanned in results may not display below.  Preventive Screenings:  Service Recommendations Previous Testing/Comments   Colorectal Cancer Screening  * Colonoscopy    * Fecal Occult Blood Test (FOBT)/Fecal Immunochemical Test (FIT)  * Fecal DNA/Cologuard Test  * Flexible Sigmoidoscopy Age: 45-75 years old   Colonoscopy: every 10 years (may be performed more frequently if at higher risk)  OR  FOBT/FIT: every 1 year  OR  Cologuard: every 3 years  OR  Sigmoidoscopy: every 5 years  Screening may be recommended earlier than age 45 if at higher risk for colorectal cancer. Also, an individualized decision between you and your healthcare provider will decide whether screening between the ages of 76-85 would be appropriate. Colonoscopy: Not on file  FOBT/FIT: Not on file  Cologuard: Not on file  Sigmoidoscopy: Not on file    Screening Not Indicated     Breast Cancer Screening Age: 40+ years old  Frequency: every 1-2 years  Not required if history of left and right mastectomy Mammogram: 11/05/2010        Cervical Cancer Screening Between the ages of 21-29, pap smear recommended once every 3 years.   Between the ages of 30-65, can perform pap smear with HPV co-testing every 5 years.   Recommendations may differ for women with a history of total hysterectomy, cervical cancer, or abnormal pap smears in past. Pap Smear: Not on file    Screening Not Indicated   Hepatitis C Screening Once for adults born between 1945 and 1965  More frequently in patients at  high risk for Hepatitis C Hep C Antibody: Not on file        Diabetes Screening 1-2 times per year if you're at risk for diabetes or have pre-diabetes Fasting glucose: 98 mg/dL (9/25/2023)  A1C: 6.2 % (12/8/2023)  Screening Current   Cholesterol Screening Once every 5 years if you don't have a lipid disorder. May order more often based on risk factors. Lipid panel: 12/08/2023    Screening Not Indicated  History Lipid Disorder     Other Preventive Screenings Covered by Medicare:  Abdominal Aortic Aneurysm (AAA) Screening: covered once if your at risk. You're considered to be at risk if you have a family history of AAA.  Lung Cancer Screening: covers low dose CT scan once per year if you meet all of the following conditions: (1) Age 55-77; (2) No signs or symptoms of lung cancer; (3) Current smoker or have quit smoking within the last 15 years; (4) You have a tobacco smoking history of at least 20 pack years (packs per day multiplied by number of years you smoked); (5) You get a written order from a healthcare provider.  Glaucoma Screening: covered annually if you're considered high risk: (1) You have diabetes OR (2) Family history of glaucoma OR (3)  aged 50 and older OR (4)  American aged 65 and older  Osteoporosis Screening: covered every 2 years if you meet one of the following conditions: (1) You're estrogen deficient and at risk for osteoporosis based off medical history and other findings; (2) Have a vertebral abnormality; (3) On glucocorticoid therapy for more than 3 months; (4) Have primary hyperparathyroidism; (5) On osteoporosis medications and need to assess response to drug therapy.   Last bone density test (DXA Scan): Not on file.  HIV Screening: covered annually if you're between the age of 15-65. Also covered annually if you are younger than 15 and older than 65 with risk factors for HIV infection. For pregnant patients, it is covered up to 3 times per  pregnancy.    Immunizations:  Immunization Recommendations   Influenza Vaccine Annual influenza vaccination during flu season is recommended for all persons aged >= 6 months who do not have contraindications   Pneumococcal Vaccine   * Pneumococcal conjugate vaccine = PCV13 (Prevnar 13), PCV15 (Vaxneuvance), PCV20 (Prevnar 20)  * Pneumococcal polysaccharide vaccine = PPSV23 (Pneumovax) Adults 19-63 yo with certain risk factors or if 65+ yo  If never received any pneumonia vaccine: recommend Prevnar 20 (PCV20)  Give PCV20 if previously received 1 dose of PCV13 or PPSV23   Hepatitis B Vaccine 3 dose series if at intermediate or high risk (ex: diabetes, end stage renal disease, liver disease)   Respiratory syncytial virus (RSV) Vaccine - COVERED BY MEDICARE PART D  * RSVPreF3 (Arexvy) CDC recommends that adults 60 years of age and older may receive a single dose of RSV vaccine using shared clinical decision-making (SCDM)   Tetanus (Td) Vaccine - COST NOT COVERED BY MEDICARE PART B Following completion of primary series, a booster dose should be given every 10 years to maintain immunity against tetanus. Td may also be given as tetanus wound prophylaxis.   Tdap Vaccine - COST NOT COVERED BY MEDICARE PART B Recommended at least once for all adults. For pregnant patients, recommended with each pregnancy.   Shingles Vaccine (Shingrix) - COST NOT COVERED BY MEDICARE PART B  2 shot series recommended in those 19 years and older who have or will have weakened immune systems or those 50 years and older     Health Maintenance Due:  There are no preventive care reminders to display for this patient.  Immunizations Due:      Topic Date Due   • COVID-19 Vaccine (4 - 2024-25 season) 09/01/2024     Advance Directives   What are advance directives?  Advance directives are legal documents that state your wishes and plans for medical care. These plans are made ahead of time in case you lose your ability to make decisions for yourself.  Advance directives can apply to any medical decision, such as the treatments you want, and if you want to donate organs.   What are the types of advance directives?  There are many types of advance directives, and each state has rules about how to use them. You may choose a combination of any of the following:  Living will:  This is a written record of the treatment you want. You can also choose which treatments you do not want, which to limit, and which to stop at a certain time. This includes surgery, medicine, IV fluid, and tube feedings.   Durable power of  for healthcare (DPAHC):  This is a written record that states who you want to make healthcare choices for you when you are unable to make them for yourself. This person, called a proxy, is usually a family member or a friend. You may choose more than 1 proxy.  Do not resuscitate (DNR) order:  A DNR order is used in case your heart stops beating or you stop breathing. It is a request not to have certain forms of treatment, such as CPR. A DNR order may be included in other types of advance directives.  Medical directive:  This covers the care that you want if you are in a coma, near death, or unable to make decisions for yourself. You can list the treatments you want for each condition. Treatment may include pain medicine, surgery, blood transfusions, dialysis, IV or tube feedings, and a ventilator (breathing machine).  Values history:  This document has questions about your views, beliefs, and how you feel and think about life. This information can help others choose the care that you would choose.  Why are advance directives important?  An advance directive helps you control your care. Although spoken wishes may be used, it is better to have your wishes written down. Spoken wishes can be misunderstood, or not followed. Treatments may be given even if you do not want them. An advance directive may make it easier for your family to make difficult  choices about your care.   Fall Prevention    Fall prevention  includes ways to make your home and other areas safer. It also includes ways you can move more carefully to prevent a fall. Health conditions that cause changes in your blood pressure, vision, or muscle strength and coordination may increase your risk for falls. Medicines may also increase your risk for falls if they make you dizzy, weak, or sleepy.   Fall prevention tips:   Stand or sit up slowly.    Use assistive devices as directed.    Wear shoes that fit well and have soles that .    Wear a personal alarm.    Stay active.    Manage your medical conditions.    Home Safety Tips:  Add items to prevent falls in the bathroom.    Keep paths clear.    Install bright lights in your home.    Keep items you use often on shelves within reach.    Paint or place reflective tape on the edges of your stairs.    Weight Management   Why it is important to manage your weight:  Being overweight increases your risk of health conditions such as heart disease, high blood pressure, type 2 diabetes, and certain types of cancer. It can also increase your risk for osteoarthritis, sleep apnea, and other respiratory problems. Aim for a slow, steady weight loss. Even a small amount of weight loss can lower your risk of health problems.  How to lose weight safely:  A safe and healthy way to lose weight is to eat fewer calories and get regular exercise. You can lose up about 1 pound a week by decreasing the number of calories you eat by 500 calories each day.   Healthy meal plan for weight management:  A healthy meal plan includes a variety of foods, contains fewer calories, and helps you stay healthy. A healthy meal plan includes the following:  Eat whole-grain foods more often.  A healthy meal plan should contain fiber. Fiber is the part of grains, fruits, and vegetables that is not broken down by your body. Whole-grain foods are healthy and provide extra fiber in your diet.  Some examples of whole-grain foods are whole-wheat breads and pastas, oatmeal, brown rice, and bulgur.  Eat a variety of vegetables every day.  Include dark, leafy greens such as spinach, kale, maria luisa greens, and mustard greens. Eat yellow and orange vegetables such as carrots, sweet potatoes, and winter squash.   Eat a variety of fruits every day.  Choose fresh or canned fruit (canned in its own juice or light syrup) instead of juice. Fruit juice has very little or no fiber.  Eat low-fat dairy foods.  Drink fat-free (skim) milk or 1% milk. Eat fat-free yogurt and low-fat cottage cheese. Try low-fat cheeses such as mozzarella and other reduced-fat cheeses.  Choose meat and other protein foods that are low in fat.  Choose beans or other legumes such as split peas or lentils. Choose fish, skinless poultry (chicken or turkey), or lean cuts of red meat (beef or pork). Before you cook meat or poultry, cut off any visible fat.   Use less fat and oil.  Try baking foods instead of frying them. Add less fat, such as margarine, sour cream, regular salad dressing and mayonnaise to foods. Eat fewer high-fat foods. Some examples of high-fat foods include french fries, doughnuts, ice cream, and cakes.  Eat fewer sweets.  Limit foods and drinks that are high in sugar. This includes candy, cookies, regular soda, and sweetened drinks.  Exercise:  Exercise at least 30 minutes per day on most days of the week. Some examples of exercise include walking, biking, dancing, and swimming. You can also fit in more physical activity by taking the stairs instead of the elevator or parking farther away from stores. Ask your healthcare provider about the best exercise plan for you.      © Copyright [employer] 2018 Information is for End User's use only and may not be sold, redistributed or otherwise used for commercial purposes. All illustrations and images included in CareNotes® are the copyrighted property of [employer] or [employer]

## 2025-02-24 NOTE — PROGRESS NOTES
Name: Yesenia Tillman      : 1935      MRN: [id number]  Encounter Provider: Liam Zepeda MD  Encounter Date: 2025   Encounter department: [facility] (formerly [facility] PRIMARY CARE    Assessment & Plan  HTN (hypertension), benign    Orders:  •  metoprolol succinate (TOPROL-XL) 50 mg 24 hr tablet; Take 1 tablet (50 mg total) by mouth daily    Moderate episode of recurrent major depressive disorder (HCC)    Orders:  •  FLUoxetine (PROzac) 40 MG capsule; Take 1 capsule (40 mg total) by mouth daily    Essential hypertension         Acquired hypothyroidism    Orders:  •  levothyroxine 88 mcg tablet; Take 1 tablet (88 mcg total) by mouth daily    Mixed hyperlipidemia    Orders:  •  atorvastatin (LIPITOR) 40 mg tablet; Take 1 tablet (40 mg total) by mouth daily    Gastroesophageal reflux disease without esophagitis    Orders:  •  omeprazole (PriLOSEC) 20 mg delayed release capsule; Take 1 capsule (20 mg total) by mouth daily  •  famotidine (PEPCID) 20 mg tablet; Take 1 tablet (20 mg total) by mouth daily    Medicare annual wellness visit, subsequent           Depression Screening and Follow-up Plan: Patient was screened for depression during today's encounter. They screened negative with a PHQ-9 score of 0.        Preventive health issues were discussed with patient, and age appropriate screening tests were ordered as noted in patient's After Visit Summary. Personalized health advice and appropriate referrals for health education or preventive services given if needed, as noted in patient's After Visit Summary.    History of Present Illness     Patient here for review of chronic medical problems and  the labs and imaging if it is applicable.  Currently has no specific complaints other than mentioned in the review of systems  Denies chest pain, SOB, cough, abdominal pain, nausea, vomiting, fever, chills, lightheadedness, dizziness,headache, tingling or numbness.No bowel or bladder problem.         Patient Care  Team:  Liam Zepeda MD as PCP - General (Internal Medicine)  Rhonda Stoner, RN as RN Care Manager (Care Coordination)    Review of Systems   Constitutional:  Positive for fatigue. Negative for chills and fever.   HENT:  Negative for congestion, ear pain, rhinorrhea, sneezing and sore throat.    Eyes:  Negative for pain, discharge, redness, itching and visual disturbance.   Respiratory:  Negative for cough, chest tightness and shortness of breath.    Cardiovascular:  Negative for chest pain, palpitations and leg swelling.   Gastrointestinal:  Negative for abdominal pain, blood in stool, diarrhea, nausea and vomiting.   Endocrine: Negative for cold intolerance and heat intolerance.   Genitourinary:  Negative for dysuria, frequency, hematuria and urgency.   Musculoskeletal:  Negative for arthralgias, back pain and myalgias.   Skin:  Negative for color change and rash.   Neurological:  Negative for dizziness, weakness, light-headedness, numbness and headaches.   Hematological:  Does not bruise/bleed easily.   Psychiatric/Behavioral:  Positive for dysphoric mood. Negative for agitation and behavioral problems.      Medical History Reviewed by provider this encounter:  Tobacco  Allergies  Meds  Problems  Med Hx  Surg Hx  Fam Hx       Annual Wellness Visit Questionnaire   Yesenia is here for her Subsequent Wellness visit.     Health Risk Assessment:   Patient rates overall health as good. Patient feels that their physical health rating is same. Patient is satisfied with their life. Eyesight was rated as same. Hearing was rated as same. Patient feels that their emotional and mental health rating is same. Patients states they are never, rarely angry. Patient states they are sometimes unusually tired/fatigued. Pain experienced in the last 7 days has been none. Patient states that she has experienced no weight loss or gain in last 6 months.     Depression Screening:   PHQ-9 Score: 0      Fall Risk Screening:   In the past  year, patient has experienced: history of falling in past year    Number of falls: 1  Injured during fall?: No    Feels unsteady when standing or walking?: Yes    Worried about falling?: Yes      Urinary Incontinence Screening:   Patient has not leaked urine accidently in the last six months.     Home Safety:  Patient does not have trouble with stairs inside or outside of their home. Patient has working smoke alarms and has no working carbon monoxide detector. Home safety hazards include: none.     Nutrition:   Current diet is Regular.     Medications:   Patient is currently taking over-the-counter supplements. OTC medications include: see medication list. Patient is able to manage medications.     Activities of Daily Living (ADLs)/Instrumental Activities of Daily Living (IADLs):   Walk and transfer into and out of bed and chair?: Yes  Dress and groom yourself?: Yes    Bathe or shower yourself?: Yes    Feed yourself? Yes  Do your laundry/housekeeping?: Yes  Manage your money, pay your bills and track your expenses?: Yes  Make your own meals?: Yes    Do your own shopping?: Yes    Previous Hospitalizations:   Any hospitalizations or ED visits within the last 12 months?: Yes    How many hospitalizations have you had in the last year?: 1-2    Advance Care Planning:   Living will: No    Durable POA for healthcare: No    Advanced directive: No      Cognitive Screening:   Provider or family/friend/caregiver concerned regarding cognition?: No    PREVENTIVE SCREENINGS      Cardiovascular Screening:    General: Screening Not Indicated and History Lipid Disorder      Diabetes Screening:     General: Screening Current      Colorectal Cancer Screening:     General: Screening Not Indicated      Cervical Cancer Screening:    General: Screening Not Indicated      Lung Cancer Screening:     General: Screening Not Indicated    Screening, Brief Intervention, and Referral to Treatment (SBIRT)     Screening  Typical number of drinks in  "a day: 0  Typical number of drinks in a week: 0  Interpretation: Low risk drinking behavior.    Single Item Drug Screening:  How often have you used an illegal drug (including marijuana) or a prescription medication for non-medical reasons in the past year? never    Single Item Drug Screen Score: 0  Interpretation: Negative screen for possible drug use disorder    Social Drivers of Health     Financial Resource Strain: Low Risk  (10/12/2023)    Overall Financial Resource Strain (CARDIA)    • Difficulty of Paying Living Expenses: Not hard at all   Food Insecurity: No Food Insecurity (2/24/2025)    Hunger Vital Sign    • Worried About Running Out of Food in the Last Year: Never true    • Ran Out of Food in the Last Year: Never true   Transportation Needs: No Transportation Needs (2/24/2025)    PRAPARE - Transportation    • Lack of Transportation (Medical): No    • Lack of Transportation (Non-Medical): No   Housing Stability: Low Risk  (2/24/2025)    Housing Stability Vital Sign    • Unable to Pay for Housing in the Last Year: No    • Number of Times Moved in the Last Year: 0    • Homeless in the Last Year: No   Utilities: Not At Risk (2/24/2025)    [facility] Utilities    • Threatened with loss of utilities: No     No results found.    Objective   /80 (BP Location: Right arm, Patient Position: Sitting, Cuff Size: Standard)   Pulse 88   Temp (!) 96.7 °F (35.9 °C) (Temporal)   Resp 18   Ht 5' 1\" (1.549 m)   Wt 67.8 kg (149 lb 6.4 oz)   SpO2 96%   BMI 28.23 kg/m²     Physical Exam  Vitals and nursing note reviewed.   Constitutional:       General: She is not in acute distress.     Appearance: Normal appearance. She is well-developed. She is not ill-appearing, toxic-appearing or diaphoretic.   HENT:      Head: Normocephalic and atraumatic.      Right Ear: External ear normal.      Left Ear: External ear normal.      Nose: Nose normal.      Mouth/Throat:      Mouth: Mucous membranes are moist.      Pharynx: " Oropharynx is clear.   Eyes:      General: No scleral icterus.        Right eye: No discharge.         Left eye: No discharge.      Extraocular Movements: Extraocular movements intact.      Conjunctiva/sclera: Conjunctivae normal.      Pupils: Pupils are equal, round, and reactive to light.   Cardiovascular:      Rate and Rhythm: Normal rate and regular rhythm.      Pulses: Normal pulses.      Heart sounds: Normal heart sounds. No murmur heard.     No gallop.   Pulmonary:      Effort: Pulmonary effort is normal. No respiratory distress.      Breath sounds: Normal breath sounds. No wheezing, rhonchi or rales.   Abdominal:      General: Abdomen is flat. Bowel sounds are normal. There is no distension.      Palpations: Abdomen is soft.      Tenderness: There is no abdominal tenderness. There is no right CVA tenderness, left CVA tenderness or guarding.   Musculoskeletal:         General: No swelling or tenderness.      Cervical back: Normal range of motion and neck supple. No rigidity.      Right lower leg: No edema.      Left lower leg: No edema.      Comments: Limited range of motion right shoulder   Lymphadenopathy:      Cervical: No cervical adenopathy.   Skin:     General: Skin is warm and dry.      Capillary Refill: Capillary refill takes 2 to 3 seconds.      Coloration: Skin is not jaundiced or pale.   Neurological:      General: No focal deficit present.      Mental Status: She is alert and oriented to person, place, and time. Mental status is at baseline.      Motor: No weakness.      Gait: Gait normal.   Psychiatric:         Mood and Affect: Mood normal.         Behavior: Behavior normal.

## 2025-02-24 NOTE — ASSESSMENT & PLAN NOTE
Orders:  •  omeprazole (PriLOSEC) 20 mg delayed release capsule; Take 1 capsule (20 mg total) by mouth daily  •  famotidine (PEPCID) 20 mg tablet; Take 1 tablet (20 mg total) by mouth daily

## 2025-02-24 NOTE — PROGRESS NOTES
This writer called pt's son and left a message on his cell phone asking him to please call the office back, noting that we have not seen Yesenia since end of November and need confirmation that she will not be returning to treatment, as a follow up from our last phone conversation.

## 2025-03-03 ENCOUNTER — TELEPHONE (OUTPATIENT)
Age: 89
End: 2025-03-03

## 2025-03-03 ENCOUNTER — SOCIAL WORK (OUTPATIENT)
Dept: BEHAVIORAL/MENTAL HEALTH CLINIC | Facility: CLINIC | Age: 89
End: 2025-03-03
Payer: COMMERCIAL

## 2025-03-03 DIAGNOSIS — F32.1 CURRENT MODERATE EPISODE OF MAJOR DEPRESSIVE DISORDER WITHOUT PRIOR EPISODE (HCC): Primary | ICD-10-CM

## 2025-03-03 DIAGNOSIS — F41.1 GENERALIZED ANXIETY DISORDER: ICD-10-CM

## 2025-03-03 DIAGNOSIS — R41.3 MEMORY CHANGES: ICD-10-CM

## 2025-03-03 PROCEDURE — 90832 PSYTX W PT 30 MINUTES: CPT | Performed by: SOCIAL WORKER

## 2025-03-03 NOTE — PSYCH
Behavioral Health Psychotherapy Progress Note    Psychotherapy Provided: Individual Psychotherapy     1. 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 Generalized anxiety disorder        3. Memory changes            Goals addressed in session: Goal 1     DATA: Yesenia arrived late for session even though her recurring appts had been removed, She was confused and distressed after having gotten lost on her way here.  She was unsure why she was here except that she had it written down.  Her son, Chucky was called with her present and a message was left for him stating that if Yesenia would like to continue to come for counseling she will need to be taken by someone since it is not recommended that she drive here, also if we did not hear back from him within a week, she will need to be discharged.  Yesenia shared that she is worried that she will no longer be able to drive since she does go back and forth to [employer]. Yesenia shared that she only slightly remembers this writer, does not remember phone conversation.  Shared that she continues to cry on and off.Yesenia was given empathy and reassurance.  Discussed the importance of going straight home from the appt which Yesenia agreed to.Yesenia signed AGUS for Marge Granados for case management.  During this session, this clinician used the following therapeutic modalities: Client-centered Therapy and Supportive Psychotherapy    Substance Abuse was not addressed during this session. If the client is diagnosed with a co-occurring substance use disorder, please indicate any changes in the frequency or amount of use: n/a. Stage of change for addressing substance use diagnoses: No substance use/Not applicable    ASSESSMENT:  Yesenia Tillman presents with a Anxious mood.     her affect is Normal range and intensity and Tearful, which is congruent, with her mood and the content of the session. The client has not made progress on their  "goals.     Yesenia Tillman presents with a none risk of suicide, none risk of self-harm, and none risk of harm to others.    For any risk assessment that surpasses a \"low\" rating, a safety plan must be developed.    A safety plan was indicated: no  If yes, describe in detail n/a    PLAN: Writer will look for return call from sonChucky and if no return call pt will be discharged.  Refferal completed for Case Management. Pt to follow up with Memory Care appt on 3/7/25.    Behavioral Health Treatment Plan and Discharge Planning: Yesenia Tillman is aware of and agrees to continue to work on their treatment plan. They have identified and are working toward their discharge goals. yes    Depression Follow-up Plan Completed: Not applicable      Visit start and stop times:    3/3/25  Start Time: 1234  Stop Time: 1305  Total Visit Time: 31 minutes  "

## 2025-03-03 NOTE — TELEPHONE ENCOUNTER
Pts son called returning an vm from the office regarding pts missed therapy appt today. Writer attempted to warm transfer to office, line was busy. Pts son understood an message will be sent with pts son's number for office to call back.     Pts son carissa can be reached at: [phone]  
Implemented All Universal Safety Interventions:  [place] to call system. Call bell, personal items and telephone within reach. Instruct patient to call for assistance. Room bathroom lighting operational. Non-slip footwear when patient is off stretcher. Physically safe environment: no spills, clutter or unnecessary equipment. Stretcher in lowest position, wheels locked, appropriate side rails in place.

## 2025-03-04 ENCOUNTER — PATIENT OUTREACH (OUTPATIENT)
Dept: CASE MANAGEMENT | Facility: HOSPITAL | Age: 89
End: 2025-03-04

## 2025-03-04 NOTE — PROGRESS NOTES
Chart reviewed.   Pt had PCP [facility] apt on 2/24  Call placed to patients son Chucky. Chucky says patient is doing well. Patient still has services coming out to her home through [facility]. He says he spoke with Social Work and he has no further questions or concerns at this time. He feels patient is getting stronger and her health is what it is. He says they have an apt with [facility] tomorrow and he is looking forward to what they think.    Patients son declined the need for ccm services or follow up.    CCM episode closed and this RNCM removed self from care team.

## 2025-03-05 ENCOUNTER — OFFICE VISIT (OUTPATIENT)
Age: 89
End: 2025-03-05
Payer: COMMERCIAL

## 2025-03-05 VITALS
WEIGHT: 148.2 LBS | SYSTOLIC BLOOD PRESSURE: 150 MMHG | HEART RATE: 75 BPM | DIASTOLIC BLOOD PRESSURE: 80 MMHG | TEMPERATURE: 97.6 F | BODY MASS INDEX: 27.27 KG/M2 | OXYGEN SATURATION: 94 % | HEIGHT: 62 IN

## 2025-03-05 DIAGNOSIS — Z91.89 DRIVING SAFETY ISSUE: ICD-10-CM

## 2025-03-05 DIAGNOSIS — F33.1 MODERATE EPISODE OF RECURRENT MAJOR DEPRESSIVE DISORDER (HCC): ICD-10-CM

## 2025-03-05 DIAGNOSIS — R41.3 MEMORY DIFFICULTY: Primary | ICD-10-CM

## 2025-03-05 DIAGNOSIS — K22.4 ESOPHAGEAL DYSMOTILITY: ICD-10-CM

## 2025-03-05 DIAGNOSIS — R26.89 IMBALANCE: ICD-10-CM

## 2025-03-05 DIAGNOSIS — R26.2 AMBULATORY DYSFUNCTION: ICD-10-CM

## 2025-03-05 PROCEDURE — 99205 OFFICE O/P NEW HI 60 MIN: CPT | Performed by: NURSE PRACTITIONER

## 2025-03-05 NOTE — PROGRESS NOTES
Assessment & Plan:   Geriatric Evaluation  Memory  Pt independent with adl, supported for iadls.  Memory loss:  STM starting to affect function  MOCA 18/30.  Deficits in all domains but language and attention   MRI brain, 12/2023:  No acute ischemia. Mild microangiopathic change.   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  In the future, some items to consider would be door video surveillance, ID bracelet, Tile GPS   Home:  Is doing ok so far.  Ensure pt has phone and reinforce to not use stove or shower while gone.  Level of care:  independent with supports, likely will be needing more supports in future- ?HHA vs STUART  Fall preventions:  son unsure if falls - will refer to PT to ensure safety  Medication management:  son using divider box, pt forgetting to take  Driving safety:  need to check, pt more forgetful  Scam safety:  Do not answer suspicious mail, phone calls, email, or text message without having second person review d/t safety risk.  Do not give out personal info to questionable sources- read company safety policies for how they might contact you.  Continue to engage in physical, cognitive, social activity.  Cont doing games, puzzles, trivia, current event discussions  Cont daily walks or exercise video  Cont outings with friends and family.  Avoid social isolation.  Referral to cognitive therapy:  OT for help with routines to keep independent  Optimize chronic and acute conditions  Cont to f/u with providers w/chronic conditions  "and ensure medication compliance  Last GFR 76 (1/2025)  Vascular risk factors:  htn, afib, cerebral artery stenosis, h/o cva, seizure disorder  Geriatric syndromes:  memory changes, depression   Chronic condition concerns:  stable, however concerns d/t forgetting meds  Ensure good diet (ex Mediterranean diet) and adequate hydration  Monitor for changes in behavior/mood- if noted, notify provider for eval  Encourage mindfulness and positive socialization for general wellness.  Do not overwhelm pt with info.    Do one task at a time. Use slower pace.  Keep to one conversation at a time.  Do not multitask.  Decision making:  At this point, recommend making \"bigger\" decisions with care partner/POA assist.  In future, if capacity is of concern, would refer to neuropsychology for full eval.  Encourage independence as able.  Recommended next follow up: care conference    Mobility  Baseline ambulation: no AD.  Fall type: son unsure   PT OT needs:   PT for eval/gait and balance  Fall precautions    Mood  Baseline mood:  doing ok, several life stressors over the last year  GDS 4  Pharmalogical interventions:  prozac  Non pharmalogical interventions:  Encourage relaxation techniques, emotional support.  Follows with     Medication Review  Medications seem appropriate for current conditions  Patient is at risk for increased side effects due to polypharmacy  Patient is taking the following medications that meet Beer's Criteria to monitor/avoid:  none  Avoid medications that worsen cognition - check with provider or pharmacist before starting new or OTC meds    5.  Other Geriatric Syndromes:  Swallow concerns:  none at present.  's safety:  pt reports driving good, per son, more reminders to get where she needs to go are now needed.  Will check FTD.  Pt self limiting driving at this time.      HPI:  We had the pleasure of evaluating Yesenia YONG Tillman who is a 89 y.o. female in Geriatric Consultation today.  Previous MOCA:  " "26/30.  Comorbidities include memory difficulty, dysphagia, depression, .   Ms. Tillman is in the office with her  sonClarence  She lives on her own.     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  No pets.  Has her son and her dil is helpful.      Memory report per son (d/t memory issues): She has been on her own for two years.  He feels that it she is starting to have harder time being indep.  Trouble with taking meds.  Trouble with learning new info and instruction.  He is needing to check in more often to make sure of safety.   Current activities:  Cognitive:  was doing cross words, likes to write, not too much after hospitalization a lot of artwork  Physical:  not too much  Social:  was going to Taoist, hasn't       Memory report per patient:  Doing pretty good getting around.  She is in too much and not active enough.  She did have one fall in the home was able to get up.  STM is terrible.  LTM is a little better.  She is by herself a lot.  Self care not affected.  She cooks as little as poss because she doesn't like to cook.  She isn't eating too much good meals, she snacks.  She eats good breakfast.  She does forget medications some time.  Son takes care of the bills (she thinks).  She feels driving ok, but everyone says she shouldn't.  Mood isn't too bad:  anxiety ok, sadness comes and goes.   She is thinking about moving closer to her son.  She will tell her son she is moving in so that he finds her a closer place.  Pt is aware of memory loss and changes, she is not sure when the \"right\" time to make move is, but she is not sure that she is there yet.  We discussed having convo with son and start looking into options so that she can participate in decision making.    She has difficulty finding the right word while speaking: Not more than expected  Patient requires repeat information or ask the same question repeatedly: Yes  She has some problems operating household appliance such as TV remote, kitchen " appliances, computer.    Functional concerns:  Bathing indep  Dressing indep  Feeding yourself indep- does forget  Tolieting indep  Continence    Transferring indep  Uses : has cane/walker, not really using (ok inside).  She hasn't told him about falls.    Can you make your own light meals Yes - microwave, no cooking.  She forgets about the pre packed in freezer (had one delivery- unsure   Do light cleaning/chores Yes  Do you take care of your own medications No- weekly planner.  She is forgetting more often.  She has htn that is affected  Do you do your own shopping No - son takes to grocery store  Do you handle your own financial affairs such as balancing your checkbook, paying bills, investments: No   No scam issues at this time.    Do you use a cell phone No- can't use  Do you drive: Yes - she is forgetting more.  There is a bus with schedule - she forgot about the bus      Have you had any recent accidents, citations or getting lost in familiar places :Yes    Psychosocial concerns:  Have you or your family noted any change in your mood or personality:Yes - two sons passed - age 62, 66.    Are you currently or have you been treated in the past for depression or anxiety: Yes - seems to do better with meds.     Any hallucination or delusion: No  Sleep Issues: No  Hearing and vision issue: No  ADL/IADL:  independent/more dependent  Appetite/swallow:  ok/ok  Pain:  no    Family Review of Behavior [facility]:    pacing. No    agressive/combative behavior. No    agitated. No   wandering. No   resistance to care. No - doesn't see need for appts  hoarding/hiding objects.Yes    suspicious  No - some confusion with son's passing, but overall ok  withdrawn Yes  rummaging/pillaging.    misplacing/losing objects No  personal hygiene problems.No  forgetfulness of actions Yes     Past Medical, surgical, social, medication and allergy history and patients previous records reviewed.    Family member with dementia and what  "type?older sister is doing well  Does patient have previous diagnosis of dementia?  no   Does patient take any \"memory medications\"? no  Stroke history Yes  Have you had any head trauma No  Does patient have history of alcohol abuse No    ROS:  Review of Systems   Constitutional:  Negative for activity change, appetite change, chills and fatigue.   HENT:  Negative for congestion and hearing loss.    Eyes:  Negative for visual disturbance.   Respiratory:  Negative for cough and shortness of breath.    Cardiovascular:  Negative for chest pain.   Gastrointestinal:  Negative for abdominal pain, constipation, diarrhea, nausea and vomiting.   Genitourinary:  Negative for difficulty urinating.   Musculoskeletal:  Positive for arthralgias. Negative for back pain and gait problem.   Neurological:  Negative for dizziness and light-headedness.   Psychiatric/Behavioral:  Positive for decreased concentration (forgetful). Negative for dysphoric mood and sleep disturbance. The patient is not nervous/anxious.        Allergies:   Allergies   Allergen Reactions    Amlodipine Other (See Comments)     Unknown reaction    Hydralazine Other (See Comments)     Unknown reaction    Penicillins Other (See Comments)     Unknown since childhood       Medications:      Current Outpatient Medications:     acetaminophen (TYLENOL) 325 mg tablet, Take 2 tablets (650 mg total) by mouth every 6 (six) hours as needed for mild pain, headaches or fever, Disp: 30 tablet, Rfl: 0    atorvastatin (LIPITOR) 40 mg tablet, Take 1 tablet (40 mg total) by mouth daily, Disp: 90 tablet, Rfl: 0    clopidogrel (PLAVIX) 75 mg tablet, Take 1 tablet (75 mg total) by mouth daily (Patient taking differently: Take 75 mg by mouth daily after dinner), Disp: 90 tablet, Rfl: 0    famotidine (PEPCID) 20 mg tablet, Take 1 tablet (20 mg total) by mouth daily, Disp: 90 tablet, Rfl: 1    FLUoxetine (PROzac) 40 MG capsule, Take 1 capsule (40 mg total) by mouth daily, Disp: 90 " capsule, Rfl: 2    folic acid (FOLVITE) 1 mg tablet, Take 1 tablet (1 mg total) by mouth daily (Patient not taking: Reported on 2/24/2025), Disp: , Rfl:     levETIRAcetam (KEPPRA) 500 mg tablet, Take 1 tablet (500 mg total) by mouth daily, Disp: 90 tablet, Rfl: 1    levothyroxine 88 mcg tablet, Take 1 tablet (88 mcg total) by mouth daily, Disp: 90 tablet, Rfl: 1    meloxicam (MOBIC) 7.5 mg tablet, Take 7.5 mg by mouth daily, Disp: , Rfl:     metoprolol succinate (TOPROL-XL) 50 mg 24 hr tablet, Take 1 tablet (50 mg total) by mouth daily, Disp: 90 tablet, Rfl: 1    omeprazole (PriLOSEC) 20 mg delayed release capsule, Take 1 capsule (20 mg total) by mouth daily, Disp: 90 capsule, Rfl: 1    Vitals:  There were no vitals filed for this visit.    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    Anxiety 06/10/2019    Arthritis     Asthma     Colon polyp     Depression     Disease of thyroid gland     Dyslipidemia 06/10/2019    Esophageal dysmotility 06/10/2019    Essential hypertension 09/12/2016    GERD (gastroesophageal reflux disease)     Hyperlipidemia     Hypertension     Hypothyroidism     Obesity     Paroxysmal atrial fibrillation ([facility]) 09/12/2016    Seizure disorder ([facility]) 08/24/2018    Seizures ([facility])     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    Stroke ([facility]) 06/2016    Subarachnoid hemorrhage ([facility]) 09/13/2016    Subdural hematoma ([facility]) 09/13/2016     Past Surgical History:   Procedure Laterality Date    APPENDECTOMY      BACK SURGERY  01/2016    CARPAL TUNNEL RELEASE Right     COLONOSCOPY      TUBAL LIGATION       Family History   Problem Relation Age of Onset    Kidney disease Mother     Heart disease Mother     Cancer Father         colon    Heart disease Father     Colon cancer Father     Heart disease Sister     Diabetes Son     Multiple sclerosis Son     No  Known Problems Son     Heart disease Maternal Grandmother      Social History     Socioeconomic History    Marital status:      Spouse name: Not on file    Number of children: Not on file    Years of education: Not on file    Highest education level: Not on file   Occupational History    Not on file   Tobacco Use    Smoking status: Never    Smokeless tobacco: Never   Vaping Use    Vaping status: Never Used   Substance and Sexual Activity    Alcohol use: Never     Alcohol/week: 0.0 standard drinks of alcohol     Comment: 0    Drug use: No    Sexual activity: Not Currently     Partners: Male     Birth control/protection: Post-menopausal   Other Topics Concern    Not on file   Social History Narrative    Not on file     Social Drivers of Health     Financial Resource Strain: Low Risk  (10/12/2023)    Overall Financial Resource Strain (CARDIA)     Difficulty of Paying Living Expenses: Not hard at all   Food Insecurity: No Food Insecurity (2/24/2025)    Hunger Vital Sign     Worried About Running Out of Food in the Last Year: Never true     Ran Out of Food in the Last Year: Never true   Transportation Needs: No Transportation Needs (2/24/2025)    PRAPARE - Transportation     Lack of Transportation (Medical): No     Lack of Transportation (Non-Medical): No   Physical Activity: Not on file   Stress: Not on file   Social Connections: Feeling Socially Integrated (2/17/2025)    Received from [facility]    OASIS : Social Isolation     Frequency of experiencing loneliness or isolation: Never   Recent Concern: Social Connections - Feeling Socially Isolated (1/25/2025)    Received from [facility]    OASIS : Social Isolation     Frequency of experiencing loneliness or isolation: Often   Intimate Partner Violence: Unknown (1/1/2025)    Nursing IPS     Feels Physically and Emotionally Safe: Not on file     Physically Hurt by Someone: Not on file     Humiliated or Emotionally Abused by Someone: Not on file      Physically Hurt by Someone: No     Hurt or Threatened by Someone: No   Housing Stability: Low Risk  (2/24/2025)    Housing Stability Vital Sign     Unable to Pay for Housing in the Last Year: No     Number of Times Moved in the Last Year: 0     Homeless in the Last Year: No       Past Surgical History:   Procedure Laterality Date    APPENDECTOMY      BACK SURGERY  01/2016    CARPAL TUNNEL RELEASE Right     COLONOSCOPY      TUBAL LIGATION         Physical Exam  Observed Ambulation:  no AD, fairly steady, sl slower  Physical Exam  Vitals and nursing note reviewed.   Constitutional:       General: She is not in acute distress.     Appearance: Normal appearance. She is well-developed. She is not diaphoretic.   HENT:      Head: Normocephalic.   Cardiovascular:      Rate and Rhythm: Normal rate.      Heart sounds: No murmur heard.     No friction rub. No gallop.   Pulmonary:      Effort: Pulmonary effort is normal. No respiratory distress.      Breath sounds: Normal breath sounds. No wheezing or rales.   Abdominal:      General: Bowel sounds are normal. There is no distension.      Palpations: Abdomen is soft.      Tenderness: There is no abdominal tenderness. There is no rebound.   Musculoskeletal:         General: Normal range of motion.   Skin:     General: Skin is warm and dry.   Neurological:      General: No focal deficit present.      Mental Status: She is alert. Mental status is at baseline.      Comments: Oriented to person, partial tps  Pleasant, cooperative, good humored   Psychiatric:         Mood and Affect: Mood normal.         Behavior: Behavior normal.     I have spent a total time of 63 minutes in caring for this patient on the day of the visit/encounter including Diagnostic results, Prognosis, Risks and benefits of tx options, Instructions for management, Patient and family education, Importance of tx compliance, Risk factor reductions, Impressions, Counseling / Coordination of care, Documenting in  the medical record, Reviewing/placing orders in the medical record (including tests, medications, and/or procedures), Obtaining or reviewing history  , and Communicating with other healthcare professionals .
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
## 2025-03-06 NOTE — PSYCH
MEDICATION MANAGEMENT NOTE    Name: Yesenia Tillman      : 1935      MRN: [id number]  Encounter Provider: Jeet Avalos  Encounter Date: 3/7/2025   Encounter department: [facility]    Insurance: Payor: [employer]  REP / Plan: [facility] BLUE CROSS  REP / Product Type: Medicare HMO /      Reason for Visit:   Chief Complaint   Patient presents with    Follow-up    Medication Management    Depression    Memory Issues   :  Assessment & Plan  Moderate episode of recurrent major depressive disorder (HCC)  Improving but not yet at goal - work on taking fluoxetine 40 mg qd consistently; continue talk therapy; f/u in 1 month         Memory difficulty  Now working with [facility]           Her son, Clarence, came with her today and reports that they saw [facility] on Wednesday (3/5) and are working with them on the memory. He is working on a way to help pt remember to take all of her medications on a consistent, every day basis (missing 1-2 days per week on average). She feels fluoxetine increase was helpful for mood and well tolerated but she is still depressed about half the days. Advised working on taking the fluoxetine every day over the next month and if still not at goal can consider increase.     Treatment Recommendations:    Continue current medication:     - fluoxetine 40 mg qd    Educated about diagnosis and treatment modalities. Verbalizes understanding and agreement with the treatment plan.  Discussed self monitoring of symptoms, and symptom monitoring tools.  Discussed medications and if treatment adjustment was needed or desired.  Medication management every 1 month  Aware of 24 hour and weekend coverage for urgent situations accessed by calling [facility] main practice number  Continue psychotherapy with [facility] therapist Galina Holland  I am scheduling this patient out for greater than 3 months: No    Medications Risks/Benefits:       Risks, Benefits And Possible Side Effects Of Medications:    Risks, benefits, and possible side effects of medications explained to Yesenia and she (or legal representative) verbalizes understanding and agreement for treatment.    Controlled Medication Discussion:     Not applicable      History of Present Illness     Yesenia is seen today for a follow up for Follow-up, Medication Management, Depression, and Memory Issues. Her son, Clarence, presents with her and helps with the history. She did increase fluoxetine and they both agree she has been taking the correct dose, though not as consistently as they would like. She has been missing 1-2 days a week of all of her medications on average. She did have her appt with [facility] on 3/5 and they are working with her for the memory. She does feel that overall the increased dose of fluoxetine has helped her mood, energy, and motivation a little bit. She does still feel depressed and down often and still cries a lot. She thinks her memory might be slightly better. Her son agrees that since starting on fluoxetine overall he has seen some improvement in mood and memory, though not where they would like it to be yet. They are working to get some strategies into place to help her with taking her medications and getting her more engaged/out of the apartment. She does become tearful when speaking of her 's passing and states she doesn't want to be a burden to her son and his family. Her son reassures her that she is not a burden and they would like to help, though she is reticent to accept this. They have f/u with [facility] next month where they may discuss medications for memory.     Past med trials: Depakote (per chart), Celexa (per chart), Effexor (per chart), Lexapro (per chart), Ativan (per chart)     She denies any suicidal ideation, intent or plan at present; denies any homicidal ideation, intent or plan at present.    She denies any auditory hallucinations,  denies any visual hallucinations, denies any delusions.    She denies any side effects from current psychiatric medications.    HPI ROS Appetite Changes and Sleep:     She reports adequate number of sleep hours, fluctuating appetite, decreased energy    Review Of Systems: A review of systems is obtained and is negative except for the pertinent positives listed in HPI/Subjective above.      Current Rating Scores:     None completed today.    Areas of Improvement: reviewed in HPI/Subjective Section and reviewed in Assessment and Plan Section    Past Medical History:   Diagnosis Date    Acute CVA (cerebrovascular accident) ([facility]) 06/18/2016    Adult hypothyroidism 09/12/2016    Anxiety 06/10/2019    Arthritis     Asthma     Colon polyp     Depression     Disease of thyroid gland     Dyslipidemia 06/10/2019    Esophageal dysmotility 06/10/2019    Essential hypertension 09/12/2016    GERD (gastroesophageal reflux disease)     Hyperlipidemia     Hypertension     Hypothyroidism     Obesity     Paroxysmal atrial fibrillation ([facility]) 09/12/2016    Seizure disorder ([facility]) 08/24/2018    Seizures ([facility])     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    Stroke ([facility]) 06/2016    Subarachnoid hemorrhage ([facility]) 09/13/2016    Subdural hematoma ([facility]) 09/13/2016        Past Surgical History:   Procedure Laterality Date    APPENDECTOMY      BACK SURGERY  01/2016    CARPAL TUNNEL RELEASE Right     COLONOSCOPY      TUBAL LIGATION       Allergies:   Allergies   Allergen Reactions    Amlodipine Other (See Comments)     Unknown reaction    Hydralazine Other (See Comments)     Unknown reaction    Penicillins Other (See Comments)     Unknown since childhood       Current Outpatient Medications   Medication Sig Dispense Refill    acetaminophen (TYLENOL) 325 mg tablet Take 2 tablets (650 mg total) by mouth every 6 (six) hours as needed for mild pain, headaches or fever 30 tablet 0    atorvastatin  (LIPITOR) 40 mg tablet Take 1 tablet (40 mg total) by mouth daily 90 tablet 0    clopidogrel (PLAVIX) 75 mg tablet Take 1 tablet (75 mg total) by mouth daily (Patient taking differently: Take 75 mg by mouth daily after dinner) 90 tablet 0    famotidine (PEPCID) 20 mg tablet Take 1 tablet (20 mg total) by mouth daily (Patient taking differently: Take 20 mg by mouth as needed for heartburn or indigestion) 90 tablet 1    FLUoxetine (PROzac) 40 MG capsule Take 1 capsule (40 mg total) by mouth daily 90 capsule 2    folic acid (FOLVITE) 1 mg tablet Take 1 tablet (1 mg total) by mouth daily (Patient not taking: Reported on 3/5/2025)      levETIRAcetam (KEPPRA) 500 mg tablet Take 1 tablet (500 mg total) by mouth daily 90 tablet 1    levothyroxine 88 mcg tablet Take 1 tablet (88 mcg total) by mouth daily 90 tablet 1    metoprolol succinate (TOPROL-XL) 50 mg 24 hr tablet Take 1 tablet (50 mg total) by mouth daily 90 tablet 1    omeprazole (PriLOSEC) 20 mg delayed release capsule Take 1 capsule (20 mg total) by mouth daily 90 capsule 1     No current facility-administered medications for this visit.       Substance Abuse History:    Social History     Substance and Sexual Activity   Alcohol Use Never    Alcohol/week: 0.0 standard drinks of alcohol    Comment: 0     Social History     Substance and Sexual Activity   Drug Use No       Social History:    Social History     Socioeconomic History    Marital status:      Spouse name: Not on file    Number of children: Not on file    Years of education: Not on file    Highest education level: Not on file   Occupational History    Not on file   Tobacco Use    Smoking status: Never    Smokeless tobacco: Never   Vaping Use    Vaping status: Never Used   Substance and Sexual Activity    Alcohol use: Never     Alcohol/week: 0.0 standard drinks of alcohol     Comment: 0    Drug use: No    Sexual activity: Not Currently     Partners: Male     Birth control/protection: Post-menopausal    Other Topics Concern    Not on file   Social History Narrative    Not on file     Social Drivers of Health     Financial Resource Strain: Low Risk  (10/12/2023)    Overall Financial Resource Strain (CARDIA)     Difficulty of Paying Living Expenses: Not hard at all   Food Insecurity: No Food Insecurity (2/24/2025)    Hunger Vital Sign     Worried About Running Out of Food in the Last Year: Never true     Ran Out of Food in the Last Year: Never true   Transportation Needs: No Transportation Needs (2/24/2025)    PRAPARE - Transportation     Lack of Transportation (Medical): No     Lack of Transportation (Non-Medical): No   Physical Activity: Not on file   Stress: Not on file   Social Connections: Feeling Socially Integrated (2/17/2025)    Received from [facility]    OASIS : Social Isolation     Frequency of experiencing loneliness or isolation: Never   Recent Concern: Social Connections - Feeling Socially Isolated (1/25/2025)    Received from [facility]    OASIS : Social Isolation     Frequency of experiencing loneliness or isolation: Often   Intimate Partner Violence: Unknown (1/1/2025)    Nursing IPS     Feels Physically and Emotionally Safe: Not on file     Physically Hurt by Someone: Not on file     Humiliated or Emotionally Abused by Someone: Not on file     Physically Hurt by Someone: No     Hurt or Threatened by Someone: No   Housing Stability: Low Risk  (2/24/2025)    Housing Stability Vital Sign     Unable to Pay for Housing in the Last Year: No     Number of Times Moved in the Last Year: 0     Homeless in the Last Year: No       Family Psychiatric History:     Family History   Problem Relation Age of Onset    Kidney disease Mother     Heart disease Mother     Cancer Father         colon    Heart disease Father     Colon cancer Father     Heart disease Sister     Diabetes Son     Multiple sclerosis Son     No Known Problems Son     Heart disease Maternal Grandmother        Medical History  Reviewed by provider this encounter:  Tobacco  Allergies  Meds  Problems  Med Hx  Surg Hx  Fam Hx          Objective   There were no vitals taken for this visit.     Mental Status Evaluation:    Appearance age appropriate, casually dressed   Behavior cooperative, calm   Speech normal rate, normal volume, normal pitch, spontaneous   Mood slightly less depressed   Affect tearful   Thought Processes organized, goal directed   Thought Content no overt delusions   Perceptual Disturbances: no auditory hallucinations, no visual hallucinations   Abnormal Thoughts  Risk Potential Suicidal ideation - None  Homicidal ideation - None  Potential for aggression - No   Orientation oriented to person, place, and situation   Memory recent memory mildly impaired, remote memory mildly impaired   Consciousness alert and awake   Attention Span Concentration Span attention span and concentration appear shorter than expected for age   Intellect appears to be of average intelligence   Insight intact   Judgement intact   Muscle Strength and  Gait normal muscle strength and normal muscle tone, normal gait and normal balance   Motor activity no abnormal movements   Language no difficulty naming common objects, no difficulty repeating a phrase, no difficulty writing a sentence   Fund of Knowledge adequate knowledge of current events  adequate fund of knowledge regarding past history  adequate fund of knowledge regarding vocabulary    Pain none   Pain Scale 0       Laboratory Results: I have personally reviewed all pertinent laboratory/tests results    Suicide/Homicide Risk Assessment:    Risk of Harm to Self:  The following ratings are based on assessment at the time of the interview  Based on today's assessment, Yesenia presents the following risk of harm to self: none    Risk of Harm to Others:  The following ratings are based on assessment at the time of the interview  Based on today's assessment, Yesenia presents the following risk of  harm to others: none    The following interventions are recommended: Continue medication management. No other intervention changes indicated at this time.    Psychotherapy Provided:     Individual psychotherapy provided: No    Treatment Plan:    Completed and signed during the session: Not applicable - Treatment Plan to be completed by  [facility] therapist    Goals: Progress towards Treatment Plan goals - Yes, progressing, as evidenced by subjective findings in HPI/Subjective Section and in Assessment and Plan Section    Depression Follow-up Plan Completed: Not applicable    Note Share:    This note was not shared with the patient due to reasonable likelihood of causing patient harm    Visit Time  Visit Start Time: 9:00 AM  Visit Stop Time: 9:30 AM  Total Visit Duration:  30 minutes    Jeet Avalos 03/07/25

## 2025-03-07 ENCOUNTER — OFFICE VISIT (OUTPATIENT)
Dept: PSYCHIATRY | Facility: CLINIC | Age: 89
End: 2025-03-07
Payer: COMMERCIAL

## 2025-03-07 DIAGNOSIS — F33.1 MODERATE EPISODE OF RECURRENT MAJOR DEPRESSIVE DISORDER (HCC): Primary | ICD-10-CM

## 2025-03-07 DIAGNOSIS — R41.3 MEMORY DIFFICULTY: ICD-10-CM

## 2025-03-07 PROCEDURE — 99214 OFFICE O/P EST MOD 30 MIN: CPT | Performed by: PHYSICIAN ASSISTANT

## 2025-03-07 PROCEDURE — G2211 COMPLEX E/M VISIT ADD ON: HCPCS | Performed by: PHYSICIAN ASSISTANT

## 2025-03-07 NOTE — ASSESSMENT & PLAN NOTE
Improving but not yet at goal - work on taking fluoxetine 40 mg qd consistently; continue talk therapy; f/u in 1 month

## 2025-03-11 ENCOUNTER — TELEPHONE (OUTPATIENT)
Dept: PSYCHIATRY | Facility: CLINIC | Age: 89
End: 2025-03-11

## 2025-03-11 NOTE — TELEPHONE ENCOUNTER
Referral for [facility] reopened as Pt signed [facility] during her previous appointment with Provider. CM updated referral form and placed it in Provider's mailbox for review and signature.

## 2025-04-01 DIAGNOSIS — F33.1 MODERATE EPISODE OF RECURRENT MAJOR DEPRESSIVE DISORDER (HCC): ICD-10-CM

## 2025-04-01 DIAGNOSIS — E03.9 ACQUIRED HYPOTHYROIDISM: ICD-10-CM

## 2025-04-01 DIAGNOSIS — I48.0 PAROXYSMAL ATRIAL FIBRILLATION (HCC): Chronic | ICD-10-CM

## 2025-04-01 DIAGNOSIS — I10 HTN (HYPERTENSION), BENIGN: ICD-10-CM

## 2025-04-01 DIAGNOSIS — E78.2 MIXED HYPERLIPIDEMIA: ICD-10-CM

## 2025-04-01 DIAGNOSIS — G40.909 SEIZURE DISORDER (HCC): ICD-10-CM

## 2025-04-02 RX ORDER — METOPROLOL SUCCINATE 50 MG/1
50 TABLET, EXTENDED RELEASE ORAL DAILY
Qty: 90 TABLET | Refills: 1 | Status: SHIPPED | OUTPATIENT
Start: 2025-04-02

## 2025-04-02 RX ORDER — ATORVASTATIN CALCIUM 40 MG/1
40 TABLET, FILM COATED ORAL DAILY
Qty: 90 TABLET | Refills: 1 | Status: SHIPPED | OUTPATIENT
Start: 2025-04-02

## 2025-04-02 RX ORDER — LEVOTHYROXINE SODIUM 88 UG/1
88 TABLET ORAL DAILY
Qty: 90 TABLET | Refills: 1 | Status: SHIPPED | OUTPATIENT
Start: 2025-04-02

## 2025-04-02 RX ORDER — CLOPIDOGREL BISULFATE 75 MG/1
75 TABLET ORAL DAILY
Qty: 90 TABLET | Refills: 1 | Status: SHIPPED | OUTPATIENT
Start: 2025-04-02

## 2025-04-02 RX ORDER — LEVETIRACETAM 500 MG/1
500 TABLET ORAL DAILY
Qty: 90 TABLET | Refills: 1 | Status: SHIPPED | OUTPATIENT
Start: 2025-04-02

## 2025-04-02 RX ORDER — FLUOXETINE HYDROCHLORIDE 40 MG/1
40 CAPSULE ORAL DAILY
Qty: 90 CAPSULE | Refills: 1 | Status: SHIPPED | OUTPATIENT
Start: 2025-04-02

## 2025-04-11 ENCOUNTER — TELEPHONE (OUTPATIENT)
Age: 89
End: 2025-04-11

## 2025-04-11 NOTE — TELEPHONE ENCOUNTER
Left message for patient's son, Clarence, to return the call to reschedule 4/18/25 Care Conference cancelled via [facility].

## 2025-05-09 DIAGNOSIS — K21.9 GASTROESOPHAGEAL REFLUX DISEASE WITHOUT ESOPHAGITIS: ICD-10-CM

## 2025-05-09 DIAGNOSIS — I48.0 PAROXYSMAL ATRIAL FIBRILLATION (HCC): Chronic | ICD-10-CM

## 2025-05-10 RX ORDER — OMEPRAZOLE 20 MG/1
20 CAPSULE, DELAYED RELEASE ORAL DAILY
Qty: 90 CAPSULE | Refills: 1 | Status: SHIPPED | OUTPATIENT
Start: 2025-05-10

## 2025-05-11 RX ORDER — CLOPIDOGREL BISULFATE 75 MG/1
75 TABLET ORAL DAILY
Qty: 90 TABLET | Refills: 1 | Status: SHIPPED | OUTPATIENT
Start: 2025-05-11

## 2025-05-11 RX ORDER — FAMOTIDINE 20 MG/1
20 TABLET, FILM COATED ORAL DAILY
Qty: 90 TABLET | Refills: 0 | Status: SHIPPED | OUTPATIENT
Start: 2025-05-11

## 2025-06-25 DIAGNOSIS — G40.909 SEIZURE DISORDER (HCC): ICD-10-CM

## 2025-06-25 DIAGNOSIS — E03.9 ACQUIRED HYPOTHYROIDISM: ICD-10-CM

## 2025-06-25 DIAGNOSIS — I10 HTN (HYPERTENSION), BENIGN: ICD-10-CM

## 2025-06-25 DIAGNOSIS — F33.1 MODERATE EPISODE OF RECURRENT MAJOR DEPRESSIVE DISORDER (HCC): ICD-10-CM

## 2025-06-26 RX ORDER — FLUOXETINE HYDROCHLORIDE 40 MG/1
40 CAPSULE ORAL DAILY
Qty: 90 CAPSULE | Refills: 1 | Status: SHIPPED | OUTPATIENT
Start: 2025-06-26

## 2025-06-26 RX ORDER — LEVETIRACETAM 500 MG/1
500 TABLET ORAL DAILY
Qty: 90 TABLET | Refills: 1 | Status: SHIPPED | OUTPATIENT
Start: 2025-06-26

## 2025-06-26 RX ORDER — LEVOTHYROXINE SODIUM 88 UG/1
88 TABLET ORAL DAILY
Qty: 90 TABLET | Refills: 1 | Status: SHIPPED | OUTPATIENT
Start: 2025-06-26

## 2025-06-26 RX ORDER — METOPROLOL SUCCINATE 50 MG/1
50 TABLET, EXTENDED RELEASE ORAL DAILY
Qty: 90 TABLET | Refills: 1 | Status: SHIPPED | OUTPATIENT
Start: 2025-06-26